# Patient Record
Sex: MALE | Race: WHITE | NOT HISPANIC OR LATINO | Employment: OTHER | URBAN - METROPOLITAN AREA
[De-identification: names, ages, dates, MRNs, and addresses within clinical notes are randomized per-mention and may not be internally consistent; named-entity substitution may affect disease eponyms.]

---

## 2023-09-09 ENCOUNTER — APPOINTMENT (OUTPATIENT)
Dept: GENERAL RADIOLOGY | Facility: HOSPITAL | Age: 88
DRG: 199 | End: 2023-09-09
Payer: MEDICARE

## 2023-09-09 ENCOUNTER — HOSPITAL ENCOUNTER (INPATIENT)
Facility: HOSPITAL | Age: 88
LOS: 20 days | Discharge: NURSING, REHAB OR LONG-TERM CARE | DRG: 199 | End: 2023-09-29
Attending: INTERNAL MEDICINE | Admitting: INTERNAL MEDICINE
Payer: MEDICARE

## 2023-09-09 PROBLEM — J44.1 COPD WITH ACUTE EXACERBATION: Status: ACTIVE | Noted: 2023-09-09

## 2023-09-09 PROBLEM — J96.01 ACUTE RESPIRATORY FAILURE WITH HYPOXIA: Status: ACTIVE | Noted: 2023-09-09

## 2023-09-09 PROBLEM — I46.9 CARDIAC ARREST: Status: ACTIVE | Noted: 2023-09-09

## 2023-09-09 LAB
ALBUMIN SERPL-MCNC: 3.8 G/DL (ref 3.5–5.2)
ALBUMIN/GLOB SERPL: 1.5 G/DL
ALP SERPL-CCNC: 109 U/L (ref 39–117)
ALT SERPL W P-5'-P-CCNC: 30 U/L (ref 1–41)
ANION GAP SERPL CALCULATED.3IONS-SCNC: 18 MMOL/L (ref 5–15)
AST SERPL-CCNC: 19 U/L (ref 1–40)
BILIRUB SERPL-MCNC: 0.3 MG/DL (ref 0–1.2)
BUN SERPL-MCNC: 25 MG/DL (ref 8–23)
BUN/CREAT SERPL: 21.2 (ref 7–25)
CALCIUM SPEC-SCNC: 9.3 MG/DL (ref 8.6–10.5)
CHLORIDE SERPL-SCNC: 106 MMOL/L (ref 98–107)
CO2 SERPL-SCNC: 18 MMOL/L (ref 22–29)
CREAT SERPL-MCNC: 1.18 MG/DL (ref 0.76–1.27)
D-LACTATE SERPL-SCNC: 8.1 MMOL/L (ref 0.5–2)
DEPRECATED RDW RBC AUTO: 47 FL (ref 37–54)
EGFRCR SERPLBLD CKD-EPI 2021: 60.1 ML/MIN/1.73
ERYTHROCYTE [DISTWIDTH] IN BLOOD BY AUTOMATED COUNT: 12.2 % (ref 12.3–15.4)
GLOBULIN UR ELPH-MCNC: 2.6 GM/DL
GLUCOSE SERPL-MCNC: 215 MG/DL (ref 65–99)
HCT VFR BLD AUTO: 36.3 % (ref 37.5–51)
HGB BLD-MCNC: 12.4 G/DL (ref 13–17.7)
INR PPP: 1.04 (ref 0.9–1.1)
LYMPHOCYTES # BLD MANUAL: 0.2 10*3/MM3 (ref 0.7–3.1)
LYMPHOCYTES NFR BLD MANUAL: 1 % (ref 5–12)
MCH RBC QN AUTO: 35.9 PG (ref 26.6–33)
MCHC RBC AUTO-ENTMCNC: 34.2 G/DL (ref 31.5–35.7)
MCV RBC AUTO: 105.2 FL (ref 79–97)
MONOCYTES # BLD: 0.2 10*3/MM3 (ref 0.1–0.9)
NEUTROPHILS # BLD AUTO: 19.61 10*3/MM3 (ref 1.7–7)
NEUTROPHILS NFR BLD MANUAL: 98 % (ref 42.7–76)
NRBC BLD AUTO-RTO: 0 /100 WBC (ref 0–0.2)
NT-PROBNP SERPL-MCNC: 2407 PG/ML (ref 0–1800)
PLAT MORPH BLD: NORMAL
PLATELET # BLD AUTO: 188 10*3/MM3 (ref 140–450)
PMV BLD AUTO: 9.7 FL (ref 6–12)
POTASSIUM SERPL-SCNC: 4.5 MMOL/L (ref 3.5–5.2)
PROCALCITONIN SERPL-MCNC: 0.33 NG/ML (ref 0–0.25)
PROT SERPL-MCNC: 6.4 G/DL (ref 6–8.5)
PROTHROMBIN TIME: 13.7 SECONDS (ref 11.7–14.2)
RBC # BLD AUTO: 3.45 10*6/MM3 (ref 4.14–5.8)
RBC MORPH BLD: NORMAL
SODIUM SERPL-SCNC: 142 MMOL/L (ref 136–145)
TROPONIN T SERPL HS-MCNC: 151 NG/L
VARIANT LYMPHS NFR BLD MANUAL: 1 % (ref 19.6–45.3)
WBC MORPH BLD: NORMAL
WBC NRBC COR # BLD: 20.01 10*3/MM3 (ref 3.4–10.8)

## 2023-09-09 PROCEDURE — 80053 COMPREHEN METABOLIC PANEL: CPT | Performed by: NURSE PRACTITIONER

## 2023-09-09 PROCEDURE — 84145 PROCALCITONIN (PCT): CPT | Performed by: NURSE PRACTITIONER

## 2023-09-09 PROCEDURE — 83605 ASSAY OF LACTIC ACID: CPT | Performed by: NURSE PRACTITIONER

## 2023-09-09 PROCEDURE — 0W9930Z DRAINAGE OF RIGHT PLEURAL CAVITY WITH DRAINAGE DEVICE, PERCUTANEOUS APPROACH: ICD-10-PCS

## 2023-09-09 PROCEDURE — 84484 ASSAY OF TROPONIN QUANT: CPT | Performed by: NURSE PRACTITIONER

## 2023-09-09 PROCEDURE — 93005 ELECTROCARDIOGRAM TRACING: CPT | Performed by: NURSE PRACTITIONER

## 2023-09-09 PROCEDURE — 85025 COMPLETE CBC W/AUTO DIFF WBC: CPT | Performed by: NURSE PRACTITIONER

## 2023-09-09 PROCEDURE — 93010 ELECTROCARDIOGRAM REPORT: CPT | Performed by: STUDENT IN AN ORGANIZED HEALTH CARE EDUCATION/TRAINING PROGRAM

## 2023-09-09 PROCEDURE — 85610 PROTHROMBIN TIME: CPT | Performed by: NURSE PRACTITIONER

## 2023-09-09 PROCEDURE — 71045 X-RAY EXAM CHEST 1 VIEW: CPT

## 2023-09-09 PROCEDURE — 85007 BL SMEAR W/DIFF WBC COUNT: CPT | Performed by: NURSE PRACTITIONER

## 2023-09-09 PROCEDURE — 83880 ASSAY OF NATRIURETIC PEPTIDE: CPT | Performed by: NURSE PRACTITIONER

## 2023-09-09 RX ORDER — ACETAMINOPHEN 160 MG/5ML
650 SOLUTION ORAL EVERY 4 HOURS PRN
Status: DISCONTINUED | OUTPATIENT
Start: 2023-09-09 | End: 2023-09-15

## 2023-09-09 RX ORDER — BISACODYL 5 MG/1
5 TABLET, DELAYED RELEASE ORAL DAILY PRN
Status: DISCONTINUED | OUTPATIENT
Start: 2023-09-09 | End: 2023-09-29 | Stop reason: HOSPADM

## 2023-09-09 RX ORDER — POLYETHYLENE GLYCOL 3350 17 G/17G
17 POWDER, FOR SOLUTION ORAL DAILY PRN
Status: DISCONTINUED | OUTPATIENT
Start: 2023-09-09 | End: 2023-09-29 | Stop reason: HOSPADM

## 2023-09-09 RX ORDER — METHYLPREDNISOLONE SODIUM SUCCINATE 125 MG/2ML
60 INJECTION, POWDER, LYOPHILIZED, FOR SOLUTION INTRAMUSCULAR; INTRAVENOUS EVERY 12 HOURS
Status: DISCONTINUED | OUTPATIENT
Start: 2023-09-09 | End: 2023-09-14

## 2023-09-09 RX ORDER — DEXTROSE MONOHYDRATE 25 G/50ML
25 INJECTION, SOLUTION INTRAVENOUS
Status: DISCONTINUED | OUTPATIENT
Start: 2023-09-09 | End: 2023-09-19

## 2023-09-09 RX ORDER — IPRATROPIUM BROMIDE AND ALBUTEROL SULFATE 2.5; .5 MG/3ML; MG/3ML
3 SOLUTION RESPIRATORY (INHALATION) EVERY 4 HOURS PRN
Status: DISCONTINUED | OUTPATIENT
Start: 2023-09-09 | End: 2023-09-29 | Stop reason: HOSPADM

## 2023-09-09 RX ORDER — MORPHINE SULFATE 2 MG/ML
2 INJECTION, SOLUTION INTRAMUSCULAR; INTRAVENOUS ONCE
Status: COMPLETED | OUTPATIENT
Start: 2023-09-10 | End: 2023-09-10

## 2023-09-09 RX ORDER — NITROGLYCERIN 0.4 MG/1
0.4 TABLET SUBLINGUAL
Status: DISCONTINUED | OUTPATIENT
Start: 2023-09-09 | End: 2023-09-29 | Stop reason: HOSPADM

## 2023-09-09 RX ORDER — BISACODYL 10 MG
10 SUPPOSITORY, RECTAL RECTAL DAILY PRN
Status: DISCONTINUED | OUTPATIENT
Start: 2023-09-09 | End: 2023-09-29 | Stop reason: HOSPADM

## 2023-09-09 RX ORDER — ALUMINA, MAGNESIA, AND SIMETHICONE 2400; 2400; 240 MG/30ML; MG/30ML; MG/30ML
15 SUSPENSION ORAL EVERY 6 HOURS PRN
Status: DISCONTINUED | OUTPATIENT
Start: 2023-09-09 | End: 2023-09-21

## 2023-09-09 RX ORDER — SODIUM CHLORIDE 0.9 % (FLUSH) 0.9 %
10 SYRINGE (ML) INJECTION AS NEEDED
Status: DISCONTINUED | OUTPATIENT
Start: 2023-09-09 | End: 2023-09-19

## 2023-09-09 RX ORDER — UREA 10 %
3 LOTION (ML) TOPICAL NIGHTLY PRN
Status: DISCONTINUED | OUTPATIENT
Start: 2023-09-09 | End: 2023-09-21

## 2023-09-09 RX ORDER — NICOTINE POLACRILEX 4 MG
15 LOZENGE BUCCAL
Status: DISCONTINUED | OUTPATIENT
Start: 2023-09-09 | End: 2023-09-19

## 2023-09-09 RX ORDER — ONDANSETRON 2 MG/ML
4 INJECTION INTRAMUSCULAR; INTRAVENOUS EVERY 6 HOURS PRN
Status: DISCONTINUED | OUTPATIENT
Start: 2023-09-09 | End: 2023-09-29 | Stop reason: HOSPADM

## 2023-09-09 RX ORDER — IBUPROFEN 600 MG/1
1 TABLET ORAL
Status: DISCONTINUED | OUTPATIENT
Start: 2023-09-09 | End: 2023-09-19

## 2023-09-09 RX ORDER — IPRATROPIUM BROMIDE AND ALBUTEROL SULFATE 2.5; .5 MG/3ML; MG/3ML
3 SOLUTION RESPIRATORY (INHALATION)
Status: DISCONTINUED | OUTPATIENT
Start: 2023-09-09 | End: 2023-09-14

## 2023-09-09 RX ORDER — ONDANSETRON 4 MG/1
4 TABLET, FILM COATED ORAL EVERY 6 HOURS PRN
Status: DISCONTINUED | OUTPATIENT
Start: 2023-09-09 | End: 2023-09-29 | Stop reason: HOSPADM

## 2023-09-09 RX ORDER — ACETAMINOPHEN 650 MG/1
650 SUPPOSITORY RECTAL EVERY 4 HOURS PRN
Status: DISCONTINUED | OUTPATIENT
Start: 2023-09-09 | End: 2023-09-29 | Stop reason: HOSPADM

## 2023-09-09 RX ORDER — SODIUM CHLORIDE 9 MG/ML
125 INJECTION, SOLUTION INTRAVENOUS CONTINUOUS
Status: DISCONTINUED | OUTPATIENT
Start: 2023-09-10 | End: 2023-09-11

## 2023-09-09 RX ORDER — INSULIN LISPRO 100 [IU]/ML
2-7 INJECTION, SOLUTION INTRAVENOUS; SUBCUTANEOUS
Status: DISCONTINUED | OUTPATIENT
Start: 2023-09-10 | End: 2023-09-19

## 2023-09-09 RX ORDER — AMOXICILLIN 250 MG
2 CAPSULE ORAL 2 TIMES DAILY
Status: DISCONTINUED | OUTPATIENT
Start: 2023-09-09 | End: 2023-09-29 | Stop reason: HOSPADM

## 2023-09-09 RX ORDER — ACETAMINOPHEN 325 MG/1
650 TABLET ORAL EVERY 4 HOURS PRN
Status: DISCONTINUED | OUTPATIENT
Start: 2023-09-09 | End: 2023-09-15

## 2023-09-10 ENCOUNTER — APPOINTMENT (OUTPATIENT)
Dept: GENERAL RADIOLOGY | Facility: HOSPITAL | Age: 88
DRG: 199 | End: 2023-09-10
Payer: MEDICARE

## 2023-09-10 PROBLEM — J93.9 PNEUMOTHORAX ON RIGHT: Status: ACTIVE | Noted: 2023-09-10

## 2023-09-10 PROBLEM — J96.21 ACUTE ON CHRONIC RESPIRATORY FAILURE WITH HYPOXIA: Status: ACTIVE | Noted: 2023-09-10

## 2023-09-10 LAB
ANION GAP SERPL CALCULATED.3IONS-SCNC: 9.4 MMOL/L (ref 5–15)
B PARAPERT DNA SPEC QL NAA+PROBE: NOT DETECTED
B PERT DNA SPEC QL NAA+PROBE: NOT DETECTED
BUN SERPL-MCNC: 29 MG/DL (ref 8–23)
BUN/CREAT SERPL: 29.3 (ref 7–25)
C PNEUM DNA NPH QL NAA+NON-PROBE: NOT DETECTED
CALCIUM SPEC-SCNC: 9.2 MG/DL (ref 8.6–10.5)
CHLORIDE SERPL-SCNC: 107 MMOL/L (ref 98–107)
CO2 SERPL-SCNC: 22.6 MMOL/L (ref 22–29)
CREAT SERPL-MCNC: 0.99 MG/DL (ref 0.76–1.27)
D-LACTATE SERPL-SCNC: 1 MMOL/L (ref 0.5–2)
D-LACTATE SERPL-SCNC: 2.2 MMOL/L (ref 0.5–2)
D-LACTATE SERPL-SCNC: 2.9 MMOL/L (ref 0.5–2)
D-LACTATE SERPL-SCNC: 4.5 MMOL/L (ref 0.5–2)
DEPRECATED RDW RBC AUTO: 47.4 FL (ref 37–54)
EGFRCR SERPLBLD CKD-EPI 2021: 74.2 ML/MIN/1.73
ERYTHROCYTE [DISTWIDTH] IN BLOOD BY AUTOMATED COUNT: 12.2 % (ref 12.3–15.4)
FLUAV SUBTYP SPEC NAA+PROBE: NOT DETECTED
FLUBV RNA ISLT QL NAA+PROBE: NOT DETECTED
GEN 5 2HR TROPONIN T REFLEX: 116 NG/L
GLUCOSE BLDC GLUCOMTR-MCNC: 136 MG/DL (ref 70–130)
GLUCOSE BLDC GLUCOMTR-MCNC: 138 MG/DL (ref 70–130)
GLUCOSE BLDC GLUCOMTR-MCNC: 143 MG/DL (ref 70–130)
GLUCOSE BLDC GLUCOMTR-MCNC: 156 MG/DL (ref 70–130)
GLUCOSE BLDC GLUCOMTR-MCNC: 163 MG/DL (ref 70–130)
GLUCOSE SERPL-MCNC: 173 MG/DL (ref 65–99)
HADV DNA SPEC NAA+PROBE: NOT DETECTED
HCOV 229E RNA SPEC QL NAA+PROBE: NOT DETECTED
HCOV HKU1 RNA SPEC QL NAA+PROBE: NOT DETECTED
HCOV NL63 RNA SPEC QL NAA+PROBE: NOT DETECTED
HCOV OC43 RNA SPEC QL NAA+PROBE: NOT DETECTED
HCT VFR BLD AUTO: 33.4 % (ref 37.5–51)
HGB BLD-MCNC: 11.4 G/DL (ref 13–17.7)
HMPV RNA NPH QL NAA+NON-PROBE: NOT DETECTED
HPIV1 RNA ISLT QL NAA+PROBE: NOT DETECTED
HPIV2 RNA SPEC QL NAA+PROBE: NOT DETECTED
HPIV3 RNA NPH QL NAA+PROBE: NOT DETECTED
HPIV4 P GENE NPH QL NAA+PROBE: NOT DETECTED
L PNEUMO1 AG UR QL IA: NEGATIVE
M PNEUMO IGG SER IA-ACNC: NOT DETECTED
MCH RBC QN AUTO: 35.7 PG (ref 26.6–33)
MCHC RBC AUTO-ENTMCNC: 34.1 G/DL (ref 31.5–35.7)
MCV RBC AUTO: 104.7 FL (ref 79–97)
PLATELET # BLD AUTO: 180 10*3/MM3 (ref 140–450)
PMV BLD AUTO: 9.9 FL (ref 6–12)
POTASSIUM SERPL-SCNC: 4.8 MMOL/L (ref 3.5–5.2)
QT INTERVAL: 339 MS
QTC INTERVAL: 467 MS
RBC # BLD AUTO: 3.19 10*6/MM3 (ref 4.14–5.8)
RHINOVIRUS RNA SPEC NAA+PROBE: DETECTED
RSV RNA NPH QL NAA+NON-PROBE: NOT DETECTED
S PNEUM AG SPEC QL LA: NEGATIVE
SARS-COV-2 RNA NPH QL NAA+NON-PROBE: NOT DETECTED
SODIUM SERPL-SCNC: 139 MMOL/L (ref 136–145)
TROPONIN T DELTA: -35 NG/L
WBC NRBC COR # BLD: 21.54 10*3/MM3 (ref 3.4–10.8)

## 2023-09-10 PROCEDURE — 25010000002 MORPHINE PER 10 MG: Performed by: INTERNAL MEDICINE

## 2023-09-10 PROCEDURE — 80048 BASIC METABOLIC PNL TOTAL CA: CPT | Performed by: INTERNAL MEDICINE

## 2023-09-10 PROCEDURE — 87449 NOS EACH ORGANISM AG IA: CPT | Performed by: INTERNAL MEDICINE

## 2023-09-10 PROCEDURE — 94640 AIRWAY INHALATION TREATMENT: CPT

## 2023-09-10 PROCEDURE — 94664 DEMO&/EVAL PT USE INHALER: CPT

## 2023-09-10 PROCEDURE — 83605 ASSAY OF LACTIC ACID: CPT | Performed by: NURSE PRACTITIONER

## 2023-09-10 PROCEDURE — 0202U NFCT DS 22 TRGT SARS-COV-2: CPT | Performed by: NURSE PRACTITIONER

## 2023-09-10 PROCEDURE — 71045 X-RAY EXAM CHEST 1 VIEW: CPT

## 2023-09-10 PROCEDURE — 25010000002 METHYLPREDNISOLONE PER 125 MG: Performed by: INTERNAL MEDICINE

## 2023-09-10 PROCEDURE — 94799 UNLISTED PULMONARY SVC/PX: CPT

## 2023-09-10 PROCEDURE — 85027 COMPLETE CBC AUTOMATED: CPT | Performed by: INTERNAL MEDICINE

## 2023-09-10 PROCEDURE — 94761 N-INVAS EAR/PLS OXIMETRY MLT: CPT

## 2023-09-10 PROCEDURE — 94760 N-INVAS EAR/PLS OXIMETRY 1: CPT

## 2023-09-10 PROCEDURE — 25010000002 MORPHINE PER 10 MG

## 2023-09-10 PROCEDURE — 84484 ASSAY OF TROPONIN QUANT: CPT | Performed by: NURSE PRACTITIONER

## 2023-09-10 PROCEDURE — 82948 REAGENT STRIP/BLOOD GLUCOSE: CPT

## 2023-09-10 PROCEDURE — 0W9930Z DRAINAGE OF RIGHT PLEURAL CAVITY WITH DRAINAGE DEVICE, PERCUTANEOUS APPROACH: ICD-10-PCS

## 2023-09-10 PROCEDURE — 99221 1ST HOSP IP/OBS SF/LOW 40: CPT | Performed by: STUDENT IN AN ORGANIZED HEALTH CARE EDUCATION/TRAINING PROGRAM

## 2023-09-10 PROCEDURE — 87040 BLOOD CULTURE FOR BACTERIA: CPT | Performed by: INTERNAL MEDICINE

## 2023-09-10 RX ORDER — PREDNISONE 1 MG/1
2 TABLET ORAL DAILY
COMMUNITY
End: 2023-09-29 | Stop reason: HOSPADM

## 2023-09-10 RX ORDER — ASPIRIN 81 MG/1
81 TABLET ORAL DAILY
Status: DISCONTINUED | OUTPATIENT
Start: 2023-09-10 | End: 2023-09-15

## 2023-09-10 RX ORDER — MIRTAZAPINE 15 MG/1
15 TABLET, ORALLY DISINTEGRATING ORAL NIGHTLY
Status: DISCONTINUED | OUTPATIENT
Start: 2023-09-10 | End: 2023-09-25

## 2023-09-10 RX ORDER — TAMSULOSIN HYDROCHLORIDE 0.4 MG/1
2 CAPSULE ORAL DAILY
COMMUNITY

## 2023-09-10 RX ORDER — TAMSULOSIN HYDROCHLORIDE 0.4 MG/1
0.8 CAPSULE ORAL DAILY
Status: DISCONTINUED | OUTPATIENT
Start: 2023-09-10 | End: 2023-09-29 | Stop reason: HOSPADM

## 2023-09-10 RX ORDER — ATORVASTATIN CALCIUM 40 MG/1
40 TABLET, FILM COATED ORAL DAILY
COMMUNITY

## 2023-09-10 RX ORDER — MORPHINE SULFATE 2 MG/ML
1 INJECTION, SOLUTION INTRAMUSCULAR; INTRAVENOUS
Status: DISPENSED | OUTPATIENT
Start: 2023-09-10 | End: 2023-09-17

## 2023-09-10 RX ORDER — MORPHINE SULFATE 2 MG/ML
2 INJECTION, SOLUTION INTRAMUSCULAR; INTRAVENOUS ONCE
Status: COMPLETED | OUTPATIENT
Start: 2023-09-10 | End: 2023-09-10

## 2023-09-10 RX ORDER — ALLOPURINOL 300 MG/1
300 TABLET ORAL DAILY
COMMUNITY

## 2023-09-10 RX ORDER — ASPIRIN 81 MG/1
81 TABLET, CHEWABLE ORAL DAILY
COMMUNITY

## 2023-09-10 RX ORDER — ATORVASTATIN CALCIUM 20 MG/1
40 TABLET, FILM COATED ORAL DAILY
Status: DISCONTINUED | OUTPATIENT
Start: 2023-09-10 | End: 2023-09-29 | Stop reason: HOSPADM

## 2023-09-10 RX ORDER — MORPHINE SULFATE 2 MG/ML
INJECTION, SOLUTION INTRAMUSCULAR; INTRAVENOUS
Status: COMPLETED
Start: 2023-09-10 | End: 2023-09-10

## 2023-09-10 RX ORDER — CYPROHEPTADINE HYDROCHLORIDE 4 MG/1
4 TABLET ORAL DAILY PRN
COMMUNITY
End: 2023-09-29 | Stop reason: HOSPADM

## 2023-09-10 RX ORDER — MIRTAZAPINE 15 MG/1
15 TABLET, ORALLY DISINTEGRATING ORAL NIGHTLY
COMMUNITY
End: 2023-09-29 | Stop reason: HOSPADM

## 2023-09-10 RX ORDER — ASCORBIC ACID 500 MG
500 TABLET ORAL DAILY
COMMUNITY

## 2023-09-10 RX ADMIN — SENNOSIDES AND DOCUSATE SODIUM 2 TABLET: 50; 8.6 TABLET ORAL at 21:30

## 2023-09-10 RX ADMIN — IPRATROPIUM BROMIDE AND ALBUTEROL SULFATE 3 ML: 2.5; .5 SOLUTION RESPIRATORY (INHALATION) at 15:25

## 2023-09-10 RX ADMIN — MORPHINE SULFATE 2 MG: 2 INJECTION, SOLUTION INTRAMUSCULAR; INTRAVENOUS at 03:43

## 2023-09-10 RX ADMIN — MIRTAZAPINE 15 MG: 15 TABLET, ORALLY DISINTEGRATING ORAL at 21:30

## 2023-09-10 RX ADMIN — IPRATROPIUM BROMIDE AND ALBUTEROL SULFATE 3 ML: 2.5; .5 SOLUTION RESPIRATORY (INHALATION) at 19:21

## 2023-09-10 RX ADMIN — MORPHINE SULFATE 1 MG: 2 INJECTION, SOLUTION INTRAMUSCULAR; INTRAVENOUS at 18:36

## 2023-09-10 RX ADMIN — IPRATROPIUM BROMIDE AND ALBUTEROL SULFATE 3 ML: 2.5; .5 SOLUTION RESPIRATORY (INHALATION) at 07:29

## 2023-09-10 RX ADMIN — IPRATROPIUM BROMIDE AND ALBUTEROL SULFATE 3 ML: 2.5; .5 SOLUTION RESPIRATORY (INHALATION) at 02:59

## 2023-09-10 RX ADMIN — IPRATROPIUM BROMIDE AND ALBUTEROL SULFATE 3 ML: 2.5; .5 SOLUTION RESPIRATORY (INHALATION) at 11:03

## 2023-09-10 RX ADMIN — METHYLPREDNISOLONE SODIUM SUCCINATE 60 MG: 125 INJECTION, POWDER, FOR SOLUTION INTRAMUSCULAR; INTRAVENOUS at 00:48

## 2023-09-10 RX ADMIN — SODIUM CHLORIDE 1000 ML: 9 INJECTION, SOLUTION INTRAVENOUS at 00:48

## 2023-09-10 RX ADMIN — ASPIRIN 81 MG: 81 TABLET, CHEWABLE ORAL at 12:36

## 2023-09-10 RX ADMIN — METHYLPREDNISOLONE SODIUM SUCCINATE 60 MG: 125 INJECTION, POWDER, FOR SOLUTION INTRAMUSCULAR; INTRAVENOUS at 12:36

## 2023-09-10 RX ADMIN — MORPHINE SULFATE 2 MG: 2 INJECTION, SOLUTION INTRAMUSCULAR; INTRAVENOUS at 00:10

## 2023-09-10 RX ADMIN — SODIUM CHLORIDE 125 ML/HR: 9 INJECTION, SOLUTION INTRAVENOUS at 02:03

## 2023-09-10 RX ADMIN — MORPHINE SULFATE 1 MG: 2 INJECTION, SOLUTION INTRAMUSCULAR; INTRAVENOUS at 06:39

## 2023-09-10 RX ADMIN — ACETAMINOPHEN 650 MG: 325 TABLET, FILM COATED ORAL at 21:30

## 2023-09-10 RX ADMIN — TAMSULOSIN HYDROCHLORIDE 0.8 MG: 0.4 CAPSULE ORAL at 12:36

## 2023-09-10 RX ADMIN — ATORVASTATIN CALCIUM 40 MG: 20 TABLET, FILM COATED ORAL at 12:36

## 2023-09-10 NOTE — PROGRESS NOTES
DAILY PROGRESS NOTE  [facility]    Patient Identification:  Name: Gabriel Lan  Age: 87 y.o.  Sex: male  :  1935  MRN: [id number]         Primary Care Physician: Provider, No Known    Subjective:  Interval History: Rib pains his main complaint.  He feels he is breathing a bit easier.  He is able to sit up and have some simple conversations with you.  He is son at bedside/supportive.  No issues with emesis or fever or chills    Objective: Elderly and quite frail but seems to be making a rally    Scheduled Meds:insulin lispro, 2-7 Units, Subcutaneous, 4x Daily AC & at Bedtime  ipratropium-albuterol, 3 mL, Nebulization, 4x Daily - RT  methylPREDNISolone sodium succinate, 60 mg, Intravenous, Q12H  senna-docusate sodium, 2 tablet, Oral, BID      Continuous Infusions:sodium chloride, 125 mL/hr, Last Rate: 125 mL/hr (09/10/23 0203)        Vital signs in last 24 hours:  Temp:  [97.5 °F (36.4 °C)-97.9 °F (36.6 °C)] 97.9 °F (36.6 °C)  Heart Rate:  [] 73  Resp:  [16-28] 16  BP: (149-183)/(65-99) 162/65    Intake/Output:  No intake or output data in the 24 hours ending 09/10/23 1032    Exam:  /65 (BP Location: Left arm, Patient Position: Lying)   Pulse 73   Temp 97.9 °F (36.6 °C) (Oral)   Resp 16   SpO2 98%     General Appearance:    Alert, cooperative, nontoxic, hard of hearing                         Throat:   Oral mucosa pink and moist                           Neck:   No JVD                         Lungs:    Diffuse wheeze and rhonchi and crepitus throughout to auscultation bilaterally, respirations unlabored with simple conversation                 Chest Wall:  Chest tube to right side with tenderness to palpation over fractures                          Heart:    Regular rate and rhythm, S1 and S2 normal                  Abdomen:     Soft, nontender, bowel sounds active                 Extremities:   Nno cyanosis or edema                        Pulses:   Pulses palpable in all  extremities                  Neurologic:   CNII-XII intact     Data Review:  Labs in chart were reviewed.    Assessment:  Active Hospital Problems    Diagnosis  POA    **COPD with acute exacerbation [J44.1]  Yes    Pneumothorax on right [J93.9]  Unknown    Cardiac arrest [I46.9]  Unknown    Acute respiratory failure with hypoxia [J96.01]  Unknown      Resolved Hospital Problems   No resolved problems to display.       Plan:    Rhinovirus with COPD acute exacerbation with large right pneumothorax status post chest tube per pulmonary.  Pulmonary considering nerve block for better pain control   -Serial chest x-ray demonstrating improvement   -Solu-Medrol nebs    Status postcardiac arrest with subsequent multiple rib fractures    Acute hypoxic respiratory failure on supplemental O2 but will wean accordingly    PAF-RC with recent cardiac arrest -cardiology consult placed    Lactic acidosis with no source of infection    Home MAR reviewed -multiple meds resumed    Keo Houston MD  9/10/2023  10:32 EDT

## 2023-09-10 NOTE — PROGRESS NOTES
LAZARO Vizcarra    [address]    9/10/2023    Patient ID:  Name:  Gabriel Lan  MRN:  [id number]  1935  87 y.o.  male            CC/Reason for visit: Large right-sided pneumothorax, COPD exacerbation, acute hypoxic respiratory failure, status post cardiac arrest    HPI:  Patient admitted last night after cardiac arrest.  Chest x-ray revealed large right-sided pneumothorax.  Chest tube placement attempted with significant pain-attempted to redirect without success.  Chest tube removed with plans for repeat imaging.  Post chest tube placement chest x-ray showed reinflation of right lung.    Called urgently to bedside due to patient impending doom, wheezing, complaining of shortness of breath.  Chest x-ray ordered stat showing collapse of right lung.  Assessment revealed subcutaneous emphysema around old chest tube placement site.    ROS:  Positive for shortness of breath, chest pain.    Vitals:  Vitals:    09/10/23 0200 09/10/23 0300 09/10/23 0312 09/10/23 0323   BP:   170/99 174/91   BP Location:       Patient Position:       Pulse: 79 106 113    Resp:  28 28    Temp:       TempSrc:       SpO2: 97% 93% 93%            There is no height or weight on file to calculate BMI.  No intake or output data in the 24 hours ending 09/10/23 0347    Exam:  Constitutional: Elderly male pt in bed, + acute respiratory distress, + accessory muscle use  Head: - NCAT  Eyes: No pallor, Anicteric conjunctiva, EOMI.  ENMT:  Mallampati 3, no oral thrush. Dry MM.   NECK: Trachea midline, No thyromegaly, no palpable cervical lymphadenopathy  Heart: Tachycardic rate, regular rhythm, no murmur. No pedal edema   Lungs: MARCELA +, + wheezing, diminished lung sounds over right side, subcutaneous air around lateral right side.  Abdomen: Soft. No tenderness, guarding or rigidity. No palpable masses  Extremities: Extremities warm and well perfused. No cyanosis/ clubbing  Neuro: Conscious, answers appropriately, no gross  focal neuro deficits  Psych: Mood and affect appropriate, anxious    PPE recommended per [facility] infectious disease Isolation protocol for the current clinical scenario(as mentioned below) was followed.      Scheduled meds:  insulin lispro, 2-7 Units, Subcutaneous, 4x Daily AC & at Bedtime  ipratropium-albuterol, 3 mL, Nebulization, 4x Daily - RT  methylPREDNISolone sodium succinate, 60 mg, Intravenous, Q12H  senna-docusate sodium, 2 tablet, Oral, BID      IV meds:                      sodium chloride, 125 mL/hr, Last Rate: 125 mL/hr (09/10/23 0203)        Data Review:   I reviewed the patient's medications and new clinical results.  Lab Results   Component Value Date    CALCIUM 9.3 09/09/2023     Results from last 7 days   Lab Units 09/09/23  2218   SODIUM mmol/L 142   POTASSIUM mmol/L 4.5   CHLORIDE mmol/L 106   CO2 mmol/L 18.0*   BUN mg/dL 25*   CREATININE mg/dL 1.18   CALCIUM mg/dL 9.3   BILIRUBIN mg/dL 0.3   ALK PHOS U/L 109   ALT (SGPT) U/L 30   AST (SGOT) U/L 19   GLUCOSE mg/dL 215*   WBC 10*3/mm3 20.01*   HEMOGLOBIN g/dL 12.4*   PLATELETS 10*3/mm3 188   INR  1.04   PROBNP pg/mL 2,407.0*   PROCALCITONIN ng/mL 0.33*         Microbiology Results (last 10 days)       Procedure Component Value - Date/Time    Respiratory Panel PCR w/COVID-19(SARS-CoV-2) ALLA/RIMA/CHIDI/PAD/COR/MAD/LAURA In-House, NP Swab in UTM/[facility], 3-4 HR TAT - Swab, Nasopharynx [[id number]]  (Abnormal) Collected: 09/10/23 0102    Lab Status: Final result Specimen: Swab from Nasopharynx Updated: 09/10/23 0224     ADENOVIRUS, PCR Not Detected     Coronavirus 229E Not Detected     Coronavirus HKU1 Not Detected     Coronavirus NL63 Not Detected     Coronavirus OC43 Not Detected     COVID19 Not Detected     Human Metapneumovirus Not Detected     Human Rhinovirus/Enterovirus Detected     Influenza A PCR Not Detected     Influenza B PCR Not Detected     Parainfluenza Virus 1 Not Detected     Parainfluenza Virus 2 Not Detected     Parainfluenza Virus  3 Not Detected     Parainfluenza Virus 4 Not Detected     RSV, PCR Not Detected     Bordetella pertussis pcr Not Detected     Bordetella parapertussis PCR Not Detected     Chlamydophila pneumoniae PCR Not Detected     Mycoplasma pneumo by PCR Not Detected    Narrative:      In the setting of a positive respiratory panel with a viral infection PLUS a negative procalcitonin without other underlying concern for bacterial infection, consider observing off antibiotics or discontinuation of antibiotics and continue supportive care. If the respiratory panel is positive for atypical bacterial infection (Bordetella pertussis, Chlamydophila pneumoniae, or Mycoplasma pneumoniae), consider antibiotic de-escalation to target atypical bacterial infection.            Results from last 7 days   Lab Units 09/09/23  2218   HSTROP T ng/L 151*           CrCl cannot be calculated (Unknown ideal weight.).      ASSESSMENT:   COPD, acute exacerbation  Rhinovirus infection  Acute respiratory failure with hypoxia  Large right sided pneumothorax  Right sided rib fractures, acute versus subacute  Hypertension     PLAN:  Chest x-ray performed stat showing collapse of the right lung.  Patient just received his as needed albuterol treatment for wheezing.  Oxygen saturation remained above 94% on 4 L nasal cannula.  Discussed plan of care with patient and son at bedside-they would like to proceed with replacing chest tube for right-sided pneumothorax.  Risks and indication for procedure discussed.    See separate procedure note.    Transfer patient to vascular thoracic floor for chest tube management.  Morphine 1 mg every 2 hours ordered for severe pain.  Keep chest tube at -20 of suction for now.    Total critical care time: 25 minutes    While I was in the room and during my examination of the patient I were gown, gloves, mask, eye protection and washed my hands before and after the encounter.  Proper enhanced droplet precautions precautions and  isolation precautions were taken.    Edith Shearer, APRN  9/10/2023

## 2023-09-10 NOTE — PROCEDURES
Chest Tube Insertion Procedure Note    Indications:  Clinically significant Pneumothorax    Pre-op Diagnosis:   Large right pneumothorax    Post-Op Diagnosis Codes:   Same as above.    Anesthesia: Local    Procedure Details:   Informed consent was obtained for the procedure, including sedation.  Risks of lung perforation, hemorrhage, arrhythmia, and adverse drug reaction were discussed.     After sterile skin prep, using standard technique, a 14 Lithuanian tube was placed in the right lateral 5th rib space.    Findings:  None    Estimated Blood Loss:  Minimal           Specimens: None                Complications:  Patient did not tolerate procedure secondary to significant pain after insertion. CXR confirmed placement and reexpansion of right lung. Air leak present in chest tube drainage. Plan to redirect chest tube- however unable to due to patient agitation and significant pain. CT removed.           Disposition: Remain on floor.           Condition: stable

## 2023-09-10 NOTE — PLAN OF CARE
Goal Outcome Evaluation:  Plan of Care Reviewed With: patient        Progress: no change  Outcome Evaluation: Pt admitted from [facility] hospital with SOB and s/p cardiac arrest due to reaction to rocephin. Patient arrived on Bipap, alert and oriented. O2 titrated down to 4L. Patient alert and oriented, son at bedside. Chest xray showed large right pneumo. Chest tube attempted to be place 2 times but patient unable to tolerate due to pain. Lung reinflated after chest tube attempt. Told to monitor for distress. Patient showed signs of increased work of breathing and increase in wheezing. Stat chest xray showed another large right pneumno. Pulmonary to bedside and chest tube was place successfully. Patient tolerated well. Transfer to cardiac floor.

## 2023-09-10 NOTE — CONSULTS
CONSULT NOTE    Patient Identification:  Gabriel Lan  87 y.o.  male  1935  [id number]            Requesting physician: Dr. Rueda    Reason for Consultation: COPD exacerbation, hypoxia    CC: Shortness of breath, cough    History of Present Illness:  Gabriel Lan is an 87-year-old male with a past medical history of asthma and hypertension.  He denies history of stroke, coronary artery disease, diabetes.  He is a lifelong non-smoker.  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    He presented to an outside emergency room on 9/9/2023 with complaints of 2 days of shortness of breath and productive cough.  Patient reports he has not slept in 2 days because he has been coughing continuously.  He reports some wheezing, not improved by his albuterol nebulizer.  At the outpatient hospital, he was diagnosed with COPD exacerbation and presumed pneumonia.  He was started on Rocephin and subsequently thereafter developed cardiac arrest.  He received 1 round of CPR with ROSC.  Upon ROSC, EKG showed rapid atrial fibrillation.  He was started on BiPAP therapy with significant improvement in clinical condition and patient was transferred to [facility] as outside hospital not have cardiology coverage.  He is currently on 4 L nasal cannula (not on oxygen at home), sounds hoarse, reports chest soreness from CPR, endorses productive cough with white phlegm.    ED evaluation revealed high-sensitivity troponin of 151, proBNP of 2407, serum bicarbonate 18.0, lactate 8.1, procalcitonin 0.33, WBC 20.01.  Chest x-ray upon arrival to [facility] showed a large right-sided pneumothorax with left-sided mediastinal shift.    Review of Systems:  Constitutional: Negative for fever, chills, unexpected weight change. Positive for fatigue, denies weakness.  HEENT: Negative for congestion, sinus pain or pressure.  Respiratory: Positive for productive cough, shortness of breath,  wheezing.  Cardiac: Positive for chest wall discomfort.  Negative for palpitations or leg swelling.  GI: Negative for abdominal distention, constipation, diarrhea, nausea or vomiting.  : Negative for dysuria, frequency, urgency or flank pain.  Musculoskeletal: Negative for arthralgias or myalgias.  Neuro: Negative for dizziness, lightheadedness, weakness or headache.    Past Medical History:  No past medical history on file.    Past Surgical History:  No past surgical history on file.     Home Meds:  No medications prior to admission.       Allergies:  Allergies   Allergen Reactions    Rocephin [Ceftriaxone] Anaphylaxis    Iodinated Contrast Media Arrhythmia and Unknown - High Severity       Social History:   Social History     Socioeconomic History    Marital status:        Family History:  No family history on file.    Physical Exam:  /85 (BP Location: Right arm, Patient Position: Lying)   Pulse 110   Resp 24   SpO2 90%  There is no height or weight on file to calculate BMI. 90%      Physical Exam:  Constitutional: Elderly male pt in bed, No acute respiratory distress, no accessory muscle use  Head: - NCAT  Eyes: No pallor, Anicteric conjunctiva, EOMI.  ENMT:  Mallampati 3, no oral thrush. Dry MM.   NECK: Trachea midline, No thyromegaly, no palpable cervical lymphadenopathy  Heart: RRR, no murmur. No pedal edema   Lungs: MARCELA +, Clear left lung, diminished/ absent breath sounds over right lung  Abdomen: Soft. No tenderness, guarding or rigidity. No palpable masses  Extremities: Extremities warm and well perfused. No cyanosis/ clubbing  Neuro: Conscious, answers appropriately, no gross focal neuro deficits  Psych: Mood and affect appropriate    PPE recommended per [facility] infectious disease Isolation protocol for the current clinical scenario(as mentioned below) was followed.      LABS:  No results found for: COVID19    Lab Results   Component Value Date    CALCIUM 9.3 09/09/2023      Results from last 7 days   Lab Units 09/09/23  2218   SODIUM mmol/L 142   POTASSIUM mmol/L 4.5   CHLORIDE mmol/L 106   CO2 mmol/L 18.0*   BUN mg/dL 25*   CREATININE mg/dL 1.18   GLUCOSE mg/dL 215*   CALCIUM mg/dL 9.3   WBC 10*3/mm3 20.01*   HEMOGLOBIN g/dL 12.4*   PLATELETS 10*3/mm3 188   ALT (SGPT) U/L 30   AST (SGOT) U/L 19   PROBNP pg/mL 2,407.0*   PROCALCITONIN ng/mL 0.33*     No results found for: CKTOTAL, CKMB, CKMBINDEX, TROPONINI, TROPONINT          Results from last 7 days   Lab Units 09/09/23  2218   PROCALCITONIN ng/mL 0.33*             Results from last 7 days   Lab Units 09/09/23  2218   INR  1.04         No results found for: TSH  CrCl cannot be calculated (Unknown ideal weight.).         Imaging: I personally visualized the images of scans/x-rays performed within last 3 days.      Assessment:  COPD, acute exacerbation  Rhinovirus infection  Acute respiratory failure with hypoxia  Large right sided pneumothorax  Right sided rib fractures, acute versus subacute  Hypertension    Recommendations:  Scheduled DuoNebs.  Continue IV Solu-Medrol.  Continue supplemental oxygen to maintain SPO2 greater than 88%.  Chest x-ray reviewed showing a large right-sided pneumothorax, recommend urgent chest tube placement at bedside.    Addendum:  Chest tube placed on right side, patient had significant pain.  Confirmed position with chest x-ray and air leak in atrium.  Attempted to redirect tube without success, chest tube removed and site sealed with occlusive dressing.  Plan for repeat chest x-ray in 4 hours.  Call rapid response/intensivist if patient develops worsening hypoxia, tachycardia, hypertension.    Patient was placed in face mask upon entering room and kept mask on throughout our encounter. I wore full protective equipment throughout this patient encounter including a face mask, gown and gloves. Hand hygiene was performed before donning protective equipment and after removal when leaving the  room.    Edith Shearer, APRN  9/9/2023  23:03 EDT      Much of this encounter note is an electronic transcription/translation of spoken language to printed text using Dragon Software.

## 2023-09-10 NOTE — PROCEDURES
Chest Tube Insertion Procedure Note    Indications:  Clinically significant Pneumothorax    Pre-op Diagnosis:   Large right-sided pneumothorax    Post-Op Diagnosis Codes:  Same as above    Anesthesia: Local    Procedure Details:   Informed consent was obtained for the procedure, including sedation.  Risks of lung perforation, hemorrhage, arrhythmia, and adverse drug reaction were discussed.     After sterile skin prep, using standard technique, a 14 Romansh tube was placed in the right lateral fourth rib space.    Findings:  None    Estimated Blood Loss:  Minimal           Specimens: None                Complications:  None; patient tolerated the procedure well.           Disposition: Vascular thoracic floor           Condition: stable

## 2023-09-10 NOTE — PROGRESS NOTES
LOS: 1 day   Patient Care Team:  Provider, No Known as PCP - General    Subjective     Picking up pulmonary coverage from Dr. Poole have reviewed his nurse practitioner's notes and other records.  Patient presented to outside hospital with respiratory distress COPD exacerbation he separately been found to have an RSV infection he developed a cardiac arrest at the outside facility and post resuscitation some paroxysmal A-fib.  X-rays here revealed a right pneumothorax he had a chest tube placed had severe pain had the tube removed lung very collapsed and had to replace the chest tube.  he is having some pain in his right anterior chest does hurt to move or breathe  Review of Systems:          Objective     Vital Signs  Vital Sign Min/Max for last 24 hours  Temp  Min: 97.5 °F (36.4 °C)  Max: 97.9 °F (36.6 °C)   BP  Min: 149/85  Max: 183/80   Pulse  Min: 73  Max: 113   Resp  Min: 16  Max: 28   SpO2  Min: 90 %  Max: 98 %   Flow (L/min)  Min: 4  Max: 7   No data recorded        Ventilator/Non-Invasive Ventilation Settings (From admission, onward)      None                         There is no height or weight on file to calculate BMI.  No intake/output data recorded.  No intake/output data recorded.        Physical Exam:  General Appearance: Well-developed elderly male resting in bed he was sleeping on my arrival and not in acute distress  Eyes: Conjunctiva are clear and anicteric  ENT: Mucous membranes are little dry no erythema no exudates  Neck: No jugular venous tension, trachea midline  Lungs: Right chest tube with small air leak he has some crepitance across the right chest the left is clear  Cardiac: Regular rate rhythm no murmur  Abdomen: Soft no palpable hepatosplenomegaly or masses  : Not examined  Musculoskeletal: Grossly normal  Skin: Warm and dry no jaundice no petechiae  Neuro: He is alert he is very hard of hearing but he is cooperative  Extremities/P Vascular: No clubbing no cyanosis no edema  palpable radial and dorsalis pedis pulses bilaterally  MSE: Appropriate       Labs:  Results from last 7 days   Lab Units 09/09/23 2218   GLUCOSE mg/dL 215*   SODIUM mmol/L 142   POTASSIUM mmol/L 4.5   CO2 mmol/L 18.0*   CHLORIDE mmol/L 106   ANION GAP mmol/L 18.0*   CREATININE mg/dL 1.18   BUN mg/dL 25*   BUN / CREAT RATIO  21.2   CALCIUM mg/dL 9.3   ALK PHOS U/L 109   TOTAL PROTEIN g/dL 6.4   ALT (SGPT) U/L 30   AST (SGOT) U/L 19   BILIRUBIN mg/dL 0.3   ALBUMIN g/dL 3.8   GLOBULIN gm/dL 2.6     CrCl cannot be calculated (Unknown ideal weight.).      Results from last 7 days   Lab Units 09/09/23 2218   WBC 10*3/mm3 20.01*   RBC 10*6/mm3 3.45*   HEMOGLOBIN g/dL 12.4*   HEMATOCRIT % 36.3*   MCV fL 105.2*   MCH pg 35.9*   MCHC g/dL 34.2   RDW % 12.2*   RDW-SD fl 47.0   MPV fL 9.7   PLATELETS 10*3/mm3 188   NEUTROS ABS 10*3/mm3 19.61*   NRBC /100 WBC 0.0         Results from last 7 days   Lab Units 09/09/23 2218   HSTROP T ng/L 151*     Results from last 7 days   Lab Units 09/09/23 2218   PROBNP pg/mL 2,407.0*         Results from last 7 days   Lab Units 09/09/23 2218   LACTATE mmol/L 8.1*   PROCALCITONIN ng/mL 0.33*     Results from last 7 days   Lab Units 09/09/23 2218   INR  1.04     Microbiology Results (last 10 days)       Procedure Component Value - Date/Time    Respiratory Panel PCR w/COVID-19(SARS-CoV-2) [facility]A/CH[facility]I/PAD/[facility]AD/LAURA In-House, NP Swab in UTM/VTM, 3-4 HR TAT - Swab, Nasopharynx [[id number]]  (Abnormal) Collected: 09/10/23 0102    Lab Status: Final result Specimen: Swab from Nasopharynx Updated: 09/10/23 0224     ADENOVIRUS, PCR Not Detected     Coronavirus 229E Not Detected     Coronavirus HKU1 Not Detected     Coronavirus NL63 Not Detected     Coronavirus OC43 Not Detected     COVID19 Not Detected     Human Metapneumovirus Not Detected     Human Rhinovirus/Enterovirus Detected     Influenza A PCR Not Detected     Influenza B PCR Not Detected     Parainfluenza Virus 1 Not Detected      Parainfluenza Virus 2 Not Detected     Parainfluenza Virus 3 Not Detected     Parainfluenza Virus 4 Not Detected     RSV, PCR Not Detected     Bordetella pertussis pcr Not Detected     Bordetella parapertussis PCR Not Detected     Chlamydophila pneumoniae PCR Not Detected     Mycoplasma pneumo by PCR Not Detected    Narrative:      In the setting of a positive respiratory panel with a viral infection PLUS a negative procalcitonin without other underlying concern for bacterial infection, consider observing off antibiotics or discontinuation of antibiotics and continue supportive care. If the respiratory panel is positive for atypical bacterial infection (Bordetella pertussis, Chlamydophila pneumoniae, or Mycoplasma pneumoniae), consider antibiotic de-escalation to target atypical bacterial infection.                insulin lispro, 2-7 Units, Subcutaneous, 4x Daily AC & at Bedtime  ipratropium-albuterol, 3 mL, Nebulization, 4x Daily - RT  methylPREDNISolone sodium succinate, 60 mg, Intravenous, Q12H  senna-docusate sodium, 2 tablet, Oral, BID      sodium chloride, 125 mL/hr, Last Rate: 125 mL/hr (09/10/23 0203)        Diagnostics:  XR Chest 1 View    Addendum Date: 9/10/2023    ADDENDUM: 09 10 23 03:49 Call Doctor Regarding Pneumothorax, called Charge Nurse Selvin  on 09 10 03:49 (-04:00)     Result Date: 9/10/2023  CR Patient: JUANCARLOS DUMONT  Time Out: 03:43 Exam(s): XR CXR 1 VIEW EXAM:   XR Chest, 1 View CLINICAL HISTORY:    Reason for exam: Follow-up pneumothorax. TECHNIQUE:   Frontal view of the chest. COMPARISON:   September 10, 2023 at 0016 hours FINDINGS:   Lungs:  Unremarkable.  No consolidation.   Pleural space:  See below.    Heart:  The cardiac silhouette is mildly enlarged.   Mediastinum:  Unremarkable.   Bones joints:  Unremarkable.   Soft tissues:  There is gas in the soft tissues over the right chest.   Vasculature:  The aortic arch is mildly calcified but nondilated.   Tubes, lines and devices:  The  right-sided chest tube has been removed.  There is a new, large, 60% right-sided pneumothorax. IMPRESSION:       The right-sided chest tube has been removed.  There is a new, large, 60% right-sided pneumothorax.     Communications:  Call Doctor Pneumothorax Electronically signed by Jenaro Almanza MD on 09-10-23 at 0343    XR Chest 1 View    Result Date: 9/10/2023  Patient: JUANCARLOS DUMONT  Time Out: 03:21 Exam(s): XR CXR 1 VIEW EXAM:   XR Chest, 1 View CLINICAL HISTORY:    Reason for exam: chest tube placement. TECHNIQUE:   Frontal view of the chest. COMPARISON:   No relevant prior studies available. FINDINGS:   Lungs:  Areas of consolidation in the right lower lobe consistent with atelectasis or pneumonia.   Pleural space:  See below.    Heart:  Unremarkable.  No cardiomegaly.   Mediastinum:  Unremarkable.   Bones joints:  Unremarkable.   Vasculature:  The aortic arch is mildly calcified but nondilated.   Tubes, lines and devices:  There is a locking loop drain in the right lower hemithorax.  No pneumothorax is identified. IMPRESSION:     1.  Areas of consolidation in the right lower lobe consistent with atelectasis or pneumonia. 2.  There is a locking loop drain in the right lower hemithorax.  No pneumothorax is identified.     Electronically signed by Jenaro Almanza MD on 09-10-23 at 0321    XR Chest 1 View    Result Date: 9/9/2023  Clinical: Post cardiopulmonary arrest, hypoxia  COMPARISON: None  FINDINGS: Subtle right rib angulation deformities, acute versus old fractures.  Large right-sided pneumothorax. Suggestion of subtle mediastinal shift towards the left.  The left lung is clear. Cardiac size within normal limits. There is atherosclerotic calcification of the aorta. Mild prominence of the pulmonary vasculature, question mild congestion.  FINDINGS called to the patient's nurse at 11:25 p.m. Attending to be immediately notified.  This report was finalized on 9/9/2023 11:29 PM by Dr. Benjamín Mackay M.D.           Reviewed series of chest x-rays    Active Hospital Problems    Diagnosis  POA    **COPD with acute exacerbation [J44.1]  Yes    Pneumothorax on right [J93.9]  Unknown    Cardiac arrest [I46.9]  Unknown    Acute respiratory failure with hypoxia [J96.01]  Unknown      Resolved Hospital Problems   No resolved problems to display.         Assessment & Plan     Status postcardiac arrest  Pneumothorax question could this have been the cause of his arrest or caused by CPR rib fractures.  Continue chest tube to suction for now.  Follow-up x-ray in the a.m.  Rhinovirus infection treatment is purely symptomatic  Acute exacerbation COPD probably secondary to rhinovirus infection bronchodilators and steroids.  Acute hypoxic respiratory failure secondary to above wean O2 as tolerated  Paroxysmal A-fib looks to be in sinus rhythm now  Right-sided rib fractures no fall probably related to CPR if pain is not adequately controlled may need block  CHF suspected based on elevated proBNP and normal creatinine  Lactic acidosis probably secondary to all above no definite acute pneumonia seen on x-rays    Plan for disposition:    Dami Villafuerte Jr, MD  09/10/23  08:48 EDT    Time:

## 2023-09-10 NOTE — H&P
[facility] Admission H&P    Patient Care Team:  Provider, No Known as PCP - General    Chief complaint: Shortness of breath, cough    History of Present Illness    This is an 87-year-old male with history of COPD who presented to an outside emergency room with a 2-day history of shortness of breath and cough productive of white phlegm.  He was diagnosed with acute exacerbation of COPD.  Evidently there was some concern for pneumonia as well.  [facility] upon administration of Rocephin and a clonidine tablet he developed cardiac arrest and required 1 round of CPR with return of pulse thereafter.  Repeat EKG showed rapid atrial fibrillation.  He subsequently required BiPAP but did show fairly significant improvement in his clinical condition.  He was felt unsuitable to be admitted to the outside emergency room due to lack of cardiology coverage and we were asked to admit.  The case was initially discussed with the intensivist at this facility who did not feel that he needed ICU level care.  He was excepted earlier in the day by Dr. Granado and I am seeing him as the provider on-call now that he has arrived.  Presently the patient complains of soreness in his chest after receiving the CPR.  He still has some shortness of breath.  His son at the bedside notes that he does not wear oxygen at home.  Currently requiring 4 L.  Patient is asking if he can eat and when he can go home.    No past medical history on file.  No past surgical history on file.  No family history on file.     No medications prior to admission.     Allergies:  Rocephin [ceftriaxone] and Iodinated contrast media    Review of Systems   Constitutional:  Negative for chills and fever.   HENT:  Negative for congestion and sore throat.    Eyes:  Negative for visual disturbance.   Respiratory:  Positive for cough and shortness of breath. Negative for chest tightness and wheezing.    Cardiovascular:  Negative  for chest pain, palpitations and leg swelling.   Gastrointestinal:  Negative for abdominal distention, abdominal pain, diarrhea, nausea and vomiting.   Endocrine: Negative for polydipsia and polyuria.   Genitourinary:  Negative for difficulty urinating, dysuria, frequency and urgency.   Musculoskeletal:  Negative for arthralgias and myalgias.   Skin:  Negative for color change and rash.   Neurological:  Negative for dizziness and light-headedness.      PHYSICAL EXAM    Vital Signs  tMax 24 hrs:  No data recorded.    Vitals Ranges:  Heart Rate:  [110] 110  Resp:  [24] 24  BP: (149)/(85) 149/85    Physical Exam  Vitals and nursing note reviewed.   Constitutional:       General: He is not in acute distress.     Appearance: He is well-developed. He is not ill-appearing.      Comments: Looks his stated age and chronically ill-appearing   HENT:      Head: Normocephalic and atraumatic.      Nose: Nose normal.      Mouth/Throat:      Mouth: Mucous membranes are not dry.   Eyes:      Conjunctiva/sclera: Conjunctivae normal.      Pupils: Pupils are equal, round, and reactive to light.   Neck:      Vascular: No JVD.   Cardiovascular:      Rate and Rhythm: Regular rhythm. Tachycardia present.      Heart sounds: No murmur heard.    No gallop.   Pulmonary:      Effort: Pulmonary effort is normal. No accessory muscle usage or respiratory distress.      Breath sounds: Wheezing present. No decreased breath sounds.      Comments: Diminished breath sounds throughout.  No respiratory distress  Abdominal:      General: Bowel sounds are normal. There is no distension.      Palpations: Abdomen is soft.      Tenderness: There is no abdominal tenderness. There is no guarding or rebound.   Musculoskeletal:         General: No deformity. Normal range of motion.      Cervical back: Normal range of motion and neck supple.   Lymphadenopathy:      Cervical: No cervical adenopathy.   Skin:     General: Skin is warm and dry.      Findings: No  erythema or rash.   Neurological:      Mental Status: He is alert and oriented to person, place, and time.      Cranial Nerves: No cranial nerve deficit.       Results Review:  Results from last 7 days   Lab Units 09/09/23  2218   WBC 10*3/mm3 20.01*   HEMOGLOBIN g/dL 12.4*   HEMATOCRIT % 36.3*   PLATELETS 10*3/mm3 188           Invalid input(s): LABALBU, PROT     I reviewed the patient's new clinical results.  I reviewed the patient's new imaging results and agree with the interpretation.  I personally viewed and interpreted the patient's EKG/Telemetry data        Active Hospital Problems    Diagnosis  POA    **COPD with acute exacerbation [J44.1]  Yes    Cardiac arrest [I46.9]  Unknown    Acute respiratory failure with hypoxia [J96.01]  Unknown      Resolved Hospital Problems   No resolved problems to display.       Assessment & Plan    The patient has been directly admitted to this facility for further evaluation/management of his acute exacerbation of COPD as well as the witnessed cardiac arrest that responded well to 1 round of CPR.  He currently is requiring 4 L and has wheezing on exam.  Will initiate IV steroids and bronchodilators along with pulmonary hygiene measures.  Consult pulmonology.  We will also consult cardiology.  Check echocardiogram.  Awaiting repeat lab work to result.  Chest x-ray from the outside emergency room did not reveal any evidence of pneumonia.  I will repeat one tonight but hold off on any further antibiotics for the time being.  Repeat EKG here shows sinus tachycardia without evidence of atrial fibrillation at this time.  Monitor on telemetry.  Additional plans based on his clinical course.    I discussed the patients findings and my recommendations with patient, family, and nursing staff      Raul Rueda MD  09/09/23  22:47 EDT    Addendum: Further work-up has now resulted showing elevated lactic acid of 8.1, troponin of 151, white count of 20, and chest x-ray has  revealed a large right-sided pneumothorax with possibly some mediastinal shift.  Stat consult has been placed for pulmonology/intensivist service for chest tube and potential transfer to ICU.

## 2023-09-10 NOTE — NURSING NOTE
Patient arrived from [facility] with respiratory distress and SOB. Patient arrived on Bipap and was weaned down to 4L NC. CXR was completed and showed large right pneumo. Stat Pulm was consulted and arrived to unit for chest tube placement. Patient unable to tolerate insertion. RN informed by pulmonology to monitor patient and to call rapid and place stat CXR if any signs of further respiratory stress occur. Patient stable at this time.

## 2023-09-10 NOTE — CONSULTS
Date of Hospital Visit: 09/10/23  Encounter Provider: Rashid Johnson MD  Place of Service: [facility] CARDIOLOGY  Patient Name: Gabriel Lan  :1935  Referral Provider: Marisa Granado, *    Chief complaint  PEA    History of Present Illness  87-year-old man with COPD who presented to an outside emergency room with a 2-day history of shortness of breath and cough.  He was diagnosed with COPD exacerbation and he was started on ceftriaxone.  After this he had cardiac arrest requiring 1 round of CPR with ROSC.  Repeat EKG apparently revealed A-fib with RVR.  He was placed on BiPAP and transferred here for further management.    On arrival, he was afebrile, tachycardic to 110, respiratory 24, O2 saturations 90% on 6 L, blood pressure 149/85.  Blood work with high-sensitivity troponin of 151, proBNP of 2400, lactate of 8.1, procalcitonin 0.33, cytosis to 20, hemoglobin 12.4.Chest x-ray revealed large right-sided pneumothorax with possible mediastinal shift.  He underwent chest tube placement however was unable to tolerate the procedure due to significant pain in the chest tube was removed.  Later on in the day, he had acutely worsening shortness of breath and was found to have right lung collapse so chest tube was again placed, this time successfully.    EKG here with sinus tachycardia, nonspecific ST-T changes.  Currently, he complains of chest pain after CPR.    Past medical history:  COPD      No past surgical history on file.    No medications prior to admission.       Current Meds  Scheduled Meds:insulin lispro, 2-7 Units, Subcutaneous, 4x Daily AC & at Bedtime  ipratropium-albuterol, 3 mL, Nebulization, 4x Daily - RT  methylPREDNISolone sodium succinate, 60 mg, Intravenous, Q12H  senna-docusate sodium, 2 tablet, Oral, BID      Continuous Infusions:sodium chloride, 125 mL/hr, Last Rate: 125 mL/hr (09/10/23 0203)      PRN Meds:.  acetaminophen **OR** acetaminophen **OR**  acetaminophen    aluminum-magnesium hydroxide-simethicone    senna-docusate sodium **AND** polyethylene glycol **AND** bisacodyl **AND** bisacodyl    dextrose    dextrose    glucagon (human recombinant)    ipratropium-albuterol    melatonin    Morphine    nitroglycerin    ondansetron **OR** ondansetron    sodium chloride    Allergies as of 09/09/2023 - never reviewed   Allergen Reaction Noted    Rocephin [ceftriaxone] Anaphylaxis 09/09/2023    Iodinated contrast media Arrhythmia and Unknown - High Severity 09/09/2023       Social History     Socioeconomic History    Marital status:        No family history on file.    REVIEW OF SYSTEMS:   12 point ROS was performed and is negative except as outlined in HPI          Objective:   Temp:  [97.5 °F (36.4 °C)-97.8 °F (36.6 °C)] 97.8 °F (36.6 °C)  Heart Rate:  [] 73  Resp:  [16-28] 16  BP: (149-183)/(75-99) 183/80  There is no height or weight on file to calculate BMI.    Vitals:    09/10/23 0729   BP:    Pulse:    Resp: 16   Temp:    SpO2:        GEN: no distress, alert and oriented  HEENT: NACT, EOMI, moist mucous membranes  Lungs: CTAB, no wheezes, rales or rhonchi, right-sided chest tube in place  CV: normal rate, regular rhythm, normal S1, S2, no murmurs, +2 radial pulses b/l, no carotid bruit  Abdomen: soft, nontender, nondistended, NABS  Extremities: no edema  Skin: no rash, warm, dry  Heme/Lymph: no bruising  Psych: organized thought, normal behavior and affect      Lab Review:   Results from last 7 days   Lab Units 09/09/23  2218   SODIUM mmol/L 142   POTASSIUM mmol/L 4.5   CHLORIDE mmol/L 106   CO2 mmol/L 18.0*   BUN mg/dL 25*   CREATININE mg/dL 1.18   CALCIUM mg/dL 9.3   BILIRUBIN mg/dL 0.3   ALK PHOS U/L 109   ALT (SGPT) U/L 30   AST (SGOT) U/L 19   GLUCOSE mg/dL 215*     Results from last 7 days   Lab Units 09/09/23  2218   HSTROP T ng/L 151*     Results from last 7 days   Lab Units 09/09/23  9789   WBC 10*3/mm3 20.01*   HEMOGLOBIN g/dL 12.4*    HEMATOCRIT % 36.3*   PLATELETS 10*3/mm3 188     Results from last 7 days   Lab Units 09/09/23  2218   INR  1.04             I personally viewed and interpreted the patient's EKG/Telemetry data)      COPD with acute exacerbation    Cardiac arrest    Acute respiratory failure with hypoxia    Pneumothorax on right    Assessment and Plan:  #PEA arrest likely secondary to acute allergic reaction to ceftriaxone  #COPD exacerbation  #Right-sided pneumothorax secondary to CPR  #Paroxysmal A-fib    87-year-old man with COPD who presented to an outside emergency room with a 2-day history of shortness of breath and cough started on treatment for COPD exacerbation with ceftriaxone but apparently had allergic reaction and had PEA arrest requiring CPR.  After ROSC he was noted to be in A-fib with RVR.  CPR complicated by right-sided pneumothorax status post chest tube.    Arrest likely related to allergic reaction to ceftriaxone.  Low concern for any other cardiac etiology of arrest.    Echocardiogram for baseline assessment    Rashid Johnson MD  09/10/23  08:24 EDT.

## 2023-09-10 NOTE — PLAN OF CARE
Problem: Infection Progression (Sepsis/Septic Shock)  Goal: Absence of Infection Signs and Symptoms  Outcome: Ongoing, Progressing  Intervention: Initiate Sepsis Management  Description: Provide fluid therapy, such as crystalloid or albumin, to increase intravascular volume, organ perfusion and oxygen delivery.  Provide respiratory support, such as oxygen therapy, noninvasive or invasive positive pressure ventilation, to achieve oxygenation and ventilation goal; avoid hyperoxemia.  Obtain cultures prior to initiating antimicrobial therapy when possible. Do not delay for laboratory results in the presence of high suspicion or clinical indicators.  Administer intravenous broad-spectrum antimicrobial therapy promptly.  Implement hemodynamic monitoring to guide intravascular support based on individual targeted parameters.  Determine and address underlying source of infection aggressively; implement transmission-based precautions and isolation, as indicated.  Recent Flowsheet Documentation  Taken 9/10/2023 1400 by Jany Agarwal RN  Infection Prevention:   hand hygiene promoted   personal protective equipment utilized   single patient room provided   visitors restricted/screened  Isolation Precautions:   precautions maintained   droplet  Taken 9/10/2023 1200 by Jany Agarwal RN  Infection Prevention:   hand hygiene promoted   personal protective equipment utilized   single patient room provided   visitors restricted/screened  Isolation Precautions:   precautions maintained   droplet  Taken 9/10/2023 1000 by Jany Agarwal RN  Infection Prevention:   hand hygiene promoted   personal protective equipment utilized   single patient room provided   visitors restricted/screened  Isolation Precautions:   precautions maintained   droplet  Taken 9/10/2023 0800 by Jany Agarwal RN  Infection Prevention:   hand hygiene promoted   personal protective equipment utilized   single patient room provided    visitors restricted/screened  Isolation Precautions:   precautions maintained   droplet  Intervention: Promote Recovery  Description: Encourage pulmonary hygiene, such as cough-enhancement and airway-clearance techniques, that may include use of incentive spirometry, deep breathing and cough.  Encourage early rehabilitation and physical activity to optimize functional ability and activity tolerance, as well as minimize delirium.  Promote energy conservation; minimize oxygen demand and consumption by adjusting environment, decreasing stimulation, maintaining normothermia and treating pain.  Optimize fluid balance, nutrition intake, sleep and glycemic control to maintain tissue perfusion and enhance immune response.  Recent Flowsheet Documentation  Taken 9/10/2023 1433 by Jany Agarwal, RN  Activity Management: activity encouraged  Taken 9/10/2023 0900 by Jany Agarwal, RN  Activity Management: activity encouraged   Goal Outcome Evaluation:  Plan of Care Reviewed With: patient, family        Progress: improving

## 2023-09-11 ENCOUNTER — APPOINTMENT (OUTPATIENT)
Dept: GENERAL RADIOLOGY | Facility: HOSPITAL | Age: 88
DRG: 199 | End: 2023-09-11
Payer: MEDICARE

## 2023-09-11 ENCOUNTER — APPOINTMENT (OUTPATIENT)
Dept: CARDIOLOGY | Facility: HOSPITAL | Age: 88
DRG: 199 | End: 2023-09-11
Payer: MEDICARE

## 2023-09-11 LAB
ASCENDING AORTA: 2.9 CM
BH CV ECHO MEAS - ACS: 1.96 CM
BH CV ECHO MEAS - AO MAX PG: 7.7 MMHG
BH CV ECHO MEAS - AO MEAN PG: 3.8 MMHG
BH CV ECHO MEAS - AO ROOT DIAM: 3.6 CM
BH CV ECHO MEAS - AO V2 MAX: 138.7 CM/SEC
BH CV ECHO MEAS - AO V2 VTI: 33.5 CM
BH CV ECHO MEAS - AVA(I,D): 2.45 CM2
BH CV ECHO MEAS - EDV(CUBED): 68 ML
BH CV ECHO MEAS - EDV(MOD-SP2): 43 ML
BH CV ECHO MEAS - EDV(MOD-SP4): 50 ML
BH CV ECHO MEAS - EF(MOD-BP): 60.3 %
BH CV ECHO MEAS - EF(MOD-SP2): 65.1 %
BH CV ECHO MEAS - EF(MOD-SP4): 58 %
BH CV ECHO MEAS - ESV(CUBED): 22.4 ML
BH CV ECHO MEAS - ESV(MOD-SP2): 15 ML
BH CV ECHO MEAS - ESV(MOD-SP4): 21 ML
BH CV ECHO MEAS - FS: 31 %
BH CV ECHO MEAS - IVS/LVPW: 0.88 CM
BH CV ECHO MEAS - IVSD: 0.78 CM
BH CV ECHO MEAS - LAT PEAK E' VEL: 8.3 CM/SEC
BH CV ECHO MEAS - LV MASS(C)D: 102.9 GRAMS
BH CV ECHO MEAS - LV MAX PG: 5 MMHG
BH CV ECHO MEAS - LV MEAN PG: 2.07 MMHG
BH CV ECHO MEAS - LV V1 MAX: 111.9 CM/SEC
BH CV ECHO MEAS - LV V1 VTI: 25.9 CM
BH CV ECHO MEAS - LVIDD: 4.1 CM
BH CV ECHO MEAS - LVIDS: 2.8 CM
BH CV ECHO MEAS - LVOT AREA: 3.2 CM2
BH CV ECHO MEAS - LVOT DIAM: 2.01 CM
BH CV ECHO MEAS - LVPWD: 0.89 CM
BH CV ECHO MEAS - MED PEAK E' VEL: 8.7 CM/SEC
BH CV ECHO MEAS - MV A DUR: 0.11 SEC
BH CV ECHO MEAS - MV A MAX VEL: 155 CM/SEC
BH CV ECHO MEAS - MV DEC SLOPE: 360.4 CM/SEC2
BH CV ECHO MEAS - MV DEC TIME: 0.3 MSEC
BH CV ECHO MEAS - MV E MAX VEL: 102 CM/SEC
BH CV ECHO MEAS - MV E/A: 0.66
BH CV ECHO MEAS - MV MAX PG: 7.6 MMHG
BH CV ECHO MEAS - MV MEAN PG: 2.6 MMHG
BH CV ECHO MEAS - MV P1/2T: 82.7 MSEC
BH CV ECHO MEAS - MV V2 VTI: 26.8 CM
BH CV ECHO MEAS - MVA(P1/2T): 2.7 CM2
BH CV ECHO MEAS - MVA(VTI): 3.1 CM2
BH CV ECHO MEAS - PA ACC TIME: 0.11 SEC
BH CV ECHO MEAS - PA V2 MAX: 96.9 CM/SEC
BH CV ECHO MEAS - PULM A REVS DUR: 0.11 SEC
BH CV ECHO MEAS - PULM A REVS VEL: 30.6 CM/SEC
BH CV ECHO MEAS - PULM DIAS VEL: 23.9 CM/SEC
BH CV ECHO MEAS - PULM S/D: 1.57
BH CV ECHO MEAS - PULM SYS VEL: 37.7 CM/SEC
BH CV ECHO MEAS - RAP SYSTOLE: 3 MMHG
BH CV ECHO MEAS - RV MAX PG: 1.48 MMHG
BH CV ECHO MEAS - RV V1 MAX: 60.8 CM/SEC
BH CV ECHO MEAS - RV V1 VTI: 14.1 CM
BH CV ECHO MEAS - RVSP: 45 MMHG
BH CV ECHO MEAS - SV(LVOT): 82.3 ML
BH CV ECHO MEAS - SV(MOD-SP2): 28 ML
BH CV ECHO MEAS - SV(MOD-SP4): 29 ML
BH CV ECHO MEAS - TAPSE (>1.6): 1.65 CM
BH CV ECHO MEAS - TR MAX PG: 42 MMHG
BH CV ECHO MEAS - TR MAX VEL: 324.1 CM/SEC
BH CV ECHO MEASUREMENTS AVERAGE E/E' RATIO: 12
BH CV XLRA - RV BASE: 3.1 CM
BH CV XLRA - RV LENGTH: 7 CM
BH CV XLRA - RV MID: 2.7 CM
BH CV XLRA - TDI S': 11.2 CM/SEC
GLUCOSE BLDC GLUCOMTR-MCNC: 114 MG/DL (ref 70–130)
GLUCOSE BLDC GLUCOMTR-MCNC: 134 MG/DL (ref 70–130)
GLUCOSE BLDC GLUCOMTR-MCNC: 139 MG/DL (ref 70–130)
GLUCOSE BLDC GLUCOMTR-MCNC: 219 MG/DL (ref 70–130)
LEFT ATRIUM VOLUME INDEX: 18.2 ML/M2
SINUS: 3.3 CM
STJ: 2.6 CM

## 2023-09-11 PROCEDURE — 94799 UNLISTED PULMONARY SVC/PX: CPT

## 2023-09-11 PROCEDURE — 99232 SBSQ HOSP IP/OBS MODERATE 35: CPT | Performed by: STUDENT IN AN ORGANIZED HEALTH CARE EDUCATION/TRAINING PROGRAM

## 2023-09-11 PROCEDURE — 25010000002 METHYLPREDNISOLONE PER 125 MG: Performed by: INTERNAL MEDICINE

## 2023-09-11 PROCEDURE — 82948 REAGENT STRIP/BLOOD GLUCOSE: CPT

## 2023-09-11 PROCEDURE — 94761 N-INVAS EAR/PLS OXIMETRY MLT: CPT

## 2023-09-11 PROCEDURE — 25010000002 MORPHINE PER 10 MG: Performed by: INTERNAL MEDICINE

## 2023-09-11 PROCEDURE — 93306 TTE W/DOPPLER COMPLETE: CPT

## 2023-09-11 PROCEDURE — 94664 DEMO&/EVAL PT USE INHALER: CPT

## 2023-09-11 PROCEDURE — 94760 N-INVAS EAR/PLS OXIMETRY 1: CPT

## 2023-09-11 PROCEDURE — 63710000001 INSULIN LISPRO (HUMAN) PER 5 UNITS: Performed by: INTERNAL MEDICINE

## 2023-09-11 PROCEDURE — 93306 TTE W/DOPPLER COMPLETE: CPT | Performed by: INTERNAL MEDICINE

## 2023-09-11 PROCEDURE — 71045 X-RAY EXAM CHEST 1 VIEW: CPT

## 2023-09-11 RX ADMIN — METHYLPREDNISOLONE SODIUM SUCCINATE 60 MG: 125 INJECTION, POWDER, FOR SOLUTION INTRAMUSCULAR; INTRAVENOUS at 01:52

## 2023-09-11 RX ADMIN — ATORVASTATIN CALCIUM 40 MG: 20 TABLET, FILM COATED ORAL at 08:56

## 2023-09-11 RX ADMIN — INSULIN LISPRO 3 UNITS: 100 INJECTION, SOLUTION INTRAVENOUS; SUBCUTANEOUS at 17:38

## 2023-09-11 RX ADMIN — MIRTAZAPINE 15 MG: 15 TABLET, ORALLY DISINTEGRATING ORAL at 21:33

## 2023-09-11 RX ADMIN — IPRATROPIUM BROMIDE AND ALBUTEROL SULFATE 3 ML: 2.5; .5 SOLUTION RESPIRATORY (INHALATION) at 20:26

## 2023-09-11 RX ADMIN — METHYLPREDNISOLONE SODIUM SUCCINATE 60 MG: 125 INJECTION, POWDER, FOR SOLUTION INTRAMUSCULAR; INTRAVENOUS at 12:18

## 2023-09-11 RX ADMIN — SENNOSIDES AND DOCUSATE SODIUM 2 TABLET: 50; 8.6 TABLET ORAL at 21:33

## 2023-09-11 RX ADMIN — IPRATROPIUM BROMIDE AND ALBUTEROL SULFATE 3 ML: 2.5; .5 SOLUTION RESPIRATORY (INHALATION) at 14:21

## 2023-09-11 RX ADMIN — MORPHINE SULFATE 1 MG: 2 INJECTION, SOLUTION INTRAMUSCULAR; INTRAVENOUS at 08:56

## 2023-09-11 RX ADMIN — TAMSULOSIN HYDROCHLORIDE 0.8 MG: 0.4 CAPSULE ORAL at 08:55

## 2023-09-11 RX ADMIN — METHYLPREDNISOLONE SODIUM SUCCINATE 60 MG: 125 INJECTION, POWDER, FOR SOLUTION INTRAMUSCULAR; INTRAVENOUS at 22:44

## 2023-09-11 RX ADMIN — IPRATROPIUM BROMIDE AND ALBUTEROL SULFATE 3 ML: 2.5; .5 SOLUTION RESPIRATORY (INHALATION) at 05:31

## 2023-09-11 RX ADMIN — IPRATROPIUM BROMIDE AND ALBUTEROL SULFATE 3 ML: 2.5; .5 SOLUTION RESPIRATORY (INHALATION) at 11:05

## 2023-09-11 RX ADMIN — MORPHINE SULFATE 1 MG: 2 INJECTION, SOLUTION INTRAMUSCULAR; INTRAVENOUS at 17:38

## 2023-09-11 RX ADMIN — SENNOSIDES AND DOCUSATE SODIUM 2 TABLET: 50; 8.6 TABLET ORAL at 08:56

## 2023-09-11 RX ADMIN — ASPIRIN 81 MG: 81 TABLET, CHEWABLE ORAL at 08:56

## 2023-09-11 NOTE — PROGRESS NOTES
"  PROGRESS NOTE  Patient Name: Gabriel Lan  Age/Sex: 87 y.o. male  : 1935  MRN: [id number]    Date of Admission: 2023  Date of Encounter Visit: 23   LOS: 2 days   Patient Care Team:  Provider, No Known as PCP - General    Chief Complaint: Pneumothorax    Hospital course: Patient had a right-sided small chest tube placed in yesterday and has been doing much better since, he still have minimal air leak mainly noticeable upon coughing otherwise there is minimal discomfort from the chest tube.  He is on 1 L nasal cannula oxygen and his sats were doing okay even without the oxygen in his nose.  He has no significant complaint, he does have some cough with some secretion and he is complaining of the pain mainly upon coughing    Interval History: Chest tube placement on 9/10/2023    REVIEW OF SYSTEMS:   CONSTITUTIONAL: no fever or chills  CARDIOVASCULAR: Positive chest pain upon coughing. No palpitations or edema.   RESPIRATORY: Minimal shortness of breath, positive cough.   GASTROINTESTINAL: No anorexia, nausea, vomiting or diarrhea. No abdominal pain or blood.   HEMATOLOGIC: No bleeding or bruising.     Ventilator/Non-Invasive Ventilation Settings (From admission, onward)      1 L/min              Vital Signs  Temp:  [97.2 °F (36.2 °C)-98.2 °F (36.8 °C)] 97.2 °F (36.2 °C)  Heart Rate:  [72-89] 78  Resp:  [16-20] 20  BP: (149-191)/(71-91) 149/71  SpO2:  [90 %-98 %] 94 %  on  Flow (L/min):  [1-2] 1 Device (Oxygen Therapy): nasal cannula    Intake/Output Summary (Last 24 hours) at 2023 1537  Last data filed at 2023 1100  Gross per 24 hour   Intake 360 ml   Output 1357 ml   Net -997 ml     Flowsheet Rows      Flowsheet Row First Filed Value   Admission Height 172.7 cm (68\") Documented at 09/10/2023 1728   Admission Weight 60.8 kg (134 lb) Documented at 09/10/2023 1728          Body mass index is 20.37 kg/m².      09/10/23  1728 23  0959   Weight: 60.8 kg (134 lb) 60.8 kg (134 lb) "       Physical Exam:  GEN:  No acute distress, alert, cooperative, well developed, elderly but doing better overall  EYES:   Sclerae clear. No icterus. PERRL. Normal EOM  ENT:   External ears/nose normal, no oral lesions, no thrush, mucous membranes moist  NECK:  Supple, midline trachea, no JVD  LUNGS: Normal chest on inspection, minimal wheezes upon coughing, positive rhonchi bilaterally, no subcutaneous air respirations regular, even and unlabored.   CV:  Regular rhythm and rate. Normal S1/S2. No murmurs, gallops, or rubs noted.  ABD:  Soft, nontender and nondistended. Normal bowel sounds. No guarding  EXT:  Moves all extremities well. No cyanosis. No redness. No edema.   Skin: Dry, intact, no bleeding    Results Review:    Results From Last 14 Days   Lab Units 09/10/23  2308 09/10/23  1707 09/10/23  1420   LACTATE mmol/L 1.0 4.5* 2.9*     Results from last 7 days   Lab Units 09/10/23  1024 09/09/23  2218   SODIUM mmol/L 139 142   POTASSIUM mmol/L 4.8 4.5   CHLORIDE mmol/L 107 106   CO2 mmol/L 22.6 18.0*   BUN mg/dL 29* 25*   CREATININE mg/dL 0.99 1.18   CALCIUM mg/dL 9.2 9.3   AST (SGOT) U/L  --  19   ALT (SGPT) U/L  --  30   ANION GAP mmol/L 9.4 18.0*   ALBUMIN g/dL  --  3.8     Results from last 7 days   Lab Units 09/10/23  1024 09/09/23 2218   HSTROP T ng/L 116* 151*         Results from last 7 days   Lab Units 09/09/23  2218   PROBNP pg/mL 2,407.0*     Results from last 7 days   Lab Units 09/10/23  1024 09/09/23 2218   WBC 10*3/mm3 21.54* 20.01*   HEMOGLOBIN g/dL 11.4* 12.4*   HEMATOCRIT % 33.4* 36.3*   PLATELETS 10*3/mm3 180 188   MCV fL 104.7* 105.2*     Results from last 7 days   Lab Units 09/09/23 2218   INR  1.04               Invalid input(s): LDLCALC          Glucose   Date/Time Value Ref Range Status   09/11/2023 1108 114 70 - 130 mg/dL Final   09/11/2023 0611 134 (H) 70 - 130 mg/dL Final   09/10/2023 2039 136 (H) 70 - 130 mg/dL Final   09/10/2023 1635 138 (H) 70 - 130 mg/dL Final   09/10/2023 1124  156 (H) 70 - 130 mg/dL Final   09/10/2023 0657 163 (H) 70 - 130 mg/dL Final   09/10/2023 0315 143 (H) 70 - 130 mg/dL Final     Results from last 7 days   Lab Units 09/10/23  2308 09/10/23  1707 09/10/23  1420 09/10/23  1024 09/09/23  2218   PROCALCITONIN ng/mL  --   --   --   --  0.33*   LACTATE mmol/L 1.0 4.5* 2.9* 2.2* 8.1*     Results from last 7 days   Lab Units 09/10/23  1656 09/10/23  1028 09/10/23  1024   BLOODCX   --  No growth at 24 hours No growth at 24 hours   STREP PNEUMO AG  Negative  --   --    L. PNEUMOPHILA SEROGP 1 UR AG  Negative  --   --          Results from last 7 days   Lab Units 09/10/23  0102   COVID19  Not Detected   ADENOVIRUS DETECTION BY PCR  Not Detected   CORONAVIRUS 229E  Not Detected   CORONAVIRUS HKU1  Not Detected   CORONAVIRUS NL63  Not Detected   CORONAVIRUS OC43  Not Detected   HUMAN METAPNEUMOVIRUS  Not Detected   HUMAN RHINOVIRUS/ENTEROVIRUS  Detected*   INFLUENZA B PCR  Not Detected   PARAINFLUENZA 1  Not Detected   PARAINFLUENZA VIRUS 2  Not Detected   PARAINFLUENZA VIRUS 3  Not Detected   PARAINFLUENZA VIRUS 4  Not Detected   BORDETELLA PERTUSSIS PCR  Not Detected   BORDETELLA PARAPERTUSSIS PCR  Not Detected   CHLAMYDOPHILA PNEUMONIAE PCR  Not Detected   MYCOPLAMA PNEUMO PCR  Not Detected   RSV, PCR  Not Detected               Imaging:   Imaging Results (All)       Procedure Component Value Units Date/Time    XR Chest 1 View [[id number]] Collected: 09/11/23 0624     Updated: 09/11/23 0629    Narrative:      Clinical: Chest tube, pneumothorax     COMPARISON examination 19 2023     FINDINGS: Right-sided chest tube in position as before. Subcutaneous  emphysema right chest wall diminished. No pneumothorax. The  cardiomediastinal silhouette is stable. Patchy atelectasis has developed  at the left lung base. No vascular congestion. No other interval change  has occurred.     This report was finalized on 9/11/2023 6:25 AM by Dr. Benjamín Mackay M.D.       XR Chest 1 View  [[id number]] Collected: 09/10/23 0910     Updated: 09/10/23 0910    Narrative:        Patient: JUANCARLOS DUMONT  Time Out: 09:09  Exam(s): XR CXR 1 VIEW     EXAM:    XR Chest, 1 View    CLINICAL HISTORY:     Reason for exam: chest tube placement.    TECHNIQUE:    Frontal view of the chest.    COMPARISON:    No relevant prior studies available.    FINDINGS: Moderate right subcutaneous emphysema.    Lungs: Mild to moderate peribronchial thickening of the central and   lower lobe bronchi.  No consolidation.    Pleural space: There is a tiny pneumothorax at the right apex.  There   is a right-sided chest tube in place.  No pneumothorax.    Heart:  Unremarkable.  No cardiomegaly.    Mediastinum:  Unremarkable.    Bones joints:  Unremarkable.    IMPRESSION:       Tiny right atypical pneumothorax with chest tube in place.      Impression:          Electronically signed by Gabby Whitlock MD on 09-10-23 at 0909    XR Chest 1 View [[id number]] Collected: 09/10/23 0344     Updated: 09/10/23 0350    Addenda:        ADDENDUM:  09 10 23 03:49 Call Doctor Regarding Pneumothorax, called Charge Nurse   Selvin  on 09 10 03:49 (-04:00)      Signed: 09/10/23 0343 by Jenaro Almanza MD    Narrative:      CR  Patient: JUANCARLOS DUMONT  Time Out: 03:43  Exam(s): XR CXR 1 VIEW     EXAM:    XR Chest, 1 View    CLINICAL HISTORY:     Reason for exam: Follow-up pneumothorax.    TECHNIQUE:    Frontal view of the chest.    COMPARISON:    September 10, 2023 at 0016 hours    FINDINGS:    Lungs:  Unremarkable.  No consolidation.    Pleural space:  See below.      Heart:  The cardiac silhouette is mildly enlarged.    Mediastinum:  Unremarkable.    Bones joints:  Unremarkable.    Soft tissues:  There is gas in the soft tissues over the right chest.    Vasculature:  The aortic arch is mildly calcified but nondilated.    Tubes, lines and devices:  The right-sided chest tube has been removed.    There is a new, large, 60% right-sided  pneumothorax.    IMPRESSION:         The right-sided chest tube has been removed.  There is a new, large,   60% right-sided pneumothorax.      Impression:            Communications:     Call Doctor Pneumothorax    Electronically signed by Jenaro Almanza MD on 09-10-23 at 0343    XR Chest 1 View [[id number]] Collected: 09/10/23 0321     Updated: 09/10/23 0321    Narrative:        Patient: JUANCARLOS DUMONT  Time Out: 03:21  Exam(s): XR CXR 1 VIEW     EXAM:    XR Chest, 1 View    CLINICAL HISTORY:     Reason for exam: chest tube placement.    TECHNIQUE:    Frontal view of the chest.    COMPARISON:    No relevant prior studies available.    FINDINGS:    Lungs:  Areas of consolidation in the right lower lobe consistent with   atelectasis or pneumonia.    Pleural space:  See below.      Heart:  Unremarkable.  No cardiomegaly.    Mediastinum:  Unremarkable.    Bones joints:  Unremarkable.    Vasculature:  The aortic arch is mildly calcified but nondilated.    Tubes, lines and devices:  There is a locking loop drain in the right   lower hemithorax.  No pneumothorax is identified.    IMPRESSION:       1.  Areas of consolidation in the right lower lobe consistent with   atelectasis or pneumonia.  2.  There is a locking loop drain in the right lower hemithorax.  No   pneumothorax is identified.      Impression:          Electronically signed by Jenaro Almanza MD on 09-10-23 at 0321    XR Chest 1 View [[id number]] Collected: 09/09/23 2326     Updated: 09/09/23 2332    Narrative:      Clinical: Post cardiopulmonary arrest, hypoxia     COMPARISON: None     FINDINGS: Subtle right rib angulation deformities, acute versus old  fractures.     Large right-sided pneumothorax. Suggestion of subtle mediastinal shift  towards the left.     The left lung is clear. Cardiac size within normal limits. There is  atherosclerotic calcification of the aorta. Mild prominence of the  pulmonary vasculature, question mild congestion.     FINDINGS called  to the patient's nurse at 11:25 p.m. Attending to be  immediately notified.     This report was finalized on 9/9/2023 11:29 PM by Dr. Benjamín Mackay M.D.               I reviewed the patient's new clinical results.  I personally viewed and interpreted the patient's imaging results: Expansion of the right lung with chest tube in position        Medication Review:   aspirin, 81 mg, Oral, Daily  atorvastatin, 40 mg, Oral, Daily  insulin lispro, 2-7 Units, Subcutaneous, 4x Daily AC & at Bedtime  ipratropium-albuterol, 3 mL, Nebulization, 4x Daily - RT  methylPREDNISolone sodium succinate, 60 mg, Intravenous, Q12H  mirtazapine, 15 mg, Oral, Nightly  senna-docusate sodium, 2 tablet, Oral, BID  tamsulosin, 0.8 mg, Oral, Daily             ASSESSMENT:   Status post resuscitated cardiac arrest  Right-sided pneumothorax, possibly from rib fracture and CPR, chest tube in position  Rhinovirus infection, on symptomatic treatment  Acute exacerbation of COPD secondary to rhinoviral infection  Acute hypoxemic respiratory failure secondary to above  Right-sided rib fracture, likely from CPR  Congestive heart failure with elevated proBNP, currently on no diuretics  Lactic acidosis, improved    PLAN:  Patient is currently on 1 L/min nasal cannula oxygen, lactic acid is down to normal, chemistry is normal, high sensitive troponin is slightly elevated likely type II  proBNP was 2400.  White count is elevated at 21, glycemic control is satisfactory, cultures are negative to date except for the rhinovirus on the PCR  The air leak is very minimal at this point, we will continue with the chest tube at the wall suction and we will discontinue the suction and switch to waterseal at 6 in the morning, repeat the chest x-ray 2 hours later and decide if the chest tube can be removed  Discussed with patient and family  Labs/Notes/films were independently reviewed and pertinent results are summarized above  The copied texts in this note were  reviewed and they are accurate as of 09/11/23    Disposition: Per primary team    Carlos House MD  09/11/23  15:37 EDT         Dictated utilizing Dragon dictation

## 2023-09-11 NOTE — PLAN OF CARE
Goal Outcome Evaluation:           Progress: improving  Outcome Evaluation: Pt A&Ox4, VSS on 1L, and only 2 doses of morphine given throughout the day. CT still at -20 suction to be switched to water seal at 0600 tomorrow and then repeat CXR at 1000. Pt still wheezing - scheduled and PRN breathing tx's given. Will CTM.         Problem: Adult Inpatient Plan of Care  Goal: Plan of Care Review  Flowsheets  Taken 9/11/2023 1939 by Teri Guerra, RN  Progress: improving  Outcome Evaluation: Pt A&Ox4, VSS on 1L, and only 2 doses of morphine given throughout the day. CT still at -20 suction to be switched to water seal at 0600 tomorrow and then repeat CXR at 1000. Pt still wheezing - scheduled and PRN breathing tx's given. Will CTM.  Taken 9/10/2023 1548 by Jany Agarwal, RN  Plan of Care Reviewed With:   patient   family  Goal: Absence of Hospital-Acquired Illness or Injury  Intervention: Identify and Manage Fall Risk  Recent Flowsheet Documentation  Taken 9/11/2023 1750 by Teri Guerra, RN  Safety Promotion/Fall Prevention:   activity supervised   assistive device/personal items within reach   clutter free environment maintained   fall prevention program maintained   lighting adjusted   nonskid shoes/slippers when out of bed   room organization consistent   safety round/check completed  Taken 9/11/2023 1613 by Teri Guerra, RN  Safety Promotion/Fall Prevention:   activity supervised   assistive device/personal items within reach   clutter free environment maintained   fall prevention program maintained   lighting adjusted   nonskid shoes/slippers when out of bed   room organization consistent   safety round/check completed  Taken 9/11/2023 1419 by Teri Guerra, RN  Safety Promotion/Fall Prevention:   activity supervised   assistive device/personal items within reach   clutter free environment maintained   fall prevention program maintained   lighting adjusted   nonskid shoes/slippers when out of bed    room organization consistent   safety round/check completed  Taken 9/11/2023 1216 by Teri Guerra RN  Safety Promotion/Fall Prevention:   activity supervised   assistive device/personal items within reach   clutter free environment maintained   fall prevention program maintained   lighting adjusted   nonskid shoes/slippers when out of bed   room organization consistent   safety round/check completed  Taken 9/11/2023 1009 by Teri Guerra RN  Safety Promotion/Fall Prevention:   activity supervised   assistive device/personal items within reach   clutter free environment maintained   fall prevention program maintained   lighting adjusted   nonskid shoes/slippers when out of bed   room organization consistent   safety round/check completed  Taken 9/11/2023 0856 by Teri Guerra RN  Safety Promotion/Fall Prevention:   activity supervised   assistive device/personal items within reach   clutter free environment maintained   fall prevention program maintained   lighting adjusted   nonskid shoes/slippers when out of bed   room organization consistent   safety round/check completed  Intervention: Prevent Skin Injury  Recent Flowsheet Documentation  Taken 9/11/2023 1750 by Teri Guerra RN  Body Position: position changed independently  Taken 9/11/2023 1613 by Teri Guerra, RN  Body Position: position changed independently  Taken 9/11/2023 1419 by Teri Guerra RN  Body Position: position changed independently  Skin Protection:   adhesive use limited   incontinence pads utilized   skin sealant/moisture barrier applied   transparent dressing maintained   tubing/devices free from skin contact  Taken 9/11/2023 1216 by Teri Guerra RN  Body Position: position changed independently  Taken 9/11/2023 1009 by Teri Guerra RN  Body Position: position changed independently  Taken 9/11/2023 0856 by Teri Guerra RN  Body Position: position changed independently  Skin Protection:   adhesive use limited    incontinence pads utilized   skin sealant/moisture barrier applied   transparent dressing maintained   tubing/devices free from skin contact  Intervention: Prevent and Manage VTE (Venous Thromboembolism) Risk  Recent Flowsheet Documentation  Taken 9/11/2023 1750 by Teri Guerra RN  Activity Management: bedrest  Taken 9/11/2023 1613 by Teri Guerra RN  Activity Management: bedrest  Taken 9/11/2023 1419 by Teri Guerra RN  Activity Management: activity encouraged  Taken 9/11/2023 1216 by Teri Guerra RN  Activity Management: activity minimized  Taken 9/11/2023 1009 by Teri Guerra RN  Activity Management: bedrest  Taken 9/11/2023 0856 by Teri Guerra RN  Activity Management: activity minimized  Intervention: Prevent Infection  Recent Flowsheet Documentation  Taken 9/11/2023 1750 by Teri Guerra RN  Infection Prevention:   hand hygiene promoted   personal protective equipment utilized   rest/sleep promoted   single patient room provided   visitors restricted/screened  Taken 9/11/2023 1613 by Teri Guerra RN  Infection Prevention:   hand hygiene promoted   personal protective equipment utilized   rest/sleep promoted   single patient room provided   visitors restricted/screened  Taken 9/11/2023 1419 by Teri Guerra RN  Infection Prevention:   hand hygiene promoted   personal protective equipment utilized   single patient room provided   rest/sleep promoted   visitors restricted/screened  Taken 9/11/2023 1216 by Teri Guerra RN  Infection Prevention:   hand hygiene promoted   personal protective equipment utilized   rest/sleep promoted   single patient room provided   visitors restricted/screened  Taken 9/11/2023 1009 by Teri Guerra RN  Infection Prevention:   rest/sleep promoted   hand hygiene promoted   personal protective equipment utilized   single patient room provided   visitors restricted/screened  Taken 9/11/2023 0856 by Teri Guerra RN  Infection Prevention:   hand  hygiene promoted   personal protective equipment utilized   rest/sleep promoted   single patient room provided   visitors restricted/screened  Goal: Optimal Comfort and Wellbeing  Intervention: Monitor Pain and Promote Comfort  Recent Flowsheet Documentation  Taken 9/11/2023 1738 by Teri Guerra, RN  Pain Management Interventions:   pillow support provided   position adjusted   quiet environment facilitated   see MAR   relaxation techniques promoted  Taken 9/11/2023 0856 by Teri Guerra, RN  Pain Management Interventions:   pillow support provided   position adjusted   quiet environment facilitated   see MAR   relaxation techniques promoted  Intervention: Provide Person-Centered Care  Recent Flowsheet Documentation  Taken 9/11/2023 1419 by Teri Guerra, RN  Trust Relationship/Rapport:   care explained   emotional support provided   choices provided   empathic listening provided   questions answered   questions encouraged   reassurance provided   thoughts/feelings acknowledged  Taken 9/11/2023 0856 by Teri Guerra, RN  Trust Relationship/Rapport:   care explained   choices provided   emotional support provided   empathic listening provided   questions answered   questions encouraged   reassurance provided   thoughts/feelings acknowledged

## 2023-09-11 NOTE — PROGRESS NOTES
"    DAILY PROGRESS NOTE  [facility]    Patient Identification:  Name: Gabriel Lan  Age: 87 y.o.  Sex: male  :  1935  MRN: [id number]         Primary Care Physician: Provider, No Known    Subjective:  Interval History: Continues to feel better.  Oral intake starting to improve.  Conversational and pleasant with improving mentation.  No new fevers and no new complaints of chest pain or worsening respirations with continued improvement with O2 requirements    Objective: Elderly and quite frail but holding his own.  Case discussed with son at bedside    Scheduled Meds:aspirin, 81 mg, Oral, Daily  atorvastatin, 40 mg, Oral, Daily  insulin lispro, 2-7 Units, Subcutaneous, 4x Daily AC & at Bedtime  ipratropium-albuterol, 3 mL, Nebulization, 4x Daily - RT  methylPREDNISolone sodium succinate, 60 mg, Intravenous, Q12H  mirtazapine, 15 mg, Oral, Nightly  senna-docusate sodium, 2 tablet, Oral, BID  tamsulosin, 0.8 mg, Oral, Daily      Continuous Infusions:     Vital signs in last 24 hours:  Temp:  [97.2 °F (36.2 °C)-98.2 °F (36.8 °C)] 97.7 °F (36.5 °C)  Heart Rate:  [72-84] 84  Resp:  [14-18] 18  BP: (144-191)/(69-91) 191/91    Intake/Output:    Intake/Output Summary (Last 24 hours) at 2023 1053  Last data filed at 2023 0651  Gross per 24 hour   Intake 120 ml   Output 1357 ml   Net -1237 ml       Exam:  BP (!) 191/91   Pulse 84   Temp 97.7 °F (36.5 °C) (Oral)   Resp 18   Ht 172.7 cm (68\")   Wt 60.8 kg (134 lb)   SpO2 93%   BMI 20.37 kg/m²     General Appearance:    Alert, cooperative, nontoxic, AAOx3                         Throat:   Oral mucosa pink and moist                           Neck:   No JVD                         Lungs:    Apical wheeze otherwise improved air entry to bases/clear to auscultation bilaterally, respirations unlabored                 Chest Wall:  TTP                          Heart:    Regular rate and rhythm, S1 and S2 normal                  Abdomen:     Soft, " nontender, bowel sounds active                  Extremities: Moving all, no cyanosis or edema                        Pulses:   Pulses palpable in all extremities                  Neurologic:   CNII-XII intact     Data Review:  Labs in chart were reviewed.    Assessment:  Active Hospital Problems    Diagnosis  POA    **COPD with acute exacerbation [J44.1]  Yes    Pneumothorax on right [J93.9]  Unknown    Acute on chronic respiratory failure with hypoxia [J96.21]  Yes    Cardiac arrest [I46.9]  Unknown    Acute respiratory failure with hypoxia [J96.01]  Unknown      Resolved Hospital Problems   No resolved problems to display.       Plan:    Rhinovirus with COPD acute exacerbation with large right pneumothorax status post chest tube per pulmonary.  Pulmonary considering nerve block for better pain control              -Serial chest x-ray demonstrating improvement/resolution of PTX              -Solu-Medrol nebs     Status postcardiac arrest with subsequent multiple rib fractures    Elevated lactate -not all that clinically significant from my perspective given recent cardiac arrest.  When contacted by RN I asked her to make pulmonology aware and today lactate is normal without antibiotic     Acute hypoxic respiratory failure on supplemental O2 but will wean accordingly     PAF-RC with recent cardiac arrest -cardiology consult placed   -echo pending     Elevated pressures noted.  Given recent cardiac arrest, I do not plan on initiating anything right now as elevated pressures are not all that bad in this clinical case/recent arrest.  We will go ahead and saline lock fluids       Keo Houston MD  9/11/2023  10:53 EDT

## 2023-09-11 NOTE — PROGRESS NOTES
"    Patient Name: Gabriel Lan  :1935  87 y.o.      Patient Care Team:  Provider, No Known as PCP - General    Chief Complaint:   PEA arrest    Interval History:   NAEO.  Feels well, no complaints.    Objective   Vital Signs  Temp:  [97.2 °F (36.2 °C)-98.2 °F (36.8 °C)] 97.7 °F (36.5 °C)  Heart Rate:  [72-81] 76  Resp:  [14-18] 18  BP: (144-184)/(69-84) 184/84    Intake/Output Summary (Last 24 hours) at 2023 0802  Last data filed at 2023 0651  Gross per 24 hour   Intake 120 ml   Output 1357 ml   Net -1237 ml     Flowsheet Rows      Flowsheet Row First Filed Value   Admission Height 172.7 cm (68\") Documented at 09/10/2023 1728   Admission Weight 60.8 kg (134 lb) Documented at 09/10/2023 1728            GEN: no distress, alert and oriented  HEENT: NACT, EOMI, moist mucous membranes  Lungs: CTAB, no wheezes, rales or rhonchi  CV: normal rate, regular rhythm, normal S1, S2, no murmurs, +2 radial pulses b/l, no carotid bruit  Abdomen: soft, nontender, nondistended, NABS  Extremities: no edema  Skin: no rash, warm, dry  Heme/Lymph: no bruising  Psych: organized thought, normal behavior and affect    Results Review:    Results from last 7 days   Lab Units 09/10/23  1024   SODIUM mmol/L 139   POTASSIUM mmol/L 4.8   CHLORIDE mmol/L 107   CO2 mmol/L 22.6   BUN mg/dL 29*   CREATININE mg/dL 0.99   GLUCOSE mg/dL 173*   CALCIUM mg/dL 9.2     Results from last 7 days   Lab Units 09/10/23  1024 23  2218   HSTROP T ng/L 116* 151*     Results from last 7 days   Lab Units 09/10/23  1024   WBC 10*3/mm3 21.54*   HEMOGLOBIN g/dL 11.4*   HEMATOCRIT % 33.4*   PLATELETS 10*3/mm3 180     Results from last 7 days   Lab Units 23  2218   INR  1.04                       Medication Review:   aspirin, 81 mg, Oral, Daily  atorvastatin, 40 mg, Oral, Daily  insulin lispro, 2-7 Units, Subcutaneous, 4x Daily AC & at Bedtime  ipratropium-albuterol, 3 mL, Nebulization, 4x Daily - RT  methylPREDNISolone sodium succinate, 60 " mg, Intravenous, Q12H  mirtazapine, 15 mg, Oral, Nightly  senna-docusate sodium, 2 tablet, Oral, BID  tamsulosin, 0.8 mg, Oral, Daily         sodium chloride, 125 mL/hr, Last Rate: 125 mL/hr (09/10/23 0203)        Assessment & Plan   #PEA arrest likely secondary to acute allergic reaction to ceftriaxone  #COPD exacerbation  #Right-sided pneumothorax secondary to CPR  #Paroxysmal A-fib     87-year-old man with COPD who presented to an outside emergency room with a 2-day history of shortness of breath and cough started on treatment for COPD exacerbation with ceftriaxone but apparently had allergic reaction and had PEA arrest requiring CPR.  After ROSC he was noted to be in A-fib with RVR.  CPR complicated by right-sided pneumothorax status post chest tube.     Arrest likely related to allergic reaction to ceftriaxone vs pneumothorax?  Low concern for any other cardiac etiology of arrest.     Echocardiogram with normal EF, no significant valvular disease.    Cardiology will sign off at this time, please call back with any questions or concerns.    Rashid Johnson MD  [facility]  09/11/23  08:02 EDT

## 2023-09-11 NOTE — PLAN OF CARE
Goal Outcome Evaluation:         VSS. 1L O2 NC. Ax1. New IV placed. Son at bedside through night. PRN tylenol x1. R CT to -20 sxn. Airleak present. No Fluctuation, no subQ air. Cream on bottom.

## 2023-09-11 NOTE — CASE MANAGEMENT/SOCIAL WORK
Discharge Planning Assessment  [facility]     Patient Name: Gabriel Lan  MRN: [id number]  Today's Date: 9/11/2023    Admit Date: 9/9/2023    Plan: Home with HH vs SNF   Discharge Needs Assessment       Row Name 09/11/23 1439       Living Environment    People in Home spouse    Current Living Arrangements home    Potentially Unsafe Housing Conditions none    Primary Care Provided by self    Provides Primary Care For no one    Quality of Family Relationships supportive       Resource/Environmental Concerns    Resource/Environmental Concerns none       Transition Planning    Patient/Family Anticipates Transition to home with family    Patient/Family Anticipated Services at Transition none    Transportation Anticipated family or friend will provide       Discharge Needs Assessment    Equipment Currently Used at Home rollator    Equipment Needed After Discharge none                   Discharge Plan       Row Name 09/11/23 1440       Plan    Plan Home with HH vs SNF    Patient/Family in Agreement with Plan yes    Plan Comments Met with pt and granddaughter at bedside. Introduced self, explained CCP role, facesheet verified. Pt states he lives with wife in one level home with ramp present.  Pt doesn't use any DME.  No history of HH.  Has been [facility] in past.  If rehab needed after this hospitalization, would want [facility].  Await PT eval, CCP will follow for needs.  IVAN Rios RN                  Continued Care and Services - Admitted Since 9/9/2023    Coordination has not been started for this encounter.       Expected Discharge Date and Time       Expected Discharge Date Expected Discharge Time    Sep 13, 2023            Demographic Summary       Row Name 09/11/23 1437       General Information    Admission Type inpatient    Arrived From home    Referral Source admission list    Reason for Consult discharge planning    Preferred Language English       Contact Information     Permission Granted to Share Info With family/designee  Viviana Rios (daughter)                   Functional Status    No documentation.                  Psychosocial    No documentation.                  Abuse/Neglect    No documentation.                  Legal    No documentation.                  Substance Abuse    No documentation.                  Patient Forms    No documentation.                     Modesta Rios RN

## 2023-09-12 ENCOUNTER — APPOINTMENT (OUTPATIENT)
Dept: GENERAL RADIOLOGY | Facility: HOSPITAL | Age: 88
DRG: 199 | End: 2023-09-12
Payer: MEDICARE

## 2023-09-12 LAB
GLUCOSE BLDC GLUCOMTR-MCNC: 127 MG/DL (ref 70–130)
GLUCOSE BLDC GLUCOMTR-MCNC: 152 MG/DL (ref 70–130)
GLUCOSE BLDC GLUCOMTR-MCNC: 221 MG/DL (ref 70–130)
GLUCOSE BLDC GLUCOMTR-MCNC: 92 MG/DL (ref 70–130)

## 2023-09-12 PROCEDURE — 94761 N-INVAS EAR/PLS OXIMETRY MLT: CPT

## 2023-09-12 PROCEDURE — 25010000002 METHYLPREDNISOLONE PER 125 MG: Performed by: INTERNAL MEDICINE

## 2023-09-12 PROCEDURE — 94664 DEMO&/EVAL PT USE INHALER: CPT

## 2023-09-12 PROCEDURE — 94799 UNLISTED PULMONARY SVC/PX: CPT

## 2023-09-12 PROCEDURE — 97162 PT EVAL MOD COMPLEX 30 MIN: CPT

## 2023-09-12 PROCEDURE — 25010000002 MORPHINE PER 10 MG: Performed by: INTERNAL MEDICINE

## 2023-09-12 PROCEDURE — 71045 X-RAY EXAM CHEST 1 VIEW: CPT

## 2023-09-12 PROCEDURE — 63710000001 INSULIN LISPRO (HUMAN) PER 5 UNITS: Performed by: INTERNAL MEDICINE

## 2023-09-12 PROCEDURE — 94760 N-INVAS EAR/PLS OXIMETRY 1: CPT

## 2023-09-12 PROCEDURE — 82948 REAGENT STRIP/BLOOD GLUCOSE: CPT

## 2023-09-12 PROCEDURE — 97530 THERAPEUTIC ACTIVITIES: CPT

## 2023-09-12 RX ADMIN — METHYLPREDNISOLONE SODIUM SUCCINATE 60 MG: 125 INJECTION, POWDER, FOR SOLUTION INTRAMUSCULAR; INTRAVENOUS at 12:46

## 2023-09-12 RX ADMIN — SENNOSIDES AND DOCUSATE SODIUM 2 TABLET: 50; 8.6 TABLET ORAL at 20:55

## 2023-09-12 RX ADMIN — ATORVASTATIN CALCIUM 40 MG: 20 TABLET, FILM COATED ORAL at 09:45

## 2023-09-12 RX ADMIN — SENNOSIDES AND DOCUSATE SODIUM 2 TABLET: 50; 8.6 TABLET ORAL at 09:45

## 2023-09-12 RX ADMIN — IPRATROPIUM BROMIDE AND ALBUTEROL SULFATE 3 ML: 2.5; .5 SOLUTION RESPIRATORY (INHALATION) at 16:37

## 2023-09-12 RX ADMIN — INSULIN LISPRO 2 UNITS: 100 INJECTION, SOLUTION INTRAVENOUS; SUBCUTANEOUS at 20:54

## 2023-09-12 RX ADMIN — IPRATROPIUM BROMIDE AND ALBUTEROL SULFATE 3 ML: 2.5; .5 SOLUTION RESPIRATORY (INHALATION) at 07:55

## 2023-09-12 RX ADMIN — MIRTAZAPINE 15 MG: 15 TABLET, ORALLY DISINTEGRATING ORAL at 20:55

## 2023-09-12 RX ADMIN — TAMSULOSIN HYDROCHLORIDE 0.8 MG: 0.4 CAPSULE ORAL at 09:45

## 2023-09-12 RX ADMIN — METHYLPREDNISOLONE SODIUM SUCCINATE 60 MG: 125 INJECTION, POWDER, FOR SOLUTION INTRAMUSCULAR; INTRAVENOUS at 22:30

## 2023-09-12 RX ADMIN — IPRATROPIUM BROMIDE AND ALBUTEROL SULFATE 3 ML: 2.5; .5 SOLUTION RESPIRATORY (INHALATION) at 11:13

## 2023-09-12 RX ADMIN — INSULIN LISPRO 3 UNITS: 100 INJECTION, SOLUTION INTRAVENOUS; SUBCUTANEOUS at 12:00

## 2023-09-12 RX ADMIN — MORPHINE SULFATE 1 MG: 2 INJECTION, SOLUTION INTRAMUSCULAR; INTRAVENOUS at 20:55

## 2023-09-12 RX ADMIN — IPRATROPIUM BROMIDE AND ALBUTEROL SULFATE 3 ML: 2.5; .5 SOLUTION RESPIRATORY (INHALATION) at 19:16

## 2023-09-12 RX ADMIN — ASPIRIN 81 MG: 81 TABLET, CHEWABLE ORAL at 09:45

## 2023-09-12 NOTE — PROGRESS NOTES
"  PROGRESS NOTE  Patient Name: Gabriel Lan  Age/Sex: 87 y.o. male  : 1935  MRN: [id number]    Date of Admission: 2023  Date of Encounter Visit: 23   LOS: 3 days   Patient Care Team:  Provider, No Known as PCP - General    Chief Complaint: Pneumothorax    Hospital course: Patient had a right-sided small chest tube placed on 9/10/2023  with significant improvement, he had his chest tube connected to waterseal this morning with a chest x-ray 2 hours later showing a small apical pneumothorax.  Later this afternoon he was having some evidence of air bubbling in the waterseal chamber.  The was feeling better himself  No fever or chills  Denies any significant wheezing or chest tightness at this point    Interval History: Chest tube placement on 9/10/2023    REVIEW OF SYSTEMS:   CONSTITUTIONAL: no fever or chills  CARDIOVASCULAR: Positive chest pain upon coughing. No palpitations or edema.   RESPIRATORY: Breath with minimal residual cough  GASTROINTESTINAL: No anorexia, nausea, vomiting or diarrhea. No abdominal pain or blood.   HEMATOLOGIC: No bleeding or bruising.     Ventilator/Non-Invasive Ventilation Settings (From admission, onward)      1 L/min              Vital Signs  Temp:  [97.2 °F (36.2 °C)-98.8 °F (37.1 °C)] 97.8 °F (36.6 °C)  Heart Rate:  [78-90] 83  Resp:  [18-20] 18  BP: (137-167)/(69-94) 166/84  SpO2:  [91 %-96 %] 95 %  on  Flow (L/min):  [1-2] 1 Device (Oxygen Therapy): nasal cannula    Intake/Output Summary (Last 24 hours) at 2023 1243  Last data filed at 2023 0800  Gross per 24 hour   Intake 480 ml   Output 950 ml   Net -470 ml       Flowsheet Rows      Flowsheet Row First Filed Value   Admission Height 172.7 cm (68\") Documented at 09/10/2023 1728   Admission Weight 60.8 kg (134 lb) Documented at 09/10/2023 1728          Body mass index is 20.37 kg/m².      09/10/23  1728 23  0959   Weight: 60.8 kg (134 lb) 60.8 kg (134 lb)       Physical Exam:  GEN:  No acute " distress, alert, cooperative, well developed, elderly but doing better overall  EYES:   Sclerae clear. No icterus. PERRL. Normal EOM  ENT:   External ears/nose normal, no oral lesions, no thrush, mucous membranes moist  NECK:  Supple, midline trachea, no JVD  LUNGS: Normal chest on inspection, minimal and expiratory wheeze, no rhonchi today respirations regular, even and unlabored.   CV:  Regular rhythm and rate. Normal S1/S2. No murmurs, gallops, or rubs noted.  ABD:  Soft, nontender and nondistended. Normal bowel sounds. No guarding  EXT:  Moves all extremities well. No cyanosis. No redness. No edema.   Skin: Dry, intact, no bleeding    Results Review:    Results From Last 14 Days   Lab Units 09/10/23  2308 09/10/23  1707 09/10/23  1420   LACTATE mmol/L 1.0 4.5* 2.9*       Results from last 7 days   Lab Units 09/10/23  1024 09/09/23  2218   SODIUM mmol/L 139 142   POTASSIUM mmol/L 4.8 4.5   CHLORIDE mmol/L 107 106   CO2 mmol/L 22.6 18.0*   BUN mg/dL 29* 25*   CREATININE mg/dL 0.99 1.18   CALCIUM mg/dL 9.2 9.3   AST (SGOT) U/L  --  19   ALT (SGPT) U/L  --  30   ANION GAP mmol/L 9.4 18.0*   ALBUMIN g/dL  --  3.8       Results from last 7 days   Lab Units 09/10/23  1024 09/09/23  2218   HSTROP T ng/L 116* 151*           Results from last 7 days   Lab Units 09/09/23  2218   PROBNP pg/mL 2,407.0*       Results from last 7 days   Lab Units 09/10/23  1024 09/09/23  2218   WBC 10*3/mm3 21.54* 20.01*   HEMOGLOBIN g/dL 11.4* 12.4*   HEMATOCRIT % 33.4* 36.3*   PLATELETS 10*3/mm3 180 188   MCV fL 104.7* 105.2*       Results from last 7 days   Lab Units 09/09/23  2218   INR  1.04                 Invalid input(s): LDLCALC          Glucose   Date/Time Value Ref Range Status   09/12/2023 1109 221 (H) 70 - 130 mg/dL Final   09/12/2023 0552 127 70 - 130 mg/dL Final   09/11/2023 2045 139 (H) 70 - 130 mg/dL Final   09/11/2023 1637 219 (H) 70 - 130 mg/dL Final   09/11/2023 1108 114 70 - 130 mg/dL Final   09/11/2023 0611 134 (H) 70 -  130 mg/dL Final   09/10/2023 2039 136 (H) 70 - 130 mg/dL Final   09/10/2023 1635 138 (H) 70 - 130 mg/dL Final     Results from last 7 days   Lab Units 09/10/23  2308 09/10/23  1707 09/10/23  1420 09/10/23  1024 09/09/23  2218   PROCALCITONIN ng/mL  --   --   --   --  0.33*   LACTATE mmol/L 1.0 4.5* 2.9* 2.2* 8.1*       Results from last 7 days   Lab Units 09/10/23  1656 09/10/23  1028 09/10/23  1024   BLOODCX   --  No growth at 2 days No growth at 2 days   STREP PNEUMO AG  Negative  --   --    L. PNEUMOPHILA SEROGP 1 UR AG  Negative  --   --            Results from last 7 days   Lab Units 09/10/23  0102   COVID19  Not Detected   ADENOVIRUS DETECTION BY PCR  Not Detected   CORONAVIRUS 229E  Not Detected   CORONAVIRUS HKU1  Not Detected   CORONAVIRUS NL63  Not Detected   CORONAVIRUS OC43  Not Detected   HUMAN METAPNEUMOVIRUS  Not Detected   HUMAN RHINOVIRUS/ENTEROVIRUS  Detected*   INFLUENZA B PCR  Not Detected   PARAINFLUENZA 1  Not Detected   PARAINFLUENZA VIRUS 2  Not Detected   PARAINFLUENZA VIRUS 3  Not Detected   PARAINFLUENZA VIRUS 4  Not Detected   BORDETELLA PERTUSSIS PCR  Not Detected   BORDETELLA PARAPERTUSSIS PCR  Not Detected   CHLAMYDOPHILA PNEUMONIAE PCR  Not Detected   MYCOPLAMA PNEUMO PCR  Not Detected   RSV, PCR  Not Detected                 Imaging:   Imaging Results (All)                     Medication Review:   aspirin, 81 mg, Oral, Daily  atorvastatin, 40 mg, Oral, Daily  insulin lispro, 2-7 Units, Subcutaneous, 4x Daily AC & at Bedtime  ipratropium-albuterol, 3 mL, Nebulization, 4x Daily - RT  methylPREDNISolone sodium succinate, 60 mg, Intravenous, Q12H  mirtazapine, 15 mg, Oral, Nightly  senna-docusate sodium, 2 tablet, Oral, BID  tamsulosin, 0.8 mg, Oral, Daily             ASSESSMENT:   Status post resuscitated cardiac arrest  Right-sided pneumothorax, possibly from rib fracture and CPR, chest tube in position  Rhinovirus infection, on symptomatic treatment  Acute exacerbation of COPD  secondary to rhinoviral infection  Acute hypoxemic respiratory failure secondary to above  Right-sided rib fracture, likely from CPR  Congestive heart failure with elevated proBNP, currently on no diuretics  Lactic acidosis, improved    PLAN:  Patient is currently on 1 L/min nasal cannula oxygen, lactic acid is down to normal, chemistry is normal, high sensitive troponin is slightly elevated likely type II  The chest tube showed some air leak after he was connected to waterseal for a few hours and the chest x-ray showed small apical pneumothorax  Patient will be reconnected back to the negative pressure suction at -20 cm  We will hold the negative pressure at midnight again and we will try to repeat the chest x-ray in a.m. and decide if the chest tube can be removed  Patient seems to be improving from the viral infection standpoint with less wheezing and congestion on today's exam   discussed with patient   Labs/Notes/films were independently reviewed and pertinent results are summarized above  The copied texts in this note were reviewed and they are accurate as of 09/12/23    Disposition: Per primary team    Carlos House MD  09/12/23  12:43 EDT         Dictated utilizing Dragon dictation

## 2023-09-12 NOTE — THERAPY EVALUATION
Patient Name: Gabriel Lan  : 1935    MRN: [id number]                              Today's Date: 2023       Admit Date: 2023    Visit Dx: No diagnosis found.  Patient Active Problem List   Diagnosis    COPD with acute exacerbation    Cardiac arrest    Acute respiratory failure with hypoxia    Pneumothorax on right    Acute on chronic respiratory failure with hypoxia     History reviewed. No pertinent past medical history.  History reviewed. No pertinent surgical history.   General Information       Row Name 23          Physical Therapy Time and Intention    Document Type evaluation  -     Mode of Treatment individual therapy;physical therapy  -       Row Name 23          General Information    Prior Level of Function independent:;gait;transfer;bed mobility  walks with a walker  -     Existing Precautions/Restrictions fall;oxygen therapy device and L/min  -     Barriers to Rehab medically complex  -       Row Name 23          Living Environment    People in Home spouse  -       Row Name 23          Cognition    Orientation Status (Cognition) oriented x 3  -       Row Name 23          Safety Issues, Functional Mobility    Impairments Affecting Function (Mobility) balance;endurance/activity tolerance;pain;shortness of breath;strength  -               User Key  (r) = Recorded By, (t) = Taken By, (c) = Cosigned By      Initials Name Provider Type     Meg Fitzgerald, PT Physical Therapist                   Mobility       Row Name 23          Bed Mobility    Bed Mobility supine-sit;sit-supine  -     Supine-Sit [place] (Bed Mobility) verbal cues;nonverbal cues (demo/gesture);contact guard  -     Sit-Supine [place] (Bed Mobility) verbal cues;nonverbal cues (demo/gesture);contact guard  -     Assistive Device (Bed Mobility) bed rails;head of bed elevated  -       Row Name 23          Sit-Stand  Transfer    Sit-Stand [place] (Transfers) verbal cues;nonverbal cues (demo/gesture);minimum assist (75% patient effort)  -     Assistive Device (Sit-Stand Transfers) walker, front-wheeled  -       Row Name 09/12/23 0947          Gait/Stairs (Locomotion)    [place] Level (Gait) verbal cues;nonverbal cues (demo/gesture);contact guard  -     Assistive Device (Gait) walker, front-wheeled  -     Distance in Feet (Gait) 75  -     Deviations/Abnormal Patterns (Gait) sumeet decreased;gait speed decreased;stride length decreased  -     Bilateral Gait Deviations forward flexed posture  -     Comment, (Gait/Stairs) some assist to navigate walker on the turns  -               User Key  (r) = Recorded By, (t) = Taken By, (c) = Cosigned By      Initials Name Provider Type     Meg Fitzgerald, PT Physical Therapist                   Obj/Interventions       Row Name 09/12/23 0949          Range of Motion Comprehensive    General Range of Motion no range of motion deficits identified  -       Row Name 09/12/23 0949          Strength Comprehensive (MMT)    Comment, General Manual Muscle Testing (MMT) Assessment generalized weakness noted with functional mobility, B LE grossly >/=3+/5  -       Row Name 09/12/23 0949          Balance    Balance Assessment standing static balance;standing dynamic balance  -     Static Standing Balance verbal cues;non-verbal cues (demo/gesture);contact guard  -     Dynamic Standing Balance verbal cues;non-verbal cues (demo/gesture);contact guard  -     Position/Device Used, Standing Balance walker, rolling  -               User Key  (r) = Recorded By, (t) = Taken By, (c) = Cosigned By      Initials Name Provider Type     Meg Fitzgerald, PT Physical Therapist                   Goals/Plan       Row Name 09/12/23 0954          Bed Mobility Goal 1 (PT)    Activity/Assistive Device (Bed Mobility Goal 1, PT) bed mobility activities, all  -     [place]  Level/Cues Needed (Bed Mobility Goal 1, PT) supervision required  -CH     Time Frame (Bed Mobility Goal 1, PT) 1 week  -CH       Row Name 09/12/23 0998          Transfer Goal 1 (PT)    Activity/Assistive Device (Transfer Goal 1, PT) transfers, all;walker, rolling  -CH     [place] Level/Cues Needed (Transfer Goal 1, PT) supervision required  -CH     Time Frame (Transfer Goal 1, PT) 1 week  -CH       Row Name 09/12/23 0923          Gait Training Goal 1 (PT)    Activity/Assistive Device (Gait Training Goal 1, PT) gait (walking locomotion);walker, rolling  -CH     [place] Level (Gait Training Goal 1, PT) supervision required  -CH     Distance (Gait Training Goal 1, PT) 150  -CH     Time Frame (Gait Training Goal 1, PT) 1 week  -       Row Name 09/12/23 0976          Therapy Assessment/Plan (PT)    Planned Therapy Interventions (PT) balance training;bed mobility training;gait training;home exercise program;patient/family education;strengthening;transfer training  -               User Key  (r) = Recorded By, (t) = Taken By, (c) = Cosigned By      Initials Name Provider Type    CH Meg Fitzgerald, PT Physical Therapist                   Clinical Impression       Row Name 09/12/23 0912          Pain    Pretreatment Pain Rating 5/10  -CH     Posttreatment Pain Rating 5/10  -CH     Pain Location - Side/Orientation Right  -     Pain Location - chest  -CH     Pre/Posttreatment Pain Comment CT site  -     Pain Intervention(s) Repositioned  -       Row Name 09/12/23 0925          Plan of Care Review    Plan of Care Reviewed With patient  -     Outcome Evaluation Pt is a 88 yo M who was admitted with SOA, cough. Pt reports he went into cardiac arrest and respiratory after given a medication. Pt received CPR and sustained a pneumothorax. Pt had a chest tube inserted which is now set to water seal. Pt presents to PT with some impaired functional mobility and gait secondary to generalized weakness, impaired  balance, and decreased activity tolerance. Pt may benefit from skilled PT to address strength, mobility, and gait.  -       Row Name 09/12/23 0950          Therapy Assessment/Plan (PT)    Patient/Family Therapy Goals Statement (PT) to return to PLOF  -     Rehab Potential (PT) good, to achieve stated therapy goals  -     Criteria for Skilled Interventions Met (PT) skilled treatment is necessary  -     Therapy Frequency (PT) 5 times/wk  -       Row Name 09/12/23 0950          Positioning and Restraints    Pre-Treatment Position in bed  -     Post Treatment Position bed  -     In Bed supine;call light within reach;encouraged to call for assist;exit alarm on;with family/caregiver  -               User Key  (r) = Recorded By, (t) = Taken By, (c) = Cosigned By      Initials Name Provider Type    Meg Barriga PT Physical Therapist                   Outcome Measures       Row Name 09/12/23 0955          How much help from another person do you currently need...    Turning from your back to your side while in flat bed without using bedrails? 3  -CH     Moving from lying on back to sitting on the side of a flat bed without bedrails? 3  -CH     Moving to and from a bed to a chair (including a wheelchair)? 3  -CH     Standing up from a chair using your arms (e.g., wheelchair, bedside chair)? 3  -CH     Climbing 3-5 steps with a railing? 1  -CH     To walk in hospital room? 3  -CH     AM-PAC 6 Clicks Score (PT) 16  -     Highest level of mobility 5 --> Static standing  -       Row Name 09/12/23 0955          Functional Assessment    Outcome Measure Options AM-PAC 6 Clicks Basic Mobility (PT)  -               User Key  (r) = Recorded By, (t) = Taken By, (c) = Cosigned By      Initials Name Provider Type    Meg Barriga PT Physical Therapist                                 Physical Therapy Education       Title: PT OT SLP Therapies (In Progress)       Topic: Physical Therapy (In Progress)        Point: Mobility training (Done)       Learning Progress Summary             Patient Acceptance, E,TB,D, VU,NR by  at 9/12/2023 0955                         Point: Home exercise program (Not Started)       Learner Progress:  Not documented in this visit.              Point: Body mechanics (Done)       Learning Progress Summary             Patient Acceptance, E,TB,D, VU,NR by  at 9/12/2023 0955                         Point: Precautions (Done)       Learning Progress Summary             Patient Acceptance, E,TB,D, VU,NR by  at 9/12/2023 0955                                         User Key       Initials Effective Dates Name Provider Type Discipline     06/16/21 -  Meg Fitzgerald, PT Physical Therapist PT                  PT Recommendation and Plan  Planned Therapy Interventions (PT): balance training, bed mobility training, gait training, home exercise program, patient/family education, strengthening, transfer training  Plan of Care Reviewed With: patient  Outcome Evaluation: Pt is a 86 yo M who was admitted with SOA, cough. Pt reports he went into cardiac arrest and respiratory after given a medication. Pt received CPR and sustained a pneumothorax. Pt had a chest tube inserted which is now set to water seal. Pt presents to PT with some impaired functional mobility and gait secondary to generalized weakness, impaired balance, and decreased activity tolerance. Pt may benefit from skilled PT to address strength, mobility, and gait.     Time Calculation:         PT Charges       Row Name 09/12/23 0956             Time Calculation    Start Time 0848  -      Stop Time 0905  -      Time Calculation (min) 17 min  -      PT Received On 09/12/23  -      PT - Next Appointment 09/13/23  -      PT Goal Re-Cert Due Date 09/19/23  -         Time Calculation- PT    Total Timed Code Minutes- PT 12 minute(s)  -         Timed Charges    36347 - PT Therapeutic Activity Minutes 12  -         Total  Minutes    Timed Charges Total Minutes 12  -CH       Total Minutes 12  -CH                User Key  (r) = Recorded By, (t) = Taken By, (c) = Cosigned By      Initials Name Provider Type     Meg Fitzgerald, PT Physical Therapist                  Therapy Charges for Today       Code Description Service Date Service Provider Modifiers Qty    63732830737 HC PT THERAPEUTIC ACT EA 15 MIN 9/12/2023 Meg Fitzgerald, PT GP 1    99966308056 HC PT EVAL MOD COMPLEXITY 2 9/12/2023 Meg Fitzgerald, PT GP 1            PT G-Codes  Outcome Measure Options: AM-PAC 6 Clicks Basic Mobility (PT)  AM-PAC 6 Clicks Score (PT): 16  PT Discharge Summary  Anticipated Discharge Disposition (PT): home with assist, home with home health    Meg Fitzgerald, PT  9/12/2023

## 2023-09-12 NOTE — PROGRESS NOTES
"    DAILY PROGRESS NOTE  [facility]    Patient Identification:  Name: Gabriel Lan  Age: 87 y.o.  Sex: male  :  1935  MRN: [id number]         Primary Care Physician: Provider, No Known    Subjective:  Interval History:     Still with chest tube  On oxygen  On steroids  Sleeping this morning      Case discussed with son at bedside    Scheduled Meds:aspirin, 81 mg, Oral, Daily  atorvastatin, 40 mg, Oral, Daily  insulin lispro, 2-7 Units, Subcutaneous, 4x Daily AC & at Bedtime  ipratropium-albuterol, 3 mL, Nebulization, 4x Daily - RT  methylPREDNISolone sodium succinate, 60 mg, Intravenous, Q12H  mirtazapine, 15 mg, Oral, Nightly  senna-docusate sodium, 2 tablet, Oral, BID  tamsulosin, 0.8 mg, Oral, Daily      Continuous Infusions:     Vital signs in last 24 hours:  Temp:  [97.2 °F (36.2 °C)-98.8 °F (37.1 °C)] 97.8 °F (36.6 °C)  Heart Rate:  [78-90] 83  Resp:  [18-20] 18  BP: (137-167)/(69-94) 166/84    Intake/Output:    Intake/Output Summary (Last 24 hours) at 2023 1311  Last data filed at 2023 0800  Gross per 24 hour   Intake 480 ml   Output 950 ml   Net -470 ml       Exam:  /84 (BP Location: Left arm, Patient Position: Lying)   Pulse 83   Temp 97.8 °F (36.6 °C) (Oral)   Resp 18   Ht 172.7 cm (68\")   Wt 60.8 kg (134 lb)   SpO2 95%   BMI 20.37 kg/m²     General Appearance:    Sleeping, acutely and chronically ill                                          Neck:   No JVD                         Lungs:   bases/clear to auscultation bilaterally, respirations unlabored                                  Heart:    Regular rate and rhythm, S1 and S2 normal                  Abdomen:     Soft, nontender, bowel sounds active                  Extremities: Moving all, no cyanosis or edema                        Pulses:   Pulses palpable in all extremities                  Neurologic:   CNII-XII intact     Data Review:  Labs in chart were reviewed.  09/10/2023 4282 09/10/2023 9003 STAT " Lactic Acid, Reflex [[id number]]   Blood    Final result Component Value Units   Lactate 1.0 mmol/L          09/10/2023 1707 09/10/2023 1749 STAT Lactic Acid, Reflex [[id number]]   (Abnormal)   Blood    Final result Component Value Units   Lactate 4.5 High Critical  mmol/L          09/10/2023 1656 09/10/2023 1819 S. Pneumo Ag Urine or CSF - Urine, Urine, Clean Catch [[id number]]   Urine, Clean Catch    Final result Component Value   Strep Pneumo Ag Negative             09/10/2023 1656 09/10/2023 1819 Legionella Antigen, Urine - Urine, Urine, Clean Catch [[id number]]   Urine, Clean Catch    Final result Component Value   LEGIONELLA ANTIGEN, URINE Negative             09/10/2023 1420 09/10/2023 1459 STAT Lactic Acid, Reflex [[id number]]   (Abnormal)   Blood from Arm, Right    Final result Component Value Units   Lactate 2.9 High Critical  mmol/L          09/10/2023 1028 09/12/2023 1045 Blood Culture - Blood, Arm, Left [[id number]]   Blood from Arm, Left    Preliminary result Component Value   Blood Culture No growth at 2 days P             09/10/2023 1024 09/10/2023 1051 CBC (No Diff) [[id number]]   (Abnormal)   Blood    Final result Component Value Units   WBC 21.54 High  10*3/mm3   RBC 3.19 Low  10*6/mm3   Hemoglobin 11.4 Low  g/dL   Hematocrit 33.4 Low  %   .7 High  fL   MCH 35.7 High  pg   MCHC 34.1 g/dL   RDW 12.2 Low  %   RDW-SD 47.4 fl   MPV 9.9 fL   Platelets 180 10*3/mm3          09/10/2023 1024 09/10/2023 1116 Basic Metabolic Panel [[id number]]    (Abnormal)   Blood    Final result Component Value Units   Glucose 173 High  mg/dL   BUN 29 High  mg/dL   Creatinine 0.99 mg/dL   Sodium 139 mmol/L   Potassium 4.8 mmol/L   Chloride 107 mmol/L   CO2 22.6 mmol/L   Calcium 9.2 mg/dL   BUN/Creatinine Ratio 29.3 High     Anion Gap 9.4 mmol/L   eGFR 74.2 mL/min/1.73        Assessment:  Active Hospital Problems    Diagnosis  POA    **COPD with acute exacerbation [J44.1]  Yes    Pneumothorax on right [J93.9]  Unknown     Acute on chronic respiratory failure with hypoxia [J96.21]  Yes    Cardiac arrest [I46.9]  Unknown    Acute respiratory failure with hypoxia [J96.01]  Unknown      Resolved Hospital Problems   No resolved problems to display.       Plan:    Rhinovirus with COPD acute exacerbation with large right pneumothorax status post chest tube per pulmonary.                -Serial chest x-ray               -Solu-Medrol   -nebs  -Leukocytosis likely related to steroids, monitor. No fever.      Status postcardiac arrest with subsequent multiple rib fractures    Elevated lactate -may not be clinically significant from my perspective given recent cardiac arrest.  Repeat lactate is normal      Acute hypoxic respiratory failure on supplemental O2 but will wean accordingly     PAF-RC with recent cardiac arrest -cardiology seen. Arrest may be allergic rx to ceftriaxone vs PTX per Cardiology. Low concern for cardiac cause per Cardiology and ECHO unremarkable.            William Song MD  9/12/2023  13:11 EDT

## 2023-09-12 NOTE — PLAN OF CARE
Goal Outcome Evaluation:   VSS. A&OX4. Speech is garbled, per family this is baseline. O2 @2lpm. No c/o pain or discomfort overnight. Right chest tube remains to -20 suction, will switch to water seal at 0600. Up x1 assist to bsdc. Call light in reach. Son at bedside.

## 2023-09-12 NOTE — PLAN OF CARE
Goal Outcome Evaluation:  Plan of Care Reviewed With: patient           Outcome Evaluation: Pt is a 88 yo M who was admitted with SOA, cough. Pt reports he went into cardiac arrest and respiratory after given a medication. Pt received CPR and sustained a pneumothorax. Pt had a chest tube inserted which is now set to water seal. Pt presents to PT with some impaired functional mobility and gait secondary to generalized weakness, impaired balance, and decreased activity tolerance. Pt may benefit from skilled PT to address strength, mobility, and gait.      Anticipated Discharge Disposition (PT): home with assist, home with home health

## 2023-09-12 NOTE — PLAN OF CARE
Goal Outcome Evaluation:  Plan of Care Reviewed With: patient        Progress: improving     Vital signs stable. Alert and oriented x 3. On 1 liter oxygen per nasal cannula. Normal sinus cardiac rhythm. Right chest tube patent to - 20 cm suction. No air leak or fluctuation noted. Occlusive dressing dry / intact to chest tube insertion site. Dark serosanguinous drainage noted in atrium. Up with assistance of one out of bed. Falls risk noted, bed alarm on. Family visited throughout the day. Tolerating consistent carbohydrate diet fairly well. Blood sugar monitored AC/HS. Voiding per urinal. Resting quietly in no distress. Call light within patient's reach.

## 2023-09-13 ENCOUNTER — APPOINTMENT (OUTPATIENT)
Dept: CT IMAGING | Facility: HOSPITAL | Age: 88
DRG: 199 | End: 2023-09-13
Payer: MEDICARE

## 2023-09-13 ENCOUNTER — APPOINTMENT (OUTPATIENT)
Dept: GENERAL RADIOLOGY | Facility: HOSPITAL | Age: 88
DRG: 199 | End: 2023-09-13
Payer: MEDICARE

## 2023-09-13 ENCOUNTER — APPOINTMENT (OUTPATIENT)
Dept: MRI IMAGING | Facility: HOSPITAL | Age: 88
DRG: 199 | End: 2023-09-13
Payer: MEDICARE

## 2023-09-13 LAB
ALBUMIN SERPL-MCNC: 3 G/DL (ref 3.5–5.2)
ALBUMIN/GLOB SERPL: 1.2 G/DL
ALP SERPL-CCNC: 77 U/L (ref 39–117)
ALT SERPL W P-5'-P-CCNC: 21 U/L (ref 1–41)
ANION GAP SERPL CALCULATED.3IONS-SCNC: 8.9 MMOL/L (ref 5–15)
AST SERPL-CCNC: 19 U/L (ref 1–40)
BASOPHILS # BLD AUTO: 0.02 10*3/MM3 (ref 0–0.2)
BASOPHILS NFR BLD AUTO: 0.1 % (ref 0–1.5)
BILIRUB SERPL-MCNC: 0.3 MG/DL (ref 0–1.2)
BUN SERPL-MCNC: 34 MG/DL (ref 8–23)
BUN/CREAT SERPL: 38.6 (ref 7–25)
CALCIUM SPEC-SCNC: 9 MG/DL (ref 8.6–10.5)
CHLORIDE SERPL-SCNC: 112 MMOL/L (ref 98–107)
CO2 SERPL-SCNC: 22.1 MMOL/L (ref 22–29)
CREAT SERPL-MCNC: 0.88 MG/DL (ref 0.76–1.27)
DEPRECATED RDW RBC AUTO: 45.8 FL (ref 37–54)
EGFRCR SERPLBLD CKD-EPI 2021: 83.7 ML/MIN/1.73
EOSINOPHIL # BLD AUTO: 0 10*3/MM3 (ref 0–0.4)
EOSINOPHIL NFR BLD AUTO: 0 % (ref 0.3–6.2)
ERYTHROCYTE [DISTWIDTH] IN BLOOD BY AUTOMATED COUNT: 12 % (ref 12.3–15.4)
GLOBULIN UR ELPH-MCNC: 2.6 GM/DL
GLUCOSE BLDC GLUCOMTR-MCNC: 106 MG/DL (ref 70–130)
GLUCOSE BLDC GLUCOMTR-MCNC: 122 MG/DL (ref 70–130)
GLUCOSE BLDC GLUCOMTR-MCNC: 124 MG/DL (ref 70–130)
GLUCOSE BLDC GLUCOMTR-MCNC: 176 MG/DL (ref 70–130)
GLUCOSE SERPL-MCNC: 117 MG/DL (ref 65–99)
HCT VFR BLD AUTO: 34.6 % (ref 37.5–51)
HGB BLD-MCNC: 12.1 G/DL (ref 13–17.7)
IMM GRANULOCYTES # BLD AUTO: 0.33 10*3/MM3 (ref 0–0.05)
IMM GRANULOCYTES NFR BLD AUTO: 1.9 % (ref 0–0.5)
LYMPHOCYTES # BLD AUTO: 0.65 10*3/MM3 (ref 0.7–3.1)
LYMPHOCYTES NFR BLD AUTO: 3.8 % (ref 19.6–45.3)
MCH RBC QN AUTO: 36 PG (ref 26.6–33)
MCHC RBC AUTO-ENTMCNC: 35 G/DL (ref 31.5–35.7)
MCV RBC AUTO: 103 FL (ref 79–97)
MONOCYTES # BLD AUTO: 0.61 10*3/MM3 (ref 0.1–0.9)
MONOCYTES NFR BLD AUTO: 3.6 % (ref 5–12)
NEUTROPHILS NFR BLD AUTO: 15.46 10*3/MM3 (ref 1.7–7)
NEUTROPHILS NFR BLD AUTO: 90.6 % (ref 42.7–76)
NRBC BLD AUTO-RTO: 0 /100 WBC (ref 0–0.2)
PLATELET # BLD AUTO: 179 10*3/MM3 (ref 140–450)
PMV BLD AUTO: 10.3 FL (ref 6–12)
POTASSIUM SERPL-SCNC: 4.1 MMOL/L (ref 3.5–5.2)
PROT SERPL-MCNC: 5.6 G/DL (ref 6–8.5)
RBC # BLD AUTO: 3.36 10*6/MM3 (ref 4.14–5.8)
SODIUM SERPL-SCNC: 143 MMOL/L (ref 136–145)
WBC NRBC COR # BLD: 17.07 10*3/MM3 (ref 3.4–10.8)

## 2023-09-13 PROCEDURE — 0 GADOBENATE DIMEGLUMINE 529 MG/ML SOLUTION: Performed by: INTERNAL MEDICINE

## 2023-09-13 PROCEDURE — 99223 1ST HOSP IP/OBS HIGH 75: CPT | Performed by: NURSE PRACTITIONER

## 2023-09-13 PROCEDURE — 71045 X-RAY EXAM CHEST 1 VIEW: CPT

## 2023-09-13 PROCEDURE — 71250 CT THORAX DX C-: CPT

## 2023-09-13 PROCEDURE — 25010000002 MORPHINE PER 10 MG: Performed by: INTERNAL MEDICINE

## 2023-09-13 PROCEDURE — 94761 N-INVAS EAR/PLS OXIMETRY MLT: CPT

## 2023-09-13 PROCEDURE — 94799 UNLISTED PULMONARY SVC/PX: CPT

## 2023-09-13 PROCEDURE — 80053 COMPREHEN METABOLIC PANEL: CPT | Performed by: INTERNAL MEDICINE

## 2023-09-13 PROCEDURE — 85025 COMPLETE CBC W/AUTO DIFF WBC: CPT | Performed by: INTERNAL MEDICINE

## 2023-09-13 PROCEDURE — 70553 MRI BRAIN STEM W/O & W/DYE: CPT

## 2023-09-13 PROCEDURE — 63710000001 INSULIN LISPRO (HUMAN) PER 5 UNITS: Performed by: INTERNAL MEDICINE

## 2023-09-13 PROCEDURE — 82948 REAGENT STRIP/BLOOD GLUCOSE: CPT

## 2023-09-13 PROCEDURE — 94760 N-INVAS EAR/PLS OXIMETRY 1: CPT

## 2023-09-13 PROCEDURE — 94664 DEMO&/EVAL PT USE INHALER: CPT

## 2023-09-13 PROCEDURE — 25010000002 METHYLPREDNISOLONE PER 125 MG: Performed by: INTERNAL MEDICINE

## 2023-09-13 PROCEDURE — A9577 INJ MULTIHANCE: HCPCS | Performed by: INTERNAL MEDICINE

## 2023-09-13 PROCEDURE — 97530 THERAPEUTIC ACTIVITIES: CPT

## 2023-09-13 PROCEDURE — 70450 CT HEAD/BRAIN W/O DYE: CPT

## 2023-09-13 RX ORDER — HYDRALAZINE HYDROCHLORIDE 25 MG/1
25 TABLET, FILM COATED ORAL ONCE
Status: COMPLETED | OUTPATIENT
Start: 2023-09-13 | End: 2023-09-13

## 2023-09-13 RX ORDER — SODIUM CHLORIDE 9 MG/ML
40 INJECTION, SOLUTION INTRAVENOUS AS NEEDED
Status: DISCONTINUED | OUTPATIENT
Start: 2023-09-13 | End: 2023-09-19

## 2023-09-13 RX ORDER — AMLODIPINE BESYLATE 5 MG/1
5 TABLET ORAL
Status: DISCONTINUED | OUTPATIENT
Start: 2023-09-13 | End: 2023-09-15

## 2023-09-13 RX ORDER — SODIUM CHLORIDE 0.9 % (FLUSH) 0.9 %
10 SYRINGE (ML) INJECTION EVERY 12 HOURS SCHEDULED
Status: DISCONTINUED | OUTPATIENT
Start: 2023-09-13 | End: 2023-09-19

## 2023-09-13 RX ORDER — SODIUM CHLORIDE 0.9 % (FLUSH) 0.9 %
10 SYRINGE (ML) INJECTION AS NEEDED
Status: DISCONTINUED | OUTPATIENT
Start: 2023-09-13 | End: 2023-09-19

## 2023-09-13 RX ADMIN — TAMSULOSIN HYDROCHLORIDE 0.8 MG: 0.4 CAPSULE ORAL at 09:33

## 2023-09-13 RX ADMIN — MORPHINE SULFATE 1 MG: 2 INJECTION, SOLUTION INTRAMUSCULAR; INTRAVENOUS at 09:45

## 2023-09-13 RX ADMIN — HYDRALAZINE HYDROCHLORIDE 25 MG: 25 TABLET, FILM COATED ORAL at 12:30

## 2023-09-13 RX ADMIN — MIRTAZAPINE 15 MG: 15 TABLET, ORALLY DISINTEGRATING ORAL at 23:14

## 2023-09-13 RX ADMIN — INSULIN LISPRO 2 UNITS: 100 INJECTION, SOLUTION INTRAVENOUS; SUBCUTANEOUS at 12:30

## 2023-09-13 RX ADMIN — ATORVASTATIN CALCIUM 40 MG: 20 TABLET, FILM COATED ORAL at 09:32

## 2023-09-13 RX ADMIN — METHYLPREDNISOLONE SODIUM SUCCINATE 60 MG: 125 INJECTION, POWDER, FOR SOLUTION INTRAMUSCULAR; INTRAVENOUS at 12:30

## 2023-09-13 RX ADMIN — Medication 10 ML: at 09:39

## 2023-09-13 RX ADMIN — ASPIRIN 81 MG: 81 TABLET, CHEWABLE ORAL at 09:33

## 2023-09-13 RX ADMIN — GADOBENATE DIMEGLUMINE 12 ML: 529 INJECTION, SOLUTION INTRAVENOUS at 19:57

## 2023-09-13 RX ADMIN — METHYLPREDNISOLONE SODIUM SUCCINATE 60 MG: 125 INJECTION, POWDER, FOR SOLUTION INTRAMUSCULAR; INTRAVENOUS at 23:14

## 2023-09-13 RX ADMIN — IPRATROPIUM BROMIDE AND ALBUTEROL SULFATE 3 ML: 2.5; .5 SOLUTION RESPIRATORY (INHALATION) at 14:56

## 2023-09-13 RX ADMIN — IPRATROPIUM BROMIDE AND ALBUTEROL SULFATE 3 ML: 2.5; .5 SOLUTION RESPIRATORY (INHALATION) at 10:58

## 2023-09-13 RX ADMIN — AMLODIPINE BESYLATE 5 MG: 5 TABLET ORAL at 12:56

## 2023-09-13 RX ADMIN — IPRATROPIUM BROMIDE AND ALBUTEROL SULFATE 3 ML: 2.5; .5 SOLUTION RESPIRATORY (INHALATION) at 07:26

## 2023-09-13 RX ADMIN — Medication 10 ML: at 12:30

## 2023-09-13 NOTE — PROGRESS NOTES
"    DAILY PROGRESS NOTE  [facility]    Patient Identification:  Name: Riky Rios  Age: 87 y.o.  Sex: male  :  1935  MRN: [id number]         Primary Care Physician: Provider, No Known    Subjective:  Interval History:     Still with chest tube  On oxygen  On steroids  Awake today   Some upper airway congestion and dry mouth     Case discussed with granddaughter at bedside    Scheduled Meds:amLODIPine, 5 mg, Oral, Q24H  aspirin, 81 mg, Oral, Daily  atorvastatin, 40 mg, Oral, Daily  insulin lispro, 2-7 Units, Subcutaneous, 4x Daily AC & at Bedtime  ipratropium-albuterol, 3 mL, Nebulization, 4x Daily - RT  methylPREDNISolone sodium succinate, 60 mg, Intravenous, Q12H  mirtazapine, 15 mg, Oral, Nightly  senna-docusate sodium, 2 tablet, Oral, BID  tamsulosin, 0.8 mg, Oral, Daily      Continuous Infusions:     Vital signs in last 24 hours:  Temp:  [97.3 °F (36.3 °C)-98.7 °F (37.1 °C)] 97.8 °F (36.6 °C)  Heart Rate:  [71-91] 81  Resp:  [18-20] 20  BP: (156-192)/(85-99) 162/88    Intake/Output:    Intake/Output Summary (Last 24 hours) at 2023 1306  Last data filed at 2023 0900  Gross per 24 hour   Intake 600 ml   Output 1010 ml   Net -410 ml       Exam:  /88 (BP Location: Left arm, Patient Position: Lying)   Pulse 81   Temp 97.8 °F (36.6 °C) (Oral)   Resp 20   Ht 172.7 cm (68\")   Wt 60.8 kg (134 lb)   SpO2 93%   BMI 20.37 kg/m²     General Appearance:    awake, acutely and chronically ill                                          Neck:   No JVD                         Lungs:   +rhonchi, slight resp distress                                  Heart:    Regular rate and rhythm, S1 and S2 normal                  Abdomen:     Soft, nontender, bowel sounds active                  Extremities: Moving all, no cyanosis or edema                     Some upper airway congestion and dry mouth         Data Review:  Labs in chart were reviewed.  09/10/2023 2614 09/10/2023 2333 STAT Lactic " Acid, Reflex [[id number]]   Blood    Final result Component Value Units   Lactate 1.0 mmol/L          09/10/2023 1707 09/10/2023 1749 STAT Lactic Acid, Reflex [[id number]]   (Abnormal)   Blood    Final result Component Value Units   Lactate 4.5 High Critical  mmol/L          09/10/2023 1656 09/10/2023 1819 S. Pneumo Ag Urine or CSF - Urine, Urine, Clean Catch [[id number]]   Urine, Clean Catch    Final result Component Value   Strep Pneumo Ag Negative             09/10/2023 1656 09/10/2023 1819 Legionella Antigen, Urine - Urine, Urine, Clean Catch [[id number]]   Urine, Clean Catch    Final result Component Value   LEGIONELLA ANTIGEN, URINE Negative             09/10/2023 1420 09/10/2023 1459 STAT Lactic Acid, Reflex [[id number]]   (Abnormal)   Blood from Arm, Right    Final result Component Value Units   Lactate 2.9 High Critical  mmol/L          09/10/2023 1028 09/12/2023 1045 Blood Culture - Blood, Arm, Left [[id number]]   Blood from Arm, Left    Preliminary result Component Value   Blood Culture No growth at 2 days P             09/10/2023 1024 09/10/2023 1051 CBC (No Diff) [[id number]]   (Abnormal)   Blood    Final result Component Value Units   WBC 21.54 High  10*3/mm3   RBC 3.19 Low  10*6/mm3   Hemoglobin 11.4 Low  g/dL   Hematocrit 33.4 Low  %   .7 High  fL   MCH 35.7 High  pg   MCHC 34.1 g/dL   RDW 12.2 Low  %   RDW-SD 47.4 fl   MPV 9.9 fL   Platelets 180 10*3/mm3          09/10/2023 1024 09/10/2023 1116 Basic Metabolic Panel [[id number]]    (Abnormal)   Blood    Final result Component Value Units   Glucose 173 High  mg/dL   BUN 29 High  mg/dL   Creatinine 0.99 mg/dL   Sodium 139 mmol/L   Potassium 4.8 mmol/L   Chloride 107 mmol/L   CO2 22.6 mmol/L   Calcium 9.2 mg/dL   BUN/Creatinine Ratio 29.3 High     Anion Gap 9.4 mmol/L   eGFR 74.2 mL/min/1.73        Assessment:  Active Hospital Problems    Diagnosis  POA    **COPD with acute exacerbation [J44.1]  Yes    Pneumothorax on right [J93.9]  Unknown    Acute on  chronic respiratory failure with hypoxia [J96.21]  Yes    Cardiac arrest [I46.9]  Unknown    Acute respiratory failure with hypoxia [J96.01]  Unknown      Resolved Hospital Problems   No resolved problems to display.       Plan:    Rhinovirus with COPD acute exacerbation with large right pneumothorax status post chest tube per pulmonary.                -Serial chest x-ray               -Solu-Medrol   -nebs  -Leukocytosis likely related to steroids, monitor. No fever.   -Pulmonary following      Status postcardiac arrest with subsequent multiple rib fractures    Elevated lactate -may not be clinically significant from my perspective given recent cardiac arrest.  Repeat lactate is normal      Acute hypoxic respiratory failure on supplemental O2 but will wean accordingly     PAF-RC with recent cardiac arrest -cardiology seen. Arrest may be allergic rx to ceftriaxone vs PTX per Cardiology. Low concern for cardiac cause per Cardiology and ECHO unremarkable.     Slurred speech today most likely related to congestion from rhinovirus and dry mouth. BP high though will obtain CT head to r/o any bleed and monitor. Have added agents for HTN.     DW Family  DW Dr. Harsh Song MD  9/13/2023  13:06 EDT    Addendum  DW Dr. [facility]. No bleed on CT but abnormalities best obtained by MRI, will obtain. On asa and statin already. Neurochecks and NIH ordered.    Electronically signed by William Song MD, 09/13/23, 4:48 PM EDT.

## 2023-09-13 NOTE — PROGRESS NOTES
"  PROGRESS NOTE  Patient Name: Riky Rios  Age/Sex: 87 y.o. male  : 1935  MRN: [id number]    Date of Admission: 2023  Date of Encounter Visit: 23   LOS: 4 days   Patient Care Team:  Provider, No Known as PCP - General    Chief Complaint: Pneumothorax    Hospital course: Patient had a right-sided small chest tube placed on 9/10/2023  with significant improvement, however unable to maintain on waterseal with separation of the lung from the chest wall and he does have some elements of subcutaneous air on today's exam.  Today his speech was slurred but it looks more related to the dry mouth, this was also noted by the internal medicine team who will be evaluating with a CT of the head    Interval History: Chest tube placement on 9/10/2023    REVIEW OF SYSTEMS:   CONSTITUTIONAL: no fever or chills  CARDIOVASCULAR: Positive chest pain upon coughing. No palpitations or edema.   RESPIRATORY: Breath with minimal residual cough  GASTROINTESTINAL: No anorexia, nausea, vomiting or diarrhea. No abdominal pain or blood.   HEMATOLOGIC: No bleeding or bruising.     Ventilator/Non-Invasive Ventilation Settings (From admission, onward)      1 L/min              Vital Signs  Temp:  [97.3 °F (36.3 °C)-98.7 °F (37.1 °C)] 97.5 °F (36.4 °C)  Heart Rate:  [71-91] 71  Resp:  [18] 18  BP: (156-192)/(84-99) 192/98  SpO2:  [93 %-97 %] 94 %  on  Flow (L/min):  [1-2] 2 Device (Oxygen Therapy): nasal cannula    Intake/Output Summary (Last 24 hours) at 2023 0838  Last data filed at 2023 0218  Gross per 24 hour   Intake 360 ml   Output 860 ml   Net -500 ml       Flowsheet Rows      Flowsheet Row First Filed Value   Admission Height 172.7 cm (68\") Documented at 09/10/2023 1728   Admission Weight 60.8 kg (134 lb) Documented at 09/10/2023 1728          Body mass index is 20.37 kg/m².      09/10/23  1728 23  0959   Weight: 60.8 kg (134 lb) 60.8 kg (134 lb)       Physical Exam:  GEN:  No acute distress, alert, " cooperative, well developed, elderly but doing better overall, slurred speech, likely from the dry mouth but not completely certain  EYES:   Sclerae clear. No icterus. PERRL. Normal EOM  ENT:   External ears/nose normal, no oral lesions, no thrush, mucous membranes moist  NECK:  Supple, midline trachea, no JVD  LUNGS: Normal chest on inspection, minimal and expiratory wheeze, no rhonchi today respirations regular, even and unlabored..  Minimal subcutaneous air  CV:  Regular rhythm and rate. Normal S1/S2. No murmurs, gallops, or rubs noted.  ABD:  Soft, nontender and nondistended. Normal bowel sounds. No guarding  EXT:  Moves all extremities well. No cyanosis. No redness. No edema.   Skin: Dry, intact, no bleeding    Results Review:    Results From Last 14 Days   Lab Units 09/10/23  2308 09/10/23  1707 09/10/23  1420   LACTATE mmol/L 1.0 4.5* 2.9*       Results from last 7 days   Lab Units 09/13/23  0530 09/10/23  1024 09/09/23  2218   SODIUM mmol/L 143 139 142   POTASSIUM mmol/L 4.1 4.8 4.5   CHLORIDE mmol/L 112* 107 106   CO2 mmol/L 22.1 22.6 18.0*   BUN mg/dL 34* 29* 25*   CREATININE mg/dL 0.88 0.99 1.18   CALCIUM mg/dL 9.0 9.2 9.3   AST (SGOT) U/L 19  --  19   ALT (SGPT) U/L 21  --  30   ANION GAP mmol/L 8.9 9.4 18.0*   ALBUMIN g/dL 3.0*  --  3.8       Results from last 7 days   Lab Units 09/10/23  1024 09/09/23 2218   HSTROP T ng/L 116* 151*           Results from last 7 days   Lab Units 09/09/23  2218   PROBNP pg/mL 2,407.0*       Results from last 7 days   Lab Units 09/13/23  0530 09/10/23  1024 09/09/23 2218   WBC 10*3/mm3 17.07* 21.54* 20.01*   HEMOGLOBIN g/dL 12.1* 11.4* 12.4*   HEMATOCRIT % 34.6* 33.4* 36.3*   PLATELETS 10*3/mm3 179 180 188   MCV fL 103.0* 104.7* 105.2*   NEUTROPHIL % % 90.6*  --   --    LYMPHOCYTE % % 3.8*  --   --    MONOCYTES % % 3.6*  --   --    EOSINOPHIL % % 0.0*  --   --    BASOPHIL % % 0.1  --   --    IMM GRAN % % 1.9*  --   --        Results from last 7 days   Lab Units  09/09/23  2218   INR  1.04                 Invalid input(s): LDLCALC          Glucose   Date/Time Value Ref Range Status   09/13/2023 0549 122 70 - 130 mg/dL Final   09/12/2023 2034 152 (H) 70 - 130 mg/dL Final   09/12/2023 1630 92 70 - 130 mg/dL Final   09/12/2023 1109 221 (H) 70 - 130 mg/dL Final   09/12/2023 0552 127 70 - 130 mg/dL Final   09/11/2023 2045 139 (H) 70 - 130 mg/dL Final   09/11/2023 1637 219 (H) 70 - 130 mg/dL Final   09/11/2023 1108 114 70 - 130 mg/dL Final     Results from last 7 days   Lab Units 09/10/23  2308 09/10/23  1707 09/10/23  1420 09/10/23  1024 09/09/23  2218   PROCALCITONIN ng/mL  --   --   --   --  0.33*   LACTATE mmol/L 1.0 4.5* 2.9* 2.2* 8.1*       Results from last 7 days   Lab Units 09/10/23  1656 09/10/23  1028 09/10/23  1024   BLOODCX   --  No growth at 2 days No growth at 2 days   STREP PNEUMO AG  Negative  --   --    L. PNEUMOPHILA SEROGP 1 UR AG  Negative  --   --            Results from last 7 days   Lab Units 09/10/23  0102   COVID19  Not Detected   ADENOVIRUS DETECTION BY PCR  Not Detected   CORONAVIRUS 229E  Not Detected   CORONAVIRUS HKU1  Not Detected   CORONAVIRUS NL63  Not Detected   CORONAVIRUS OC43  Not Detected   HUMAN METAPNEUMOVIRUS  Not Detected   HUMAN RHINOVIRUS/ENTEROVIRUS  Detected*   INFLUENZA B PCR  Not Detected   PARAINFLUENZA 1  Not Detected   PARAINFLUENZA VIRUS 2  Not Detected   PARAINFLUENZA VIRUS 3  Not Detected   PARAINFLUENZA VIRUS 4  Not Detected   BORDETELLA PERTUSSIS PCR  Not Detected   BORDETELLA PARAPERTUSSIS PCR  Not Detected   CHLAMYDOPHILA PNEUMONIAE PCR  Not Detected   MYCOPLAMA PNEUMO PCR  Not Detected   RSV, PCR  Not Detected                 Imaging:   Imaging Results (All)                 Medication Review:   aspirin, 81 mg, Oral, Daily  atorvastatin, 40 mg, Oral, Daily  insulin lispro, 2-7 Units, Subcutaneous, 4x Daily AC & at Bedtime  ipratropium-albuterol, 3 mL, Nebulization, 4x Daily - RT  methylPREDNISolone sodium succinate, 60 mg,  Intravenous, Q12H  mirtazapine, 15 mg, Oral, Nightly  senna-docusate sodium, 2 tablet, Oral, BID  tamsulosin, 0.8 mg, Oral, Daily             ASSESSMENT:   Status post resuscitated cardiac arrest  Right-sided pneumothorax, possibly from rib fracture and CPR, chest tube in position  Rhinovirus infection, on symptomatic treatment  Acute exacerbation of COPD secondary to rhinoviral infection  Acute hypoxemic respiratory failure secondary to above  Right-sided rib fracture, likely from CPR  Congestive heart failure with elevated proBNP, currently on no diuretics  Lactic acidosis, improved    PLAN:  Patient is still having air leak and the follow-up chest x-ray this morning after being on waterseal overnight shows recurrent separation of the pleura membrane  We will consult thoracic surgery to evaluate  As far as the slurred speech, this was also noted by the primary team who is ordering CT of the head for more reassurance  We will continue to follow    discussed with patient   Labs/Notes/films were independently reviewed and pertinent results are summarized above  The copied texts in this note were reviewed and they are accurate as of 09/13/23    Disposition: Per primary team    Carlos House MD  09/13/23  08:38 EDT         Dictated utilizing Dragon dictation

## 2023-09-13 NOTE — PLAN OF CARE
Goal Outcome Evaluation:  Plan of Care Reviewed With: patient        Progress: no change  Outcome Evaluation: Pt seen by PT this AM for tx. Pt performed bed mobility w/ SBA. Pt then stood and amb 80' req CGA and use of fww. Pt unsteady w/ no overt LOB. Extra time taken after cues to turn. Pt SOA on RA during amb w/ sats at 77% although wave poor. Incr to mid 90's in less than 15 sec. Pt performed ther ex for strengthening then returned to bed after declining chair. PT will prog as pt enrrique.      Anticipated Discharge Disposition (PT): skilled nursing facility, home with home health, home with assist

## 2023-09-13 NOTE — PLAN OF CARE
Alert, vss. 1L NC. Droplet isolation. Voids per urinal. Chest tube water seal 0000. Repeat chest xray in AM. IV saline locked. Pain meds given, see mar. Assist x 1 to ambulate.       Problem: Adult Inpatient Plan of Care  Goal: Absence of Hospital-Acquired Illness or Injury  Intervention: Prevent Skin Injury  Recent Flowsheet Documentation  Taken 9/13/2023 0005 by Lis Brito RN  Body Position: sitting up in bed  Skin Protection: adhesive use limited  Taken 9/12/2023 2215 by Lis Brito, RN  Body Position: sitting up in bed  Taken 9/12/2023 1942 by Lis Brito, RN  Body Position: sitting up in bed  Skin Protection: adhesive use limited   Goal Outcome Evaluation:

## 2023-09-13 NOTE — CONSULTS
Inpatient Thoracic Surgery Consult  Consult performed by: Tamela Hernandez, MARILYNN, APRN  Consult ordered by: Carlos House MD        Patient Care Team:  Provider, No Known as PCP - General    No chief complaint on file.      Subjective     History of Present Illness    Mr. Rios is a pleasant 87-year-old gentleman with a past medical history of asthma and hypertension.  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  He presented [facility] ER on 9/9/2023 with shortness of breath, wheezing and cough x2 days.  He went to outpatient hospital and was diagnosed with COPD exacerbation and pneumonia.  He was initiated on Rocephin and had a subsequent cardiac arrest.  Patient was resuscitated and ROSC was achieved.  EKG showed atrial fibrillation and patient was transferred to [facility] for cardiology evaluation.  Chest x-ray performed upon admission demonstrated a large right-sided pneumothorax and chest tube was placed at the bedside.  Patient has had persistent pneumothorax since then for which thoracic surgery has been asked to see him.    On exam today, he is resting in bed on 2 L nasal cannula.  Shortness of breath is much improved since admission.  There is report of some slurred speech for which a head CT has been ordered, but I do not appreciate any deficit on my exam today.    Review of Systems   Constitutional: Negative.    Eyes: Negative.    Respiratory:  Positive for shortness of breath.    Cardiovascular: Negative.    Gastrointestinal: Negative.    Endocrine: Negative.    Genitourinary: Negative.    Musculoskeletal: Negative.    Skin: Negative.    Allergic/Immunologic: Negative.    Neurological: Negative.    Hematological: Negative.    Psychiatric/Behavioral: Negative.        History reviewed. No pertinent past medical history.  History reviewed. No pertinent surgical history.  History reviewed. No pertinent family history.  Social History      Socioeconomic History    Marital status:    Tobacco Use    Smoking status: Never    Smokeless tobacco: Never   Vaping Use    Vaping Use: Never used   Substance and Sexual Activity    Alcohol use: Yes     Alcohol/week: 14.0 standard drinks     Types: 14 Cans of beer per week    Drug use: Never    Sexual activity: Defer     Medications Prior to Admission   Medication Sig Dispense Refill Last Dose    allopurinol (ZYLOPRIM) 300 MG tablet Take 1 tablet by mouth Daily.   Past Week    ascorbic acid (VITAMIN C) 500 MG tablet Take 1 tablet by mouth Daily.   Past Week    aspirin 81 MG chewable tablet Chew 1 tablet Daily.   Past Week    atorvastatin (LIPITOR) 40 MG tablet Take 1 tablet by mouth Daily.   Past Week    cyproheptadine (PERIACTIN) 4 MG tablet Take 1 tablet by mouth Daily As Needed for Allergies.   Past Week    mirtazapine (REMERON SOL-TAB) 15 MG disintegrating tablet Place 1 tablet on the tongue Every Night.   Past Week    predniSONE (DELTASONE) 1 MG tablet Take 2 tablets by mouth Daily.   Past Week    tamsulosin (FLOMAX) 0.4 MG capsule 24 hr capsule Take 2 capsules by mouth Daily.   Past Week     Allergies   Allergen Reactions    Rocephin [Ceftriaxone] Anaphylaxis    Iodinated Contrast Media Arrhythmia and Unknown - High Severity       Objective      Vital Signs  Temp:  [97.5 °F (36.4 °C)-98.7 °F (37.1 °C)] 97.8 °F (36.6 °C)  Heart Rate:  [71-85] 83  Resp:  [18-24] 24  BP: (156-192)/(85-99) 162/88    Intake & Output (last day)         09/12 0701  09/13 0700 09/13 0701  09/14 0700    P.O. 600 240    Total Intake(mL/kg) 600 (9.9) 240 (3.9)    Urine (mL/kg/hr) 1250 (0.9) 450 (0.8)    Stool  0    Chest Tube 10     Total Output 1260 450    Net -660 -210          Urine Unmeasured Occurrence 1 x 1 x    Stool Unmeasured Occurrence  1 x            Physical Exam  Constitutional:       General: He is not in acute distress.     Appearance: Normal appearance. He is ill-appearing.   HENT:      Head: Normocephalic and  atraumatic.      Comments: Nasal cannula  Cardiovascular:      Rate and Rhythm: Normal rate.   Pulmonary:      Effort: No respiratory distress.   Chest:      Chest wall: Tenderness present.   Musculoskeletal:         General: Normal range of motion.      Cervical back: Normal range of motion and neck supple.   Skin:     General: Skin is warm.   Neurological:      General: No focal deficit present.      Mental Status: He is alert.      Motor: Weakness present.   Psychiatric:         Mood and Affect: Mood normal.         Thought Content: Thought content normal.       Results Review:    I reviewed the patient's new clinical results.  I reviewed the patient's new imaging results and agree with the interpretation.  I reviewed the patient's other test results and agree with the interpretation  Discussed with patient, RN and Dr. Kitchen    Imaging Results (Last 24 Hours)       Procedure Component Value Units Date/Time    CT Head Without Contrast [[id number]] Resulted: 09/13/23 1545     Updated: 09/13/23 1547    CT Chest Without Contrast Diagnostic [[id number]] Resulted: 09/13/23 1545     Updated: 09/13/23 1547    XR Chest 1 View [[id number]] Collected: 09/13/23 0803     Updated: 09/13/23 0810    Narrative:      Clinical: Follow-up right apical pneumothorax     COMPARISON 9/12/2023     FINDINGS: Right-sided chest tube in position as before. There is  subcutaneous emphysema right chest wall.     Right apical pneumothorax appears slightly larger compared to the  previous examination. Distance between the pleural reflection and apex  30 mm currently compared to 15 mm previously. The current projection  however is apical lordotic. This could be contributing to the apparent  enlargement.     The cardiomediastinal silhouette is stable. Bibasilar airspace disease  near completely resolved within the interim. No vascular congestion  seen. The remainder is unremarkable.     This report was finalized on 9/13/2023 8:07 AM by Dr. Butts  MARIA ANTONIA Mackay               Lab Results:  Lab Results (last 24 hours)       Procedure Component Value Units Date/Time    POC Glucose Once [[id number]]  (Abnormal) Collected: 09/13/23 1128    Specimen: Blood Updated: 09/13/23 1129     Glucose 176 mg/dL     Blood Culture - Blood, Arm, Left [[id number]]  (Normal) Collected: 09/10/23 1028    Specimen: Blood from Arm, Left Updated: 09/13/23 1045     Blood Culture No growth at 3 days    Blood Culture - Blood, Arm, Right [[id number]]  (Normal) Collected: 09/10/23 1024    Specimen: Blood from Arm, Right Updated: 09/13/23 1045     Blood Culture No growth at 3 days    Narrative:      Less than seven (7) mL's of blood was collected.  Insufficient quantity may yield false negative results.    Comprehensive Metabolic Panel [[id number]]  (Abnormal) Collected: 09/13/23 0530    Specimen: Blood Updated: 09/13/23 0709     Glucose 117 mg/dL      BUN 34 mg/dL      Creatinine 0.88 mg/dL      Sodium 143 mmol/L      Potassium 4.1 mmol/L      Chloride 112 mmol/L      CO2 22.1 mmol/L      Calcium 9.0 mg/dL      Total Protein 5.6 g/dL      Albumin 3.0 g/dL      ALT (SGPT) 21 U/L      AST (SGOT) 19 U/L      Alkaline Phosphatase 77 U/L      Total Bilirubin 0.3 mg/dL      Globulin 2.6 gm/dL      A/G Ratio 1.2 g/dL      BUN/Creatinine Ratio 38.6     Anion Gap 8.9 mmol/L      eGFR 83.7 mL/min/1.73     Narrative:      GFR Normal >60  Chronic Kidney Disease <60  Kidney Failure <15    The GFR formula is only valid for adults with stable renal function between ages 18 and 70.    CBC & Differential [[id number]]  (Abnormal) Collected: 09/13/23 0530    Specimen: Blood Updated: 09/13/23 0612    Narrative:      The following orders were created for panel order CBC & Differential.  Procedure                               Abnormality         Status                     ---------                               -----------         ------                     CBC Auto Differential[[id number]]        Abnormal             Final result                 Please view results for these tests on the individual orders.    CBC Auto Differential [[id number]]  (Abnormal) Collected: 09/13/23 0530    Specimen: Blood Updated: 09/13/23 0612     WBC 17.07 10*3/mm3      RBC 3.36 10*6/mm3      Hemoglobin 12.1 g/dL      Hematocrit 34.6 %      .0 fL      MCH 36.0 pg      MCHC 35.0 g/dL      RDW 12.0 %      RDW-SD 45.8 fl      MPV 10.3 fL      Platelets 179 10*3/mm3      Neutrophil % 90.6 %      Lymphocyte % 3.8 %      Monocyte % 3.6 %      Eosinophil % 0.0 %      Basophil % 0.1 %      Immature Grans % 1.9 %      Neutrophils, Absolute 15.46 10*3/mm3      Lymphocytes, Absolute 0.65 10*3/mm3      Monocytes, Absolute 0.61 10*3/mm3      Eosinophils, Absolute 0.00 10*3/mm3      Basophils, Absolute 0.02 10*3/mm3      Immature Grans, Absolute 0.33 10*3/mm3      nRBC 0.0 /100 WBC     POC Glucose Once [[id number]]  (Normal) Collected: 09/13/23 0549    Specimen: Blood Updated: 09/13/23 0551     Glucose 122 mg/dL     POC Glucose Once [[id number]]  (Abnormal) Collected: 09/12/23 2034    Specimen: Blood Updated: 09/12/23 2039     Glucose 152 mg/dL     POC Glucose Once [[id number]]  (Normal) Collected: 09/12/23 1630    Specimen: Blood Updated: 09/12/23 1631     Glucose 92 mg/dL                 Assessment & Plan       COPD with acute exacerbation    Cardiac arrest    Acute respiratory failure with hypoxia    Pneumothorax on right    Acute on chronic respiratory failure with hypoxia      Assessment & Plan    I independently reviewed the patient's chest x-rays including this morning's film which demonstrates trace pleural separation with small bore chest tube in adequate position.    Right pneumothorax: Likely secondary to chest compressions during CPR on 9/9/2023.  CT chest has been ordered for further evaluation.  Pleural separation appears improved with chest tube in place although minimal pneumothorax remains.  Intermittent airleak on exam today.  Patient has a  known history of emphysema and is currently on high-dose steroids, which is likely contributing to his persistent airleak.  Would recommend to wean.  Increase suction to -40 cm and recheck a chest x-ray in the morning.    COPD exacerbation: Recommend to wean steroids as this is likely contributing to persistent airleak.  Management per pulmonary medicine    Rhinovirus: Continue supportive care per primary service      I discussed the patients findings and our recommendations with patient, nursing staff, and Dr. Kitchen    Thank you for this consult and allowing us to participate in the care of your patient.  We will follow along with you during this hospitalization.       Tamela Hernandez DNP, APRN  [facility]  09/13/23  15:50 EDT    I spent >75 minutes reviewing the patient's chart including medical history, notes, radiographic imaging, labs and performing an assessment and development of a plan and discussion with the patient/family at bedside.

## 2023-09-13 NOTE — THERAPY TREATMENT NOTE
Patient Name: Riky Rios  : 1935    MRN: [id number]                              Today's Date: 2023       Admit Date: 2023    Visit Dx: No diagnosis found.  Patient Active Problem List   Diagnosis    COPD with acute exacerbation    Cardiac arrest    Acute respiratory failure with hypoxia    Pneumothorax on right    Acute on chronic respiratory failure with hypoxia     History reviewed. No pertinent past medical history.  History reviewed. No pertinent surgical history.   General Information       Row Name 23 1130          Physical Therapy Time and Intention    Document Type therapy note (daily note)  -     Mode of Treatment physical therapy  -       Row Name 23 1130          General Information    Existing Precautions/Restrictions fall  -       Row Name 23 1130          Safety Issues, Functional Mobility    Impairments Affecting Function (Mobility) balance;endurance/activity tolerance;strength  -     Comment, Safety Issues/Impairments (Mobility) gt belt and non skid socks donned; 1 chest tube w/ suction  -               User Key  (r) = Recorded By, (t) = Taken By, (c) = Cosigned By      Initials Name Provider Type     Sherri Whitten PTA Physical Therapist Assistant                   Mobility       Row Name 23 1131          Bed Mobility    Bed Mobility bed mobility (all) activities  -     All Activities, [place] (Bed Mobility) standby assist  -     Comment, (Bed Mobility) pt SBA for sit bal at EOB  -       Row Name 23 1131          Transfers    Comment, (Transfers) sats at 95% on NC at beg of session; Removed during amb  -       Row Name 23 1131          Sit-Stand Transfer    Sit-Stand [place] (Transfers) contact guard;verbal cues  -     Assistive Device (Sit-Stand Transfers) walker, front-wheeled  -     Comment, (Sit-Stand Transfer) 2x from EOB; cues for postural correction  -       Row Name 23 1131           Gait/Stairs (Locomotion)    [place] Level (Gait) contact guard;verbal cues  -PH     Assistive Device (Gait) walker, front-wheeled  -PH     Distance in Feet (Gait) 80'  -PH     Deviations/Abnormal Patterns (Gait) sumeet decreased;gait speed decreased;stride length decreased  -PH     Bilateral Gait Deviations forward flexed posture  -PH     [place] Level (Stairs) not tested  -PH     Comment, (Gait/Stairs) slow and unsteady w/ extra time to respond to cues for turns. SOA noted w/ sats at 77% after seated EOB although quickly arose to mid 90's w/ wave poor  -PH               User Key  (r) = Recorded By, (t) = Taken By, (c) = Cosigned By      Initials Name Provider Type     Sherri Whitten PTA Physical Therapist Assistant                   Obj/Interventions       Row Name 09/13/23 1133          Motor Skills    Therapeutic Exercise other (see comments)  BAP, LAQ, seated march; x 10 reps all  -PH       Row Name 09/13/23 1133          Balance    Balance Assessment sitting static balance;standing static balance  -PH     Static Sitting Balance standby assist  -PH     Static Standing Balance contact guard  -PH     Position/Device Used, Standing Balance walker, front-wheeled  -PH     Comment, Balance unsteady w/ no overt LOB during amb.  -PH               User Key  (r) = Recorded By, (t) = Taken By, (c) = Cosigned By      Initials Name Provider Type     Sherri Whitten PTA Physical Therapist Assistant                   Goals/Plan    No documentation.                  Clinical Impression       Row Name 09/13/23 1134          Pain    Pretreatment Pain Rating 5/10  -PH     Posttreatment Pain Rating 5/10  -PH     Pain Location - Side/Orientation Right  -PH     Pain Location incisional  -PH     Pain Location - chest  -PH     Pain Intervention(s) Repositioned;Rest  -PH     Additional Documentation Pain Scale: Numbers Pre/Post-Treatment (Group)  -PH       Row Name 09/13/23 1133          Plan of Care  Review    Plan of Care Reviewed With patient  -PH     Progress no change  -PH     Outcome Evaluation Pt seen by PT this AM for tx. Pt performed bed mobility w/ SBA. Pt then stood and amb 80' req CGA and use of fww. Pt unsteady w/ no overt LOB. Extra time taken after cues to turn. Pt SOA on RA during amb w/ sats at 77% although wave poor. Incr to mid 90's in less than 15 sec. Pt performed ther ex for strengthening then returned to bed after declining chair. PT will prog as pt enrrique.  -PH       Row Name 09/13/23 1134          Vital Signs    Pre SpO2 (%) 95  -PH     O2 Delivery Pre Treatment supplemental O2  -PH     Intra SpO2 (%) 77  -PH     O2 Delivery Intra Treatment room air  -PH     Post SpO2 (%) 96  -PH     O2 Delivery Post Treatment supplemental O2  -PH       Row Name 09/13/23 1134          Positioning and Restraints    Pre-Treatment Position in bed  -PH     Post Treatment Position bed  -PH     In Bed fowlers;call light within reach;encouraged to call for assist;exit alarm on;notified nsg  -PH               User Key  (r) = Recorded By, (t) = Taken By, (c) = Cosigned By      Initials Name Provider Type    PH Sherri Whitten PTA Physical Therapist Assistant                   Outcome Measures       Row Name 09/13/23 1137 09/13/23 0930       How much help from another person do you currently need...    Turning from your back to your side while in flat bed without using bedrails? 4  -PH 3  -CG    Moving from lying on back to sitting on the side of a flat bed without bedrails? 3  -PH 3  -CG    Moving to and from a bed to a chair (including a wheelchair)? 3  -PH 2  -CG    Standing up from a chair using your arms (e.g., wheelchair, bedside chair)? 3  -PH 2  -CG    Climbing 3-5 steps with a railing? 2  -PH 2  -CG    To walk in hospital room? 3  -PH 2  -CG    AM-PAC 6 Clicks Score (PT) 18  -PH 14  -CG    Highest level of mobility 6 --> Walked 10 steps or more  -PH 4 --> Transferred to chair/commode  -CG      Row  Name 09/13/23 1137          Functional Assessment    Outcome Measure Options AM-PAC 6 Clicks Basic Mobility (PT)  -               User Key  (r) = Recorded By, (t) = Taken By, (c) = Cosigned By      Initials Name Provider Type     Sherri Whitten PTA Physical Therapist Assistant    Sudhir Guerrero, RN Registered Nurse                                 Physical Therapy Education       Title: PT OT SLP Therapies (Done)       Topic: Physical Therapy (Done)       Point: Mobility training (Done)       Learning Progress Summary             Patient Acceptance, E,TB,D, VU,NR by  at 9/13/2023 1138    Acceptance, E, VU,NR by  at 9/12/2023 1156    Acceptance, E,TB,D, VU,NR by  at 9/12/2023 0955                         Point: Home exercise program (Done)       Learning Progress Summary             Patient Acceptance, E,TB,D, VU,NR by  at 9/13/2023 1138    Acceptance, E, VU,NR by  at 9/12/2023 1156                         Point: Body mechanics (Done)       Learning Progress Summary             Patient Acceptance, E,TB,D, VU,NR by  at 9/13/2023 1138    Acceptance, E, VU,NR by  at 9/12/2023 1156    Acceptance, E,TB,D, VU,NR by  at 9/12/2023 0955                         Point: Precautions (Done)       Learning Progress Summary             Patient Acceptance, E,TB,D, VU,NR by  at 9/13/2023 1138    Acceptance, E, VU,NR by  at 9/12/2023 1156    Acceptance, E,TB,D, VU,NR by  at 9/12/2023 0955                                         User Key       Initials Effective Dates Name Provider Type Discipline     06/16/21 -  Meg Fitzgerald, PT Physical Therapist PT    KT 06/16/21 -  Chel De Anda, RN Registered Nurse Nurse     06/16/21 -  Sherri Whitten PTA Physical Therapist Assistant PT                  PT Recommendation and Plan     Plan of Care Reviewed With: patient  Progress: no change  Outcome Evaluation: Pt seen by PT this AM for tx. Pt performed bed mobility w/ SBA. Pt then  stood and amb 80' req CGA and use of fww. Pt unsteady w/ no overt LOB. Extra time taken after cues to turn. Pt SOA on RA during amb w/ sats at 77% although wave poor. Incr to mid 90's in less than 15 sec. Pt performed ther ex for strengthening then returned to bed after declining chair. PT will prog as pt enrrique.     Time Calculation:         PT Charges       Row Name 09/13/23 1138             Time Calculation    Start Time 1030  -PH      Stop Time 1047  -PH      Time Calculation (min) 17 min  -PH      PT Received On 09/13/23  -PH      PT - Next Appointment 09/14/23  -PH         Timed Charges    55376 - PT Therapeutic Exercise Minutes 5  -PH      32194 - PT Therapeutic Activity Minutes 12  -PH         Total Minutes    Timed Charges Total Minutes 17  -PH       Total Minutes 17  -PH                User Key  (r) = Recorded By, (t) = Taken By, (c) = Cosigned By      Initials Name Provider Type    PH Sherri Whitten PTA Physical Therapist Assistant                  Therapy Charges for Today       Code Description Service Date Service Provider Modifiers Qty    [id number]  PT THERAPEUTIC ACT EA 15 MIN 9/13/2023 Sherri Whitten PTA GP 1            PT G-Codes  Outcome Measure Options: AM-PAC 6 Clicks Basic Mobility (PT)  AM-PAC 6 Clicks Score (PT): 18  PT Discharge Summary  Anticipated Discharge Disposition (PT): skilled nursing facility, home with home health, home with assist    Sherri Whitten PTA  9/13/2023

## 2023-09-14 ENCOUNTER — APPOINTMENT (OUTPATIENT)
Dept: CARDIOLOGY | Facility: HOSPITAL | Age: 88
DRG: 199 | End: 2023-09-14
Payer: MEDICARE

## 2023-09-14 ENCOUNTER — APPOINTMENT (OUTPATIENT)
Dept: GENERAL RADIOLOGY | Facility: HOSPITAL | Age: 88
DRG: 199 | End: 2023-09-14
Payer: MEDICARE

## 2023-09-14 PROBLEM — I48.0 PAROXYSMAL ATRIAL FIBRILLATION: Status: ACTIVE | Noted: 2023-09-14

## 2023-09-14 PROBLEM — I63.9 ACUTE ISCHEMIC STROKE: Status: ACTIVE | Noted: 2023-09-14

## 2023-09-14 LAB
ANION GAP SERPL CALCULATED.3IONS-SCNC: 9 MMOL/L (ref 5–15)
BH CV XLRA MEAS LEFT DIST CCA EDV: -8.8 CM/SEC
BH CV XLRA MEAS LEFT DIST CCA PSV: -94.4 CM/SEC
BH CV XLRA MEAS LEFT DIST ICA EDV: -14.7 CM/SEC
BH CV XLRA MEAS LEFT DIST ICA PSV: -73.7 CM/SEC
BH CV XLRA MEAS LEFT ICA/CCA RATIO: 1.17
BH CV XLRA MEAS LEFT MID ICA EDV: -8.7 CM/SEC
BH CV XLRA MEAS LEFT MID ICA PSV: -52.3 CM/SEC
BH CV XLRA MEAS LEFT PROX CCA PSV: 67.7 CM/SEC
BH CV XLRA MEAS LEFT PROX ECA EDV: -8.1 CM/SEC
BH CV XLRA MEAS LEFT PROX ECA PSV: -95.1 CM/SEC
BH CV XLRA MEAS LEFT PROX ICA EDV: 9.9 CM/SEC
BH CV XLRA MEAS LEFT PROX ICA PSV: 110 CM/SEC
BH CV XLRA MEAS LEFT PROX SCLA PSV: 116 CM/SEC
BH CV XLRA MEAS LEFT VERTEBRAL A PSV: -42.3 CM/SEC
BH CV XLRA MEAS RIGHT DIST CCA PSV: -76.3 CM/SEC
BH CV XLRA MEAS RIGHT DIST ICA EDV: -11.1 CM/SEC
BH CV XLRA MEAS RIGHT DIST ICA PSV: -58.2 CM/SEC
BH CV XLRA MEAS RIGHT ICA/CCA RATIO: 1.01
BH CV XLRA MEAS RIGHT MID ICA EDV: 13.7 CM/SEC
BH CV XLRA MEAS RIGHT MID ICA PSV: 76.3 CM/SEC
BH CV XLRA MEAS RIGHT PROX CCA PSV: 76.4 CM/SEC
BH CV XLRA MEAS RIGHT PROX ECA PSV: 71.9 CM/SEC
BH CV XLRA MEAS RIGHT PROX ICA EDV: -9.3 CM/SEC
BH CV XLRA MEAS RIGHT PROX ICA PSV: -77.4 CM/SEC
BH CV XLRA MEAS RIGHT PROX SCLA PSV: 85.2 CM/SEC
BH CV XLRA MEAS RIGHT VERTEBRAL A EDV: -13.8 CM/SEC
BH CV XLRA MEAS RIGHT VERTEBRAL A PSV: -64.9 CM/SEC
BUN SERPL-MCNC: 32 MG/DL (ref 8–23)
BUN/CREAT SERPL: 40 (ref 7–25)
CALCIUM SPEC-SCNC: 8.7 MG/DL (ref 8.6–10.5)
CHLORIDE SERPL-SCNC: 112 MMOL/L (ref 98–107)
CHOLEST SERPL-MCNC: 144 MG/DL (ref 0–200)
CO2 SERPL-SCNC: 24 MMOL/L (ref 22–29)
CREAT SERPL-MCNC: 0.8 MG/DL (ref 0.76–1.27)
DEPRECATED RDW RBC AUTO: 46.3 FL (ref 37–54)
EGFRCR SERPLBLD CKD-EPI 2021: 86.2 ML/MIN/1.73
ERYTHROCYTE [DISTWIDTH] IN BLOOD BY AUTOMATED COUNT: 12.5 % (ref 12.3–15.4)
GLUCOSE BLDC GLUCOMTR-MCNC: 111 MG/DL (ref 70–130)
GLUCOSE BLDC GLUCOMTR-MCNC: 174 MG/DL (ref 70–130)
GLUCOSE BLDC GLUCOMTR-MCNC: 95 MG/DL (ref 70–130)
GLUCOSE SERPL-MCNC: 146 MG/DL (ref 65–99)
HBA1C MFR BLD: 5.4 % (ref 4.8–5.6)
HCT VFR BLD AUTO: 35.8 % (ref 37.5–51)
HDLC SERPL-MCNC: 73 MG/DL (ref 40–60)
HGB BLD-MCNC: 12.6 G/DL (ref 13–17.7)
LDLC SERPL CALC-MCNC: 55 MG/DL (ref 0–100)
LDLC/HDLC SERPL: 0.75 {RATIO}
MCH RBC QN AUTO: 35.9 PG (ref 26.6–33)
MCHC RBC AUTO-ENTMCNC: 35.2 G/DL (ref 31.5–35.7)
MCV RBC AUTO: 102 FL (ref 79–97)
PLATELET # BLD AUTO: 175 10*3/MM3 (ref 140–450)
PMV BLD AUTO: 10.5 FL (ref 6–12)
POTASSIUM SERPL-SCNC: 4.4 MMOL/L (ref 3.5–5.2)
RBC # BLD AUTO: 3.51 10*6/MM3 (ref 4.14–5.8)
SODIUM SERPL-SCNC: 145 MMOL/L (ref 136–145)
TRIGL SERPL-MCNC: 82 MG/DL (ref 0–150)
VLDLC SERPL-MCNC: 16 MG/DL (ref 5–40)
WBC NRBC COR # BLD: 14.43 10*3/MM3 (ref 3.4–10.8)

## 2023-09-14 PROCEDURE — 63710000001 INSULIN LISPRO (HUMAN) PER 5 UNITS: Performed by: INTERNAL MEDICINE

## 2023-09-14 PROCEDURE — 92610 EVALUATE SWALLOWING FUNCTION: CPT

## 2023-09-14 PROCEDURE — 80048 BASIC METABOLIC PNL TOTAL CA: CPT | Performed by: INTERNAL MEDICINE

## 2023-09-14 PROCEDURE — 71045 X-RAY EXAM CHEST 1 VIEW: CPT

## 2023-09-14 PROCEDURE — 94799 UNLISTED PULMONARY SVC/PX: CPT

## 2023-09-14 PROCEDURE — 83036 HEMOGLOBIN GLYCOSYLATED A1C: CPT | Performed by: INTERNAL MEDICINE

## 2023-09-14 PROCEDURE — 82948 REAGENT STRIP/BLOOD GLUCOSE: CPT

## 2023-09-14 PROCEDURE — 93880 EXTRACRANIAL BILAT STUDY: CPT

## 2023-09-14 PROCEDURE — 80061 LIPID PANEL: CPT | Performed by: INTERNAL MEDICINE

## 2023-09-14 PROCEDURE — 99222 1ST HOSP IP/OBS MODERATE 55: CPT | Performed by: NURSE PRACTITIONER

## 2023-09-14 PROCEDURE — 94664 DEMO&/EVAL PT USE INHALER: CPT

## 2023-09-14 PROCEDURE — 94761 N-INVAS EAR/PLS OXIMETRY MLT: CPT

## 2023-09-14 PROCEDURE — 99232 SBSQ HOSP IP/OBS MODERATE 35: CPT

## 2023-09-14 PROCEDURE — 85027 COMPLETE CBC AUTOMATED: CPT | Performed by: INTERNAL MEDICINE

## 2023-09-14 RX ORDER — BUDESONIDE 0.5 MG/2ML
0.5 INHALANT ORAL
Status: DISCONTINUED | OUTPATIENT
Start: 2023-09-14 | End: 2023-09-29 | Stop reason: HOSPADM

## 2023-09-14 RX ORDER — PREDNISONE 10 MG/1
10 TABLET ORAL
Status: DISCONTINUED | OUTPATIENT
Start: 2023-09-15 | End: 2023-09-15

## 2023-09-14 RX ORDER — ALBUTEROL SULFATE 2.5 MG/3ML
2.5 SOLUTION RESPIRATORY (INHALATION) EVERY 6 HOURS PRN
Status: DISCONTINUED | OUTPATIENT
Start: 2023-09-14 | End: 2023-09-21

## 2023-09-14 RX ORDER — ARFORMOTEROL TARTRATE 15 UG/2ML
15 SOLUTION RESPIRATORY (INHALATION)
Status: DISCONTINUED | OUTPATIENT
Start: 2023-09-14 | End: 2023-09-29 | Stop reason: HOSPADM

## 2023-09-14 RX ORDER — METHYLPREDNISOLONE SODIUM SUCCINATE 40 MG/ML
40 INJECTION, POWDER, LYOPHILIZED, FOR SOLUTION INTRAMUSCULAR; INTRAVENOUS EVERY 12 HOURS
Status: DISCONTINUED | OUTPATIENT
Start: 2023-09-14 | End: 2023-09-14

## 2023-09-14 RX ADMIN — INSULIN LISPRO 2 UNITS: 100 INJECTION, SOLUTION INTRAVENOUS; SUBCUTANEOUS at 12:13

## 2023-09-14 RX ADMIN — ATORVASTATIN CALCIUM 40 MG: 20 TABLET, FILM COATED ORAL at 09:56

## 2023-09-14 RX ADMIN — AMLODIPINE BESYLATE 5 MG: 5 TABLET ORAL at 09:56

## 2023-09-14 RX ADMIN — ARFORMOTEROL TARTRATE 15 MCG: 15 SOLUTION RESPIRATORY (INHALATION) at 20:12

## 2023-09-14 RX ADMIN — IPRATROPIUM BROMIDE AND ALBUTEROL SULFATE 3 ML: 2.5; .5 SOLUTION RESPIRATORY (INHALATION) at 11:30

## 2023-09-14 RX ADMIN — ASPIRIN 81 MG: 81 TABLET, CHEWABLE ORAL at 09:56

## 2023-09-14 RX ADMIN — Medication 10 ML: at 20:15

## 2023-09-14 RX ADMIN — IPRATROPIUM BROMIDE AND ALBUTEROL SULFATE 3 ML: 2.5; .5 SOLUTION RESPIRATORY (INHALATION) at 08:02

## 2023-09-14 RX ADMIN — SENNOSIDES AND DOCUSATE SODIUM 2 TABLET: 50; 8.6 TABLET ORAL at 09:57

## 2023-09-14 RX ADMIN — MIRTAZAPINE 15 MG: 15 TABLET, ORALLY DISINTEGRATING ORAL at 20:14

## 2023-09-14 RX ADMIN — TAMSULOSIN HYDROCHLORIDE 0.8 MG: 0.4 CAPSULE ORAL at 09:56

## 2023-09-14 RX ADMIN — Medication 10 ML: at 09:58

## 2023-09-14 NOTE — PROGRESS NOTES
PROGRESS NOTE  Patient Name: Riky Rios  Age/Sex: 87 y.o. male  : 1935  MRN: [id number]    Date of Admission: 2023  Date of Encounter Visit: 23   LOS: 5 days   Patient Care Team:  Provider, No Known as PCP - General    Chief Complaint: Pneumothorax    Hospital course: Patient had a right-sided small chest tube placed on 9/10/2023  with significant improvement, however unable to maintain on waterseal with separation of the lung from the chest wall and he does have some elements of subcutaneous air on   exam.  Requested a consultation from thoracic surgery and they ordered a CT of the chest which was reviewed and showed no significant blebs or bullous emphysema or any significant emphysema to start with.  They recommended to hold on the steroid and I will go ahead and discontinue Solu-Medrol and we will follow-up along with them on further recommendations  Patient developed some slurred speech yesterday that was felt to be due to the dry mouth, a MRI of the head was done and it showed a an acute infarct in the cerebellar area that is unrelated to the speech.  Patient will be seen in consultation later by neurology for further recommendations  On nasal cannula oxygen at 2 L/min  Afebrile  Minimal cough  Interval History: Chest tube placement on 9/10/2023    REVIEW OF SYSTEMS:   CONSTITUTIONAL: no fever or chills  CARDIOVASCULAR: Positive chest pain upon coughing. No palpitations or edema.   RESPIRATORY: Breath with minimal residual cough  GASTROINTESTINAL: No anorexia, nausea, vomiting or diarrhea. No abdominal pain or blood.   HEMATOLOGIC: No bleeding or bruising.     Ventilator/Non-Invasive Ventilation Settings (From admission, onward)      2 L/min              Vital Signs  Temp:  [97.7 °F (36.5 °C)-98 °F (36.7 °C)] 97.7 °F (36.5 °C)  Heart Rate:  [65-86] 65  Resp:  [18-24] 18  BP: (162-189)/(88-98) 183/98  SpO2:  [93 %-100 %] 100 %  on  Flow (L/min):  [2] 2 Device (Oxygen Therapy): nasal  "cannula    Intake/Output Summary (Last 24 hours) at 9/14/2023 0929  Last data filed at 9/13/2023 2025  Gross per 24 hour   Intake 480 ml   Output 500 ml   Net -20 ml       Flowsheet Rows      Flowsheet Row First Filed Value   Admission Height 172.7 cm (68\") Documented at 09/10/2023 1728   Admission Weight 60.8 kg (134 lb) Documented at 09/10/2023 1728          Body mass index is 20.37 kg/m².      09/10/23  1728 09/11/23  0959   Weight: 60.8 kg (134 lb) 60.8 kg (134 lb)       Physical Exam:  GEN:  No acute distress, alert, cooperative, well developed, elderly but doing better overall, slurred speech, likely from the dry mouth but not completely certain.  Neuro exam otherwise was nonfocal  EYES:   Sclerae clear. No icterus. PERRL. Normal EOM  ENT:   External ears/nose normal, no oral lesions, no thrush, dry mucous membranes   NECK:  Supple, midline trachea, no JVD  LUNGS: Normal chest on inspection, minimal and expiratory wheeze, minimal scattered rhonchi, respirations regular, even and unlabored..  Minimal subcutaneous air  CV:  Regular rhythm and rate. Normal S1/S2. No murmurs, gallops, or rubs noted.  ABD:  Soft, nontender and nondistended. Normal bowel sounds. No guarding  EXT:  Moves all extremities well. No cyanosis. No redness. No edema.   Skin: Dry, intact, no bleeding    Results Review:    Results From Last 14 Days   Lab Units 09/14/23  0541 09/10/23  2308 09/10/23  1707 09/10/23  1420   LACTATE mmol/L  --  1.0 4.5* 2.9*   TRIGLYCERIDES mg/dL 82  --   --   --        Results from last 7 days   Lab Units 09/14/23  0541 09/13/23  0530 09/10/23  1024 09/09/23  2218   SODIUM mmol/L 145 143 139 142   POTASSIUM mmol/L 4.4 4.1 4.8 4.5   CHLORIDE mmol/L 112* 112* 107 106   CO2 mmol/L 24.0 22.1 22.6 18.0*   BUN mg/dL 32* 34* 29* 25*   CREATININE mg/dL 0.80 0.88 0.99 1.18   CALCIUM mg/dL 8.7 9.0 9.2 9.3   AST (SGOT) U/L  --  19  --  19   ALT (SGPT) U/L  --  21  --  30   ANION GAP mmol/L 9.0 8.9 9.4 18.0*   ALBUMIN g/dL  " --  3.0*  --  3.8       Results from last 7 days   Lab Units 09/10/23  1024 09/09/23  2218   HSTROP T ng/L 116* 151*           Results from last 7 days   Lab Units 09/09/23  2218   PROBNP pg/mL 2,407.0*       Results from last 7 days   Lab Units 09/14/23  0541 09/13/23  0530 09/10/23  1024 09/09/23  2218   WBC 10*3/mm3 14.43* 17.07* 21.54* 20.01*   HEMOGLOBIN g/dL 12.6* 12.1* 11.4* 12.4*   HEMATOCRIT % 35.8* 34.6* 33.4* 36.3*   PLATELETS 10*3/mm3 175 179 180 188   MCV fL 102.0* 103.0* 104.7* 105.2*   NEUTROPHIL % %  --  90.6*  --   --    LYMPHOCYTE % %  --  3.8*  --   --    MONOCYTES % %  --  3.6*  --   --    EOSINOPHIL % %  --  0.0*  --   --    BASOPHIL % %  --  0.1  --   --    IMM GRAN % %  --  1.9*  --   --        Results from last 7 days   Lab Units 09/09/23 2218   INR  1.04           Results from last 7 days   Lab Units 09/14/23  0541   CHOLESTEROL mg/dL 144   TRIGLYCERIDES mg/dL 82   HDL CHOL mg/dL 73*         Results from last 7 days   Lab Units 09/14/23  0541   HEMOGLOBIN A1C % 5.40     Glucose   Date/Time Value Ref Range Status   09/13/2023 2020 124 70 - 130 mg/dL Final   09/13/2023 1625 106 70 - 130 mg/dL Final   09/13/2023 1128 176 (H) 70 - 130 mg/dL Final   09/13/2023 0549 122 70 - 130 mg/dL Final   09/12/2023 2034 152 (H) 70 - 130 mg/dL Final   09/12/2023 1630 92 70 - 130 mg/dL Final   09/12/2023 1109 221 (H) 70 - 130 mg/dL Final   09/12/2023 0552 127 70 - 130 mg/dL Final     Results from last 7 days   Lab Units 09/10/23  2308 09/10/23  1707 09/10/23  1420 09/10/23  1024 09/09/23  2218   PROCALCITONIN ng/mL  --   --   --   --  0.33*   LACTATE mmol/L 1.0 4.5* 2.9* 2.2* 8.1*       Results from last 7 days   Lab Units 09/10/23  1656 09/10/23  1028 09/10/23  1024   BLOODCX   --  No growth at 3 days No growth at 3 days   STREP PNEUMO AG  Negative  --   --    L. PNEUMOPHILA SEROGP 1 UR AG  Negative  --   --            Results from last 7 days   Lab Units 09/10/23  0102   COVID19  Not Detected   ADENOVIRUS  DETECTION BY PCR  Not Detected   CORONAVIRUS 229E  Not Detected   CORONAVIRUS HKU1  Not Detected   CORONAVIRUS NL63  Not Detected   CORONAVIRUS OC43  Not Detected   HUMAN METAPNEUMOVIRUS  Not Detected   HUMAN RHINOVIRUS/ENTEROVIRUS  Detected*   INFLUENZA B PCR  Not Detected   PARAINFLUENZA 1  Not Detected   PARAINFLUENZA VIRUS 2  Not Detected   PARAINFLUENZA VIRUS 3  Not Detected   PARAINFLUENZA VIRUS 4  Not Detected   BORDETELLA PERTUSSIS PCR  Not Detected   BORDETELLA PARAPERTUSSIS PCR  Not Detected   CHLAMYDOPHILA PNEUMONIAE PCR  Not Detected   MYCOPLAMA PNEUMO PCR  Not Detected   RSV, PCR  Not Detected                 Imaging:   Imaging Results (All)                   Medication Review:   amLODIPine, 5 mg, Oral, Q24H  aspirin, 81 mg, Oral, Daily  atorvastatin, 40 mg, Oral, Daily  insulin lispro, 2-7 Units, Subcutaneous, 4x Daily AC & at Bedtime  ipratropium-albuterol, 3 mL, Nebulization, 4x Daily - RT  methylPREDNISolone sodium succinate, 60 mg, Intravenous, Q12H  mirtazapine, 15 mg, Oral, Nightly  senna-docusate sodium, 2 tablet, Oral, BID  sodium chloride, 10 mL, Intravenous, Q12H  tamsulosin, 0.8 mg, Oral, Daily             ASSESSMENT:   Status post resuscitated cardiac arrest  Right-sided pneumothorax, possibly from rib fracture and CPR, chest tube in position  Rhinovirus infection, on symptomatic treatment  Acute exacerbation of COPD secondary to rhinoviral infection  Acute hypoxemic respiratory failure secondary to above  Right-sided rib fracture, likely from CPR  Congestive heart failure with elevated proBNP, currently on no diuretics  Lactic acidosis, improved  Acute CVA    PLAN:  Patient is still having minimal air leak  We will go ahead and discontinue the steroid per thoracic surgery recommendation and the CT scan was very reassuring  Awaiting input from the neurology team, the area of the acute stroke is not related to the speech center  We will continue with the bronchodilators however and with  oxygen titration.  Note reviewed, will continue to follow       discussed with patient   Labs/Notes/films were independently reviewed and pertinent results are summarized above  The copied texts in this note were reviewed and they are accurate as of 09/14/23    Disposition: Per primary team    Carlos House MD  09/14/23  09:29 EDT         Dictated utilizing Dragon dictation

## 2023-09-14 NOTE — CONSULTS
DOS: 2023  NAME: Riky Rios   : 1935  PCP: Provider, No Known  CC: Stroke  Referring MD: Marisa Granado, *          Neurological Problem and Interval History:  87 y.o.  male with a known history of COPD, EtOH use (14 beers per week), tobacco abuse, known iodine and IV contrast allergy who presented to outside hospital due to complaint of shortness of air and productive cough-found to have right-sided pneumothorax-chest tube since placed.  IV Rocephin and clonidine administered and patient arrested there after-recovered after 1 cycle of CPR-since found to have A-fib with RVR.  Did not require intubation-BiPAP for short time-at hospital but did not have cardiology services ([facility]) therefore transferred here for further work-up and evaluation-stable since admission never required higher level of care/ICU admission.  Other work-up completed since admission TTE shows EF 60.3%.  LV/LA normal.  Left atrial volume index 18.2.  Ideally would like to complete vessel imaging/CTA head and neck the patient has anaphylaxis to contrast dye therefore will discontinue CTA and transition order to MRA of the head and carotid duplex.  Patient on aspirin 81 mg daily and atorvastatin 40 mg daily prior to arrival-remains on both these medications currently.    Our service was consulted due to acute left cerebellar infarct noted on imaging.  Patient denies any unilateral weakness although does report generalized weakness.  Exam local.  Chest tube noted to the right chest        Past Medical/Surgical Hx:  History reviewed. No pertinent past medical history.  History reviewed. No pertinent surgical history.    Review of Systems:        A complete review of all systems is negative except as described above.     Medications On Admission  Medications Prior to Admission   Medication Sig Dispense Refill Last Dose    allopurinol (ZYLOPRIM) 300 MG tablet Take 1 tablet by mouth Daily.   Past Week    ascorbic acid  (VITAMIN C) 500 MG tablet Take 1 tablet by mouth Daily.   Past Week    aspirin 81 MG chewable tablet Chew 1 tablet Daily.   Past Week    atorvastatin (LIPITOR) 40 MG tablet Take 1 tablet by mouth Daily.   Past Week    cyproheptadine (PERIACTIN) 4 MG tablet Take 1 tablet by mouth Daily As Needed for Allergies.   Past Week    mirtazapine (REMERON SOL-TAB) 15 MG disintegrating tablet Place 1 tablet on the tongue Every Night.   Past Week    predniSONE (DELTASONE) 1 MG tablet Take 2 tablets by mouth Daily.   Past Week    tamsulosin (FLOMAX) 0.4 MG capsule 24 hr capsule Take 2 capsules by mouth Daily.   Past Week       Allergies:  Allergies   Allergen Reactions    Rocephin [Ceftriaxone] Anaphylaxis    Iodinated Contrast Media Arrhythmia and Unknown - High Severity       Social Hx:  Social History     Socioeconomic History    Marital status:    Tobacco Use    Smoking status: Never    Smokeless tobacco: Never   Vaping Use    Vaping Use: Never used   Substance and Sexual Activity    Alcohol use: Yes     Alcohol/week: 14.0 standard drinks     Types: 14 Cans of beer per week    Drug use: Never    Sexual activity: Defer       Family Hx:  History reviewed. No pertinent family history.    Review of Imaging (Interpretation of images not reports):  Imaging discussed above reviewed    Laboratory Results:   Lab Results   Component Value Date    GLUCOSE 146 (H) 09/14/2023    CALCIUM 8.7 09/14/2023     09/14/2023    K 4.4 09/14/2023    CO2 24.0 09/14/2023     (H) 09/14/2023    BUN 32 (H) 09/14/2023    CREATININE 0.80 09/14/2023    BCR 40.0 (H) 09/14/2023    ANIONGAP 9.0 09/14/2023     Lab Results   Component Value Date    WBC 14.43 (H) 09/14/2023    HGB 12.6 (L) 09/14/2023    HCT 35.8 (L) 09/14/2023    .0 (H) 09/14/2023     09/14/2023     Lab Results   Component Value Date    CHOL 144 09/14/2023     Lab Results   Component Value Date    HDL 73 (H) 09/14/2023     Lab Results   Component Value Date    LDL  "55 09/14/2023     Lab Results   Component Value Date    TRIG 82 09/14/2023     Lab Results   Component Value Date    HGBA1C 5.40 09/14/2023     Lab Results   Component Value Date    INR 1.04 09/09/2023    PROTIME 13.7 09/09/2023         Physical Examination:  /87 (BP Location: Left arm, Patient Position: Lying)   Pulse 77   Temp 97.6 °F (36.4 °C) (Oral)   Resp 18   Ht 172.7 cm (68\")   Wt 60.8 kg (134 lb)   SpO2 98%   BMI 20.37 kg/m²   General Appearance:   Well developed, well nourished, well groomed, alert, and cooperative.  HEENT:   Normocephalic.   Neck and Spine:  Normal range of motion.  Normal alignment. No mass or tenderness. No bruits.  Cardiac: Regular rate and rhythm. No murmurs.  Peripheral Vasculature: Radial and pedal pulses are equal and symmetric.   Extremities:    No edema or deformities. Normal joint ROM.   Skin:    No rashes or birth marks.    Neurological examination:  Higher Integrative  Function:   Oriented to time, place and person. Normal registration, recall, attention span and concentration. Normal language including comprehension, spontaneous speech, repetition, reading, writing, naming and vocabulary. No neglect with normal visual-spatial function and construction. Normal fund of knowledge and higher integrative function.  CN II:    Pupils are equal, round, and reactive to light. Normal visual acuity and visual fields.    CN III IV VI:   Extraocular movements are full without nystagmus.   CN V:    Normal facial sensation and strength of muscles of mastication.  CN VII:   Facial movements are symmetric. No weakness.  CN VIII:   Auditory acuity is normal.  CN IX & X:   Symmetric palatal movement.  CN XI:    Sternocleidomastoid and trapezius are normal.  No weakness.  CN XII:   The tongue is midline.    Motor:    Normal muscle strength, bulk and tone in upper and lower extremities.  No fasciculations, rigidity, spasticity, or abnormal movements.  Sensation:   Normal to light " touch in arms and legs.   Station and Gait:  Normal gait and station.    Coordination:  Finger-to-nose test shows no dysmetria.    Impression:  1.  Acute left cerebellar infarct status post cardiac arrest likely secondary to this/cardioembolic source since found to have AF w/ RVR-patient on statin prior to arrival-no antiplt. Will consult cardiology to weigh in on long-term need for AC  2.  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  Abnormal MRI of the brain noting nonspecific nodular focus within the nasal cavities-suspicious for polyps-outpatient follow-up for direct visualization advised per radiologist  4.  COPD  5.  Tobacco abuse  6.  EtOH use  7.  Cardiac/PEA arrest with respiratory failure-successful ROSC-BiPAP used-intubation not required  8.  Pneumothorax; right-chest tube in place- felt to be d/t CPR   9. Rhinovirus         Plan:  Reconsult Cardiology; New onset AF/ s/p cardiac arrest - acute stroke   Carotid duplex  MRA of the head  Anaphylaxis noted with IV contrast therefore CTA head and neck discontinued  Continue aspirin and statin as written-on aspirin 81 mg and statin 40 mg daily prior to arrival; if ac warranted- AC not indicated from neurology perspective   EKG Tele  PT/OT/ST  Stroke Education  Blood pressure control to <130/80  Goal LDL <70-recommend high dose statins-   Serum glucose < 140  Outpatient ENT follow due to abnormalities noted on MRI above for direct visualization of suspected polyp          Case reviewed with and patient seen in conjunction with attending neurologist  and he agrees with treatment plan above.  Spoke with Dr. Jonathan Song regarding cardiology reconsult-he is in agreement given new finding of acute stroke    LAZARO Bravo       MDM  Reviewed: previous chart, nursing note and vitals  Reviewed previous: labs, MRI, CT scan and x-ray  Interpretation: MRI, CT scan, labs and x-ray

## 2023-09-14 NOTE — PROGRESS NOTES
"    DAILY PROGRESS NOTE  [facility]    Patient Identification:  Name: Riky Rios  Age: 87 y.o.  Sex: male  :  1935  MRN: [id number]         Primary Care Physician: Provider, No Known    Subjective:  Interval History:     Still with chest tube  On oxygen  On steroids  Had CT and MRI yesterday  +slurred speech     Scheduled Meds:amLODIPine, 5 mg, Oral, Q24H  aspirin, 81 mg, Oral, Daily  atorvastatin, 40 mg, Oral, Daily  insulin lispro, 2-7 Units, Subcutaneous, 4x Daily AC & at Bedtime  ipratropium-albuterol, 3 mL, Nebulization, 4x Daily - RT  methylPREDNISolone sodium succinate, 60 mg, Intravenous, Q12H  mirtazapine, 15 mg, Oral, Nightly  senna-docusate sodium, 2 tablet, Oral, BID  sodium chloride, 10 mL, Intravenous, Q12H  tamsulosin, 0.8 mg, Oral, Daily      Continuous Infusions:     Vital signs in last 24 hours:  Temp:  [97.7 °F (36.5 °C)-98 °F (36.7 °C)] 97.7 °F (36.5 °C)  Heart Rate:  [65-86] 65  Resp:  [18-24] 18  BP: (162-189)/(88-98) 183/98    Intake/Output:    Intake/Output Summary (Last 24 hours) at 2023 1048  Last data filed at 2023 0947  Gross per 24 hour   Intake 1080 ml   Output 500 ml   Net 580 ml       Exam:  BP (!) 183/98 (BP Location: Left arm, Patient Position: Lying)   Pulse 65   Temp 97.7 °F (36.5 °C) (Oral)   Resp 18   Ht 172.7 cm (68\")   Wt 60.8 kg (134 lb)   SpO2 100%   BMI 20.37 kg/m²     General Appearance:    awake, acutely and chronically ill                                          Neck:   No JVD                         Lungs:   +rhonchi, slight resp distress                                  Heart:    Regular rate and rhythm, S1 and S2 normal                  Abdomen:     Soft, nontender, bowel sounds active                  Extremities: Moving all, no cyanosis or edema                     Some upper airway congestion and dry mouth   +slurred speech. Gen weakness.       Data Review:       XR Chest 1 View [[id number]] Endy as Reviewed   Order " Status: Completed Collected: 09/14/23 0738    Updated: 09/14/23 0744   Narrative:     XR CHEST 1 VW-9/14/2023     HISTORY: Chest tube management.     Heart size is within normal limits. There is some mild probable  atelectasis of the right lower lung. Right-sided pigtail catheter  terminates over the lateral aspect of the right mid hemithorax. There is  relative lucency of the right apex compared to the left which is  probably related to small right apical pneumothorax with approximately  10 mm pleural separation in the upper lateral right hemithorax. Soft  tissue air is seen on the right.      Impression:     1. Small probable right apical pneumothorax.     This report was finalized on 9/14/2023 7:41 AM by Dr. Michael Antunez M.D.       MRI Brain With & Without Contrast [[id number]] Endy as Reviewed   Order Status: Completed Collected: 09/13/23 2101    Updated: 09/13/23 2152   Narrative:     MRI OF THE BRAIN WITH AND WITHOUT CONTRAST     CLINICAL HISTORY: Speech difficulty.     TECHNIQUE: MRI of the brain was obtained with sagittal T1, axial  pre-gadolinium and postgadolinium,  coronal postgadolinium T1,  axial  FLAIR, axial T2, axial gradient echo, and axial diffusion-weighted  images.     COMPARISON: CT head dated 09/13/2023.     FINDINGS:     There is a focus of acute infarction within the posterior aspect of the  left cerebellar hemisphere measuring up to 2.1 x 1.1 cm in greatest  axial dimensions that is within the left PICA distribution.  Additionally, there are chronic infarcts within the left cerebellar  hemisphere within the left PICA distribution. The largest of these  measures up to 1.7 x 1.0 cm in greatest axial dimensions. There are  small foci of encephalomalacia within both occipital lobes compatible  with chronic infarcts within both PCA distributions. The largest of  these is seen within the right occipital lobe measuring up to 12 mm in  diameter.     There are moderate to severe changes of  chronic small vessel ischemic  phenomenon. A chronic infarct is seen within the right corona radiata  measuring up to 1.1 cm in diameter. There is a focus of encephalomalacia  of uncertain etiology within the anterior and inferior portion of the  left temporal lobe measuring up to 2.9 x 6.3 cm in greatest axial  dimensions.     There are punctate foci of susceptibility artifact seen within the  corticomedullary interfaces of the left frontal and parietal lobes as  well as the right temporal lobe that are compatible with chronic  microhemorrhages likely secondary to underlying amyloid.     The ventricles, sulci, and cisterns are age-appropriate. The midline  intracranial anatomy is within normal limits. The major intracranial  flow related signal voids are within normal limits. There are no  abnormal foci of contrast enhancement within the brain parenchyma.     The extracranial structures are remarkable for mucosal thickening within  the maxillary sinuses, the ethmoid air cells, the sphenoid sinus.  Additionally, there are nonspecific nodular appearing foci noted within  the nasal cavities that are suspicious for polyps. Please correlate with  direct visual inspection.      Impression:        There is a focus of acute infarction within the posterior aspect of the  left cerebellar hemisphere measuring up to 2.1 x 1.1 cm in greatest  axial dimensions that is within the left PICA distribution. This finding  was discussed with the nurse caring for this patient, Jocelynn Melendrez RN, on 09/13/2023 at approximately 8:45 PM.  She was instructed to  notify the physician caring for this patient with this result.     Additionally, there are findings compatible with chronic infarcts within  the left cerebellar hemisphere within the left PICA distribution and  within the posterior aspects of both occipital lobes within the PCA  distributions.     There are moderate to severe changes of chronic small vessel ischemic  phenomenon  and there is a subcentimeter chronic infarct within the right  corona radiata.     There is a focus of encephalomalacia within the anterior and inferior  portion of the right temporal lobe that is of uncertain etiology.     Punctate foci of susceptibility artifact are seen within the  corticomedullary interfaces of the left frontal and parietal lobes as  well as the right temporal lobe that are compatible with chronic  microhemorrhages likely secondary to underlying amyloid.     There are moderate changes of inflammatory paranasal sinus disease.  Additionally, there are nonspecific nodular foci within the nasal  cavities that are suspicious for polyps. Correlation with direct visual  inspection is suggested.          Labs in chart were reviewed.  09/10/2023 2308 09/10/2023 2333 STAT Lactic Acid, Reflex [[id number]]   Blood    Final result Component Value Units   Lactate 1.0 mmol/L          09/10/2023 1707 09/10/2023 1749 STAT Lactic Acid, Reflex [[id number]]   (Abnormal)   Blood    Final result Component Value Units   Lactate 4.5 High Critical  mmol/L          09/10/2023 1656 09/10/2023 1819 S. Pneumo Ag Urine or CSF - Urine, Urine, Clean Catch [[id number]]   Urine, Clean Catch    Final result Component Value   Strep Pneumo Ag Negative             09/10/2023 1656 09/10/2023 1819 Legionella Antigen, Urine - Urine, Urine, Clean Catch [[id number]]   Urine, Clean Catch    Final result Component Value   LEGIONELLA ANTIGEN, URINE Negative             09/10/2023 1420 09/10/2023 1459 STAT Lactic Acid, Reflex [[id number]]   (Abnormal)   Blood from Arm, Right    Final result Component Value Units   Lactate 2.9 High Critical  mmol/L          09/10/2023 1028 09/12/2023 1045 Blood Culture - Blood, Arm, Left [[id number]]   Blood from Arm, Left    Preliminary result Component Value   Blood Culture No growth at 2 days P             09/10/2023 1024 09/10/2023 1051 CBC (No Diff) [[id number]]   (Abnormal)   Blood    Final result Component  Value Units   WBC 21.54 High  10*3/mm3   RBC 3.19 Low  10*6/mm3   Hemoglobin 11.4 Low  g/dL   Hematocrit 33.4 Low  %   .7 High  fL   MCH 35.7 High  pg   MCHC 34.1 g/dL   RDW 12.2 Low  %   RDW-SD 47.4 fl   MPV 9.9 fL   Platelets 180 10*3/mm3          09/10/2023 1024 09/10/2023 1116 Basic Metabolic Panel [[id number]]    (Abnormal)   Blood    Final result Component Value Units   Glucose 173 High  mg/dL   BUN 29 High  mg/dL   Creatinine 0.99 mg/dL   Sodium 139 mmol/L   Potassium 4.8 mmol/L   Chloride 107 mmol/L   CO2 22.6 mmol/L   Calcium 9.2 mg/dL   BUN/Creatinine Ratio 29.3 High     Anion Gap 9.4 mmol/L   eGFR 74.2 mL/min/1.73        Assessment:  Active Hospital Problems    Diagnosis  POA    **COPD with acute exacerbation [J44.1]  Yes    Acute ischemic stroke [I63.9]  Yes    Pneumothorax on right [J93.9]  Yes    Acute on chronic respiratory failure with hypoxia [J96.21]  Yes    Cardiac arrest [I46.9]  Yes    Acute respiratory failure with hypoxia [J96.01]  Yes      Resolved Hospital Problems   No resolved problems to display.       Plan:    Rhinovirus with COPD acute exacerbation with large right pneumothorax status post chest tube per pulmonary.                -Serial chest x-ray               -Solu-Medrol - may be weaned by Pulmonary  -nebs  -Leukocytosis likely related to steroids, monitor. No fever.   -Pulmonary and Thoracic Surgery following      Status postcardiac arrest with subsequent multiple rib fractures    Elevated lactate -may not be clinically significant from my perspective given recent cardiac arrest.  Repeat lactate is normal      Acute hypoxic respiratory failure on supplemental O2 but will wean accordingly     PAF-RC with recent cardiac arrest -cardiology seen. Arrest may be allergic rx to ceftriaxone vs contrast vs PTX per Cardiology. Low concern for cardiac cause per Cardiology and ECHO unremarkable. Cardiology to re-eval now with stroke and p.afib likely will need a/c.     Obtained CT  followed by MRI yesterday for slurred speech. +for acute stroke. On ASA and statin. Neurology consulted. PT/OT/ST. Neurochecks and NIH ordered. Added HTN agents yesterday to slowly bring down BP.      staff       William Song MD  9/14/2023  10:48 EDT

## 2023-09-14 NOTE — PROGRESS NOTES
"    Chief Complaint: Pneumothorax  S/P: Chest tube placement    Subjective:  Symptoms:  Stable.  He reports weakness.  No shortness of breath or chest pain.    Diet:  No nausea or vomiting.    Pain:  He reports no pain.      Vital Signs:  Temp:  [97.6 °F (36.4 °C)-97.8 °F (36.6 °C)] 97.8 °F (36.6 °C)  Heart Rate:  [65-86] 80  Resp:  [17-18] 18  BP: (162-189)/(83-98) 162/83    Intake & Output (last day)         09/13 0701  09/14 0700 09/14 0701  09/15 0700    P.O. 720 1080    Total Intake(mL/kg) 720 (11.8) 1080 (17.8)    Urine (mL/kg/hr) 950 (0.7)     Stool 0     Chest Tube 0     Total Output 950     Net -230 +1080          Urine Unmeasured Occurrence 1 x 1 x    Stool Unmeasured Occurrence 1 x 1 x            Objective:  General Appearance:  Comfortable, well-appearing and in no acute distress.    Vital signs: (most recent): Blood pressure 162/83, pulse 80, temperature 97.8 °F (36.6 °C), temperature source Oral, resp. rate 18, height 172.7 cm (68\"), weight 60.8 kg (134 lb), SpO2 97 %.    Lungs:  Normal effort and normal respiratory rate.  He is not in respiratory distress.  No decreased breath sounds.    Heart: Normal rate.    Chest: Symmetric chest wall expansion. Chest wall tenderness present.    Abdomen: Abdomen is soft and non-distended.    Neurological: Patient is alert and oriented to person, place and time.    Pupils:  Pupils are equal, round, and reactive to light.    Skin:  Warm and dry.              Chest tube:   Site: Right, Clean, Dry, and Securement device intact  Suction: -40 cm  Air Leak: positive  24 Hour Total: 0ml     Results Review:     I reviewed the patient's new clinical results.  I reviewed the patient's new imaging results and agree with the interpretation.  Discussed with patient, nurse, Dr. Benedict.    Imaging Results (Last 24 Hours)       Procedure Component Value Units Date/Time    XR Chest 1 View [[id number]] Collected: 09/14/23 0738     Updated: 09/14/23 0744    Narrative:      XR CHEST 1 " VW-9/14/2023     HISTORY: Chest tube management.     Heart size is within normal limits. There is some mild probable  atelectasis of the right lower lung. Right-sided pigtail catheter  terminates over the lateral aspect of the right mid hemithorax. There is  relative lucency of the right apex compared to the left which is  probably related to small right apical pneumothorax with approximately  10 mm pleural separation in the upper lateral right hemithorax. Soft  tissue air is seen on the right.       Impression:      1. Small probable right apical pneumothorax.     This report was finalized on 9/14/2023 7:41 AM by Dr. Michael Antunez M.D.       CT Head Without Contrast [[id number]] Collected: 09/13/23 1737     Updated: 09/14/23 0718    Narrative:      CT HEAD WITHOUT CONTRAST     HISTORY: Slurred speech.     COMPARISON: None.     FINDINGS: There is moderate diffuse atrophy. Severe vascular  calcification is noted. Areas of decreased attenuation are noted  involving the left cerebellar hemisphere posterolaterally consistent  with areas of infarction, age-indeterminate. The infarction may be  remote although an acute to subacute infarct cannot be excluded. The  largest measures approximately 2.4 x 1.0 cm in size. Moderate small  vessel ischemic disease is noted. A lacunar infarct involving the  subcortical white matter of frontal lobe inferolaterally is noted.  Polypoidal masses are appreciated involving the nasal cavity anteriorly  larger on the left. Direct visualization is suggested.     Decreased attenuation is appreciated involving the central white matter  of the right temporal lobe with relative sparing of the cortex. There is  no evidence of volume loss. This may represent an area of gliosis  however an underlying lesion cannot be excluded. Further evaluation with  a MRI examination of the brain with and without contrast is recommended  in order to better assess the right temporal lobe and to exclude  acute  infarction. Likely nasal polyps are appreciated bilaterally more  prominent on the left. Direct visualization is suggested.     The above information was called to and discussed with Dr. Song.     Radiation dose reduction techniques were utilized, including automated  exposure control and exposure modulation based on body size.        This report was finalized on 9/14/2023 7:14 AM by Dr. Ector Palma M.D.       MRI Brain With & Without Contrast [[id number]] Collected: 09/13/23 2101     Updated: 09/13/23 2152    Narrative:      MRI OF THE BRAIN WITH AND WITHOUT CONTRAST     CLINICAL HISTORY: Speech difficulty.     TECHNIQUE: MRI of the brain was obtained with sagittal T1, axial  pre-gadolinium and postgadolinium,  coronal postgadolinium T1,  axial  FLAIR, axial T2, axial gradient echo, and axial diffusion-weighted  images.     COMPARISON: CT head dated 09/13/2023.     FINDINGS:     There is a focus of acute infarction within the posterior aspect of the  left cerebellar hemisphere measuring up to 2.1 x 1.1 cm in greatest  axial dimensions that is within the left PICA distribution.  Additionally, there are chronic infarcts within the left cerebellar  hemisphere within the left PICA distribution. The largest of these  measures up to 1.7 x 1.0 cm in greatest axial dimensions. There are  small foci of encephalomalacia within both occipital lobes compatible  with chronic infarcts within both PCA distributions. The largest of  these is seen within the right occipital lobe measuring up to 12 mm in  diameter.     There are moderate to severe changes of chronic small vessel ischemic  phenomenon. A chronic infarct is seen within the right corona radiata  measuring up to 1.1 cm in diameter. There is a focus of encephalomalacia  of uncertain etiology within the anterior and inferior portion of the  left temporal lobe measuring up to 2.9 x 6.3 cm in greatest axial  dimensions.     There are punctate foci of  susceptibility artifact seen within the  corticomedullary interfaces of the left frontal and parietal lobes as  well as the right temporal lobe that are compatible with chronic  microhemorrhages likely secondary to underlying amyloid.     The ventricles, sulci, and cisterns are age-appropriate. The midline  intracranial anatomy is within normal limits. The major intracranial  flow related signal voids are within normal limits. There are no  abnormal foci of contrast enhancement within the brain parenchyma.     The extracranial structures are remarkable for mucosal thickening within  the maxillary sinuses, the ethmoid air cells, the sphenoid sinus.  Additionally, there are nonspecific nodular appearing foci noted within  the nasal cavities that are suspicious for polyps. Please correlate with  direct visual inspection.       Impression:         There is a focus of acute infarction within the posterior aspect of the  left cerebellar hemisphere measuring up to 2.1 x 1.1 cm in greatest  axial dimensions that is within the left PICA distribution. This finding  was discussed with the nurse caring for this patient, Jocelynn Melendrez RN, on 09/13/2023 at approximately 8:45 PM.  She was instructed to  notify the physician caring for this patient with this result.     Additionally, there are findings compatible with chronic infarcts within  the left cerebellar hemisphere within the left PICA distribution and  within the posterior aspects of both occipital lobes within the PCA  distributions.     There are moderate to severe changes of chronic small vessel ischemic  phenomenon and there is a subcentimeter chronic infarct within the right  corona radiata.     There is a focus of encephalomalacia within the anterior and inferior  portion of the right temporal lobe that is of uncertain etiology.     Punctate foci of susceptibility artifact are seen within the  corticomedullary interfaces of the left frontal and parietal lobes  as  well as the right temporal lobe that are compatible with chronic  microhemorrhages likely secondary to underlying amyloid.     There are moderate changes of inflammatory paranasal sinus disease.  Additionally, there are nonspecific nodular foci within the nasal  cavities that are suspicious for polyps. Correlation with direct visual  inspection is suggested.     This report was finalized on 9/13/2023 9:49 PM by Dr. Tao Gusman M.D.               Lab Results:     Lab Results (last 24 hours)       Procedure Component Value Units Date/Time    POC Glucose Once [[id number]]  (Normal) Collected: 09/14/23 1639    Specimen: Blood Updated: 09/14/23 1640     Glucose 111 mg/dL     POC Glucose Once [[id number]]  (Abnormal) Collected: 09/14/23 1135    Specimen: Blood Updated: 09/14/23 1136     Glucose 174 mg/dL     Blood Culture - Blood, Arm, Left [[id number]]  (Normal) Collected: 09/10/23 1028    Specimen: Blood from Arm, Left Updated: 09/14/23 1045     Blood Culture No growth at 4 days    Blood Culture - Blood, Arm, Right [[id number]]  (Normal) Collected: 09/10/23 1024    Specimen: Blood from Arm, Right Updated: 09/14/23 1045     Blood Culture No growth at 4 days    Narrative:      Less than seven (7) mL's of blood was collected.  Insufficient quantity may yield false negative results.    Basic Metabolic Panel [[id number]]  (Abnormal) Collected: 09/14/23 0541    Specimen: Blood Updated: 09/14/23 0649     Glucose 146 mg/dL      BUN 32 mg/dL      Creatinine 0.80 mg/dL      Sodium 145 mmol/L      Potassium 4.4 mmol/L      Comment: Slight hemolysis detected by analyzer. Results may be affected.        Chloride 112 mmol/L      CO2 24.0 mmol/L      Calcium 8.7 mg/dL      BUN/Creatinine Ratio 40.0     Anion Gap 9.0 mmol/L      eGFR 86.2 mL/min/1.73     Narrative:      GFR Normal >60  Chronic Kidney Disease <60  Kidney Failure <15    The GFR formula is only valid for adults with stable renal function between ages 18 and 70.    Lipid  Panel [[id number]]  (Abnormal) Collected: 09/14/23 0541    Specimen: Blood Updated: 09/14/23 0644     Total Cholesterol 144 mg/dL      Triglycerides 82 mg/dL      HDL Cholesterol 73 mg/dL      LDL Cholesterol  55 mg/dL      VLDL Cholesterol 16 mg/dL      LDL/HDL Ratio 0.75    Narrative:      Cholesterol Reference Ranges  (U.S. Department of Health and Human Services ATP III Classifications)    Desirable          <200 mg/dL  Borderline High    200-239 mg/dL  High Risk          >240 mg/dL      Triglyceride Reference Ranges  (U.S. Department of Health and Human Services ATP III Classifications)    Normal           <150 mg/dL  Borderline High  150-199 mg/dL  High             200-499 mg/dL  Very High        >500 mg/dL    HDL Reference Ranges  (U.S. Department of Health and Human Services ATP III Classifications)    Low     <40 mg/dl (major risk factor for CHD)  High    >60 mg/dl ('negative' risk factor for CHD)        LDL Reference Ranges  (U.S. Department of Health and Human Services ATP III Classifications)    Optimal          <100 mg/dL  Near Optimal     100-129 mg/dL  Borderline High  130-159 mg/dL  High             160-189 mg/dL  Very High        >189 mg/dL    Hemoglobin A1c [[id number]]  (Normal) Collected: 09/14/23 0541    Specimen: Blood Updated: 09/14/23 0639     Hemoglobin A1C 5.40 %     Narrative:      Hemoglobin A1C Ranges:    Increased Risk for Diabetes  5.7% to 6.4%  Diabetes                     >= 6.5%  Diabetic Goal                < 7.0%    CBC (No Diff) [[id number]]  (Abnormal) Collected: 09/14/23 0541    Specimen: Blood Updated: 09/14/23 0626     WBC 14.43 10*3/mm3      RBC 3.51 10*6/mm3      Hemoglobin 12.6 g/dL      Hematocrit 35.8 %      .0 fL      MCH 35.9 pg      MCHC 35.2 g/dL      RDW 12.5 %      RDW-SD 46.3 fl      MPV 10.5 fL      Platelets 175 10*3/mm3     POC Glucose Once [[id number]]  (Normal) Collected: 09/13/23 2020    Specimen: Blood Updated: 09/13/23 2021     Glucose 124 mg/dL               Assessment & Plan       COPD with acute exacerbation    Cardiac arrest    Acute respiratory failure with hypoxia    Pneumothorax on right    Acute on chronic respiratory failure with hypoxia    Acute ischemic stroke    Paroxysmal atrial fibrillation       Assessment & Plan  He is in bed without complaints.  Denies shortness of air or pain.  CVA management per neurology. I have independently reviewed the a.m. chest x-ray today.  Persistent right apical pleural separation noted.  Air leak on exam.  Continue chest tube to -40 cm of suction.  Patient has a known history of emphysema is currently on high-dose steroids, which is likely contributing to his persistent air leak.  Repeat a.m. chest x-ray.      LAZARO Juan  [facility]  09/14/23  16:48 EDT

## 2023-09-14 NOTE — THERAPY EVALUATION
Acute Care - Speech Language Pathology   Swallow Initial Evaluation [facility]     Patient Name: Riky Rios  : 1935  MRN: [id number]  Today's Date: 2023               Admit Date: 2023    Visit Dx:   No diagnosis found.  Patient Active Problem List   Diagnosis    COPD with acute exacerbation    Cardiac arrest    Acute respiratory failure with hypoxia    Pneumothorax on right    Acute on chronic respiratory failure with hypoxia    Acute ischemic stroke     History reviewed. No pertinent past medical history.  History reviewed. No pertinent surgical history.    SLP Recommendation and Plan  SLP Swallowing Diagnosis: oral dysphagia, R/O pharyngeal dysphagia (23)  SLP Diet Recommendation: soft to chew textures, chopped, thin liquids (23)  Recommended Precautions and Strategies: upright posture during/after eating, small bites of food and sips of liquid, general aspiration precautions (23)  SLP Rec. for Method of Medication Administration: meds whole, with thin liquids, with puree, as tolerated (23)     Monitor for Signs of Aspiration: yes, notify SLP if any concerns (23)  Recommended Diagnostics: reassess via clinical swallow evaluation (23)  Swallow Criteria for Skilled Therapeutic Interventions Met: demonstrates skilled criteria (23)     Rehab Potential/Prognosis, Swallowing: good, to achieve stated therapy goals (23)  Therapy Frequency (Swallow): PRN (23)  Predicted Duration Therapy Intervention (Days): until discharge (23)  Oral Care Recommendations: Oral Care BID/PRN (23)                                      Oral Care Recommendations: Oral Care BID/PRN (23)    Outcome Evaluation: Orders received. Clinical swallow evaluation completed. Voice difficult to assess however remained unchanged throughout assessment. No overt s/s of aspiration with ice chip, thins by  "spoon/cup/straw, puree, soft/chopped solid, or mixed consistency trials. Mild oral residue post regular solid trial with x1 thin liquid wash. Additional thin liquid wash cleared oral cavity. Suspect dry mouth/reduced saliva production creates difficulties to tolerate regular (dry/crunchy) solids. Recommend patient initiate soft/chopped solid diet with thin liquids. Meds whole with thins or puree, as tolerated. Sitting upright, slow rate, small bites/sips, alternate solids/liquids. Speech to follow for diet tolerance.      SWALLOW EVALUATION (last 72 hours)       SLP Adult Swallow Evaluation       Row Name 09/14/23 1300                   Rehab Evaluation    Document Type evaluation  -CR        Subjective Information no complaints  -CR        Patient Observations alert;cooperative  -CR        Patient Effort good  -CR        Symptoms Noted During/After Treatment none  -CR           General Information    Patient Profile Reviewed yes  -CR        Pertinent History Of Current Problem 86 y/o male admitted with rhinovirus and L CVA. Rib fx likely due to CPR. CXR with \"mild probable atelectasis of the right lower lung\".  -CR        Current Method of Nutrition regular textures;thin liquids  -CR        Precautions/Limitations, Vision WFL;for purposes of eval  -CR        Precautions/Limitations, Hearing WFL;for purposes of eval  -CR        Prior Level of Function-Swallowing no diet consistency restrictions  -CR        Plans/Goals Discussed with patient;agreed upon  -CR        Barriers to Rehab none identified  -CR           Pain Scale: Numbers Pre/Post-Treatment    Pretreatment Pain Rating 0/10 - no pain  -CR           Oral Motor Structure and Function    Dentition Assessment natural, present and adequate  -CR        Secretion Management WNL/WFL  -CR        Mucosal Quality moist, healthy  -CR           Oral Musculature and Cranial Nerve Assessment    Oral Motor General Assessment generalized oral motor weakness  -CR        " Vocal Impairment, Detail. Cranial Nerve X (Vagus) other (see comments)  hoarse  -CR           Clinical Swallow Eval    Clinical Swallow Evaluation Summary Clinical swallow evaluation completed. Voice hoarse and difficult to assess, however, remained unchanged throughout assessment. Productive cough noted prior to PO trials. No overt s/s of aspiration with ice chip, thins by spoon/cup/straw, puree, soft/chopped solid, or mixed consistency trials. Mild oral residue post regular solid trial with x1 thin liquid wash. Additional thin liquid wash cleared oral cavity. Suspect dry mouth/reduced saliva production creates difficulties to tolerate regular (dry/crunchy) solids. Following PO trials, nursing aide laid patient down for hygiene; of note, patient with delayed coughing. Recommend patient initiate soft/chopped solid diet with thin liquids. Meds whole with thins or puree, as tolerated. Sitting upright, slow rate, small bites/sips, alternate solids/liquids. Speech to follow for diet tolerance.  -CR           SLP Evaluation Clinical Impression    SLP Swallowing Diagnosis oral dysphagia;R/O pharyngeal dysphagia  -CR        Functional Impact risk of aspiration/pneumonia  -CR        Rehab Potential/Prognosis, Swallowing good, to achieve stated therapy goals  -CR        Swallow Criteria for Skilled Therapeutic Interventions Met demonstrates skilled criteria  -CR           Recommendations    Therapy Frequency (Swallow) PRN  -CR        Predicted Duration Therapy Intervention (Days) until discharge  -CR        SLP Diet Recommendation soft to chew textures;chopped;thin liquids  -CR        Recommended Diagnostics reassess via clinical swallow evaluation  -CR        Recommended Precautions and Strategies upright posture during/after eating;small bites of food and sips of liquid;general aspiration precautions  -CR        Oral Care Recommendations Oral Care BID/PRN  -CR        SLP Rec. for Method of Medication Administration meds  whole;with thin liquids;with puree;as tolerated  -CR        Monitor for Signs of Aspiration yes;notify SLP if any concerns  -CR           Swallow Goals (SLP)    Swallow LTGs Patient will demonstrate functional swallow for  -CR           (LTG) Patient will demonstrate functional swallow for    Diet Texture (Demonstrate functional swallow) soft to chew (chopped) textures  -CR        Liquid viscosity (Demonstrate functional swallow) thin liquids  -CR        [place] (Demonstrate functional swallow) independently (over 90% accuracy)  -CR        Time Frame (Demonstrate functional swallow) by discharge  -CR        Progress/Outcomes (Demonstrate functional swallow) new goal  -CR                  User Key  (r) = Recorded By, (t) = Taken By, (c) = Cosigned By      Initials Name Effective Dates    Delmy Guidry SLP 08/28/23 -                     EDUCATION  The patient has been educated in the following areas:   Dysphagia (Swallowing Impairment).        SLP GOALS       Row Name 09/14/23 1300             (LTG) Patient will demonstrate functional swallow for    Diet Texture (Demonstrate functional swallow) soft to chew (chopped) textures  -CR      Liquid viscosity (Demonstrate functional swallow) thin liquids  -CR      [place] (Demonstrate functional swallow) independently (over 90% accuracy)  -CR      Time Frame (Demonstrate functional swallow) by discharge  -CR      Progress/Outcomes (Demonstrate functional swallow) new goal  -CR                User Key  (r) = Recorded By, (t) = Taken By, (c) = Cosigned By      Initials Name Provider Type    Delmy Guidry SLP Speech and Language Pathologist                       Time Calculation:    Time Calculation- SLP       Row Name 09/14/23 1327             Time Calculation- SLP    SLP Start Time 1200  -CR      SLP Received On 09/14/23  -CR         Untimed Charges    40990-TA Eval Oral Pharyng Swallow Minutes 45  -CR         Total Minutes    Untimed Charges Total  Minutes 45  -CR       Total Minutes 45  -CR                User Key  (r) = Recorded By, (t) = Taken By, (c) = Cosigned By      Initials Name Provider Type    Delmy Guidry SLP Speech and Language Pathologist                    Therapy Charges for Today       Code Description Service Date Service Provider Modifiers Qty    [id number]  ST EVAL ORAL PHARYNG SWALLOW 3 9/14/2023 Delmy Esparza SLP GN 1                 CALEB Shah  9/14/2023

## 2023-09-14 NOTE — SIGNIFICANT NOTE
09/14/23 0833   OTHER   Discipline physical therapy assistant   Rehab Time/Intention   Session Not Performed unable to treat, medical status change  (Per RN request, PT will hold today as pt undergoes CVA workup. PT will follow up tomorrow.)   Recommendation   PT - Next Appointment 09/15/23

## 2023-09-14 NOTE — PLAN OF CARE
Goal Outcome Evaluation:              Outcome Evaluation: Orders received. Clinical swallow evaluation completed. Voice difficult to assess however remained unchanged throughout assessment. No overt s/s of aspiration with ice chip, thins by spoon/cup/straw, puree, soft/chopped solid, or mixed consistency trials. Mild oral residue post regular solid trial with x1 thin liquid wash. Additional thin liquid wash cleared oral cavity. Suspect dry mouth/reduced saliva production creates difficulties to tolerate regular (dry/crunchy) solids. Recommend patient initiate soft/chopped solid diet with thin liquids. Meds whole with thins or puree, as tolerated. Sitting upright, slow rate, small bites/sips, alternate solids/liquids. Speech to follow for diet tolerance.

## 2023-09-15 ENCOUNTER — APPOINTMENT (OUTPATIENT)
Dept: MRI IMAGING | Facility: HOSPITAL | Age: 88
DRG: 199 | End: 2023-09-15
Payer: MEDICARE

## 2023-09-15 ENCOUNTER — APPOINTMENT (OUTPATIENT)
Dept: GENERAL RADIOLOGY | Facility: HOSPITAL | Age: 88
DRG: 199 | End: 2023-09-15
Payer: MEDICARE

## 2023-09-15 LAB
ANION GAP SERPL CALCULATED.3IONS-SCNC: 8 MMOL/L (ref 5–15)
BACTERIA SPEC AEROBE CULT: NORMAL
BACTERIA SPEC AEROBE CULT: NORMAL
BUN SERPL-MCNC: 29 MG/DL (ref 8–23)
BUN/CREAT SERPL: 34.1 (ref 7–25)
CALCIUM SPEC-SCNC: 9 MG/DL (ref 8.6–10.5)
CHLORIDE SERPL-SCNC: 112 MMOL/L (ref 98–107)
CO2 SERPL-SCNC: 26 MMOL/L (ref 22–29)
CREAT SERPL-MCNC: 0.85 MG/DL (ref 0.76–1.27)
DEPRECATED RDW RBC AUTO: 43.4 FL (ref 37–54)
EGFRCR SERPLBLD CKD-EPI 2021: 84.6 ML/MIN/1.73
ERYTHROCYTE [DISTWIDTH] IN BLOOD BY AUTOMATED COUNT: 11.8 % (ref 12.3–15.4)
GLUCOSE BLDC GLUCOMTR-MCNC: 183 MG/DL (ref 70–130)
GLUCOSE BLDC GLUCOMTR-MCNC: 183 MG/DL (ref 70–130)
GLUCOSE BLDC GLUCOMTR-MCNC: 77 MG/DL (ref 70–130)
GLUCOSE BLDC GLUCOMTR-MCNC: 85 MG/DL (ref 70–130)
GLUCOSE SERPL-MCNC: 77 MG/DL (ref 65–99)
HCT VFR BLD AUTO: 36.1 % (ref 37.5–51)
HGB BLD-MCNC: 12.6 G/DL (ref 13–17.7)
MCH RBC QN AUTO: 35 PG (ref 26.6–33)
MCHC RBC AUTO-ENTMCNC: 34.9 G/DL (ref 31.5–35.7)
MCV RBC AUTO: 100.3 FL (ref 79–97)
PLATELET # BLD AUTO: 171 10*3/MM3 (ref 140–450)
PMV BLD AUTO: 10 FL (ref 6–12)
POTASSIUM SERPL-SCNC: 3.6 MMOL/L (ref 3.5–5.2)
RBC # BLD AUTO: 3.6 10*6/MM3 (ref 4.14–5.8)
SODIUM SERPL-SCNC: 146 MMOL/L (ref 136–145)
WBC NRBC COR # BLD: 13.4 10*3/MM3 (ref 3.4–10.8)

## 2023-09-15 PROCEDURE — 80048 BASIC METABOLIC PNL TOTAL CA: CPT | Performed by: INTERNAL MEDICINE

## 2023-09-15 PROCEDURE — 85027 COMPLETE CBC AUTOMATED: CPT | Performed by: INTERNAL MEDICINE

## 2023-09-15 PROCEDURE — 94799 UNLISTED PULMONARY SVC/PX: CPT

## 2023-09-15 PROCEDURE — 71045 X-RAY EXAM CHEST 1 VIEW: CPT

## 2023-09-15 PROCEDURE — 94664 DEMO&/EVAL PT USE INHALER: CPT

## 2023-09-15 PROCEDURE — 99232 SBSQ HOSP IP/OBS MODERATE 35: CPT | Performed by: NURSE PRACTITIONER

## 2023-09-15 PROCEDURE — 99232 SBSQ HOSP IP/OBS MODERATE 35: CPT | Performed by: STUDENT IN AN ORGANIZED HEALTH CARE EDUCATION/TRAINING PROGRAM

## 2023-09-15 PROCEDURE — 63710000001 INSULIN LISPRO (HUMAN) PER 5 UNITS: Performed by: INTERNAL MEDICINE

## 2023-09-15 PROCEDURE — 63710000001 PREDNISONE PER 5 MG: Performed by: INTERNAL MEDICINE

## 2023-09-15 PROCEDURE — 82948 REAGENT STRIP/BLOOD GLUCOSE: CPT

## 2023-09-15 PROCEDURE — 97535 SELF CARE MNGMENT TRAINING: CPT

## 2023-09-15 PROCEDURE — 70544 MR ANGIOGRAPHY HEAD W/O DYE: CPT

## 2023-09-15 PROCEDURE — 97166 OT EVAL MOD COMPLEX 45 MIN: CPT

## 2023-09-15 PROCEDURE — 94761 N-INVAS EAR/PLS OXIMETRY MLT: CPT

## 2023-09-15 PROCEDURE — 99233 SBSQ HOSP IP/OBS HIGH 50: CPT | Performed by: NURSE PRACTITIONER

## 2023-09-15 PROCEDURE — 25010000002 ENOXAPARIN PER 10 MG: Performed by: INTERNAL MEDICINE

## 2023-09-15 RX ORDER — PREDNISONE 1 MG/1
2 TABLET ORAL
Status: DISCONTINUED | OUTPATIENT
Start: 2023-09-16 | End: 2023-09-18

## 2023-09-15 RX ORDER — SODIUM CHLORIDE FOR INHALATION 7 %
4 VIAL, NEBULIZER (ML) INHALATION
Status: DISCONTINUED | OUTPATIENT
Start: 2023-09-15 | End: 2023-09-19

## 2023-09-15 RX ORDER — GUAIFENESIN 600 MG/1
600 TABLET, EXTENDED RELEASE ORAL 2 TIMES DAILY
Status: DISCONTINUED | OUTPATIENT
Start: 2023-09-15 | End: 2023-09-29 | Stop reason: HOSPADM

## 2023-09-15 RX ORDER — LIDOCAINE 50 MG/G
1 PATCH TOPICAL
Status: DISCONTINUED | OUTPATIENT
Start: 2023-09-15 | End: 2023-09-29 | Stop reason: HOSPADM

## 2023-09-15 RX ORDER — AMLODIPINE BESYLATE 10 MG/1
10 TABLET ORAL
Status: DISCONTINUED | OUTPATIENT
Start: 2023-09-16 | End: 2023-09-29 | Stop reason: HOSPADM

## 2023-09-15 RX ORDER — ACETAMINOPHEN 500 MG
1000 TABLET ORAL 3 TIMES DAILY
Status: DISCONTINUED | OUTPATIENT
Start: 2023-09-15 | End: 2023-09-24

## 2023-09-15 RX ORDER — ENOXAPARIN SODIUM 100 MG/ML
1 INJECTION SUBCUTANEOUS EVERY 12 HOURS
Status: DISCONTINUED | OUTPATIENT
Start: 2023-09-15 | End: 2023-09-19

## 2023-09-15 RX ORDER — ASPIRIN 81 MG/1
81 TABLET ORAL DAILY
Status: DISCONTINUED | OUTPATIENT
Start: 2023-09-16 | End: 2023-09-21

## 2023-09-15 RX ADMIN — ACETAMINOPHEN 1000 MG: 500 TABLET ORAL at 17:18

## 2023-09-15 RX ADMIN — AMLODIPINE BESYLATE 5 MG: 5 TABLET ORAL at 09:39

## 2023-09-15 RX ADMIN — INSULIN LISPRO 2 UNITS: 100 INJECTION, SOLUTION INTRAVENOUS; SUBCUTANEOUS at 21:05

## 2023-09-15 RX ADMIN — ARFORMOTEROL TARTRATE 15 MCG: 15 SOLUTION RESPIRATORY (INHALATION) at 07:11

## 2023-09-15 RX ADMIN — PREDNISONE 10 MG: 10 TABLET ORAL at 09:39

## 2023-09-15 RX ADMIN — IPRATROPIUM BROMIDE AND ALBUTEROL SULFATE 3 ML: 2.5; .5 SOLUTION RESPIRATORY (INHALATION) at 03:24

## 2023-09-15 RX ADMIN — ENOXAPARIN SODIUM 60 MG: 100 INJECTION SUBCUTANEOUS at 17:18

## 2023-09-15 RX ADMIN — Medication 10 ML: at 09:56

## 2023-09-15 RX ADMIN — MIRTAZAPINE 15 MG: 15 TABLET, ORALLY DISINTEGRATING ORAL at 21:05

## 2023-09-15 RX ADMIN — INSULIN LISPRO 2 UNITS: 100 INJECTION, SOLUTION INTRAVENOUS; SUBCUTANEOUS at 17:18

## 2023-09-15 RX ADMIN — ASPIRIN 81 MG: 81 TABLET, CHEWABLE ORAL at 09:39

## 2023-09-15 RX ADMIN — GUAIFENESIN 600 MG: 600 TABLET, EXTENDED RELEASE ORAL at 21:05

## 2023-09-15 RX ADMIN — TAMSULOSIN HYDROCHLORIDE 0.8 MG: 0.4 CAPSULE ORAL at 09:39

## 2023-09-15 RX ADMIN — LIDOCAINE 1 PATCH: 50 PATCH CUTANEOUS at 13:42

## 2023-09-15 RX ADMIN — ACETAMINOPHEN 1000 MG: 500 TABLET ORAL at 13:42

## 2023-09-15 RX ADMIN — SENNOSIDES AND DOCUSATE SODIUM 2 TABLET: 50; 8.6 TABLET ORAL at 09:39

## 2023-09-15 RX ADMIN — BUDESONIDE 0.5 MG: 0.5 INHALANT ORAL at 07:10

## 2023-09-15 RX ADMIN — ACETAMINOPHEN 1000 MG: 500 TABLET ORAL at 21:05

## 2023-09-15 RX ADMIN — TIOTROPIUM BROMIDE INHALATION SPRAY 2 PUFF: 3.12 SPRAY, METERED RESPIRATORY (INHALATION) at 07:12

## 2023-09-15 RX ADMIN — ARFORMOTEROL TARTRATE 15 MCG: 15 SOLUTION RESPIRATORY (INHALATION) at 20:34

## 2023-09-15 RX ADMIN — Medication 4 ML: at 20:34

## 2023-09-15 RX ADMIN — Medication 10 ML: at 22:04

## 2023-09-15 RX ADMIN — ATORVASTATIN CALCIUM 40 MG: 20 TABLET, FILM COATED ORAL at 09:39

## 2023-09-15 RX ADMIN — GUAIFENESIN 600 MG: 600 TABLET, EXTENDED RELEASE ORAL at 13:42

## 2023-09-15 NOTE — PROGRESS NOTES
"    Chief Complaint: Traumatic pneumothorax, right  S/P: Chest tube placement    Subjective:  Symptoms:  Worsening.  He reports weakness.  No shortness of breath or chest pain.    Diet:  Adequate intake.  No nausea or vomiting.    Activity level: Impaired due to weakness.    Pain:  He reports no pain.    More congested today.      Vital Signs:  Temp:  [97.4 °F (36.3 °C)-98.2 °F (36.8 °C)] 97.4 °F (36.3 °C)  Heart Rate:  [76-92] 92  Resp:  [16-20] 16  BP: (141-182)/(78-94) 141/79    Intake & Output (last day)         09/14 0701  09/15 0700 09/15 0701  09/16 0700    P.O. 1080 240    Total Intake(mL/kg) 1080 (17.8) 240 (3.9)    Urine (mL/kg/hr) 500 (0.3)     Stool 0     Chest Tube 35     Total Output 535     Net +545 +240          Urine Unmeasured Occurrence 1 x     Stool Unmeasured Occurrence 1 x             Objective:  General Appearance:  Comfortable, well-appearing and in no acute distress.    Vital signs: (most recent): Blood pressure 141/79, pulse 92, temperature 97.4 °F (36.3 °C), temperature source Oral, resp. rate 16, height 172.7 cm (68\"), weight 60.8 kg (134 lb), SpO2 94 %.    Lungs:  Normal effort and normal respiratory rate.  He is not in respiratory distress.  There are decreased breath sounds and rhonchi.  (Productive cough)  Heart: Normal rate.    Chest: Symmetric chest wall expansion. Chest wall tenderness present.    Abdomen: Abdomen is soft and non-distended.    Extremities: Decreased range of motion.    Neurological: Patient is alert.  (Slurred speech, decreased strength).    Pupils:  Pupils are equal, round, and reactive to light.    Skin:  Warm and dry.              Chest tube:   Site: Right, Clean, Dry, and Securement device intact  Suction: -40 cm  Air Leak: negative  24 Hour Total: 35ml     Results Review:     I reviewed the patient's new clinical results.  I reviewed the patient's new imaging results and agree with the interpretation.  Discussed with patient, nurse, Dr. Benedict.    Imaging " Results (Last 24 Hours)       Procedure Component Value Units Date/Time    XR Chest 1 View [[id number]] Collected: 09/15/23 0741     Updated: 09/15/23 0745    Narrative:      PORTABLE CHEST X-RAY     HISTORY: Chest tube management.     TECHNIQUE: Portable frontal chest x-ray is correlated with chest x-ray  from yesterday morning and other recent prior exams.     FINDINGS: Small-caliber right pigtail drain projects over the lateral  right hemithorax. Gas in the chest wall is again observed and there is a  10 mm right apical pneumothorax. Atelectasis at the medial right lung  base. Vascular volume is normal. The lungs are otherwise clear.       Impression:      Right chest tube remains in place and there is a small  residual right apical pneumothorax.     This report was finalized on 9/15/2023 7:41 AM by Dr. Devin Brandt M.D.               Lab Results:     Lab Results (last 24 hours)       Procedure Component Value Units Date/Time    POC Glucose Once [[id number]]  (Normal) Collected: 09/15/23 1102    Specimen: Blood Updated: 09/15/23 1104     Glucose 85 mg/dL     Blood Culture - Blood, Arm, Right [[id number]]  (Normal) Collected: 09/10/23 1024    Specimen: Blood from Arm, Right Updated: 09/15/23 1045     Blood Culture No growth at 5 days    Narrative:      Less than seven (7) mL's of blood was collected.  Insufficient quantity may yield false negative results.    Blood Culture - Blood, Arm, Left [[id number]]  (Normal) Collected: 09/10/23 1028    Specimen: Blood from Arm, Left Updated: 09/15/23 1045     Blood Culture No growth at 5 days    POC Glucose Once [[id number]]  (Normal) Collected: 09/15/23 0646    Specimen: Blood Updated: 09/15/23 0648     Glucose 77 mg/dL     Basic Metabolic Panel [[id number]]  (Abnormal) Collected: 09/15/23 0443    Specimen: Blood Updated: 09/15/23 0607     Glucose 77 mg/dL      BUN 29 mg/dL      Creatinine 0.85 mg/dL      Sodium 146 mmol/L      Potassium 3.6 mmol/L      Chloride 112 mmol/L       CO2 26.0 mmol/L      Calcium 9.0 mg/dL      BUN/Creatinine Ratio 34.1     Anion Gap 8.0 mmol/L      eGFR 84.6 mL/min/1.73     Narrative:      GFR Normal >60  Chronic Kidney Disease <60  Kidney Failure <15    The GFR formula is only valid for adults with stable renal function between ages 18 and 70.    CBC (No Diff) [[id number]]  (Abnormal) Collected: 09/15/23 0443    Specimen: Blood Updated: 09/15/23 0539     WBC 13.40 10*3/mm3      RBC 3.60 10*6/mm3      Hemoglobin 12.6 g/dL      Hematocrit 36.1 %      .3 fL      MCH 35.0 pg      MCHC 34.9 g/dL      RDW 11.8 %      RDW-SD 43.4 fl      MPV 10.0 fL      Platelets 171 10*3/mm3     POC Glucose Once [[id number]]  (Normal) Collected: 09/14/23 2034    Specimen: Blood Updated: 09/14/23 2036     Glucose 95 mg/dL     POC Glucose Once [[id number]]  (Normal) Collected: 09/14/23 1639    Specimen: Blood Updated: 09/14/23 1640     Glucose 111 mg/dL     POC Glucose Once [[id number]]  (Abnormal) Collected: 09/14/23 1135    Specimen: Blood Updated: 09/14/23 1136     Glucose 174 mg/dL              Assessment & Plan       COPD with acute exacerbation    Cardiac arrest    Acute respiratory failure with hypoxia    Pneumothorax on right    Acute on chronic respiratory failure with hypoxia    Acute ischemic stroke    Paroxysmal atrial fibrillation       Assessment & Plan    This morning's chest x-ray was independently reviewed.  Improved pleural separation with small bore chest tube in place.    Right pneumothorax: Airleak is improved today with chest tube to -40 cm of suction.  Decrease suction to -20 cm and recheck a chest x-ray in the morning.  Would recommend to hold oral anticoagulation with chest tube in place.    Emphysema: Bronchodilators per pulmonary medicine.  Steroids are being weaned.  He appears more congested today and I have added Mucinex and flutter valve today to assist with expectoration.    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

## 2023-09-15 NOTE — PROGRESS NOTES
"  PROGRESS NOTE  Patient Name: Riky Rios  Age/Sex: 87 y.o. male  : 1935  MRN: [id number]    Date of Admission: 2023  Date of Encounter Visit: 09/15/23   LOS: 6 days   Patient Care Team:  Provider, No Known as PCP - General    Chief Complaint: Pneumothorax    Hospital course: Patient had a right-sided small chest tube placed on 9/10/2023  with significant improvement, however unable to maintain on waterseal with separation of the lung from the chest wall and he does have some elements of subcutaneous air on   exam.  Patient also had an acute cerebellar stroke and is being followed by neurology  He is having difficulty handling secretion with some slurred speech even though the stroke did not include the speech area.  He did pass his swallow study but still sounding congested in the neck area.    Interval History: Chest tube placement on 9/10/2023    REVIEW OF SYSTEMS:   CONSTITUTIONAL: no fever or chills  CARDIOVASCULAR: Positive chest pain upon coughing. No palpitations or edema.   RESPIRATORY: Breath with minimal residual cough  GASTROINTESTINAL: No anorexia, nausea, vomiting or diarrhea. No abdominal pain or blood.   HEMATOLOGIC: No bleeding or bruising.     Ventilator/Non-Invasive Ventilation Settings (From admission, onward)      2 L/min              Vital Signs  Temp:  [97.6 °F (36.4 °C)-98.2 °F (36.8 °C)] 98.2 °F (36.8 °C)  Heart Rate:  [73-85] 81  Resp:  [16-20] 16  BP: (146-182)/(78-94) 182/94  SpO2:  [91 %-99 %] 97 %  on  Flow (L/min):  [2] 2 Device (Oxygen Therapy): nasal cannula    Intake/Output Summary (Last 24 hours) at 9/15/2023 0910  Last data filed at 9/15/2023 0600  Gross per 24 hour   Intake 720 ml   Output 535 ml   Net 185 ml       Flowsheet Rows      Flowsheet Row First Filed Value   Admission Height 172.7 cm (68\") Documented at 09/10/2023 1728   Admission Weight 60.8 kg (134 lb) Documented at 09/10/2023 1728          Body mass index is 20.37 kg/m².      09/10/23  1728 " 09/11/23  0959   Weight: 60.8 kg (134 lb) 60.8 kg (134 lb)       Physical Exam:  GEN:  No acute distress, alert, cooperative, well developed, elderly but doing better overall, slurred speech, likely from the dry mouth but not completely certain.  Neuro exam otherwise was nonfocal  EYES:   Sclerae clear. No icterus. PERRL. Normal EOM  ENT:   External ears/nose normal, no oral lesions, no thrush, dry mucous membranes   NECK:  Supple, midline trachea, no JVD  LUNGS: Normal chest on inspection, minimal and expiratory wheeze, minimal scattered rhonchi, patient has coarse sounds coming from the posterior oropharynx probably from retained secretions respirations regular, even and unlabored..  Minimal subcutaneous air  CV:  Regular rhythm and rate. Normal S1/S2. No murmurs, gallops, or rubs noted.  ABD:  Soft, nontender and nondistended. Normal bowel sounds. No guarding  EXT:  Moves all extremities well. No cyanosis. No redness. No edema.   Skin: Dry, intact, no bleeding    Results Review:    Results From Last 14 Days   Lab Units 09/14/23  0541 09/10/23  2308 09/10/23  1707 09/10/23  1420   LACTATE mmol/L  --  1.0 4.5* 2.9*   TRIGLYCERIDES mg/dL 82  --   --   --        Results from last 7 days   Lab Units 09/15/23  0443 09/14/23  0541 09/13/23  0530 09/10/23  1024 09/09/23  2218   SODIUM mmol/L 146* 145 143 139 142   POTASSIUM mmol/L 3.6 4.4 4.1 4.8 4.5   CHLORIDE mmol/L 112* 112* 112* 107 106   CO2 mmol/L 26.0 24.0 22.1 22.6 18.0*   BUN mg/dL 29* 32* 34* 29* 25*   CREATININE mg/dL 0.85 0.80 0.88 0.99 1.18   CALCIUM mg/dL 9.0 8.7 9.0 9.2 9.3   AST (SGOT) U/L  --   --  19  --  19   ALT (SGPT) U/L  --   --  21  --  30   ANION GAP mmol/L 8.0 9.0 8.9 9.4 18.0*   ALBUMIN g/dL  --   --  3.0*  --  3.8       Results from last 7 days   Lab Units 09/10/23  1024 09/09/23 2218   HSTROP T ng/L 116* 151*           Results from last 7 days   Lab Units 09/09/23  2218   PROBNP pg/mL 2,407.0*       Results from last 7 days   Lab Units  09/15/23  0443 09/14/23  0541 09/13/23  0530 09/10/23  1024 09/09/23  2218   WBC 10*3/mm3 13.40* 14.43* 17.07* 21.54* 20.01*   HEMOGLOBIN g/dL 12.6* 12.6* 12.1* 11.4* 12.4*   HEMATOCRIT % 36.1* 35.8* 34.6* 33.4* 36.3*   PLATELETS 10*3/mm3 171 175 179 180 188   MCV fL 100.3* 102.0* 103.0* 104.7* 105.2*   NEUTROPHIL % %  --   --  90.6*  --   --    LYMPHOCYTE % %  --   --  3.8*  --   --    MONOCYTES % %  --   --  3.6*  --   --    EOSINOPHIL % %  --   --  0.0*  --   --    BASOPHIL % %  --   --  0.1  --   --    IMM GRAN % %  --   --  1.9*  --   --        Results from last 7 days   Lab Units 09/09/23  2218   INR  1.04           Results from last 7 days   Lab Units 09/14/23  0541   CHOLESTEROL mg/dL 144   TRIGLYCERIDES mg/dL 82   HDL CHOL mg/dL 73*           Results from last 7 days   Lab Units 09/14/23  0541   HEMOGLOBIN A1C % 5.40       Glucose   Date/Time Value Ref Range Status   09/15/2023 0646 77 70 - 130 mg/dL Final   09/14/2023 2034 95 70 - 130 mg/dL Final   09/14/2023 1639 111 70 - 130 mg/dL Final   09/14/2023 1135 174 (H) 70 - 130 mg/dL Final   09/13/2023 2020 124 70 - 130 mg/dL Final   09/13/2023 1625 106 70 - 130 mg/dL Final   09/13/2023 1128 176 (H) 70 - 130 mg/dL Final   09/13/2023 0549 122 70 - 130 mg/dL Final     Results from last 7 days   Lab Units 09/10/23  2308 09/10/23  1707 09/10/23  1420 09/10/23  1024 09/09/23 2218   PROCALCITONIN ng/mL  --   --   --   --  0.33*   LACTATE mmol/L 1.0 4.5* 2.9* 2.2* 8.1*       Results from last 7 days   Lab Units 09/10/23  1656 09/10/23  1028 09/10/23  1024   BLOODCX   --  No growth at 4 days No growth at 4 days   STREP PNEUMO AG  Negative  --   --    L. PNEUMOPHILA SEROGP 1 UR AG  Negative  --   --            Results from last 7 days   Lab Units 09/10/23  0102   COVID19  Not Detected   ADENOVIRUS DETECTION BY PCR  Not Detected   CORONAVIRUS 229E  Not Detected   CORONAVIRUS HKU1  Not Detected   CORONAVIRUS NL63  Not Detected   CORONAVIRUS OC43  Not Detected   HUMAN  METAPNEUMOVIRUS  Not Detected   HUMAN RHINOVIRUS/ENTEROVIRUS  Detected*   INFLUENZA B PCR  Not Detected   PARAINFLUENZA 1  Not Detected   PARAINFLUENZA VIRUS 2  Not Detected   PARAINFLUENZA VIRUS 3  Not Detected   PARAINFLUENZA VIRUS 4  Not Detected   BORDETELLA PERTUSSIS PCR  Not Detected   BORDETELLA PARAPERTUSSIS PCR  Not Detected   CHLAMYDOPHILA PNEUMONIAE PCR  Not Detected   MYCOPLAMA PNEUMO PCR  Not Detected   RSV, PCR  Not Detected                 Imaging:   Imaging Results (All)                     Medication Review:   amLODIPine, 5 mg, Oral, Q24H  arformoterol, 15 mcg, Nebulization, BID - RT  aspirin, 81 mg, Oral, Daily  atorvastatin, 40 mg, Oral, Daily  budesonide, 0.5 mg, Nebulization, Daily - RT  insulin lispro, 2-7 Units, Subcutaneous, 4x Daily AC & at Bedtime  mirtazapine, 15 mg, Oral, Nightly  predniSONE, 10 mg, Oral, Daily With Breakfast  senna-docusate sodium, 2 tablet, Oral, BID  sodium chloride, 10 mL, Intravenous, Q12H  tamsulosin, 0.8 mg, Oral, Daily  tiotropium bromide monohydrate, 2 puff, Inhalation, Daily - RT             ASSESSMENT:   Status post resuscitated cardiac arrest  Right-sided pneumothorax, possibly from rib fracture and CPR, chest tube in position  Rhinovirus infection, on symptomatic treatment  Acute exacerbation of COPD secondary to rhinoviral infection  Acute hypoxemic respiratory failure secondary to above  Right-sided rib fracture, likely from CPR  Congestive heart failure with elevated proBNP, currently on no diuretics  Lactic acidosis, improved  Acute CVA    PLAN:  Patient is still dependent on the chest tube, followed by cardiothoracic surgery, on -40 cm chest tube suction  Patient has been off the IV steroids since yesterday to help with the pneumothorax resolution  Acute CVA management per neurology, the area of the acute stroke does not explain the slurred speech  Patient is on chronic prednisone at 2 mg dose which will be reviewed to minimize any adrenal  insufficiency  We will provide a suction at the bedside to help the patient retrieve any secretion from the back of the throat, will allow NT suction as needed, and we will add mucolytic therapy to help the patient with expectoration    discussed with patient and with the nursing staff  Labs/Notes/films were independently reviewed and pertinent results are summarized above  The copied texts in this note were reviewed and they are accurate as of 09/15/23    Disposition: Per primary team    Carlos House MD  09/15/23  09:10 EDT         Dictated utilizing Dragon dictation

## 2023-09-15 NOTE — PROGRESS NOTES
"    Patient Name: Riky Rios  :1935  87 y.o.      Patient Care Team:  Provider, No Known as PCP - General    Chief Complaint:   PEA arrest    Interval History:   Slurred speech, difficult to understand.    Objective   Vital Signs  Temp:  [97.6 °F (36.4 °C)-98.2 °F (36.8 °C)] 98.2 °F (36.8 °C)  Heart Rate:  [73-85] 81  Resp:  [16-20] 16  BP: (146-182)/(78-94) 182/94    Intake/Output Summary (Last 24 hours) at 9/15/2023 0817  Last data filed at 9/15/2023 0600  Gross per 24 hour   Intake 1080 ml   Output 535 ml   Net 545 ml     Flowsheet Rows      Flowsheet Row First Filed Value   Admission Height 172.7 cm (68\") Documented at 09/10/2023 1728   Admission Weight 60.8 kg (134 lb) Documented at 09/10/2023 1728            GEN: no distress, alert and oriented  HEENT: NACT, EOMI, moist mucous membranes  Lungs: CTAB, no wheezes, rales or rhonchi  CV: normal rate, regular rhythm, normal S1, S2, no murmurs, +2 radial pulses b/l, no carotid bruit  Abdomen: soft, nontender, nondistended, NABS  Extremities: no edema  Skin: no rash, warm, dry  Heme/Lymph: no bruising  Psych: organized thought, normal behavior and affect    Results Review:    Results from last 7 days   Lab Units 09/15/23  0443   SODIUM mmol/L 146*   POTASSIUM mmol/L 3.6   CHLORIDE mmol/L 112*   CO2 mmol/L 26.0   BUN mg/dL 29*   CREATININE mg/dL 0.85   GLUCOSE mg/dL 77   CALCIUM mg/dL 9.0     Results from last 7 days   Lab Units 09/10/23  1024 23  2218   HSTROP T ng/L 116* 151*     Results from last 7 days   Lab Units 09/15/23  0443   WBC 10*3/mm3 13.40*   HEMOGLOBIN g/dL 12.6*   HEMATOCRIT % 36.1*   PLATELETS 10*3/mm3 171     Results from last 7 days   Lab Units 23  2218   INR  1.04         Results from last 7 days   Lab Units 23  0541   CHOLESTEROL mg/dL 144   TRIGLYCERIDES mg/dL 82   HDL CHOL mg/dL 73*   LDL CHOL mg/dL 55               Medication Review:   amLODIPine, 5 mg, Oral, Q24H  arformoterol, 15 mcg, Nebulization, BID - " RT  aspirin, 81 mg, Oral, Daily  atorvastatin, 40 mg, Oral, Daily  budesonide, 0.5 mg, Nebulization, Daily - RT  insulin lispro, 2-7 Units, Subcutaneous, 4x Daily AC & at Bedtime  mirtazapine, 15 mg, Oral, Nightly  predniSONE, 10 mg, Oral, Daily With Breakfast  senna-docusate sodium, 2 tablet, Oral, BID  sodium chloride, 10 mL, Intravenous, Q12H  tamsulosin, 0.8 mg, Oral, Daily  tiotropium bromide monohydrate, 2 puff, Inhalation, Daily - RT                Assessment & Plan   #PEA arrest likely secondary to acute allergic reaction to ceftriaxone  #COPD exacerbation  #Right-sided pneumothorax secondary to CPR  #Paroxysmal A-fib  #Acute stroke     87-year-old man with COPD who presented to an outside emergency room with a 2-day history of shortness of breath and cough started on treatment for COPD exacerbation with ceftriaxone but apparently had allergic reaction and had PEA arrest requiring CPR.  After ROSC he was noted to be in A-fib with RVR.  CPR complicated by right-sided pneumothorax status post chest tube.     Arrest likely related to allergic reaction to ceftriaxone vs pneumothorax?  Low concern for any other cardiac etiology of arrest.     Echocardiogram with normal EF, no significant valvular disease.    MRI brain with acute infarct.  Telemetry with sinus with APCs, no AF.    - given acute stroke as well as imaging findings consistent with prior strokes it is possible that he has had underlying AF. He reportedly had AF after his PEA arrest but this may have been secondary to the epinephrine received during code. Regardless I think AC is warranted and reasonable. He is right at 60 kg, would start eliquis 2.5mg BID once able  - increase amlodipine to 10mg daily      Rashid Johnson MD  [facility]  09/15/23  08:17 EDT

## 2023-09-15 NOTE — PLAN OF CARE
Goal Outcome Evaluation:  Plan of Care Reviewed With: patient, family        Progress: no change  Outcome Evaluation: Pt admitted to [facility] acute COPD exacerbation. While inpatient, MRI (+) for CVA. Pt is (I) w/ ADLs at baseline and uses rollator for functional mobility. Agreeable to OT eval this date. Performing bed mobility w/ SBA, OOBTC w/ CGA using RW. S/u for LBD at EOB. Pt w/ mild strength/endurance deficits throughout session. Anticipate home w/ HHOT and increased assistance at d/c. OT will f/u to address deficits.      Anticipated Discharge Disposition (OT): home with home health

## 2023-09-15 NOTE — PROGRESS NOTES
"    DAILY PROGRESS NOTE  [facility]    Patient Identification:  Name: Riky Rios  Age: 87 y.o.  Sex: male  :  1935  MRN: [id number]         Primary Care Physician: Provider, No Known    Subjective:  Interval History:     Still with chest tube  On oxygen  On steroids  Had CT and MRI yesterday  +slurred speech     Scheduled Meds:acetaminophen, 1,000 mg, Oral, TID  [START ON 2023] amLODIPine, 10 mg, Oral, Q24H  arformoterol, 15 mcg, Nebulization, BID - RT  atorvastatin, 40 mg, Oral, Daily  budesonide, 0.5 mg, Nebulization, Daily - RT  enoxaparin, 1 mg/kg, Subcutaneous, Q12H  guaiFENesin, 600 mg, Oral, BID  insulin lispro, 2-7 Units, Subcutaneous, 4x Daily AC & at Bedtime  lidocaine, 1 patch, Transdermal, Q24H  mirtazapine, 15 mg, Oral, Nightly  [START ON 2023] predniSONE, 2 mg, Oral, Daily With Breakfast  senna-docusate sodium, 2 tablet, Oral, BID  sodium chloride, 10 mL, Intravenous, Q12H  sodium chloride, 4 mL, Nebulization, TID - RT  tamsulosin, 0.8 mg, Oral, Daily  tiotropium bromide monohydrate, 2 puff, Inhalation, Daily - RT      Continuous Infusions:     Vital signs in last 24 hours:  Temp:  [97.4 °F (36.3 °C)-98.2 °F (36.8 °C)] 97.4 °F (36.3 °C)  Heart Rate:  [76-92] 92  Resp:  [16-20] 16  BP: (141-182)/(78-94) 141/79    Intake/Output:    Intake/Output Summary (Last 24 hours) at 9/15/2023 1251  Last data filed at 9/15/2023 0934  Gross per 24 hour   Intake 720 ml   Output 535 ml   Net 185 ml       Exam:  /79 (BP Location: Right arm, Patient Position: Sitting)   Pulse 92   Temp 97.4 °F (36.3 °C) (Oral)   Resp 16   Ht 172.7 cm (68\")   Wt 60.8 kg (134 lb)   SpO2 94%   BMI 20.37 kg/m²     General Appearance:    awake, acutely and chronically ill                                          Neck:   No JVD                         Lungs:   +rhonchi, slight resp distress                                  Heart:    Regular rate and rhythm, S1 and S2 normal                  " Abdomen:     Soft, nontender, bowel sounds active                  Extremities: Moving all, no cyanosis or edema                     Some upper airway congestion and dry mouth   +slurred speech. Gen weakness.       Data Review:       XR Chest 1 View [[id number]] Endy as Reviewed   Order Status: Completed Collected: 09/14/23 0738    Updated: 09/14/23 0744   Narrative:     XR CHEST 1 VW-9/14/2023     HISTORY: Chest tube management.     Heart size is within normal limits. There is some mild probable  atelectasis of the right lower lung. Right-sided pigtail catheter  terminates over the lateral aspect of the right mid hemithorax. There is  relative lucency of the right apex compared to the left which is  probably related to small right apical pneumothorax with approximately  10 mm pleural separation in the upper lateral right hemithorax. Soft  tissue air is seen on the right.      Impression:     1. Small probable right apical pneumothorax.     This report was finalized on 9/14/2023 7:41 AM by Dr. Michael Antunez M.D.       MRI Brain With & Without Contrast [[id number]] Endy as Reviewed   Order Status: Completed Collected: 09/13/23 2101    Updated: 09/13/23 2152   Narrative:     MRI OF THE BRAIN WITH AND WITHOUT CONTRAST     CLINICAL HISTORY: Speech difficulty.     TECHNIQUE: MRI of the brain was obtained with sagittal T1, axial  pre-gadolinium and postgadolinium,  coronal postgadolinium T1,  axial  FLAIR, axial T2, axial gradient echo, and axial diffusion-weighted  images.     COMPARISON: CT head dated 09/13/2023.     FINDINGS:     There is a focus of acute infarction within the posterior aspect of the  left cerebellar hemisphere measuring up to 2.1 x 1.1 cm in greatest  axial dimensions that is within the left PICA distribution.  Additionally, there are chronic infarcts within the left cerebellar  hemisphere within the left PICA distribution. The largest of these  measures up to 1.7 x 1.0 cm in greatest axial  dimensions. There are  small foci of encephalomalacia within both occipital lobes compatible  with chronic infarcts within both PCA distributions. The largest of  these is seen within the right occipital lobe measuring up to 12 mm in  diameter.     There are moderate to severe changes of chronic small vessel ischemic  phenomenon. A chronic infarct is seen within the right corona radiata  measuring up to 1.1 cm in diameter. There is a focus of encephalomalacia  of uncertain etiology within the anterior and inferior portion of the  left temporal lobe measuring up to 2.9 x 6.3 cm in greatest axial  dimensions.     There are punctate foci of susceptibility artifact seen within the  corticomedullary interfaces of the left frontal and parietal lobes as  well as the right temporal lobe that are compatible with chronic  microhemorrhages likely secondary to underlying amyloid.     The ventricles, sulci, and cisterns are age-appropriate. The midline  intracranial anatomy is within normal limits. The major intracranial  flow related signal voids are within normal limits. There are no  abnormal foci of contrast enhancement within the brain parenchyma.     The extracranial structures are remarkable for mucosal thickening within  the maxillary sinuses, the ethmoid air cells, the sphenoid sinus.  Additionally, there are nonspecific nodular appearing foci noted within  the nasal cavities that are suspicious for polyps. Please correlate with  direct visual inspection.      Impression:        There is a focus of acute infarction within the posterior aspect of the  left cerebellar hemisphere measuring up to 2.1 x 1.1 cm in greatest  axial dimensions that is within the left PICA distribution. This finding  was discussed with the nurse caring for this patient, Jocelynn Melendrez RN, on 09/13/2023 at approximately 8:45 PM.  She was instructed to  notify the physician caring for this patient with this result.     Additionally, there are  findings compatible with chronic infarcts within  the left cerebellar hemisphere within the left PICA distribution and  within the posterior aspects of both occipital lobes within the PCA  distributions.     There are moderate to severe changes of chronic small vessel ischemic  phenomenon and there is a subcentimeter chronic infarct within the right  corona radiata.     There is a focus of encephalomalacia within the anterior and inferior  portion of the right temporal lobe that is of uncertain etiology.     Punctate foci of susceptibility artifact are seen within the  corticomedullary interfaces of the left frontal and parietal lobes as  well as the right temporal lobe that are compatible with chronic  microhemorrhages likely secondary to underlying amyloid.     There are moderate changes of inflammatory paranasal sinus disease.  Additionally, there are nonspecific nodular foci within the nasal  cavities that are suspicious for polyps. Correlation with direct visual  inspection is suggested.          Labs in chart were reviewed.  09/10/2023 2308 09/10/2023 2333 STAT Lactic Acid, Reflex [[id number]]   Blood    Final result Component Value Units   Lactate 1.0 mmol/L          09/10/2023 1707 09/10/2023 1749 STAT Lactic Acid, Reflex [[id number]]   (Abnormal)   Blood    Final result Component Value Units   Lactate 4.5 High Critical  mmol/L          09/10/2023 1656 09/10/2023 1819 S. Pneumo Ag Urine or CSF - Urine, Urine, Clean Catch [[id number]]   Urine, Clean Catch    Final result Component Value   Strep Pneumo Ag Negative             09/10/2023 1656 09/10/2023 1819 Legionella Antigen, Urine - Urine, Urine, Clean Catch [[id number]]   Urine, Clean Catch    Final result Component Value   LEGIONELLA ANTIGEN, URINE Negative             09/10/2023 1420 09/10/2023 1459 STAT Lactic Acid, Reflex [[id number]]   (Abnormal)   Blood from Arm, Right    Final result Component Value Units   Lactate 2.9 High Critical  mmol/L           09/10/2023 1028 09/12/2023 1045 Blood Culture - Blood, Arm, Left [[id number]]   Blood from Arm, Left    Preliminary result Component Value   Blood Culture No growth at 2 days P             09/10/2023 1024 09/10/2023 1051 CBC (No Diff) [[id number]]   (Abnormal)   Blood    Final result Component Value Units   WBC 21.54 High  10*3/mm3   RBC 3.19 Low  10*6/mm3   Hemoglobin 11.4 Low  g/dL   Hematocrit 33.4 Low  %   .7 High  fL   MCH 35.7 High  pg   MCHC 34.1 g/dL   RDW 12.2 Low  %   RDW-SD 47.4 fl   MPV 9.9 fL   Platelets 180 10*3/mm3          09/10/2023 1024 09/10/2023 1116 Basic Metabolic Panel [[id number]]    (Abnormal)   Blood    Final result Component Value Units   Glucose 173 High  mg/dL   BUN 29 High  mg/dL   Creatinine 0.99 mg/dL   Sodium 139 mmol/L   Potassium 4.8 mmol/L   Chloride 107 mmol/L   CO2 22.6 mmol/L   Calcium 9.2 mg/dL   BUN/Creatinine Ratio 29.3 High     Anion Gap 9.4 mmol/L   eGFR 74.2 mL/min/1.73        Assessment:  Active Hospital Problems    Diagnosis  POA    **COPD with acute exacerbation [J44.1]  Yes    Acute ischemic stroke [I63.9]  Yes    Paroxysmal atrial fibrillation [I48.0]  Clinically Undetermined    Pneumothorax on right [J93.9]  Yes    Acute on chronic respiratory failure with hypoxia [J96.21]  Yes    Cardiac arrest [I46.9]  Yes    Acute respiratory failure with hypoxia [J96.01]  Yes      Resolved Hospital Problems   No resolved problems to display.       Plan:    Rhinovirus with COPD acute exacerbation with large right pneumothorax status post chest tube per pulmonary.                -Serial chest x-ray               -Steroids weaned from IV to PO. He is on chronic steroids so monitor with taper for adrenal insufficiency.   -nebs  -Leukocytosis likely related to steroids, monitor. No fever.   -Pulmonary and Thoracic Surgery following      Status postcardiac arrest with subsequent multiple rib fractures       Acute hypoxic respiratory failure on supplemental O2 but will wean  accordingly     PAF-RC with recent cardiac arrest -cardiology seen. Arrest may be allergic rx to ceftriaxone vs contrast vs PTX per Cardiology. Low concern for cardiac cause per Cardiology and ECHO unremarkable. Cardiology has seen again now with stroke and p.afib will need a/c.     Obtained CT followed by MRI yesterday for slurred speech. +for acute stroke. Neurology has seen. PT/OT/ST. Neurochecks and NIH ordered. Added HTN agents and increasing. Now with recent P.Afib (in sinus currently) and stroke Neurology and Cardiology recommending a/c. Due to Chest tube Thoracic Surgery wants to wait on oral a/c so will start therapeutic lovenox for the time being.     FAUSTINA Hernandez DNP, APRN and Jocelynn Song MD  9/15/2023  12:51 EDT

## 2023-09-15 NOTE — PLAN OF CARE
Problem: Adult Inpatient Plan of Care  Goal: Plan of Care Review  Outcome: Ongoing, Progressing  Flowsheets (Taken 9/15/2023 0306)  Outcome Evaluation: VSS. AO x 4 but very garbled per family that is baseline. NIH 1. CT to -40 sx intermittent air leak present. NO c/o of pain. NSR. On 2 L NC.  Goal: Patient-Specific Goal (Individualized)  Outcome: Ongoing, Progressing  Goal: Absence of Hospital-Acquired Illness or Injury  Outcome: Ongoing, Progressing  Intervention: Identify and Manage Fall Risk  Recent Flowsheet Documentation  Taken 9/15/2023 0212 by Gissel Parry, RN  Safety Promotion/Fall Prevention:   activity supervised   assistive device/personal items within reach   clutter free environment maintained   lighting adjusted   mobility aid in reach   nonskid shoes/slippers when out of bed   safety round/check completed  Taken 9/15/2023 0002 by Gissel Parry, RN  Safety Promotion/Fall Prevention:   activity supervised   assistive device/personal items within reach   clutter free environment maintained   lighting adjusted   mobility aid in reach   nonskid shoes/slippers when out of bed   safety round/check completed   toileting scheduled  Taken 9/14/2023 2221 by Gissel Parry, RN  Safety Promotion/Fall Prevention:   activity supervised   assistive device/personal items within reach   clutter free environment maintained   lighting adjusted   mobility aid in reach   nonskid shoes/slippers when out of bed   safety round/check completed   toileting scheduled  Taken 9/14/2023 2000 by Gissel Parry, RN  Safety Promotion/Fall Prevention:   activity supervised   assistive device/personal items within reach   clutter free environment maintained   lighting adjusted   mobility aid in reach   nonskid shoes/slippers when out of bed   safety round/check completed   toileting scheduled  Intervention: Prevent Skin Injury  Recent Flowsheet Documentation  Taken 9/14/2023 2000 by Gissel Parry  RN  Skin Protection:   adhesive use limited   tubing/devices free from skin contact  Intervention: Prevent and Manage VTE (Venous Thromboembolism) Risk  Recent Flowsheet Documentation  Taken 9/15/2023 0212 by Gissel Parry, RN  Activity Management: activity encouraged  Taken 9/15/2023 0002 by Gissel Parry, RN  Activity Management: activity encouraged  Taken 9/14/2023 2221 by Gissel Parry, RN  Activity Management: activity encouraged  Taken 9/14/2023 2000 by Gissel Parry, RN  Activity Management: activity encouraged  VTE Prevention/Management:   bilateral   sequential compression devices on  Goal: Optimal Comfort and Wellbeing  Outcome: Ongoing, Progressing  Intervention: Provide Person-Centered Care  Recent Flowsheet Documentation  Taken 9/15/2023 0212 by Gissel Parry, RN  Trust Relationship/Rapport:   choices provided   care explained   emotional support provided   questions answered   reassurance provided   questions encouraged   thoughts/feelings acknowledged  Taken 9/14/2023 2000 by Gissel Parry, RN  Trust Relationship/Rapport:   care explained   emotional support provided   choices provided   questions answered   questions encouraged   reassurance provided   thoughts/feelings acknowledged  Goal: Readiness for Transition of Care  Outcome: Ongoing, Progressing   Goal Outcome Evaluation:              Outcome Evaluation: VSS. AO x 4 but very garbled per family that is baseline. NIH 1. CT to -40 sx intermittent air leak present. NO c/o of pain. NSR. On 2 L NC.

## 2023-09-15 NOTE — CASE MANAGEMENT/SOCIAL WORK
Continued Stay Note  [facility]     Patient Name: Riky Rios  MRN: [id number]  Today's Date: 9/15/2023    Admit Date: 9/9/2023    Plan: Home with HH   Discharge Plan       Row Name 09/15/23 1411       Plan    Plan Home with HH    Patient/Family in Agreement with Plan yes    Plan Comments Met with pt and son at [facility] who both state plan for pt will be to return home with HH at discharge.  Pt states he is has a lot family support and does not feel he will need SNF.  Pt requests referral to [facility] as he has used them in past.  Referral placed in Epic.  No further needs identified.  IVAN Rios RN                   Discharge Codes    No documentation.                 Expected Discharge Date and Time       Expected Discharge Date Expected Discharge Time    Sep 13, 2023               Modesta Rios, RN

## 2023-09-15 NOTE — SIGNIFICANT NOTE
09/15/23 1317   OTHER   Discipline physical therapist   Rehab Time/Intention   Session Not Performed other (see comments)  (Patient of the floor for MRI this PM. PT will f/u this PM if time allows. If unable will follow up next service date.)   Recommendation   PT - Next Appointment 09/16/23

## 2023-09-15 NOTE — DISCHARGE PLACEMENT REQUEST
"Riky Bauer (87 y.o. Male)       Date of Birth   1935    Social Security Number       Address   [address] [place] [address]  [facility] [address]    Home Phone   [phone]    MRN   [id number]       Christianity   None    Marital Status                               Admission Date   9/9/23    Admission Type   Emergency    Admitting Provider   Raul Rueda MD    Attending Provider   William Song MD    Department, Room/Bed   [address], [address]/1       Discharge Date       Discharge Disposition       Discharge Destination                                 Attending Provider: William Song MD    Allergies: Rocephin [Ceftriaxone], Iodinated Contrast Media    Isolation: Droplet   Infection: Rhinovirus  (09/10/23)   Code Status: CPR    Ht: 172.7 cm (68\")   Wt: 60.8 kg (134 lb)    Admission Cmt: None   Principal Problem: COPD with acute exacerbation [J44.1]                   Active Insurance as of 9/9/2023       Primary Coverage       Payor Plan Insurance Group Employer/Plan Group    HUMANA MEDICARE REPLACEMENT HUMANA MEDICARE REPLACEMENT [id number]       Payor Plan Address Payor Plan Phone Number Payor Plan Fax Number Effective Dates    [address] [address] [phone]  1/1/2018 - None Entered    [facility] [place] [address]         Subscriber Name Subscriber Birth Date Member ID       RIKY BAUER 1935 [id number]                     Emergency Contacts        (Rel.) Home Phone Work Phone Mobile Phone    Viviana Bauer (Daughter) -- -- [phone]    BAUERTEQUILA CAMEJO (Son) [phone] -- --              Emergency Contact Information       Name Relation Home Work Mobile    Viviana Bauer Daughter   [phone]    TEQUILA BAUER Son [phone]            "

## 2023-09-15 NOTE — PROGRESS NOTES
"DOS: 9/15/2023  NAME: Riky Rios   : 1935  PCP: Provider, No Known      Stroke    Subjective: No acute events overnight.  She denies any new complaints or concerns on my exam.  Patient sitting up in chair at time of my exam.    Objective:  Vital signs: /79 (BP Location: Right arm, Patient Position: Sitting)   Pulse 92   Temp 97.4 °F (36.3 °C) (Oral)   Resp 16   Ht 172.7 cm (68\")   Wt 60.8 kg (134 lb)   SpO2 94%   BMI 20.37 kg/m²       HEENT: Normocephalic, atraumatic   COR: RRR  Resp: Even and unlabored.  Chest tube to right chest  Extremities: Equal pulses, nondistal embolization    Neurological:   MS: AO. Language normal. No neglect. Higher integrative function normal  CN: II-XII normal  Motor: 5/5, normal tone  Sensory: Intact  Coordination: Normal    Laboratory results:  Lab Results   Component Value Date    GLUCOSE 77 09/15/2023    CALCIUM 9.0 09/15/2023     (H) 09/15/2023    K 3.6 09/15/2023    CO2 26.0 09/15/2023     (H) 09/15/2023    BUN 29 (H) 09/15/2023    CREATININE 0.85 09/15/2023    BCR 34.1 (H) 09/15/2023    ANIONGAP 8.0 09/15/2023     Lab Results   Component Value Date    WBC 13.40 (H) 09/15/2023    HGB 12.6 (L) 09/15/2023    HCT 36.1 (L) 09/15/2023    .3 (H) 09/15/2023     09/15/2023     Lab Results   Component Value Date    CHOL 144 2023     Lab Results   Component Value Date    HDL 73 (H) 2023     Lab Results   Component Value Date    LDL 55 2023     Lab Results   Component Value Date    TRIG 82 2023         Lab 23  0541   HEMOGLOBIN A1C 5.40      Review and interpretation of imaging:  Imaging discussed below reviewed      This is an 87 y.o.  male with a known history of COPD, EtOH use (14 beers per week), tobacco abuse, known iodine and IV contrast allergy who presented to outside hospital due to complaint of shortness of air and productive cough-found to have right-sided pneumothorax-chest tube since placed.  IV " Rocephin and clonidine administered and patient arrested there after-recovered after 1 cycle of CPR-since found to have A-fib with RVR.  Did not require intubation-BiPAP for short time-at hospital but did not have cardiology services ([facility]) therefore transferred here for further work-up and evaluation-stable since admission never required higher level of care/ICU admission.  Other work-up completed since admission TTE shows EF 60.3%.  LV/LA normal.  Left atrial volume index 18.2.  Ideally would like to complete vessel imaging/CTA head and neck the patient has anaphylaxis to contrast dye therefore will discontinue CTA and transition order to MRA of the head and carotid duplex.  Patient on aspirin 81 mg daily and atorvastatin 40 mg daily prior to arrival-remains on both these medications currently.      Neurologically, stable nonfocal exam.  Carotid duplex showed less than 50% bilateral ICA stenosis.  MRA of the head-shows no evidence of high-grade stenosis/aneurysm and/or dissection-acute redemonstrated. Cardiology team reconsulted due to discovery of acute infarct on telemetry and indication for anticoagulation-further review determines benefit outweighs the risk therefore patient should be started on adjusted dose Eliquis 2.5 mg twice daily-amlodipine also increased per cardiology.  Aspirin not indicated from neurology perspective will defer to team for other indications otherwise okay from our perspective to discontinue once Eliquis initiated.  Other recommendations below. No further neurology workup indicated. We will sign off and see again upon request.      Impression:  1.  Acute left cerebellar infarct status post cardiac arrest likely secondary to this/cardioembolic source since found to have AF w/ RVR-patient on statin prior to arrival-no antiplt. Will consult cardiology to weigh in on long-term need for AC  2.  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  Abnormal MRI of the brain noting nonspecific nodular focus within the nasal cavities-suspicious for polyps-outpatient follow-up for direct visualization advised per radiologist  4.  COPD  5.  Tobacco abuse  6.  EtOH use  7.  Cardiac/PEA arrest with respiratory failure-successful ROSC-BiPAP used-intubation not required  8.  Pneumothorax; right-chest tube in place- felt to be d/t CPR   9. Rhinovirus            Plan:  Aspirin not indicated long-term from neurology perspective will defer to team if needed once Eliquis initiated  Begin Eliquis adjusted dose per criteria 2.5 mg twice daily once chest tube removed and cleared by cardiothoracic team-see note regarding aspirin above  Continue high-dose statin as written-on aspirin 81 mg and statin 40 mg daily prior to arrival  EKG Tele  PT/OT/ST  Stroke Education  Blood pressure control to <130/80  Goal LDL <70-recommend high dose statins-             Serum glucose < 140  Outpatient ENT follow due to abnormalities noted on MRI above for direct visualization of suspected polyp  Follow-up with neurology in 3 months    Case reviewed with attending neurologist  09/15 and he agrees with treatment plan above.    Jocelynn Woodall, APRN

## 2023-09-16 ENCOUNTER — APPOINTMENT (OUTPATIENT)
Dept: GENERAL RADIOLOGY | Facility: HOSPITAL | Age: 88
DRG: 199 | End: 2023-09-16
Payer: MEDICARE

## 2023-09-16 LAB
ANION GAP SERPL CALCULATED.3IONS-SCNC: 8.2 MMOL/L (ref 5–15)
BUN SERPL-MCNC: 29 MG/DL (ref 8–23)
BUN/CREAT SERPL: 34.1 (ref 7–25)
CALCIUM SPEC-SCNC: 9 MG/DL (ref 8.6–10.5)
CHLORIDE SERPL-SCNC: 112 MMOL/L (ref 98–107)
CO2 SERPL-SCNC: 24.8 MMOL/L (ref 22–29)
CREAT SERPL-MCNC: 0.85 MG/DL (ref 0.76–1.27)
DEPRECATED RDW RBC AUTO: 46.4 FL (ref 37–54)
EGFRCR SERPLBLD CKD-EPI 2021: 84.6 ML/MIN/1.73
ERYTHROCYTE [DISTWIDTH] IN BLOOD BY AUTOMATED COUNT: 12.2 % (ref 12.3–15.4)
GLUCOSE BLDC GLUCOMTR-MCNC: 129 MG/DL (ref 70–130)
GLUCOSE BLDC GLUCOMTR-MCNC: 138 MG/DL (ref 70–130)
GLUCOSE BLDC GLUCOMTR-MCNC: 185 MG/DL (ref 70–130)
GLUCOSE BLDC GLUCOMTR-MCNC: 94 MG/DL (ref 70–130)
GLUCOSE SERPL-MCNC: 122 MG/DL (ref 65–99)
HCT VFR BLD AUTO: 36.4 % (ref 37.5–51)
HGB BLD-MCNC: 12.7 G/DL (ref 13–17.7)
MCH RBC QN AUTO: 36.3 PG (ref 26.6–33)
MCHC RBC AUTO-ENTMCNC: 34.9 G/DL (ref 31.5–35.7)
MCV RBC AUTO: 104 FL (ref 79–97)
PLATELET # BLD AUTO: 180 10*3/MM3 (ref 140–450)
PMV BLD AUTO: 10.8 FL (ref 6–12)
POTASSIUM SERPL-SCNC: 3.5 MMOL/L (ref 3.5–5.2)
PROCALCITONIN SERPL-MCNC: 0.15 NG/ML (ref 0–0.25)
RBC # BLD AUTO: 3.5 10*6/MM3 (ref 4.14–5.8)
SODIUM SERPL-SCNC: 145 MMOL/L (ref 136–145)
WBC NRBC COR # BLD: 13.85 10*3/MM3 (ref 3.4–10.8)

## 2023-09-16 PROCEDURE — 63710000001 INSULIN LISPRO (HUMAN) PER 5 UNITS: Performed by: INTERNAL MEDICINE

## 2023-09-16 PROCEDURE — 99232 SBSQ HOSP IP/OBS MODERATE 35: CPT | Performed by: STUDENT IN AN ORGANIZED HEALTH CARE EDUCATION/TRAINING PROGRAM

## 2023-09-16 PROCEDURE — 71045 X-RAY EXAM CHEST 1 VIEW: CPT

## 2023-09-16 PROCEDURE — 80048 BASIC METABOLIC PNL TOTAL CA: CPT | Performed by: INTERNAL MEDICINE

## 2023-09-16 PROCEDURE — 82948 REAGENT STRIP/BLOOD GLUCOSE: CPT

## 2023-09-16 PROCEDURE — 94664 DEMO&/EVAL PT USE INHALER: CPT

## 2023-09-16 PROCEDURE — 94799 UNLISTED PULMONARY SVC/PX: CPT

## 2023-09-16 PROCEDURE — 63710000001 PREDNISONE PER 5 MG: Performed by: INTERNAL MEDICINE

## 2023-09-16 PROCEDURE — 85027 COMPLETE CBC AUTOMATED: CPT | Performed by: INTERNAL MEDICINE

## 2023-09-16 PROCEDURE — 94761 N-INVAS EAR/PLS OXIMETRY MLT: CPT

## 2023-09-16 PROCEDURE — 84145 PROCALCITONIN (PCT): CPT | Performed by: INTERNAL MEDICINE

## 2023-09-16 PROCEDURE — 25010000002 ENOXAPARIN PER 10 MG: Performed by: INTERNAL MEDICINE

## 2023-09-16 RX ADMIN — ARFORMOTEROL TARTRATE 15 MCG: 15 SOLUTION RESPIRATORY (INHALATION) at 07:11

## 2023-09-16 RX ADMIN — ALBUTEROL SULFATE 2.5 MG: 2.5 SOLUTION RESPIRATORY (INHALATION) at 15:17

## 2023-09-16 RX ADMIN — Medication 10 ML: at 08:55

## 2023-09-16 RX ADMIN — ENOXAPARIN SODIUM 60 MG: 100 INJECTION SUBCUTANEOUS at 05:30

## 2023-09-16 RX ADMIN — ATORVASTATIN CALCIUM 40 MG: 20 TABLET, FILM COATED ORAL at 08:55

## 2023-09-16 RX ADMIN — Medication 4 ML: at 07:19

## 2023-09-16 RX ADMIN — Medication 10 ML: at 21:51

## 2023-09-16 RX ADMIN — ACETAMINOPHEN 1000 MG: 500 TABLET ORAL at 21:38

## 2023-09-16 RX ADMIN — BUDESONIDE 0.5 MG: 0.5 INHALANT ORAL at 07:12

## 2023-09-16 RX ADMIN — PREDNISONE 2 MG: 1 TABLET ORAL at 08:54

## 2023-09-16 RX ADMIN — GUAIFENESIN 600 MG: 600 TABLET, EXTENDED RELEASE ORAL at 21:38

## 2023-09-16 RX ADMIN — Medication 4 ML: at 15:18

## 2023-09-16 RX ADMIN — MIRTAZAPINE 15 MG: 15 TABLET, ORALLY DISINTEGRATING ORAL at 21:38

## 2023-09-16 RX ADMIN — ARFORMOTEROL TARTRATE 15 MCG: 15 SOLUTION RESPIRATORY (INHALATION) at 19:02

## 2023-09-16 RX ADMIN — SENNOSIDES AND DOCUSATE SODIUM 2 TABLET: 50; 8.6 TABLET ORAL at 08:54

## 2023-09-16 RX ADMIN — GUAIFENESIN 600 MG: 600 TABLET, EXTENDED RELEASE ORAL at 08:54

## 2023-09-16 RX ADMIN — LIDOCAINE 1 PATCH: 50 PATCH CUTANEOUS at 08:55

## 2023-09-16 RX ADMIN — ACETAMINOPHEN 1000 MG: 500 TABLET ORAL at 08:54

## 2023-09-16 RX ADMIN — ASPIRIN 81 MG: 81 TABLET, COATED ORAL at 08:54

## 2023-09-16 RX ADMIN — ENOXAPARIN SODIUM 60 MG: 100 INJECTION SUBCUTANEOUS at 15:29

## 2023-09-16 RX ADMIN — AMLODIPINE BESYLATE 10 MG: 10 TABLET ORAL at 08:54

## 2023-09-16 RX ADMIN — TAMSULOSIN HYDROCHLORIDE 0.8 MG: 0.4 CAPSULE ORAL at 08:54

## 2023-09-16 RX ADMIN — ACETAMINOPHEN 1000 MG: 500 TABLET ORAL at 15:29

## 2023-09-16 RX ADMIN — TIOTROPIUM BROMIDE INHALATION SPRAY 2 PUFF: 3.12 SPRAY, METERED RESPIRATORY (INHALATION) at 11:16

## 2023-09-16 RX ADMIN — Medication 4 ML: at 19:02

## 2023-09-16 RX ADMIN — INSULIN LISPRO 2 UNITS: 100 INJECTION, SOLUTION INTRAVENOUS; SUBCUTANEOUS at 21:41

## 2023-09-16 NOTE — PLAN OF CARE
Problem: Adult Inpatient Plan of Care  Goal: Plan of Care Review  Outcome: Ongoing, Progressing  Flowsheets (Taken 9/16/2023 0337)  Outcome Evaluation: VSS. AO x 4. Congested cough. CT to -20 sx with intermittent air leak. No c/o of pain. NIH 1 very garbled speech but pt and family state thats baseline. Up with assist.  Goal: Patient-Specific Goal (Individualized)  Outcome: Ongoing, Progressing  Goal: Absence of Hospital-Acquired Illness or Injury  Outcome: Ongoing, Progressing  Intervention: Identify and Manage Fall Risk  Recent Flowsheet Documentation  Taken 9/16/2023 0235 by Gissel Parry, RN  Safety Promotion/Fall Prevention:   activity supervised   assistive device/personal items within reach   clutter free environment maintained   lighting adjusted   mobility aid in reach   nonskid shoes/slippers when out of bed   safety round/check completed   toileting scheduled  Taken 9/16/2023 0046 by Gissel Parry, RN  Safety Promotion/Fall Prevention:   activity supervised   assistive device/personal items within reach   clutter free environment maintained   lighting adjusted   mobility aid in reach   nonskid shoes/slippers when out of bed   safety round/check completed   toileting scheduled  Taken 9/15/2023 2205 by Gissel Parry, RN  Safety Promotion/Fall Prevention:   activity supervised   assistive device/personal items within reach   clutter free environment maintained   lighting adjusted   mobility aid in reach   nonskid shoes/slippers when out of bed   safety round/check completed   toileting scheduled  Taken 9/15/2023 2030 by Gissel Parry, RN  Safety Promotion/Fall Prevention:   activity supervised   assistive device/personal items within reach   clutter free environment maintained   lighting adjusted   mobility aid in reach   nonskid shoes/slippers when out of bed   safety round/check completed   toileting scheduled  Intervention: Prevent Skin Injury  Recent Flowsheet  Documentation  Taken 9/16/2023 0235 by Gissel Parry, THADDEUS  Skin Protection:   adhesive use limited   tubing/devices free from skin contact  Taken 9/16/2023 0046 by Gissel Parry RN  Body Position:   turned   right  Taken 9/15/2023 2030 by Gissel Parry, THADDEUS  Skin Protection:   adhesive use limited   tubing/devices free from skin contact  Intervention: Prevent and Manage VTE (Venous Thromboembolism) Risk  Recent Flowsheet Documentation  Taken 9/16/2023 0235 by Gissel Parry, RN  Activity Management: activity encouraged  Taken 9/15/2023 2205 by Gissel Parry, RN  Activity Management: activity encouraged  Taken 9/15/2023 2030 by Gissel Parry, RN  Activity Management: activity encouraged  Goal: Optimal Comfort and Wellbeing  Outcome: Ongoing, Progressing  Intervention: Provide Person-Centered Care  Recent Flowsheet Documentation  Taken 9/16/2023 0235 by Gissel Parry, THADDEUS  Trust Relationship/Rapport:   care explained   choices provided   emotional support provided   questions encouraged   questions answered   reassurance provided   thoughts/feelings acknowledged  Taken 9/15/2023 2030 by Gissel Parry, THADDEUS  Trust Relationship/Rapport:   care explained   choices provided   emotional support provided   questions answered   questions encouraged   reassurance provided   thoughts/feelings acknowledged  Goal: Readiness for Transition of Care  Outcome: Ongoing, Progressing   Goal Outcome Evaluation:              Outcome Evaluation: VSS. AO x 4. Congested cough. CT to -20 sx with intermittent air leak. No c/o of pain. NIH 1 very garbled speech but pt and family state thats baseline. Up with assist.

## 2023-09-16 NOTE — PLAN OF CARE
Problem: Adult Inpatient Plan of Care  Goal: Plan of Care Review  Outcome: Ongoing, Progressing  Flowsheets (Taken 9/16/2023 1830)  Progress: improving  Plan of Care Reviewed With:   patient   family  Outcome Evaluation: VSS, no c/o pain, CT to waterseal per thoracic , NIH 0, speech is baseline for Pt per family at bedside  Goal: Patient-Specific Goal (Individualized)  Outcome: Ongoing, Progressing  Goal: Absence of Hospital-Acquired Illness or Injury  Outcome: Ongoing, Progressing  Intervention: Identify and Manage Fall Risk  Recent Flowsheet Documentation  Taken 9/16/2023 1529 by Chitra Gamez, RN  Safety Promotion/Fall Prevention: safety round/check completed  Taken 9/16/2023 1245 by Chitra Gamez RN  Safety Promotion/Fall Prevention: safety round/check completed  Taken 9/16/2023 0851 by Chitra Gamez RN  Safety Promotion/Fall Prevention:   safety round/check completed   nonskid shoes/slippers when out of bed   fall prevention program maintained   clutter free environment maintained  Intervention: Prevent Skin Injury  Recent Flowsheet Documentation  Taken 9/16/2023 0851 by Chitra Gamez RN  Body Position: position changed independently  Skin Protection:   adhesive use limited   other (see comments)  Intervention: Prevent and Manage VTE (Venous Thromboembolism) Risk  Recent Flowsheet Documentation  Taken 9/16/2023 1529 by Chitra Gamez RN  Activity Management: bedrest  Taken 9/16/2023 1245 by Chitra Gamez, RN  Activity Management: up in chair  Taken 9/16/2023 0851 by Chitra Gamez, RN  Activity Management:   activity encouraged   up in chair  VTE Prevention/Management:   bilateral   sequential compression devices off  Intervention: Prevent Infection  Recent Flowsheet Documentation  Taken 9/16/2023 0851 by Chitra Gamez, RN  Infection Prevention: rest/sleep promoted  Goal: Optimal Comfort and Wellbeing  Outcome: Ongoing, Progressing  Intervention: Provide Person-Centered  Care  Recent Flowsheet Documentation  Taken 9/16/2023 1529 by Chitra Gamez RN  Trust Relationship/Rapport: care explained  Taken 9/16/2023 0851 by Chitra Gamez RN  Trust Relationship/Rapport:   care explained   questions answered  Goal: Readiness for Transition of Care  Outcome: Ongoing, Progressing     Problem: Fall Injury Risk  Goal: Absence of Fall and Fall-Related Injury  Outcome: Ongoing, Progressing  Intervention: Identify and Manage Contributors  Recent Flowsheet Documentation  Taken 9/16/2023 0851 by Chitra Gamez RN  Medication Review/Management: medications reviewed  Intervention: Promote Injury-Free Environment  Recent Flowsheet Documentation  Taken 9/16/2023 1529 by Chitra Gamez RN  Safety Promotion/Fall Prevention: safety round/check completed  Taken 9/16/2023 1245 by Chitra Gamez RN  Safety Promotion/Fall Prevention: safety round/check completed  Taken 9/16/2023 0851 by Chitra Gamez RN  Safety Promotion/Fall Prevention:   safety round/check completed   nonskid shoes/slippers when out of bed   fall prevention program maintained   clutter free environment maintained     Problem: Adjustment to Illness (Sepsis/Septic Shock)  Goal: Optimal Coping  Outcome: Ongoing, Progressing     Problem: Bleeding (Sepsis/Septic Shock)  Goal: Absence of Bleeding  Outcome: Ongoing, Progressing     Problem: Glycemic Control Impaired (Sepsis/Septic Shock)  Goal: Blood Glucose Level Within Desired Range  Outcome: Ongoing, Progressing  Intervention: Optimize Glycemic Control  Recent Flowsheet Documentation  Taken 9/16/2023 0851 by Chitra Gamez RN  Glycemic Management: blood glucose monitored     Problem: Infection Progression (Sepsis/Septic Shock)  Goal: Absence of Infection Signs and Symptoms  Outcome: Ongoing, Progressing  Intervention: Initiate Sepsis Management  Recent Flowsheet Documentation  Taken 9/16/2023 0851 by Chitra Gamez RN  Infection Prevention: rest/sleep  promoted  Isolation Precautions:   precautions maintained   droplet  Intervention: Promote Recovery  Recent Flowsheet Documentation  Taken 9/16/2023 1529 by Chitra Gamez, RN  Activity Management: bedrest  Taken 9/16/2023 1245 by Chitra Gamez, RN  Activity Management: up in chair  Taken 9/16/2023 0851 by Chitra Gamez, RN  Activity Management:   activity encouraged   up in chair     Problem: Nutrition Impaired (Sepsis/Septic Shock)  Goal: Optimal Nutrition Intake  Outcome: Ongoing, Progressing     Problem: Skin Injury Risk Increased  Goal: Skin Health and Integrity  Outcome: Ongoing, Progressing  Intervention: Optimize Skin Protection  Recent Flowsheet Documentation  Taken 9/16/2023 0851 by Chitra Gamez, RN  Pressure Reduction Techniques:   frequent weight shift encouraged   weight shift assistance provided  Pressure Reduction Devices:   alternating pressure pump (ADD)   positioning supports utilized  Skin Protection:   adhesive use limited   other (see comments)   Goal Outcome Evaluation:  Plan of Care Reviewed With: patient, family        Progress: improving  Outcome Evaluation: VSS, no c/o pain, CT to waterseal per thoracic , NIH 0, speech is baseline for Pt per family at bedside

## 2023-09-16 NOTE — CONSULTS
This came as a new consult.  I reviewed all the information available to me.  He has been in sinus rhythm/sinus tachycardia since being here.   EKG and telemetry show this.  It appears diagnosis of atrial fibrillation was made at outside hospital and by report.  I reviewed the scanned EKG.  I'm not convinced that it shows atrial fibrillation.  I think it is sinus tachycardia with ectopy.  Perhaps there is other information that I did not see?    Summary,  I not sure he has atrial fibrillation.  My recommendation would be a monitor for further information collection prior to recommending anticoagulation.  He is certainly at risk of having atrial fibrillation.  I would discuss with Dr. Johnson as well.  Perhaps he saw something more convincing.

## 2023-09-16 NOTE — PROGRESS NOTES
"    DAILY PROGRESS NOTE  [facility]    Patient Identification:  Name: Riky Rios  Age: 87 y.o.  Sex: male  :  1935  MRN: [id number]         Primary Care Physician: Provider, No Known    Subjective:  Interval History:     Still with chest tube  On oxygen  +slurred speech   Working with therapists      Scheduled Meds:acetaminophen, 1,000 mg, Oral, TID  amLODIPine, 10 mg, Oral, Q24H  arformoterol, 15 mcg, Nebulization, BID - RT  aspirin, 81 mg, Oral, Daily  atorvastatin, 40 mg, Oral, Daily  budesonide, 0.5 mg, Nebulization, Daily - RT  enoxaparin, 1 mg/kg, Subcutaneous, Q12H  guaiFENesin, 600 mg, Oral, BID  insulin lispro, 2-7 Units, Subcutaneous, 4x Daily AC & at Bedtime  lidocaine, 1 patch, Transdermal, Q24H  mirtazapine, 15 mg, Oral, Nightly  predniSONE, 2 mg, Oral, Daily With Breakfast  senna-docusate sodium, 2 tablet, Oral, BID  sodium chloride, 10 mL, Intravenous, Q12H  sodium chloride, 4 mL, Nebulization, TID - RT  tamsulosin, 0.8 mg, Oral, Daily  tiotropium bromide monohydrate, 2 puff, Inhalation, Daily - RT      Continuous Infusions:     Vital signs in last 24 hours:  Temp:  [97.4 °F (36.3 °C)-98.6 °F (37 °C)] 97.4 °F (36.3 °C)  Heart Rate:  [70-92] 71  Resp:  [16-20] 20  BP: (109-165)/(66-88) 165/88    Intake/Output:    Intake/Output Summary (Last 24 hours) at 2023 1008  Last data filed at 2023 0553  Gross per 24 hour   Intake 714 ml   Output 10 ml   Net 704 ml       Exam:  /88 (BP Location: Left arm, Patient Position: Lying)   Pulse 71   Temp 97.4 °F (36.3 °C) (Oral)   Resp 20   Ht 172.7 cm (68\")   Wt 60.8 kg (134 lb)   SpO2 99%   BMI 20.37 kg/m²     General Appearance:    awake, acutely and chronically ill                                          Neck:   No JVD                         Lungs:   +rhonchi, slight resp distress                                  Heart:    Regular rate and rhythm, S1 and S2 normal                  Abdomen:     Soft, nontender, bowel " sounds active                  Extremities: Moving all, no cyanosis or edema                     Some upper airway congestion and dry mouth   +slurred speech. Gen weakness.       Data Review:       XR Chest 1 View [[id number]] Endy as Reviewed   Order Status: Completed Collected: 09/14/23 0738    Updated: 09/14/23 0744   Narrative:     XR CHEST 1 VW-9/14/2023     HISTORY: Chest tube management.     Heart size is within normal limits. There is some mild probable  atelectasis of the right lower lung. Right-sided pigtail catheter  terminates over the lateral aspect of the right mid hemithorax. There is  relative lucency of the right apex compared to the left which is  probably related to small right apical pneumothorax with approximately  10 mm pleural separation in the upper lateral right hemithorax. Soft  tissue air is seen on the right.      Impression:     1. Small probable right apical pneumothorax.     This report was finalized on 9/14/2023 7:41 AM by Dr. Michael Antunez M.D.       MRI Brain With & Without Contrast [[id number]] Endy as Reviewed   Order Status: Completed Collected: 09/13/23 2101    Updated: 09/13/23 2152   Narrative:     MRI OF THE BRAIN WITH AND WITHOUT CONTRAST     CLINICAL HISTORY: Speech difficulty.     TECHNIQUE: MRI of the brain was obtained with sagittal T1, axial  pre-gadolinium and postgadolinium,  coronal postgadolinium T1,  axial  FLAIR, axial T2, axial gradient echo, and axial diffusion-weighted  images.     COMPARISON: CT head dated 09/13/2023.     FINDINGS:     There is a focus of acute infarction within the posterior aspect of the  left cerebellar hemisphere measuring up to 2.1 x 1.1 cm in greatest  axial dimensions that is within the left PICA distribution.  Additionally, there are chronic infarcts within the left cerebellar  hemisphere within the left PICA distribution. The largest of these  measures up to 1.7 x 1.0 cm in greatest axial dimensions. There are  small foci of  encephalomalacia within both occipital lobes compatible  with chronic infarcts within both PCA distributions. The largest of  these is seen within the right occipital lobe measuring up to 12 mm in  diameter.     There are moderate to severe changes of chronic small vessel ischemic  phenomenon. A chronic infarct is seen within the right corona radiata  measuring up to 1.1 cm in diameter. There is a focus of encephalomalacia  of uncertain etiology within the anterior and inferior portion of the  left temporal lobe measuring up to 2.9 x 6.3 cm in greatest axial  dimensions.     There are punctate foci of susceptibility artifact seen within the  corticomedullary interfaces of the left frontal and parietal lobes as  well as the right temporal lobe that are compatible with chronic  microhemorrhages likely secondary to underlying amyloid.     The ventricles, sulci, and cisterns are age-appropriate. The midline  intracranial anatomy is within normal limits. The major intracranial  flow related signal voids are within normal limits. There are no  abnormal foci of contrast enhancement within the brain parenchyma.     The extracranial structures are remarkable for mucosal thickening within  the maxillary sinuses, the ethmoid air cells, the sphenoid sinus.  Additionally, there are nonspecific nodular appearing foci noted within  the nasal cavities that are suspicious for polyps. Please correlate with  direct visual inspection.      Impression:        There is a focus of acute infarction within the posterior aspect of the  left cerebellar hemisphere measuring up to 2.1 x 1.1 cm in greatest  axial dimensions that is within the left PICA distribution. This finding  was discussed with the nurse caring for this patient, Jocelynn Melendrez RN, on 09/13/2023 at approximately 8:45 PM.  She was instructed to  notify the physician caring for this patient with this result.     Additionally, there are findings compatible with chronic  infarcts within  the left cerebellar hemisphere within the left PICA distribution and  within the posterior aspects of both occipital lobes within the PCA  distributions.     There are moderate to severe changes of chronic small vessel ischemic  phenomenon and there is a subcentimeter chronic infarct within the right  corona radiata.     There is a focus of encephalomalacia within the anterior and inferior  portion of the right temporal lobe that is of uncertain etiology.     Punctate foci of susceptibility artifact are seen within the  corticomedullary interfaces of the left frontal and parietal lobes as  well as the right temporal lobe that are compatible with chronic  microhemorrhages likely secondary to underlying amyloid.     There are moderate changes of inflammatory paranasal sinus disease.  Additionally, there are nonspecific nodular foci within the nasal  cavities that are suspicious for polyps. Correlation with direct visual  inspection is suggested.          Labs in chart were reviewed.  09/10/2023 2308 09/10/2023 2333 STAT Lactic Acid, Reflex [[id number]]   Blood    Final result Component Value Units   Lactate 1.0 mmol/L          09/10/2023 1707 09/10/2023 1749 STAT Lactic Acid, Reflex [[id number]]   (Abnormal)   Blood    Final result Component Value Units   Lactate 4.5 High Critical  mmol/L          09/10/2023 1656 09/10/2023 1819 S. Pneumo Ag Urine or CSF - Urine, Urine, Clean Catch [[id number]]   Urine, Clean Catch    Final result Component Value   Strep Pneumo Ag Negative             09/10/2023 1656 09/10/2023 1819 Legionella Antigen, Urine - Urine, Urine, Clean Catch [[id number]]   Urine, Clean Catch    Final result Component Value   LEGIONELLA ANTIGEN, URINE Negative             09/10/2023 1420 09/10/2023 1459 STAT Lactic Acid, Reflex [[id number]]   (Abnormal)   Blood from Arm, Right    Final result Component Value Units   Lactate 2.9 High Critical  mmol/L          09/10/2023 1028 09/12/2023 1045 Blood  Culture - Blood, Arm, Left [[id number]]   Blood from Arm, Left    Preliminary result Component Value   Blood Culture No growth at 2 days P             09/10/2023 1024 09/10/2023 1051 CBC (No Diff) [[id number]]   (Abnormal)   Blood    Final result Component Value Units   WBC 21.54 High  10*3/mm3   RBC 3.19 Low  10*6/mm3   Hemoglobin 11.4 Low  g/dL   Hematocrit 33.4 Low  %   .7 High  fL   MCH 35.7 High  pg   MCHC 34.1 g/dL   RDW 12.2 Low  %   RDW-SD 47.4 fl   MPV 9.9 fL   Platelets 180 10*3/mm3          09/10/2023 1024 09/10/2023 1116 Basic Metabolic Panel [[id number]]    (Abnormal)   Blood    Final result Component Value Units   Glucose 173 High  mg/dL   BUN 29 High  mg/dL   Creatinine 0.99 mg/dL   Sodium 139 mmol/L   Potassium 4.8 mmol/L   Chloride 107 mmol/L   CO2 22.6 mmol/L   Calcium 9.2 mg/dL   BUN/Creatinine Ratio 29.3 High     Anion Gap 9.4 mmol/L   eGFR 74.2 mL/min/1.73        Assessment:  Active Hospital Problems    Diagnosis  POA    **COPD with acute exacerbation [J44.1]  Yes    Acute ischemic stroke [I63.9]  Yes    Paroxysmal atrial fibrillation [I48.0]  Clinically Undetermined    Pneumothorax on right [J93.9]  Yes    Acute on chronic respiratory failure with hypoxia [J96.21]  Yes    Cardiac arrest [I46.9]  Yes    Acute respiratory failure with hypoxia [J96.01]  Yes      Resolved Hospital Problems   No resolved problems to display.       Plan:    Rhinovirus with COPD acute exacerbation with large right pneumothorax status post chest tube per pulmonary.                -Serial chest x-ray               -Steroids weaned from IV to PO. He is on chronic steroids so monitor with taper for adrenal insufficiency.   -nebs  -Leukocytosis likely related to steroids, monitor. No fever.   -Pulmonary and Thoracic Surgery following      Status postcardiac arrest with subsequent multiple rib fractures       Acute hypoxic respiratory failure on supplemental O2 but will wean accordingly     PAF-RC with recent cardiac  arrest -cardiology seen. Arrest may be allergic rx to ceftriaxone vs contrast vs PTX per Cardiology. Low concern for cardiac cause per Cardiology and ECHO unremarkable. Cardiology has seen again now with stroke and p.afib will need a/c.     Obtained CT followed by MRI for slurred speech. +for acute stroke. Neurology has seen. PT/OT/ST. Neurochecks and NIH ordered. Added HTN agents and increasing. Now with recent P.Afib (in sinus currently) and stroke Neurology and Cardiology recommending a/c. Due to Chest tube Thoracic Surgery wants to wait on oral a/c so will now on therapeutic lovenox for the time being.     FAUSTINA Hernandez DNP, APRN and Jocelynn Woodall APRN- on 9/15    Continue close monitoring in the hospital for multiple significant medical issues.        William Song MD  9/16/2023  10:08 EDT

## 2023-09-16 NOTE — PROGRESS NOTES
"    Chief Complaint: Traumatic pneumothorax, right  S/P: Chest tube placement    Subjective:  Symptoms:  Worsening.  He reports weakness.  No shortness of breath or chest pain.    Diet:  Adequate intake.  No nausea or vomiting.    Activity level: Impaired due to weakness.    Pain:  He reports no pain.    Was sleeping in the chair this morning.  Denies any complaints.  Denies shortness of breath.      Vital Signs:  Temp:  [97.4 °F (36.3 °C)-98.6 °F (37 °C)] 97.5 °F (36.4 °C)  Heart Rate:  [70-91] 86  Resp:  [16-20] 20  BP: (109-165)/(66-97) 129/97    Intake & Output (last day)         09/15 0701  09/16 0700 09/16 0701  09/17 0700    P.O. 954 240    Total Intake(mL/kg) 954 (15.7) 240 (3.9)    Urine (mL/kg/hr)  400 (1)    Stool      Chest Tube 10     Total Output 10 400    Net +944 -160          Urine Unmeasured Occurrence 2 x             Objective:  General Appearance:  Comfortable, well-appearing and in no acute distress.    Vital signs: (most recent): Blood pressure 129/97, pulse 86, temperature 97.5 °F (36.4 °C), temperature source Oral, resp. rate 20, height 172.7 cm (68\"), weight 60.8 kg (134 lb), SpO2 98 %.    Lungs:  Normal effort and normal respiratory rate.  He is not in respiratory distress.  There are decreased breath sounds and rhonchi.  (Productive cough)  Heart: Normal rate.    Chest: Symmetric chest wall expansion. Chest wall tenderness present.    Abdomen: Abdomen is soft and non-distended.    Extremities: Decreased range of motion.    Neurological: Patient is alert.  (Slurred speech, decreased strength).    Pupils:  Pupils are equal, round, and reactive to light.    Skin:  Warm and dry.              Chest tube:   Site: Right, Clean, Dry, and Securement device intact  Suction: -20 cm  Air Leak: negative  24 Hour Total: 10ml     Results Review:     I reviewed the patient's new clinical results.  I reviewed the patient's new imaging results and agree with the interpretation.  Discussed with patient, " nurse.    Imaging Results (Last 24 Hours)       Procedure Component Value Units Date/Time    XR Chest 1 View [[id number]] Collected: 09/16/23 0709     Updated: 09/16/23 0748    Narrative:      ONE-VIEW PORTABLE CHEST     HISTORY: Small-gauge chest tube for right-sided pneumothorax.     FINDINGS: A small-gauge chest tube remains in place in the right  midchest laterally and there is some adjacent subcutaneous emphysema  along the right lateral chest wall. There is no evidence of pneumothorax  and this is unchanged from yesterday. There is slight worsening of  atelectasis and pleural effusion at the left base. The heart remains  slightly enlarged.     This report was finalized on 9/16/2023 7:45 AM by Dr. Julisu Reyna M.D.       MRI Angiogram Head Without Contrast [[id number]] Collected: 09/15/23 1400     Updated: 09/15/23 1654    Narrative:      MRA OF THE HEAD WITHOUT CONTRAST     HISTORY: Acute infarction.     COMPARISON: MRI brain 09/15/2013.     FINDINGS: The diffusion sequence demonstrates an area of acute  infarction involving the left cerebellar hemisphere inferiorly,  currently measuring approximately 2.4 cm in maximum dimension. It  appears slightly larger than the MRI examination of 09/13/2022, at which  time it measured 22 mm in maximum dimension. No new infarct is  identified.     There is signal present within the distal aspect of the internal carotid  arteries bilaterally. The proximal aspects of the anterior and middle  cerebral arteries appear unremarkable.     There is signal loss within the left vertebral artery proximal to and  just beyond the dura. This may be artifactual but is suspicious for an  underlying stenosis. The basilar artery and the proximal aspects of the  posterior cerebral arteries appear unremarkable.     Extensive small vessel ischemic disease and to a remote infarct  involving the left cerebellar hemisphere posteriorly is noted.  Encephalomalacia involving the inferior aspect of  "the right temporal  lobe and a small lacunar infarct involving the corona radiata on the  right are appreciated, similar in appearance as compared to the prior  study.       Impression:      1.  The left cerebellar acute infarct has increased in size by  approximately 2 mm since the study of  09/13/2023. No new infarct is  identified.  2.  There is attenuated signal within the left vertebral artery at the  skull base beginning just proximal to the dura and extending  intracranially. Further evaluation with a CT angiogram of the neck and  head is recommended.  3.  Extensive small vessel ischemic disease, remote infarcts involving  the left cerebellar hemisphere posteriorly in the corona radiata on the  right are noted. Encephalomalacia involving the right temporal lobe  inferiorly is noted. These findings are similar to the prior  examination.     This report was finalized on 9/15/2023 4:51 PM by Dr. Ector Palma M.D.               Lab Results:     Lab Results (last 24 hours)       Procedure Component Value Units Date/Time    Procalcitonin [[id number]]  (Normal) Collected: 09/16/23 1026    Specimen: Blood Updated: 09/16/23 1115     Procalcitonin 0.15 ng/mL     Narrative:      As a Marker for Sepsis (Non-Neonates):    1. <0.5 ng/mL represents a low risk of severe sepsis and/or septic shock.  2. >2 ng/mL represents a high risk of severe sepsis and/or septic shock.    As a Marker for Lower Respiratory Tract Infections that require antibiotic therapy:    PCT on Admission    Antibiotic Therapy       6-12 Hrs later    >0.5                Strongly Recommended  >0.25 - <0.5        Recommended   0.1 - 0.25          Discouraged              Remeasure/reassess PCT  <0.1                Strongly Discouraged     Remeasure/reassess PCT    As 28 day mortality risk marker: \"Change in Procalcitonin Result\" (>80% or <=80%) if Day 0 (or Day 1) and Day 4 values are available. Refer to http://www.[employer]-pct-calculator.com    Change in " PCT <=80%  A decrease of PCT levels below or equal to 80% defines a positive change in PCT test result representing a higher risk for 28-day all-cause mortality of patients diagnosed with severe sepsis for septic shock.    Change in PCT >80%  A decrease of PCT levels of more than 80% defines a negative change in PCT result representing a lower risk for 28-day all-cause mortality of patients diagnosed with severe sepsis or septic shock.       Basic Metabolic Panel [[id number]]  (Abnormal) Collected: 09/16/23 1026    Specimen: Blood Updated: 09/16/23 1108     Glucose 122 mg/dL      BUN 29 mg/dL      Creatinine 0.85 mg/dL      Sodium 145 mmol/L      Potassium 3.5 mmol/L      Chloride 112 mmol/L      CO2 24.8 mmol/L      Calcium 9.0 mg/dL      BUN/Creatinine Ratio 34.1     Anion Gap 8.2 mmol/L      eGFR 84.6 mL/min/1.73     Narrative:      GFR Normal >60  Chronic Kidney Disease <60  Kidney Failure <15    The GFR formula is only valid for adults with stable renal function between ages 18 and 70.    POC Glucose Once [[id number]]  (Abnormal) Collected: 09/16/23 1057    Specimen: Blood Updated: 09/16/23 1059     Glucose 138 mg/dL     CBC (No Diff) [[id number]]  (Abnormal) Collected: 09/16/23 0647    Specimen: Blood Updated: 09/16/23 0735     WBC 13.85 10*3/mm3      RBC 3.50 10*6/mm3      Hemoglobin 12.7 g/dL      Hematocrit 36.4 %      .0 fL      MCH 36.3 pg      MCHC 34.9 g/dL      RDW 12.2 %      RDW-SD 46.4 fl      MPV 10.8 fL      Platelets 180 10*3/mm3     POC Glucose Once [[id number]]  (Normal) Collected: 09/16/23 0600    Specimen: Blood Updated: 09/16/23 0602     Glucose 94 mg/dL     POC Glucose Once [[id number]]  (Abnormal) Collected: 09/15/23 2049    Specimen: Blood Updated: 09/15/23 2051     Glucose 183 mg/dL     POC Glucose Once [[id number]]  (Abnormal) Collected: 09/15/23 1600    Specimen: Blood Updated: 09/15/23 1601     Glucose 183 mg/dL              Assessment & Plan       COPD with acute exacerbation     Cardiac arrest    Acute respiratory failure with hypoxia    Pneumothorax on right    Acute on chronic respiratory failure with hypoxia    Acute ischemic stroke    Paroxysmal atrial fibrillation       Assessment & Plan    This morning's chest x-ray was independently reviewed.  Improved pleural separation with small bore chest tube in place.    I did not appreciate a pneumothorax on this morning's chest film.  He did not have a air leak on physical examination  We will place him to waterseal today.  Due to his history of chronic steroid use, he will likely be placed to clamp trial prior to removing his chest tube.    Emphysema: Bronchodilators per pulmonary medicine.  Steroids are being weaned.      Cerebellar stroke: Defer to neurology      Celso Kitchen MD PhD  [facility]  09/16/23  13:32 EDT

## 2023-09-16 NOTE — PROGRESS NOTES
Dr. DESHAWN Dickerson    [address]        Patient ID:  Name:  Riky Rios  MRN:  [id number]  1935  87 y.o.  male            CC/Reason for visit: Traumatic pneumothorax status post CPR, resuscitated cardiac arrest, rhinovirus bronchitis, wheezing, COPD exacerbation, acute hypoxic respiratory failure, rib fractures from CPR    Interval hx: Patient still complains of some expected chest pain from CPR and recent chest tube.  Still coughing and wheezing.  No hemoptysis  Reviewed notes by Dr. Poole and other medical consultants.  Has been weaned off of oxygen    ROS: No fever, no syncope, no abdominal pain    I reviewed old medical records.  Past medical history, social history and family history: Unchanged from admission H&P.      Vitals:  Vitals:    09/16/23 0712 09/16/23 0716 09/16/23 1059 09/16/23 1116   BP:   129/97    BP Location:   Right arm    Patient Position:   Lying    Pulse: 70 71 86 86   Resp:   16 20   Temp:   97.5 °F (36.4 °C)    TempSrc:   Oral    SpO2: 97% 99% 95% 98%   Weight:       Height:               Body mass index is 20.37 kg/m².    Intake/Output Summary (Last 24 hours) at 9/16/2023 1405  Last data filed at 9/16/2023 0900  Gross per 24 hour   Intake 717 ml   Output 410 ml   Net 307 ml       Exam:  GEN:  No distress  Alert, oriented x 3.   LUNGS: Right-sided chest tube in place.  Diminished breath sounds bilaterally with wheezing bilaterally.  No use of accessory muscles  CV:  Normal S1S2, without murmur, no edema  ABD:  Non tender, no enlarged liver or masses      Scheduled meds:  acetaminophen, 1,000 mg, Oral, TID  amLODIPine, 10 mg, Oral, Q24H  arformoterol, 15 mcg, Nebulization, BID - RT  aspirin, 81 mg, Oral, Daily  atorvastatin, 40 mg, Oral, Daily  budesonide, 0.5 mg, Nebulization, Daily - RT  enoxaparin, 1 mg/kg, Subcutaneous, Q12H  guaiFENesin, 600 mg, Oral, BID  insulin lispro, 2-7 Units, Subcutaneous, 4x Daily AC & at Bedtime  lidocaine, 1 patch, Transdermal,  Q24H  mirtazapine, 15 mg, Oral, Nightly  predniSONE, 2 mg, Oral, Daily With Breakfast  senna-docusate sodium, 2 tablet, Oral, BID  sodium chloride, 10 mL, Intravenous, Q12H  sodium chloride, 4 mL, Nebulization, TID - RT  tamsulosin, 0.8 mg, Oral, Daily  tiotropium bromide monohydrate, 2 puff, Inhalation, Daily - RT      IV meds:                           Data Review:   I reviewed the patient's medications and new clinical results.            Results from last 7 days   Lab Units 09/16/23  1026 09/16/23  0647 09/15/23  0443 09/14/23  0541 09/13/23  0530 09/10/23  1024 09/09/23  2218   SODIUM mmol/L 145  --  146* 145 143   < > 142   POTASSIUM mmol/L 3.5  --  3.6 4.4 4.1   < > 4.5   CHLORIDE mmol/L 112*  --  112* 112* 112*   < > 106   CO2 mmol/L 24.8  --  26.0 24.0 22.1   < > 18.0*   BUN mg/dL 29*  --  29* 32* 34*   < > 25*   CREATININE mg/dL 0.85  --  0.85 0.80 0.88   < > 1.18   CALCIUM mg/dL 9.0  --  9.0 8.7 9.0   < > 9.3   BILIRUBIN mg/dL  --   --   --   --  0.3  --  0.3   ALK PHOS U/L  --   --   --   --  77  --  109   ALT (SGPT) U/L  --   --   --   --  21  --  30   AST (SGOT) U/L  --   --   --   --  19  --  19   GLUCOSE mg/dL 122*  --  77 146* 117*   < > 215*   WBC 10*3/mm3  --  13.85* 13.40* 14.43* 17.07*   < > 20.01*   HEMOGLOBIN g/dL  --  12.7* 12.6* 12.6* 12.1*   < > 12.4*   PLATELETS 10*3/mm3  --  180 171 175 179   < > 188   INR   --   --   --   --   --   --  1.04   PROBNP pg/mL  --   --   --   --   --   --  2,407.0*   PROCALCITONIN ng/mL 0.15  --   --   --   --   --  0.33*    < > = values in this interval not displayed.     Results from last 7 days   Lab Units 09/10/23  1028 09/10/23  1024   BLOODCX  No growth at 5 days No growth at 5 days           ASSESSMENT:     COPD with acute exacerbation    Cardiac arrest    Acute respiratory failure with hypoxia    Pneumothorax on right    Acute on chronic respiratory failure with hypoxia    Acute ischemic stroke    Paroxysmal atrial fibrillation  Acute rhinovirus  bronchitis      PLAN:  Patient and all problems new to me.  Chest tube being managed by thoracic surgery.  Had resuscitated cardiac arrest, and CPR caused rib fractures and pneumothorax, presumably lung puncture.  Also has rhinovirus bronchitis.  Continue symptomatic management with nebulized bronchodilators, flutter valve, frequent ambulation and oxygen.  Wean oxygen as tolerated.  Systemic steroids have been discontinued.  Now on nebulized steroids.        Matty Dickerson MD  9/16/2023

## 2023-09-17 ENCOUNTER — APPOINTMENT (OUTPATIENT)
Dept: GENERAL RADIOLOGY | Facility: HOSPITAL | Age: 88
DRG: 199 | End: 2023-09-17
Payer: MEDICARE

## 2023-09-17 LAB
ANION GAP SERPL CALCULATED.3IONS-SCNC: 8.9 MMOL/L (ref 5–15)
BUN SERPL-MCNC: 27 MG/DL (ref 8–23)
BUN/CREAT SERPL: 28.1 (ref 7–25)
CALCIUM SPEC-SCNC: 8.5 MG/DL (ref 8.6–10.5)
CHLORIDE SERPL-SCNC: 111 MMOL/L (ref 98–107)
CO2 SERPL-SCNC: 25.1 MMOL/L (ref 22–29)
CREAT SERPL-MCNC: 0.96 MG/DL (ref 0.76–1.27)
EGFRCR SERPLBLD CKD-EPI 2021: 77 ML/MIN/1.73
GEN 5 2HR TROPONIN T REFLEX: 53 NG/L
GLUCOSE BLDC GLUCOMTR-MCNC: 106 MG/DL (ref 70–130)
GLUCOSE BLDC GLUCOMTR-MCNC: 165 MG/DL (ref 70–130)
GLUCOSE BLDC GLUCOMTR-MCNC: 211 MG/DL (ref 70–130)
GLUCOSE BLDC GLUCOMTR-MCNC: 85 MG/DL (ref 70–130)
GLUCOSE SERPL-MCNC: 117 MG/DL (ref 65–99)
POTASSIUM SERPL-SCNC: 3.9 MMOL/L (ref 3.5–5.2)
QT INTERVAL: 345 MS
QTC INTERVAL: 401 MS
SODIUM SERPL-SCNC: 145 MMOL/L (ref 136–145)
TROPONIN T DELTA: 0 NG/L
TROPONIN T SERPL HS-MCNC: 53 NG/L

## 2023-09-17 PROCEDURE — 99232 SBSQ HOSP IP/OBS MODERATE 35: CPT | Performed by: STUDENT IN AN ORGANIZED HEALTH CARE EDUCATION/TRAINING PROGRAM

## 2023-09-17 PROCEDURE — 71045 X-RAY EXAM CHEST 1 VIEW: CPT

## 2023-09-17 PROCEDURE — 25010000002 MORPHINE PER 10 MG: Performed by: INTERNAL MEDICINE

## 2023-09-17 PROCEDURE — 94799 UNLISTED PULMONARY SVC/PX: CPT

## 2023-09-17 PROCEDURE — 63710000001 PREDNISONE PER 5 MG: Performed by: INTERNAL MEDICINE

## 2023-09-17 PROCEDURE — 93005 ELECTROCARDIOGRAM TRACING: CPT | Performed by: INTERNAL MEDICINE

## 2023-09-17 PROCEDURE — 97530 THERAPEUTIC ACTIVITIES: CPT

## 2023-09-17 PROCEDURE — 82948 REAGENT STRIP/BLOOD GLUCOSE: CPT

## 2023-09-17 PROCEDURE — 80048 BASIC METABOLIC PNL TOTAL CA: CPT | Performed by: INTERNAL MEDICINE

## 2023-09-17 PROCEDURE — 25010000002 ENOXAPARIN PER 10 MG: Performed by: INTERNAL MEDICINE

## 2023-09-17 PROCEDURE — 84484 ASSAY OF TROPONIN QUANT: CPT | Performed by: INTERNAL MEDICINE

## 2023-09-17 PROCEDURE — 94664 DEMO&/EVAL PT USE INHALER: CPT

## 2023-09-17 PROCEDURE — 93010 ELECTROCARDIOGRAM REPORT: CPT | Performed by: INTERNAL MEDICINE

## 2023-09-17 PROCEDURE — 94761 N-INVAS EAR/PLS OXIMETRY MLT: CPT

## 2023-09-17 RX ORDER — MORPHINE SULFATE 2 MG/ML
2 INJECTION, SOLUTION INTRAMUSCULAR; INTRAVENOUS
Status: DISCONTINUED | OUTPATIENT
Start: 2023-09-17 | End: 2023-09-19

## 2023-09-17 RX ORDER — OXYCODONE HYDROCHLORIDE 5 MG/1
5 TABLET ORAL EVERY 4 HOURS PRN
Status: DISCONTINUED | OUTPATIENT
Start: 2023-09-17 | End: 2023-09-21

## 2023-09-17 RX ADMIN — TIOTROPIUM BROMIDE INHALATION SPRAY 2 PUFF: 3.12 SPRAY, METERED RESPIRATORY (INHALATION) at 06:57

## 2023-09-17 RX ADMIN — GUAIFENESIN 600 MG: 600 TABLET, EXTENDED RELEASE ORAL at 08:53

## 2023-09-17 RX ADMIN — ARFORMOTEROL TARTRATE 15 MCG: 15 SOLUTION RESPIRATORY (INHALATION) at 19:39

## 2023-09-17 RX ADMIN — PREDNISONE 2 MG: 1 TABLET ORAL at 08:53

## 2023-09-17 RX ADMIN — TAMSULOSIN HYDROCHLORIDE 0.8 MG: 0.4 CAPSULE ORAL at 08:52

## 2023-09-17 RX ADMIN — OXYCODONE HYDROCHLORIDE 5 MG: 5 TABLET ORAL at 16:35

## 2023-09-17 RX ADMIN — IPRATROPIUM BROMIDE AND ALBUTEROL SULFATE 3 ML: 2.5; .5 SOLUTION RESPIRATORY (INHALATION) at 06:54

## 2023-09-17 RX ADMIN — BUDESONIDE 0.5 MG: 0.5 INHALANT ORAL at 06:57

## 2023-09-17 RX ADMIN — ARFORMOTEROL TARTRATE 15 MCG: 15 SOLUTION RESPIRATORY (INHALATION) at 06:57

## 2023-09-17 RX ADMIN — MORPHINE SULFATE 2 MG: 2 INJECTION, SOLUTION INTRAMUSCULAR; INTRAVENOUS at 06:28

## 2023-09-17 RX ADMIN — IPRATROPIUM BROMIDE AND ALBUTEROL SULFATE 3 ML: 2.5; .5 SOLUTION RESPIRATORY (INHALATION) at 23:08

## 2023-09-17 RX ADMIN — Medication 4 ML: at 19:38

## 2023-09-17 RX ADMIN — Medication 10 ML: at 08:54

## 2023-09-17 RX ADMIN — ACETAMINOPHEN 1000 MG: 500 TABLET ORAL at 08:53

## 2023-09-17 RX ADMIN — ALBUTEROL SULFATE 2.5 MG: 2.5 SOLUTION RESPIRATORY (INHALATION) at 19:38

## 2023-09-17 RX ADMIN — ENOXAPARIN SODIUM 60 MG: 100 INJECTION SUBCUTANEOUS at 14:47

## 2023-09-17 RX ADMIN — LIDOCAINE 1 PATCH: 50 PATCH CUTANEOUS at 08:53

## 2023-09-17 RX ADMIN — MORPHINE SULFATE 2 MG: 2 INJECTION, SOLUTION INTRAMUSCULAR; INTRAVENOUS at 08:55

## 2023-09-17 RX ADMIN — ENOXAPARIN SODIUM 60 MG: 100 INJECTION SUBCUTANEOUS at 05:24

## 2023-09-17 RX ADMIN — Medication 4 ML: at 06:54

## 2023-09-17 RX ADMIN — ASPIRIN 81 MG: 81 TABLET, COATED ORAL at 08:52

## 2023-09-17 RX ADMIN — IPRATROPIUM BROMIDE AND ALBUTEROL SULFATE 3 ML: 2.5; .5 SOLUTION RESPIRATORY (INHALATION) at 14:59

## 2023-09-17 RX ADMIN — MORPHINE SULFATE 2 MG: 2 INJECTION, SOLUTION INTRAMUSCULAR; INTRAVENOUS at 14:47

## 2023-09-17 RX ADMIN — Medication 4 ML: at 14:59

## 2023-09-17 RX ADMIN — AMLODIPINE BESYLATE 10 MG: 10 TABLET ORAL at 08:53

## 2023-09-17 RX ADMIN — ATORVASTATIN CALCIUM 40 MG: 20 TABLET, FILM COATED ORAL at 08:52

## 2023-09-17 RX ADMIN — ACETAMINOPHEN 1000 MG: 500 TABLET ORAL at 16:35

## 2023-09-17 NOTE — THERAPY TREATMENT NOTE
Patient Name: Riky Rios  : 1935    MRN: [id number]                              Today's Date: 2023       Admit Date: 2023    Visit Dx: No diagnosis found.  Patient Active Problem List   Diagnosis    COPD with acute exacerbation    Cardiac arrest    Acute respiratory failure with hypoxia    Pneumothorax on right    Acute on chronic respiratory failure with hypoxia    Acute ischemic stroke    Paroxysmal atrial fibrillation     History reviewed. No pertinent past medical history.  History reviewed. No pertinent surgical history.   General Information       Row Name 23 143          Physical Therapy Time and Intention    Document Type therapy note (daily note)  -CB     Mode of Treatment individual therapy;physical therapy  -CB       Row Name 23 143          General Information    Existing Precautions/Restrictions fall;other (see comments)  chest tube  -CB       Row Name 23 143          Cognition    Orientation Status (Cognition) oriented to;person;place;time  -CB       Row Name 23 143          Safety Issues, Functional Mobility    Impairments Affecting Function (Mobility) endurance/activity tolerance;strength;balance;pain  -CB               User Key  (r) = Recorded By, (t) = Taken By, (c) = Cosigned By      Initials Name Provider Type    CB Mary Reyes PT Physical Therapist                   Mobility       Row Name 23 1433          Bed Mobility    Bed Mobility supine-sit  -CB     Supine-Sit [place] (Bed Mobility) moderate assist (50% patient effort);verbal cues  -CB     Assistive Device (Bed Mobility) bed rails;head of bed elevated  -CB       Row Name 23          Bed-Chair Transfer    Bed-Chair [place] (Transfers) minimum assist (75% patient effort);verbal cues  -CB     Assistive Device (Bed-Chair Transfers) --  HHA  -CB       Row Name 23          Sit-Stand Transfer    Sit-Stand [place] (Transfers) minimum assist (75% patient  effort);verbal cues  -CB     Assistive Device (Sit-Stand Transfers) --  no AD  -CB       Row Name 09/17/23 1433          Gait/Stairs (Locomotion)    Comment, (Gait/Stairs) denies ambulation  -CB               User Key  (r) = Recorded By, (t) = Taken By, (c) = Cosigned By      Initials Name Provider Type    Mary Noel, PT Physical Therapist                   Obj/Interventions       Row Name 09/17/23 1434          Motor Skills    Therapeutic Exercise --  AP and SLR x10 reps  -CB       Row Name 09/17/23 1434          Balance    Balance Assessment standing static balance;standing dynamic balance  -CB     Static Standing Balance minimal assist;verbal cues  -CB     Dynamic Standing Balance minimal assist;verbal cues  -CB     Position/Device Used, Standing Balance --  HHA  -CB     Balance Interventions sitting;standing;sit to stand;supported;dynamic;static;minimal challenge  -CB               User Key  (r) = Recorded By, (t) = Taken By, (c) = Cosigned By      Initials Name Provider Type    Mary Noel, PT Physical Therapist                   Goals/Plan    No documentation.                  Clinical Impression       Row Name 09/17/23 1434          Pain    Pre/Posttreatment Pain Comment pt reports pain today but does not state numerical value  -CB       Row Name 09/17/23 1434          Plan of Care Review    Plan of Care Reviewed With patient;family  -CB     Progress declining  -CB     Outcome Evaluation Patient is agreeable to PT today with maximum encouragement. Pt completed bed mobility requiring modA, STS with Jayna, and tx to chair with HHA requiring Jayna. Pt reports SOA although O2 >90% throughout session. Long discussion about increasing mobility as pt wants to go home at [place]. All lines in tact post session and daughter at bedside.  -CB       Row Name 09/17/23 1434          Vital Signs    O2 Delivery Pre Treatment room air  -CB     O2 Delivery Intra Treatment room air  -CB     O2 Delivery Post Treatment room  air  -CB       Row Name 09/17/23 1434          Positioning and Restraints    Pre-Treatment Position in bed  -CB     Post Treatment Position chair  -CB     In Chair notified nsg;reclined;call light within reach;encouraged to call for assist;exit alarm on;with family/caregiver  -CB               User Key  (r) = Recorded By, (t) = Taken By, (c) = Cosigned By      Initials Name Provider Type    Mary Noel, PT Physical Therapist                   Outcome Measures       Row Name 09/17/23 1438          How much help from another person do you currently need...    Turning from your back to your side while in flat bed without using bedrails? 3  -CB     Moving from lying on back to sitting on the side of a flat bed without bedrails? 2  -CB     Moving to and from a bed to a chair (including a wheelchair)? 3  -CB     Standing up from a chair using your arms (e.g., wheelchair, bedside chair)? 3  -CB     Climbing 3-5 steps with a railing? 2  -CB     To walk in hospital room? 2  -CB     AM-PAC 6 Clicks Score (PT) 15  -CB     Highest level of mobility 4 --> Transferred to chair/commode  -CB       Row Name 09/17/23 1438          Functional Assessment    Outcome Measure Options AM-PAC 6 Clicks Basic Mobility (PT)  -CB               User Key  (r) = Recorded By, (t) = Taken By, (c) = Cosigned By      Initials Name Provider Type    Mary Noel, MATA Physical Therapist                                 Physical Therapy Education       Title: PT OT SLP Therapies (In Progress)       Topic: Physical Therapy (Done)       Point: Mobility training (Done)       Learning Progress Summary             Patient Acceptance, E,TB, VU,NR by CB at 9/17/2023 1438    Acceptance, E,TB,D, VU,NR by PH at 9/13/2023 1138    Acceptance, E, VU,NR by KT at 9/12/2023 1156    Acceptance, E,TB,D, VU,NR by  at 9/12/2023 0955                         Point: Home exercise program (Done)       Learning Progress Summary             Patient Acceptance, E,TB,  VU,NR by CB at 9/17/2023 1438    Acceptance, E,TB,D, VU,NR by PH at 9/13/2023 1138    Acceptance, E, VU,NR by KT at 9/12/2023 1156                         Point: Body mechanics (Done)       Learning Progress Summary             Patient Acceptance, E,TB,D, VU,NR by PH at 9/13/2023 1138    Acceptance, E, VU,NR by KT at 9/12/2023 1156    Acceptance, E,TB,D, VU,NR by  at 9/12/2023 0955                         Point: Precautions (Done)       Learning Progress Summary             Patient Acceptance, E,TB,D, VU,NR by  at 9/13/2023 1138    Acceptance, E, VU,NR by KT at 9/12/2023 1156    Acceptance, E,TB,D, VU,NR by  at 9/12/2023 0955                                         User Key       Initials Effective Dates Name Provider Type Discipline     06/16/21 -  Meg Fitzgerald, PT Physical Therapist PT     06/16/21 -  Chel De Anda RN Registered Nurse Nurse     06/16/21 -  Sherri Whitten PTA Physical Therapist Assistant PT    CB 10/22/21 -  Mary Reyes, PT Physical Therapist PT                  PT Recommendation and Plan     Plan of Care Reviewed With: patient, family  Progress: declining  Outcome Evaluation: Patient is agreeable to PT today with maximum encouragement. Pt completed bed mobility requiring modA, STS with Jayna, and tx to chair with HHA requiring Jayna. Pt reports SOA although O2 >90% throughout session. Long discussion about increasing mobility as pt wants to go home at [place]. All lines in tact post session and daughter at bedside.     Time Calculation:         PT Charges       Row Name 09/17/23 1438             Time Calculation    Start Time 1331  -CB      Stop Time 1347  -CB      Time Calculation (min) 16 min  -CB      PT Received On 09/17/23  -CB      PT - Next Appointment 09/18/23  -CB         Time Calculation- PT    Total Timed Code Minutes- PT 16 minute(s)  -CB         Timed Charges    10290 - PT Therapeutic Activity Minutes 16  -CB         Total Minutes    Timed Charges Total  Minutes 16  -CB       Total Minutes 16  -CB                User Key  (r) = Recorded By, (t) = Taken By, (c) = Cosigned By      Initials Name Provider Type    CB Mary Reyes, PT Physical Therapist                  Therapy Charges for Today       Code Description Service Date Service Provider Modifiers Qty    [id number]  PT THERAPEUTIC ACT EA 15 MIN 9/17/2023 Mary Reyes, PT GP 1            PT G-Codes  Outcome Measure Options: AM-PAC 6 Clicks Basic Mobility (PT)  AM-PAC 6 Clicks Score (PT): 15  AM-PAC 6 Clicks Score (OT): 18  Modified [place] Scale: 3 - Moderate disability.  Requiring some help, but able to walk without assistance.       Mary Reyes, MATA  9/17/2023

## 2023-09-17 NOTE — PROGRESS NOTES
Dr. DESHAWN Dickerson    [address]        Patient ID:  Name:  Riky Rios  MRN:  [id number]  1935  87 y.o.  male            CC/Reason for visit: Traumatic pneumothorax status post CPR, resuscitated cardiac arrest, rhinovirus bronchitis, wheezing, COPD exacerbation, acute hypoxic respiratory failure, rib fractures from CPR     Interval hx: Patient still coughing and wheezing.  Still having some chest soreness.  He has been charted as being on room air for last 24 hours but when I visited him he was on 2 L oxygen.     ROS: No fever, no syncope, no abdominal pain    Vitals:  Vitals:    09/17/23 1100 09/17/23 1438 09/17/23 1500 09/17/23 1504   BP: 158/83 137/74     BP Location: Left arm Right arm     Patient Position: Lying Lying     Pulse: 82 85 86 85   Resp: 16 16 16    Temp: 98 °F (36.7 °C) 97.8 °F (36.6 °C)     TempSrc: Oral Oral     SpO2: 93% 95% 93% 93%   Weight:       Height:               Body mass index is 20.37 kg/m².    Intake/Output Summary (Last 24 hours) at 9/17/2023 1658  Last data filed at 9/17/2023 0537  Gross per 24 hour   Intake 240 ml   Output 115 ml   Net 125 ml       Exam:  GEN:  No distress  Alert, oriented x 3.   LUNGS: Diminished breath sounds on the right side, chest tube on the right side.  Left side sounds clear, no use of accessory muscles  CV:  Normal S1S2, without murmur, no edema  ABD:  Non tender, no enlarged liver or masses      Scheduled meds:  acetaminophen, 1,000 mg, Oral, TID  amLODIPine, 10 mg, Oral, Q24H  arformoterol, 15 mcg, Nebulization, BID - RT  aspirin, 81 mg, Oral, Daily  atorvastatin, 40 mg, Oral, Daily  budesonide, 0.5 mg, Nebulization, Daily - RT  enoxaparin, 1 mg/kg, Subcutaneous, Q12H  guaiFENesin, 600 mg, Oral, BID  insulin lispro, 2-7 Units, Subcutaneous, 4x Daily AC & at Bedtime  lidocaine, 1 patch, Transdermal, Q24H  mirtazapine, 15 mg, Oral, Nightly  predniSONE, 2 mg, Oral, Daily With Breakfast  senna-docusate sodium, 2 tablet, Oral,  BID  sodium chloride, 10 mL, Intravenous, Q12H  sodium chloride, 4 mL, Nebulization, TID - RT  tamsulosin, 0.8 mg, Oral, Daily  tiotropium bromide monohydrate, 2 puff, Inhalation, Daily - RT      IV meds:                           Data Review:   I reviewed the patient's medications and new clinical results.    COVID19   Date Value Ref Range Status   09/10/2023 Not Detected Not Detected - Ref. Range Final         Lab Results   Component Value Date    CALCIUM 8.5 (L) 09/17/2023     Results from last 7 days   Lab Units 09/17/23  1441 09/16/23  1026 09/16/23  0647 09/15/23  0443 09/14/23  0541 09/13/23  0530   SODIUM mmol/L 145 145  --  146* 145 143   POTASSIUM mmol/L 3.9 3.5  --  3.6 4.4 4.1   CHLORIDE mmol/L 111* 112*  --  112* 112* 112*   CO2 mmol/L 25.1 24.8  --  26.0 24.0 22.1   BUN mg/dL 27* 29*  --  29* 32* 34*   CREATININE mg/dL 0.96 0.85  --  0.85 0.80 0.88   CALCIUM mg/dL 8.5* 9.0  --  9.0 8.7 9.0   BILIRUBIN mg/dL  --   --   --   --   --  0.3   ALK PHOS U/L  --   --   --   --   --  77   ALT (SGPT) U/L  --   --   --   --   --  21   AST (SGOT) U/L  --   --   --   --   --  19   GLUCOSE mg/dL 117* 122*  --  77 146* 117*   WBC 10*3/mm3  --   --  13.85* 13.40* 14.43* 17.07*   HEMOGLOBIN g/dL  --   --  12.7* 12.6* 12.6* 12.1*   PLATELETS 10*3/mm3  --   --  180 171 175 179   PROCALCITONIN ng/mL  --  0.15  --   --   --   --              ASSESSMENT:     COPD with acute exacerbation    Cardiac arrest    Acute respiratory failure with hypoxia    Pneumothorax on right    Acute on chronic respiratory failure with hypoxia    Acute ischemic stroke    Paroxysmal atrial fibrillation    Rhinovirus bronchitis    PLAN:  Thoracic surgery managing chest tube.  Patient still having symptoms from viral bronchitis  Continue symptomatic and supportive treatment for rhino bronchitis with nebulized bronchodilators, oxygen and flutter valve.  Patient needs to start ambulating daily with physical therapy to enhance clearance of respiratory  secretions.  Hopefully chest tube will be removed tomorrow if no air leak.  Thoracic surgery has clamped tube today      Matty Dickerson MD  9/17/2023

## 2023-09-17 NOTE — PLAN OF CARE
Goal Outcome Evaluation:  Plan of Care Reviewed With: patient, family        Progress: declining  Outcome Evaluation: Patient is agreeable to PT today with maximum encouragement. Pt completed bed mobility requiring modA, STS with Jayna, and tx to chair with HHA requiring Jayna. Pt reports SOA although O2 >90% throughout session. Long discussion about increasing mobility as pt wants to go home at [place]. All lines in tact post session and daughter at bedside.

## 2023-09-17 NOTE — PROGRESS NOTES
"    DAILY PROGRESS NOTE  [facility]    Patient Identification:  Name: Riky Rios  Age: 87 y.o.  Sex: male  :  1935  MRN: [id number]         Primary Care Physician: Provider, No Known    Subjective:  Interval History:     Still with chest tube  On oxygen  +slurred speech   Working with therapists    Some worse dyspnea and chest discomfort today  On apap scheduled  On prn IV morphine       Scheduled Meds:acetaminophen, 1,000 mg, Oral, TID  amLODIPine, 10 mg, Oral, Q24H  arformoterol, 15 mcg, Nebulization, BID - RT  aspirin, 81 mg, Oral, Daily  atorvastatin, 40 mg, Oral, Daily  budesonide, 0.5 mg, Nebulization, Daily - RT  enoxaparin, 1 mg/kg, Subcutaneous, Q12H  guaiFENesin, 600 mg, Oral, BID  insulin lispro, 2-7 Units, Subcutaneous, 4x Daily AC & at Bedtime  lidocaine, 1 patch, Transdermal, Q24H  mirtazapine, 15 mg, Oral, Nightly  predniSONE, 2 mg, Oral, Daily With Breakfast  senna-docusate sodium, 2 tablet, Oral, BID  sodium chloride, 10 mL, Intravenous, Q12H  sodium chloride, 4 mL, Nebulization, TID - RT  tamsulosin, 0.8 mg, Oral, Daily  tiotropium bromide monohydrate, 2 puff, Inhalation, Daily - RT      Continuous Infusions:     Vital signs in last 24 hours:  Temp:  [97.7 °F (36.5 °C)-98.3 °F (36.8 °C)] 98 °F (36.7 °C)  Heart Rate:  [79-95] 82  Resp:  [16-20] 16  BP: (123-163)/(64-89) 158/83    Intake/Output:    Intake/Output Summary (Last 24 hours) at 2023 1309  Last data filed at 2023 0537  Gross per 24 hour   Intake 240 ml   Output 115 ml   Net 125 ml       Exam:  /83 (BP Location: Left arm, Patient Position: Lying)   Pulse 82   Temp 98 °F (36.7 °C) (Oral)   Resp 16   Ht 172.7 cm (68\")   Wt 60.8 kg (134 lb)   SpO2 93%   BMI 20.37 kg/m²     General Appearance:    awake, acutely and chronically ill                                          Neck:   No JVD                         Lungs:   +rhonchi, slight resp distress                                  Heart:    " Regular rate and rhythm, S1 and S2 normal                  Abdomen:     Soft, nontender, bowel sounds active                  Extremities: Moving all, no cyanosis or edema                     Some upper airway congestion and dry mouth   +slurred speech. Gen weakness.   More uncomfortable today. Pain to chest with palpation     Data Review:       XR Chest 1 View [[id number]] Endy as Reviewed   Order Status: Completed Collected: 09/14/23 0738    Updated: 09/14/23 0744   Narrative:     XR CHEST 1 VW-9/14/2023     HISTORY: Chest tube management.     Heart size is within normal limits. There is some mild probable  atelectasis of the right lower lung. Right-sided pigtail catheter  terminates over the lateral aspect of the right mid hemithorax. There is  relative lucency of the right apex compared to the left which is  probably related to small right apical pneumothorax with approximately  10 mm pleural separation in the upper lateral right hemithorax. Soft  tissue air is seen on the right.      Impression:     1. Small probable right apical pneumothorax.     This report was finalized on 9/14/2023 7:41 AM by Dr. Michael Antunez M.D.       MRI Brain With & Without Contrast [[id number]] Endy as Reviewed   Order Status: Completed Collected: 09/13/23 2101    Updated: 09/13/23 2152   Narrative:     MRI OF THE BRAIN WITH AND WITHOUT CONTRAST     CLINICAL HISTORY: Speech difficulty.     TECHNIQUE: MRI of the brain was obtained with sagittal T1, axial  pre-gadolinium and postgadolinium,  coronal postgadolinium T1,  axial  FLAIR, axial T2, axial gradient echo, and axial diffusion-weighted  images.     COMPARISON: CT head dated 09/13/2023.     FINDINGS:     There is a focus of acute infarction within the posterior aspect of the  left cerebellar hemisphere measuring up to 2.1 x 1.1 cm in greatest  axial dimensions that is within the left PICA distribution.  Additionally, there are chronic infarcts within the left  cerebellar  hemisphere within the left PICA distribution. The largest of these  measures up to 1.7 x 1.0 cm in greatest axial dimensions. There are  small foci of encephalomalacia within both occipital lobes compatible  with chronic infarcts within both PCA distributions. The largest of  these is seen within the right occipital lobe measuring up to 12 mm in  diameter.     There are moderate to severe changes of chronic small vessel ischemic  phenomenon. A chronic infarct is seen within the right corona radiata  measuring up to 1.1 cm in diameter. There is a focus of encephalomalacia  of uncertain etiology within the anterior and inferior portion of the  left temporal lobe measuring up to 2.9 x 6.3 cm in greatest axial  dimensions.     There are punctate foci of susceptibility artifact seen within the  corticomedullary interfaces of the left frontal and parietal lobes as  well as the right temporal lobe that are compatible with chronic  microhemorrhages likely secondary to underlying amyloid.     The ventricles, sulci, and cisterns are age-appropriate. The midline  intracranial anatomy is within normal limits. The major intracranial  flow related signal voids are within normal limits. There are no  abnormal foci of contrast enhancement within the brain parenchyma.     The extracranial structures are remarkable for mucosal thickening within  the maxillary sinuses, the ethmoid air cells, the sphenoid sinus.  Additionally, there are nonspecific nodular appearing foci noted within  the nasal cavities that are suspicious for polyps. Please correlate with  direct visual inspection.      Impression:        There is a focus of acute infarction within the posterior aspect of the  left cerebellar hemisphere measuring up to 2.1 x 1.1 cm in greatest  axial dimensions that is within the left PICA distribution. This finding  was discussed with the nurse caring for this patient, Jocelynn Melendrez RN, on 09/13/2023 at approximately  8:45 PM.  She was instructed to  notify the physician caring for this patient with this result.     Additionally, there are findings compatible with chronic infarcts within  the left cerebellar hemisphere within the left PICA distribution and  within the posterior aspects of both occipital lobes within the PCA  distributions.     There are moderate to severe changes of chronic small vessel ischemic  phenomenon and there is a subcentimeter chronic infarct within the right  corona radiata.     There is a focus of encephalomalacia within the anterior and inferior  portion of the right temporal lobe that is of uncertain etiology.     Punctate foci of susceptibility artifact are seen within the  corticomedullary interfaces of the left frontal and parietal lobes as  well as the right temporal lobe that are compatible with chronic  microhemorrhages likely secondary to underlying amyloid.     There are moderate changes of inflammatory paranasal sinus disease.  Additionally, there are nonspecific nodular foci within the nasal  cavities that are suspicious for polyps. Correlation with direct visual  inspection is suggested.          Labs in chart were reviewed.  09/10/2023 2308 09/10/2023 2333 STAT Lactic Acid, Reflex [[id number]]   Blood    Final result Component Value Units   Lactate 1.0 mmol/L          09/10/2023 1707 09/10/2023 1749 STAT Lactic Acid, Reflex [[id number]]   (Abnormal)   Blood    Final result Component Value Units   Lactate 4.5 High Critical  mmol/L          09/10/2023 1656 09/10/2023 1819 S. Pneumo Ag Urine or CSF - Urine, Urine, Clean Catch [[id number]]   Urine, Clean Catch    Final result Component Value   Strep Pneumo Ag Negative             09/10/2023 1656 09/10/2023 1819 Legionella Antigen, Urine - Urine, Urine, Clean Catch [[id number]]   Urine, Clean Catch    Final result Component Value   LEGIONELLA ANTIGEN, URINE Negative             09/10/2023 1420 09/10/2023 1459 STAT Lactic Acid, Reflex [[id number]]    (Abnormal)   Blood from Arm, Right    Final result Component Value Units   Lactate 2.9 High Critical  mmol/L          09/10/2023 1028 09/12/2023 1045 Blood Culture - Blood, Arm, Left [[id number]]   Blood from Arm, Left    Preliminary result Component Value   Blood Culture No growth at 2 days P             09/10/2023 1024 09/10/2023 1051 CBC (No Diff) [[id number]]   (Abnormal)   Blood    Final result Component Value Units   WBC 21.54 High  10*3/mm3   RBC 3.19 Low  10*6/mm3   Hemoglobin 11.4 Low  g/dL   Hematocrit 33.4 Low  %   .7 High  fL   MCH 35.7 High  pg   MCHC 34.1 g/dL   RDW 12.2 Low  %   RDW-SD 47.4 fl   MPV 9.9 fL   Platelets 180 10*3/mm3          09/10/2023 1024 09/10/2023 1116 Basic Metabolic Panel [[id number]]    (Abnormal)   Blood    Final result Component Value Units   Glucose 173 High  mg/dL   BUN 29 High  mg/dL   Creatinine 0.99 mg/dL   Sodium 139 mmol/L   Potassium 4.8 mmol/L   Chloride 107 mmol/L   CO2 22.6 mmol/L   Calcium 9.2 mg/dL   BUN/Creatinine Ratio 29.3 High     Anion Gap 9.4 mmol/L   eGFR 74.2 mL/min/1.73        Assessment:  Active Hospital Problems    Diagnosis  POA    **COPD with acute exacerbation [J44.1]  Yes    Acute ischemic stroke [I63.9]  Yes    Paroxysmal atrial fibrillation [I48.0]  Clinically Undetermined    Pneumothorax on right [J93.9]  Yes    Acute on chronic respiratory failure with hypoxia [J96.21]  Yes    Cardiac arrest [I46.9]  Yes    Acute respiratory failure with hypoxia [J96.01]  Yes      Resolved Hospital Problems   No resolved problems to display.       Plan:    Rhinovirus with COPD acute exacerbation with large right pneumothorax status post chest tube per pulmonary.                -Serial chest x-ray               -Steroids weaned from IV to PO. He is on chronic steroids so monitor with taper for adrenal insufficiency.   -nebs  -Leukocytosis likely related to steroids, monitor. No fever. Procal is low  -Pulmonary and Thoracic Surgery following      Status  postcardiac arrest with subsequent multiple rib fractures  More chest discomfort today. Likely related to the previous CPR. Re-ordered low dose morphine for symptom control. CXR obtained and reviewed by Thoracic Surgery. Will also obtain EKG and troponin but likely is pain related to previous CPR/pleuritic pain.      Acute hypoxic respiratory failure on supplemental O2 but have weaned     Possible but not definitive PAF-RC with recent cardiac arrest -cardiology seen. Arrest may be allergic rx to ceftriaxone vs contrast vs PTX per Cardiology. Low concern for cardiac cause per Cardiology and ECHO unremarkable. Cardiology has seen again now with stroke- unclear though if truly had Afib at OSH and has not had here.     Obtained CT followed by MRI for slurred speech. +for acute stroke. Neurology has seen. PT/OT/ST. Neurochecks and NIH ordered. Added HTN agents and increased. Due to Chest tube Thoracic Surgery wants to wait on oral a/c so will now on therapeutic lovenox for the time being. As listed above may not do full a/c at discharge if no definitive evidence for Afib.     Outpatient ENT follow due to abnormalities noted on MRI above for direct visualization of suspected polyp     DW RN at bedside     Continue close monitoring in the hospital for multiple significant medical issues.        William Song MD  9/17/2023  13:09 EDT

## 2023-09-17 NOTE — PROGRESS NOTES
"    Chief Complaint: Traumatic pneumothorax, right  S/P: Chest tube placement    Subjective:  Symptoms:  Worsening.  He reports weakness.  No shortness of breath or chest pain.    Diet:  Adequate intake.  No nausea or vomiting.    Activity level: Impaired due to weakness.    Pain:  He reports no pain.    Denies any complaints.  Denies shortness of breath.      Vital Signs:  Temp:  [97.7 °F (36.5 °C)-98.3 °F (36.8 °C)] 98.3 °F (36.8 °C)  Heart Rate:  [79-95] 81  Resp:  [16-20] 16  BP: (123-163)/(64-89) 163/85    Intake & Output (last day)         09/16 0701  09/17 0700 09/17 0701  09/18 0700    P.O. 720     Total Intake(mL/kg) 720 (11.8)     Urine (mL/kg/hr) 400 (0.3)     Stool 0     Chest Tube 115     Total Output 515     Net +205           Urine Unmeasured Occurrence 1 x     Stool Unmeasured Occurrence 3 x             Objective:  General Appearance:  Comfortable, well-appearing and in no acute distress.    Vital signs: (most recent): Blood pressure 163/85, pulse 81, temperature 98.3 °F (36.8 °C), temperature source Oral, resp. rate 16, height 172.7 cm (68\"), weight 60.8 kg (134 lb), SpO2 100 %.    Lungs:  Normal effort and normal respiratory rate.  He is not in respiratory distress.  There are decreased breath sounds and rhonchi.  (Productive cough)  Heart: Normal rate.    Chest: Symmetric chest wall expansion. Chest wall tenderness present.    Abdomen: Abdomen is soft and non-distended.    Extremities: Decreased range of motion.    Neurological: Patient is alert.  (Slurred speech, decreased strength).    Pupils:  Pupils are equal, round, and reactive to light.    Skin:  Warm and dry.              Chest tube:   Site: Right, Clean, Dry, and Securement device intact  Suction: waterseal  Air Leak: negative  24 Hour Total: NRml     Results Review:     I reviewed the patient's new clinical results.  I reviewed the patient's new imaging results and agree with the interpretation.  Discussed with patient, nurse.    Imaging " Results (Last 24 Hours)       Procedure Component Value Units Date/Time    XR Chest 1 View [[id number]] Collected: 09/17/23 0708     Updated: 09/17/23 0713    Narrative:      ONE-VIEW PORTABLE CHEST AT 5:57 A.M.     HISTORY: Small gauge chest tube for right-sided pneumothorax.     FINDINGS: A small gauge chest tube remains in place in the right mid  chest laterally and there is some adjacent subcutaneous emphysema along  the right lateral chest wall that is unchanged from yesterday. There is  a suspicion of a very small right apical pneumothorax but obscured by  the upper right ribs. There is slight worsening of atelectasis at the  right base and improvement in atelectasis and pleural fluid at the left  base when compared to yesterday's exam.     This report was finalized on 9/17/2023 7:10 AM by Dr. Julius Reyna M.D.               Lab Results:     Lab Results (last 24 hours)       Procedure Component Value Units Date/Time    POC Glucose Once [[id number]]  (Normal) Collected: 09/17/23 0752    Specimen: Blood Updated: 09/17/23 0753     Glucose 85 mg/dL     POC Glucose Once [[id number]]  (Abnormal) Collected: 09/16/23 2125    Specimen: Blood Updated: 09/16/23 2126     Glucose 185 mg/dL     POC Glucose Once [[id number]]  (Normal) Collected: 09/16/23 1613    Specimen: Blood Updated: 09/16/23 1615     Glucose 129 mg/dL     Procalcitonin [[id number]]  (Normal) Collected: 09/16/23 1026    Specimen: Blood Updated: 09/16/23 1115     Procalcitonin 0.15 ng/mL     Narrative:      As a Marker for Sepsis (Non-Neonates):    1. <0.5 ng/mL represents a low risk of severe sepsis and/or septic shock.  2. >2 ng/mL represents a high risk of severe sepsis and/or septic shock.    As a Marker for Lower Respiratory Tract Infections that require antibiotic therapy:    PCT on Admission    Antibiotic Therapy       6-12 Hrs later    >0.5                Strongly Recommended  >0.25 - <0.5        Recommended   0.1 - 0.25          Discouraged           "    Remeasure/reassess PCT  <0.1                Strongly Discouraged     Remeasure/reassess PCT    As 28 day mortality risk marker: \"Change in Procalcitonin Result\" (>80% or <=80%) if Day 0 (or Day 1) and Day 4 values are available. Refer to http://www.[facility]-pct-calculator.com    Change in PCT <=80%  A decrease of PCT levels below or equal to 80% defines a positive change in PCT test result representing a higher risk for 28-day all-cause mortality of patients diagnosed with severe sepsis for septic shock.    Change in PCT >80%  A decrease of PCT levels of more than 80% defines a negative change in PCT result representing a lower risk for 28-day all-cause mortality of patients diagnosed with severe sepsis or septic shock.       Basic Metabolic Panel [[id number]]  (Abnormal) Collected: 09/16/23 1026    Specimen: Blood Updated: 09/16/23 1108     Glucose 122 mg/dL      BUN 29 mg/dL      Creatinine 0.85 mg/dL      Sodium 145 mmol/L      Potassium 3.5 mmol/L      Chloride 112 mmol/L      CO2 24.8 mmol/L      Calcium 9.0 mg/dL      BUN/Creatinine Ratio 34.1     Anion Gap 8.2 mmol/L      eGFR 84.6 mL/min/1.73     Narrative:      GFR Normal >60  Chronic Kidney Disease <60  Kidney Failure <15    The GFR formula is only valid for adults with stable renal function between ages 18 and 70.             Assessment & Plan       COPD with acute exacerbation    Cardiac arrest    Acute respiratory failure with hypoxia    Pneumothorax on right    Acute on chronic respiratory failure with hypoxia    Acute ischemic stroke    Paroxysmal atrial fibrillation       Assessment & Plan    This morning's chest x-ray was independently reviewed.  Improved pleural separation with small bore chest tube in place.    I did not appreciate a pneumothorax on this morning's chest film.  He did not have a air leak on physical examination    I place his chest tube to clamp trial this morning.    We will obtain a midday chest x-ray today.    If his lung is up " with his chest tube clamped, we will keep this clamped until tomorrow morning and at that time if his a.m. chest x-ray does not appreciate a pneumothorax or change, we will remove his chest tube.    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

## 2023-09-17 NOTE — PLAN OF CARE
Problem: Adult Inpatient Plan of Care  Goal: Plan of Care Review  Outcome: Ongoing, Progressing  Flowsheets (Taken 9/17/2023 1804)  Progress: no change  Outcome Evaluation: vss, pain more controlled, ambulated to chair. CT on WC, encouraged to deep cough, plan home with HH when ready for DC  Goal: Patient-Specific Goal (Individualized)  Outcome: Ongoing, Progressing  Goal: Absence of Hospital-Acquired Illness or Injury  Outcome: Ongoing, Progressing  Intervention: Identify and Manage Fall Risk  Recent Flowsheet Documentation  Taken 9/17/2023 1635 by Chitra Gamez RN  Safety Promotion/Fall Prevention: safety round/check completed  Taken 9/17/2023 1447 by Chitra Gamez RN  Safety Promotion/Fall Prevention: safety round/check completed  Taken 9/17/2023 1230 by Chitra Gamez RN  Safety Promotion/Fall Prevention: safety round/check completed  Taken 9/17/2023 0850 by Chitra Gamez RN  Safety Promotion/Fall Prevention:   safety round/check completed   nonskid shoes/slippers when out of bed   fall prevention program maintained   assistive device/personal items within reach  Intervention: Prevent Skin Injury  Recent Flowsheet Documentation  Taken 9/17/2023 0850 by Chitra Gamez RN  Body Position: position changed independently  Skin Protection:   adhesive use limited   tubing/devices free from skin contact  Intervention: Prevent and Manage VTE (Venous Thromboembolism) Risk  Recent Flowsheet Documentation  Taken 9/17/2023 1447 by Chitra Gamez RN  Activity Management: up in chair  Taken 9/17/2023 0850 by Chitra Gamez RN  Activity Management: activity encouraged  Intervention: Prevent Infection  Recent Flowsheet Documentation  Taken 9/17/2023 0850 by Chitra Gamez RN  Infection Prevention: single patient room provided  Goal: Optimal Comfort and Wellbeing  Outcome: Ongoing, Progressing  Intervention: Monitor Pain and Promote Comfort  Recent Flowsheet Documentation  Taken 9/17/2023 1635  by Chitra Gamez RN  Pain Management Interventions:   pain management plan reviewed with patient/caregiver   see MAR  Taken 9/17/2023 1447 by Chitra Gamez RN  Pain Management Interventions:   pain management plan reviewed with patient/caregiver   see MAR  Taken 9/17/2023 1230 by Chitra Gamez RN  Pain Management Interventions: pain management plan reviewed with patient/caregiver  Taken 9/17/2023 0850 by Chitra Gamez RN  Pain Management Interventions:   pain management plan reviewed with patient/caregiver   see MAR  Intervention: Provide Person-Centered Care  Recent Flowsheet Documentation  Taken 9/17/2023 1635 by Chitra Gamez RN  Trust Relationship/Rapport: care explained  Taken 9/17/2023 1447 by Chitra Gamez RN  Trust Relationship/Rapport: care explained  Taken 9/17/2023 1230 by Chitra Gamez RN  Trust Relationship/Rapport: care explained  Taken 9/17/2023 0850 by Chitra Gamez RN  Trust Relationship/Rapport: care explained  Goal: Readiness for Transition of Care  Outcome: Ongoing, Progressing     Problem: Fall Injury Risk  Goal: Absence of Fall and Fall-Related Injury  Outcome: Ongoing, Progressing  Intervention: Identify and Manage Contributors  Recent Flowsheet Documentation  Taken 9/17/2023 0850 by Chitra Gamez RN  Medication Review/Management: medications reviewed  Intervention: Promote Injury-Free Environment  Recent Flowsheet Documentation  Taken 9/17/2023 1635 by Chitra Gamez RN  Safety Promotion/Fall Prevention: safety round/check completed  Taken 9/17/2023 1447 by Chitra Gamez RN  Safety Promotion/Fall Prevention: safety round/check completed  Taken 9/17/2023 1230 by Chitra Gamez RN  Safety Promotion/Fall Prevention: safety round/check completed  Taken 9/17/2023 0850 by Chitra Gamez RN  Safety Promotion/Fall Prevention:   safety round/check completed   nonskid shoes/slippers when out of bed   fall prevention program maintained    assistive device/personal items within reach     Problem: Adjustment to Illness (Sepsis/Septic Shock)  Goal: Optimal Coping  Outcome: Ongoing, Progressing     Problem: Bleeding (Sepsis/Septic Shock)  Goal: Absence of Bleeding  Outcome: Ongoing, Progressing     Problem: Glycemic Control Impaired (Sepsis/Septic Shock)  Goal: Blood Glucose Level Within Desired Range  Outcome: Ongoing, Progressing  Intervention: Optimize Glycemic Control  Recent Flowsheet Documentation  Taken 9/17/2023 0850 by Chitra Gamez RN  Glycemic Management: blood glucose monitored     Problem: Infection Progression (Sepsis/Septic Shock)  Goal: Absence of Infection Signs and Symptoms  Outcome: Ongoing, Progressing  Intervention: Initiate Sepsis Management  Recent Flowsheet Documentation  Taken 9/17/2023 0850 by Chitra Gamez RN  Infection Prevention: single patient room provided  Isolation Precautions:   precautions maintained   droplet  Intervention: Promote Recovery  Recent Flowsheet Documentation  Taken 9/17/2023 1447 by Chitra Gamez, RN  Activity Management: up in chair  Taken 9/17/2023 0850 by Chitra Gamez RN  Activity Management: activity encouraged     Problem: Nutrition Impaired (Sepsis/Septic Shock)  Goal: Optimal Nutrition Intake  Outcome: Ongoing, Progressing     Problem: Skin Injury Risk Increased  Goal: Skin Health and Integrity  Outcome: Ongoing, Progressing  Intervention: Optimize Skin Protection  Recent Flowsheet Documentation  Taken 9/17/2023 0850 by Chitra Gamez RN  Pressure Reduction Techniques:   frequent weight shift encouraged   weight shift assistance provided  Pressure Reduction Devices:   alternating pressure pump (ADD)   positioning supports utilized  Skin Protection:   adhesive use limited   tubing/devices free from skin contact   Goal Outcome Evaluation:  Plan of Care Reviewed With: patient, family        Progress: no change  Outcome Evaluation: vss, pain more controlled, ambulated to chair.  CT on WC, encouraged to deep cough, plan home with HH when ready for DC

## 2023-09-17 NOTE — PLAN OF CARE
Problem: Adult Inpatient Plan of Care  Goal: Plan of Care Review  Outcome: Ongoing, Progressing  Flowsheets (Taken 9/17/2023 0355)  Outcome Evaluation: VSS. No c/o of pain. CT to WS. NIH 0. Turn q 2 hours.  Goal: Patient-Specific Goal (Individualized)  Outcome: Ongoing, Progressing  Goal: Absence of Hospital-Acquired Illness or Injury  Outcome: Ongoing, Progressing  Intervention: Identify and Manage Fall Risk  Recent Flowsheet Documentation  Taken 9/17/2023 0240 by Gissel Parry, RN  Safety Promotion/Fall Prevention:   activity supervised   clutter free environment maintained   assistive device/personal items within reach   lighting adjusted   mobility aid in reach   nonskid shoes/slippers when out of bed   safety round/check completed   toileting scheduled  Taken 9/17/2023 0024 by Gissel Parry, RN  Safety Promotion/Fall Prevention:   activity supervised   assistive device/personal items within reach   clutter free environment maintained   lighting adjusted   mobility aid in reach   safety round/check completed   toileting scheduled   nonskid shoes/slippers when out of bed  Taken 9/16/2023 2201 by Gissel Parry, RN  Safety Promotion/Fall Prevention:   activity supervised   assistive device/personal items within reach   clutter free environment maintained   lighting adjusted   mobility aid in reach   nonskid shoes/slippers when out of bed   safety round/check completed   toileting scheduled  Taken 9/16/2023 2050 by Gissel Parry, RN  Safety Promotion/Fall Prevention:   activity supervised   assistive device/personal items within reach   clutter free environment maintained   lighting adjusted   mobility aid in reach   nonskid shoes/slippers when out of bed   safety round/check completed   toileting scheduled  Intervention: Prevent Skin Injury  Recent Flowsheet Documentation  Taken 9/17/2023 0240 by Gissel Parry, RN  Skin Protection:   adhesive use limited   tubing/devices free  from skin contact  Taken 9/16/2023 2050 by Gissel Parry, RN  Skin Protection:   adhesive use limited   tubing/devices free from skin contact  Intervention: Prevent and Manage VTE (Venous Thromboembolism) Risk  Recent Flowsheet Documentation  Taken 9/17/2023 0240 by Gissel Parry, RN  Activity Management: bedrest  Taken 9/17/2023 0024 by Gissel Parry, RN  Activity Management: bedrest  Taken 9/16/2023 2201 by Gissel Parry, RN  Activity Management: bedrest  Taken 9/16/2023 2050 by Gissel Parry, RN  Activity Management: bedrest  Goal: Optimal Comfort and Wellbeing  Outcome: Ongoing, Progressing  Intervention: Provide Person-Centered Care  Recent Flowsheet Documentation  Taken 9/16/2023 2050 by Gissel Parry, RN  Trust Relationship/Rapport:   care explained   choices provided   emotional support provided   questions answered   questions encouraged   reassurance provided   thoughts/feelings acknowledged  Goal: Readiness for Transition of Care  Outcome: Ongoing, Progressing   Goal Outcome Evaluation:              Outcome Evaluation: VSS. No c/o of pain. CT to WS. NIH 0. Turn q 2 hours.

## 2023-09-18 ENCOUNTER — APPOINTMENT (OUTPATIENT)
Dept: GENERAL RADIOLOGY | Facility: HOSPITAL | Age: 88
DRG: 199 | End: 2023-09-18
Payer: MEDICARE

## 2023-09-18 LAB
GLUCOSE BLDC GLUCOMTR-MCNC: 106 MG/DL (ref 70–130)
GLUCOSE BLDC GLUCOMTR-MCNC: 106 MG/DL (ref 70–130)
GLUCOSE BLDC GLUCOMTR-MCNC: 173 MG/DL (ref 70–130)
GLUCOSE BLDC GLUCOMTR-MCNC: 95 MG/DL (ref 70–130)
GLUCOSE BLDC GLUCOMTR-MCNC: 97 MG/DL (ref 70–130)

## 2023-09-18 PROCEDURE — 94799 UNLISTED PULMONARY SVC/PX: CPT

## 2023-09-18 PROCEDURE — 94760 N-INVAS EAR/PLS OXIMETRY 1: CPT

## 2023-09-18 PROCEDURE — 99232 SBSQ HOSP IP/OBS MODERATE 35: CPT | Performed by: NURSE PRACTITIONER

## 2023-09-18 PROCEDURE — 71045 X-RAY EXAM CHEST 1 VIEW: CPT

## 2023-09-18 PROCEDURE — 97530 THERAPEUTIC ACTIVITIES: CPT

## 2023-09-18 PROCEDURE — 82948 REAGENT STRIP/BLOOD GLUCOSE: CPT

## 2023-09-18 PROCEDURE — 92526 ORAL FUNCTION THERAPY: CPT

## 2023-09-18 PROCEDURE — 25010000002 ENOXAPARIN PER 10 MG: Performed by: INTERNAL MEDICINE

## 2023-09-18 PROCEDURE — 94664 DEMO&/EVAL PT USE INHALER: CPT

## 2023-09-18 PROCEDURE — 63710000001 PREDNISONE PER 5 MG: Performed by: INTERNAL MEDICINE

## 2023-09-18 PROCEDURE — 63710000001 INSULIN LISPRO (HUMAN) PER 5 UNITS: Performed by: INTERNAL MEDICINE

## 2023-09-18 PROCEDURE — 94761 N-INVAS EAR/PLS OXIMETRY MLT: CPT

## 2023-09-18 RX ORDER — PREDNISONE 2.5 MG/1
2.5 TABLET ORAL
Status: DISCONTINUED | OUTPATIENT
Start: 2023-09-20 | End: 2023-09-25

## 2023-09-18 RX ORDER — PREDNISONE 10 MG/1
10 TABLET ORAL
Status: DISPENSED | OUTPATIENT
Start: 2023-09-18 | End: 2023-09-20

## 2023-09-18 RX ADMIN — ACETAMINOPHEN 1000 MG: 500 TABLET ORAL at 10:13

## 2023-09-18 RX ADMIN — TIOTROPIUM BROMIDE INHALATION SPRAY 2 PUFF: 3.12 SPRAY, METERED RESPIRATORY (INHALATION) at 07:14

## 2023-09-18 RX ADMIN — Medication 10 ML: at 10:16

## 2023-09-18 RX ADMIN — GUAIFENESIN 600 MG: 600 TABLET, EXTENDED RELEASE ORAL at 21:39

## 2023-09-18 RX ADMIN — GUAIFENESIN 600 MG: 600 TABLET, EXTENDED RELEASE ORAL at 10:12

## 2023-09-18 RX ADMIN — Medication 4 ML: at 07:03

## 2023-09-18 RX ADMIN — SENNOSIDES AND DOCUSATE SODIUM 2 TABLET: 50; 8.6 TABLET ORAL at 00:07

## 2023-09-18 RX ADMIN — Medication 10 ML: at 21:39

## 2023-09-18 RX ADMIN — LIDOCAINE 1 PATCH: 50 PATCH CUTANEOUS at 10:13

## 2023-09-18 RX ADMIN — MIRTAZAPINE 15 MG: 15 TABLET, ORALLY DISINTEGRATING ORAL at 21:39

## 2023-09-18 RX ADMIN — MIRTAZAPINE 15 MG: 15 TABLET, ORALLY DISINTEGRATING ORAL at 00:08

## 2023-09-18 RX ADMIN — ENOXAPARIN SODIUM 60 MG: 100 INJECTION SUBCUTANEOUS at 17:22

## 2023-09-18 RX ADMIN — IPRATROPIUM BROMIDE AND ALBUTEROL SULFATE 3 ML: 2.5; .5 SOLUTION RESPIRATORY (INHALATION) at 07:03

## 2023-09-18 RX ADMIN — GUAIFENESIN 600 MG: 600 TABLET, EXTENDED RELEASE ORAL at 00:08

## 2023-09-18 RX ADMIN — Medication 10 ML: at 00:08

## 2023-09-18 RX ADMIN — IPRATROPIUM BROMIDE AND ALBUTEROL SULFATE 3 ML: 2.5; .5 SOLUTION RESPIRATORY (INHALATION) at 15:27

## 2023-09-18 RX ADMIN — SENNOSIDES AND DOCUSATE SODIUM 2 TABLET: 50; 8.6 TABLET ORAL at 10:13

## 2023-09-18 RX ADMIN — BUDESONIDE 0.5 MG: 0.5 INHALANT ORAL at 07:08

## 2023-09-18 RX ADMIN — ARFORMOTEROL TARTRATE 15 MCG: 15 SOLUTION RESPIRATORY (INHALATION) at 07:11

## 2023-09-18 RX ADMIN — TAMSULOSIN HYDROCHLORIDE 0.8 MG: 0.4 CAPSULE ORAL at 10:12

## 2023-09-18 RX ADMIN — Medication 4 ML: at 15:27

## 2023-09-18 RX ADMIN — ENOXAPARIN SODIUM 60 MG: 100 INJECTION SUBCUTANEOUS at 03:54

## 2023-09-18 RX ADMIN — ATORVASTATIN CALCIUM 40 MG: 20 TABLET, FILM COATED ORAL at 10:12

## 2023-09-18 RX ADMIN — AMLODIPINE BESYLATE 10 MG: 10 TABLET ORAL at 10:12

## 2023-09-18 RX ADMIN — ARFORMOTEROL TARTRATE 15 MCG: 15 SOLUTION RESPIRATORY (INHALATION) at 20:17

## 2023-09-18 RX ADMIN — ACETAMINOPHEN 1000 MG: 500 TABLET ORAL at 21:39

## 2023-09-18 RX ADMIN — ACETAMINOPHEN 1000 MG: 500 TABLET ORAL at 17:00

## 2023-09-18 RX ADMIN — SENNOSIDES AND DOCUSATE SODIUM 2 TABLET: 50; 8.6 TABLET ORAL at 21:39

## 2023-09-18 RX ADMIN — ACETAMINOPHEN 1000 MG: 500 TABLET ORAL at 00:08

## 2023-09-18 RX ADMIN — Medication 4 ML: at 20:17

## 2023-09-18 RX ADMIN — PREDNISONE 2 MG: 1 TABLET ORAL at 10:12

## 2023-09-18 RX ADMIN — ASPIRIN 81 MG: 81 TABLET, COATED ORAL at 10:13

## 2023-09-18 RX ADMIN — INSULIN LISPRO 2 UNITS: 100 INJECTION, SOLUTION INTRAVENOUS; SUBCUTANEOUS at 21:38

## 2023-09-18 NOTE — PLAN OF CARE
Goal Outcome Evaluation:  Plan of Care Reviewed With: patient        Progress: improving  Outcome Evaluation: Pt seen with focus on assisting pt to sit and then stand at EOB with min A of 2.  Pt very fatigue, weak, pain with coughing efforts.  Pt is able to move B UE to assist self to stand.  Pt continues with chest tube.  Medically involved which limits activity tolerance.      Anticipated Discharge Disposition (OT): skilled nursing facility

## 2023-09-18 NOTE — PLAN OF CARE
Goal Outcome Evaluation:  Plan of Care Reviewed With: patient           Outcome Evaluation: Pt continues to require assistance for bed mobility, tranfers, and gait secondary to generlaized weakness, pain, and decreased activity tolerance. Pt quickly fatigues and only able to take a few sidesteps to HOB. PT will continue to follow to address strength, mobility, and gait.      Anticipated Discharge Disposition (PT): skilled nursing facility, home with home health, home with assist

## 2023-09-18 NOTE — PLAN OF CARE
Problem: Adult Inpatient Plan of Care  Goal: Plan of Care Review  Flowsheets (Taken 9/18/2023 4493)  Progress: no change  Plan of Care Reviewed With: patient  Outcome Evaluation: congested coarse sounds bilateral cough is weak unable to do incentive spirometry breathing treatments given flutter valve initiated per RT complained was soa sats were 92 on room air oxygen applied at 2 liters nasal cannula with sats above 95 states can't talk because he is dry due to mouth breathing encouraged to take po fluids alert and oriented sleeping at intervals turned q 2 hours chest tube to water seal air leak with cough serosanguinous drainage noted   Goal Outcome Evaluation:  Plan of Care Reviewed With: patient

## 2023-09-18 NOTE — PROGRESS NOTES
Dr. DESHAWN Dickerson    [address]        Patient ID:  Name:  Riky Rios  MRN:  [id number]  1935  87 y.o.  male            CC/Reason for visit: Traumatic pneumothorax status post CPR, resuscitated cardiac arrest, rhinovirus bronchitis, wheezing, COPD exacerbation, acute hypoxic respiratory failure, rib fractures from CPR     Interval hx: Patient still complains of chest pain and feeling lousy  Still on some oxygen       ROS: No fever, no syncope, no abdominal pain        Vitals:  Vitals:    09/18/23 0714 09/18/23 0747 09/18/23 1425 09/18/23 1527   BP:  151/81 150/77    BP Location:  Left arm Left arm    Patient Position:  Sitting Sitting    Pulse: 86 88 86 88   Resp: 20 20 18 18   Temp:  98.4 °F (36.9 °C) 98 °F (36.7 °C)    TempSrc:  Oral Oral    SpO2: 100% 98% 95% 98%   Weight:       Height:               Body mass index is 20.37 kg/m².    Intake/Output Summary (Last 24 hours) at 9/18/2023 1642  Last data filed at 9/18/2023 1425  Gross per 24 hour   Intake 490 ml   Output 510 ml   Net -20 ml       Exam:  GEN:  No distress  Alert, oriented x 3.   LUNGS: Diminished breath sounds bilaterally.  Right-sided chest tube removed, no use of accessory muscles  CV:  Distant heart tones , without murmur, no edema  ABD:  Non tender, no enlarged liver or masses      Scheduled meds:  acetaminophen, 1,000 mg, Oral, TID  amLODIPine, 10 mg, Oral, Q24H  arformoterol, 15 mcg, Nebulization, BID - RT  aspirin, 81 mg, Oral, Daily  atorvastatin, 40 mg, Oral, Daily  budesonide, 0.5 mg, Nebulization, Daily - RT  enoxaparin, 1 mg/kg, Subcutaneous, Q12H  guaiFENesin, 600 mg, Oral, BID  insulin lispro, 2-7 Units, Subcutaneous, 4x Daily AC & at Bedtime  lidocaine, 1 patch, Transdermal, Q24H  mirtazapine, 15 mg, Oral, Nightly  predniSONE, 10 mg, Oral, Daily With Breakfast  [START ON 9/20/2023] predniSONE, 2.5 mg, Oral, Daily With Breakfast  senna-docusate sodium, 2 tablet, Oral, BID  sodium chloride, 10 mL, Intravenous,  Q12H  sodium chloride, 4 mL, Nebulization, TID - RT  tamsulosin, 0.8 mg, Oral, Daily  tiotropium bromide monohydrate, 2 puff, Inhalation, Daily - RT      IV meds:                           Data Review:   I reviewed the patient's medications and new clinical results.            Results from last 7 days   Lab Units 09/17/23  1441 09/16/23  1026 09/16/23  0647 09/15/23  0443 09/14/23  0541 09/13/23  0530   SODIUM mmol/L 145 145  --  146* 145 143   POTASSIUM mmol/L 3.9 3.5  --  3.6 4.4 4.1   CHLORIDE mmol/L 111* 112*  --  112* 112* 112*   CO2 mmol/L 25.1 24.8  --  26.0 24.0 22.1   BUN mg/dL 27* 29*  --  29* 32* 34*   CREATININE mg/dL 0.96 0.85  --  0.85 0.80 0.88   CALCIUM mg/dL 8.5* 9.0  --  9.0 8.7 9.0   BILIRUBIN mg/dL  --   --   --   --   --  0.3   ALK PHOS U/L  --   --   --   --   --  77   ALT (SGPT) U/L  --   --   --   --   --  21   AST (SGOT) U/L  --   --   --   --   --  19   GLUCOSE mg/dL 117* 122*  --  77 146* 117*   WBC 10*3/mm3  --   --  13.85* 13.40* 14.43* 17.07*   HEMOGLOBIN g/dL  --   --  12.7* 12.6* 12.6* 12.1*   PLATELETS 10*3/mm3  --   --  180 171 175 179   PROCALCITONIN ng/mL  --  0.15  --   --   --   --                ASSESSMENT:     COPD with acute exacerbation    Cardiac arrest    Acute respiratory failure with hypoxia    Pneumothorax on right    Acute on chronic respiratory failure with hypoxia    Acute ischemic stroke    Paroxysmal atrial fibrillation  Rhinovirus bronchitis      PLAN:  Patient still coughing and wheezing.  Continue nebulized bronchodilators.  Procalcitonin negative which is reassuring.  Still complaining of chest pain due to rib fractures.  Thoracic surgery has removed right-sided chest tube today.  Repeat chest x-ray tomorrow.  Wean oxygen as tolerated.  We did not expect him to come off oxygen entirely due to underlying COPD and current acute rhinovirus bronchitis.  Continue DuoNeb treatments every 6 hours.  He really needs to increase mobility with physical therapy, get out of  bed in order to help clearance of secretions          Matty Dickerson MD  9/18/2023

## 2023-09-18 NOTE — PROGRESS NOTES
"    DAILY PROGRESS NOTE  [facility]    Patient Identification:  Name: Riky Rios  Age: 87 y.o.  Sex: male  :  1935  MRN: [id number]         Primary Care Physician: Provider, No Known    Subjective:  Interval History:     Still with chest tube  On oxygen  +slurred speech   Working with PT yesterday     Still with dyspnea the past couple of days persistent  On apap scheduled  On prn IV morphine       Scheduled Meds:acetaminophen, 1,000 mg, Oral, TID  amLODIPine, 10 mg, Oral, Q24H  arformoterol, 15 mcg, Nebulization, BID - RT  aspirin, 81 mg, Oral, Daily  atorvastatin, 40 mg, Oral, Daily  budesonide, 0.5 mg, Nebulization, Daily - RT  enoxaparin, 1 mg/kg, Subcutaneous, Q12H  guaiFENesin, 600 mg, Oral, BID  insulin lispro, 2-7 Units, Subcutaneous, 4x Daily AC & at Bedtime  lidocaine, 1 patch, Transdermal, Q24H  mirtazapine, 15 mg, Oral, Nightly  predniSONE, 2 mg, Oral, Daily With Breakfast  senna-docusate sodium, 2 tablet, Oral, BID  sodium chloride, 10 mL, Intravenous, Q12H  sodium chloride, 4 mL, Nebulization, TID - RT  tamsulosin, 0.8 mg, Oral, Daily  tiotropium bromide monohydrate, 2 puff, Inhalation, Daily - RT      Continuous Infusions:     Vital signs in last 24 hours:  Temp:  [97.8 °F (36.6 °C)-98.8 °F (37.1 °C)] 98.4 °F (36.9 °C)  Heart Rate:  [80-90] 88  Resp:  [16-20] 20  BP: (137-172)/(74-95) 151/81    Intake/Output:    Intake/Output Summary (Last 24 hours) at 2023 1150  Last data filed at 2023 1000  Gross per 24 hour   Intake 490 ml   Output 510 ml   Net -20 ml       Exam:  /81 (BP Location: Left arm, Patient Position: Sitting)   Pulse 88   Temp 98.4 °F (36.9 °C) (Oral)   Resp 20   Ht 172.7 cm (68\")   Wt 60.8 kg (134 lb)   SpO2 98%   BMI 20.37 kg/m²     General Appearance:    awake, acutely and chronically ill                                          Neck:   No JVD                         Lungs:   +rhonchi, slight resp distress                                  " Heart:    Regular rate and rhythm, S1 and S2 normal                  Abdomen:     Soft, nontender, bowel sounds active                  Extremities: Moving all, no cyanosis or edema                     Some upper airway congestion and dry mouth   +slurred speech. Gen weakness.   uncomfortable yesterday and today. Pain to chest with palpation     Overall similar exam to 9/17 and 9/18    Data Review:       XR Chest 1 View [[id number]] Endy as Reviewed   Order Status: Completed Collected: 09/14/23 0738    Updated: 09/14/23 0744   Narrative:     XR CHEST 1 VW-9/14/2023     HISTORY: Chest tube management.     Heart size is within normal limits. There is some mild probable  atelectasis of the right lower lung. Right-sided pigtail catheter  terminates over the lateral aspect of the right mid hemithorax. There is  relative lucency of the right apex compared to the left which is  probably related to small right apical pneumothorax with approximately  10 mm pleural separation in the upper lateral right hemithorax. Soft  tissue air is seen on the right.      Impression:     1. Small probable right apical pneumothorax.     This report was finalized on 9/14/2023 7:41 AM by Dr. Michael Antunez M.D.       MRI Brain With & Without Contrast [[id number]] Endy as Reviewed   Order Status: Completed Collected: 09/13/23 2101    Updated: 09/13/23 2152   Narrative:     MRI OF THE BRAIN WITH AND WITHOUT CONTRAST     CLINICAL HISTORY: Speech difficulty.     TECHNIQUE: MRI of the brain was obtained with sagittal T1, axial  pre-gadolinium and postgadolinium,  coronal postgadolinium T1,  axial  FLAIR, axial T2, axial gradient echo, and axial diffusion-weighted  images.     COMPARISON: CT head dated 09/13/2023.     FINDINGS:     There is a focus of acute infarction within the posterior aspect of the  left cerebellar hemisphere measuring up to 2.1 x 1.1 cm in greatest  axial dimensions that is within the left PICA distribution.  Additionally,  there are chronic infarcts within the left cerebellar  hemisphere within the left PICA distribution. The largest of these  measures up to 1.7 x 1.0 cm in greatest axial dimensions. There are  small foci of encephalomalacia within both occipital lobes compatible  with chronic infarcts within both PCA distributions. The largest of  these is seen within the right occipital lobe measuring up to 12 mm in  diameter.     There are moderate to severe changes of chronic small vessel ischemic  phenomenon. A chronic infarct is seen within the right corona radiata  measuring up to 1.1 cm in diameter. There is a focus of encephalomalacia  of uncertain etiology within the anterior and inferior portion of the  left temporal lobe measuring up to 2.9 x 6.3 cm in greatest axial  dimensions.     There are punctate foci of susceptibility artifact seen within the  corticomedullary interfaces of the left frontal and parietal lobes as  well as the right temporal lobe that are compatible with chronic  microhemorrhages likely secondary to underlying amyloid.     The ventricles, sulci, and cisterns are age-appropriate. The midline  intracranial anatomy is within normal limits. The major intracranial  flow related signal voids are within normal limits. There are no  abnormal foci of contrast enhancement within the brain parenchyma.     The extracranial structures are remarkable for mucosal thickening within  the maxillary sinuses, the ethmoid air cells, the sphenoid sinus.  Additionally, there are nonspecific nodular appearing foci noted within  the nasal cavities that are suspicious for polyps. Please correlate with  direct visual inspection.      Impression:        There is a focus of acute infarction within the posterior aspect of the  left cerebellar hemisphere measuring up to 2.1 x 1.1 cm in greatest  axial dimensions that is within the left PICA distribution. This finding  was discussed with the nurse caring for this patient, Jocelynn  THADDEUS Melendrez, on 09/13/2023 at approximately 8:45 PM.  She was instructed to  notify the physician caring for this patient with this result.     Additionally, there are findings compatible with chronic infarcts within  the left cerebellar hemisphere within the left PICA distribution and  within the posterior aspects of both occipital lobes within the PCA  distributions.     There are moderate to severe changes of chronic small vessel ischemic  phenomenon and there is a subcentimeter chronic infarct within the right  corona radiata.     There is a focus of encephalomalacia within the anterior and inferior  portion of the right temporal lobe that is of uncertain etiology.     Punctate foci of susceptibility artifact are seen within the  corticomedullary interfaces of the left frontal and parietal lobes as  well as the right temporal lobe that are compatible with chronic  microhemorrhages likely secondary to underlying amyloid.     There are moderate changes of inflammatory paranasal sinus disease.  Additionally, there are nonspecific nodular foci within the nasal  cavities that are suspicious for polyps. Correlation with direct visual  inspection is suggested.          Labs in chart were reviewed.  09/10/2023 2308 09/10/2023 2333 STAT Lactic Acid, Reflex [[id number]]   Blood    Final result Component Value Units   Lactate 1.0 mmol/L          09/10/2023 1707 09/10/2023 1749 STAT Lactic Acid, Reflex [[id number]]   (Abnormal)   Blood    Final result Component Value Units   Lactate 4.5 High Critical  mmol/L          09/10/2023 1656 09/10/2023 1819 S. Pneumo Ag Urine or CSF - Urine, Urine, Clean Catch [[id number]]   Urine, Clean Catch    Final result Component Value   Strep Pneumo Ag Negative             09/10/2023 1656 09/10/2023 1819 Legionella Antigen, Urine - Urine, Urine, Clean Catch [[id number]]   Urine, Clean Catch    Final result Component Value   LEGIONELLA ANTIGEN, URINE Negative             09/10/2023 1420 09/10/2023  1459 STAT Lactic Acid, Reflex [[id number]]   (Abnormal)   Blood from Arm, Right    Final result Component Value Units   Lactate 2.9 High Critical  mmol/L          09/10/2023 1028 09/12/2023 1045 Blood Culture - Blood, Arm, Left [[id number]]   Blood from Arm, Left    Preliminary result Component Value   Blood Culture No growth at 2 days P             09/10/2023 1024 09/10/2023 1051 CBC (No Diff) [[id number]]   (Abnormal)   Blood    Final result Component Value Units   WBC 21.54 High  10*3/mm3   RBC 3.19 Low  10*6/mm3   Hemoglobin 11.4 Low  g/dL   Hematocrit 33.4 Low  %   .7 High  fL   MCH 35.7 High  pg   MCHC 34.1 g/dL   RDW 12.2 Low  %   RDW-SD 47.4 fl   MPV 9.9 fL   Platelets 180 10*3/mm3          09/10/2023 1024 09/10/2023 1116 Basic Metabolic Panel [[id number]]    (Abnormal)   Blood    Final result Component Value Units   Glucose 173 High  mg/dL   BUN 29 High  mg/dL   Creatinine 0.99 mg/dL   Sodium 139 mmol/L   Potassium 4.8 mmol/L   Chloride 107 mmol/L   CO2 22.6 mmol/L   Calcium 9.2 mg/dL   BUN/Creatinine Ratio 29.3 High     Anion Gap 9.4 mmol/L   eGFR 74.2 mL/min/1.73        Assessment:  Active Hospital Problems    Diagnosis  POA    **COPD with acute exacerbation [J44.1]  Yes    Acute ischemic stroke [I63.9]  Yes    Paroxysmal atrial fibrillation [I48.0]  Clinically Undetermined    Pneumothorax on right [J93.9]  Yes    Acute on chronic respiratory failure with hypoxia [J96.21]  Yes    Cardiac arrest [I46.9]  Yes    Acute respiratory failure with hypoxia [J96.01]  Yes      Resolved Hospital Problems   No resolved problems to display.       Plan:    Rhinovirus with COPD acute exacerbation with large right pneumothorax status post chest tube per pulmonary.                -Serial chest x-ray               -Steroids weaned from IV to PO. He is on chronic steroids so monitor with taper for adrenal insufficiency. Because of this and his worse breathing the past 2 days I'm going to increase him to prednisone 10mg  daily x 2 days then can go back to 2mg daily (his chronic dose)  -nebs  -Leukocytosis likely related to steroids, monitor. No fever. Procal is low. Recheck in AM.   -Pulmonary and Thoracic Surgery following      Status postcardiac arrest with subsequent multiple rib fractures  More chest discomfort still persisent. Likely related to the previous CPR. Re-ordered low dose morphine for symptom control. CXR obtained and reviewed by Thoracic Surgery. troponin is flat.      Acute hypoxic respiratory failure on supplemental O2 but have weaned     Possible but not definitive PAF-RC with recent cardiac arrest -cardiology seen. Arrest may be allergic rx to ceftriaxone vs contrast vs PTX per Cardiology. Low concern for cardiac cause per Cardiology and ECHO unremarkable. Cardiology has seen again now with stroke- unclear though if truly had Afib at OSH and has not had here.     Obtained CT followed by MRI for slurred speech. +for acute stroke. Neurology has seen. PT/OT/ST. Neurochecks and NIH ordered. Added HTN agents and increased. Due to Chest tube Thoracic Surgery wants to wait on oral a/c so will now on therapeutic lovenox for the time being. As listed above may not do full a/c at discharge if no definitive evidence for Afib.     Outpatient ENT follow due to abnormalities noted on MRI above for direct visualization of suspected polyp     DW staff    Continue close monitoring in the hospital for multiple significant medical issues. Unfortunately he remains in poor health with his age of 87 and multiple issues going through including COPD exacerbation, s/p cardiac arrest, pneumothorax, acute stroke and other issues.        William Song MD  9/18/2023  11:50 EDT

## 2023-09-18 NOTE — THERAPY RE-EVALUATION
Acute Care - Speech Language Pathology   Swallow Re-Evaluation [facility]     Patient Name: Riky Rios  : 1935  MRN: [id number]  Today's Date: 2023               Admit Date: 2023    Visit Dx:   No diagnosis found.  Patient Active Problem List   Diagnosis    COPD with acute exacerbation    Cardiac arrest    Acute respiratory failure with hypoxia    Pneumothorax on right    Acute on chronic respiratory failure with hypoxia    Acute ischemic stroke    Paroxysmal atrial fibrillation     History reviewed. No pertinent past medical history.  History reviewed. No pertinent surgical history.    SLP Recommendation and Plan  SLP Swallowing Diagnosis: suspected pharyngeal dysphagia (23)  SLP Diet Recommendation: NPO, ice chips between meals after oral care, with supervision, water between meals after oral care, with supervision (23)  Recommended Precautions and Strategies: general aspiration precautions (23)  SLP Rec. for Method of Medication Administration: meds via alternate route (23)     Monitor for Signs of Aspiration: yes, notify SLP if any concerns (23)  Recommended Diagnostics: reassess via clinical swallow evaluation (23)  Swallow Criteria for Skilled Therapeutic Interventions Met: demonstrates skilled criteria (23)  Anticipated Discharge Disposition (SLP): unknown (23)  Rehab Potential/Prognosis, Swallowing: good, to achieve stated therapy goals (23)  Therapy Frequency (Swallow): PRN (23)  Predicted Duration Therapy Intervention (Days): until discharge (23)  Oral Care Recommendations: Before ice/water (23 143)                                      Oral Care Recommendations: Before ice/water (23)    Outcome Evaluation: Patient seen for bedside swallow re-assessment. No family at bedside. Repositioned to upright position with assistance from nursing assistant.  Vocal quality characterized as wet and dysphonic. Patient with persistant congested cough. Utilized yanker to manage secretions throughout assessment. Difficult to assess s/sx of aspiration vs. symptoms of rhinovirus throughout assessment. Patient had inconsistent cough across trials of ice chips, thin, nectar, and honey thick liquids. No overt s/sx observed with puree trials x3. Refused trials of solids. Patient reported that he has a decreased appetite and has not been eating very much. Due to poor secretion management and inconsistent s/sx of aspiration, recommend NPO with medication via alternate route. Patient may have ice chips or small amounts of water after oral care for free water protocol. ST to follow.      SWALLOW EVALUATION (last 72 hours)       SLP Adult Swallow Evaluation       Row Name 09/18/23 0687       Rehab Evaluation    Document Type re-evaluation  -SR    Subjective Information no complaints  -SR    Patient Observations alert;cooperative;agree to therapy  -SR    Patient Effort adequate  -SR    Symptoms Noted During/After Treatment none  -SR       General Information    Patient Profile Reviewed yes  -SR    Pertinent History Of Current Problem 87 y.o male with rhinovirus, COPD acute exacerbation, large right pneumothorax status post chest tube. MRI positive for acute stroke.  -SR    Current Method of Nutrition regular textures;thin liquids  -SR    Precautions/Limitations, Vision WFL;for purposes of eval  -SR    Precautions/Limitations, Hearing WFL;for purposes of eval  -SR    Prior Level of Function-Communication WFL  -SR    Prior Level of Function-Swallowing no diet consistency restrictions  -SR    Plans/Goals Discussed with patient;agreed upon  -SR    Barriers to Rehab medically complex  -SR       Pain Scale: Numbers Pre/Post-Treatment    Pretreatment Pain Rating 0/10 - no pain  -SR    Posttreatment Pain Rating 0/10 - no pain  -SR       Oral Motor Structure and Function    Dentition Assessment  natural, present and adequate  -SR    Secretion Management wet vocal quality;requires suctioning to control secretions  -SR    Mucosal Quality moist, healthy  -SR    Volitional Swallow weak  -SR    Volitional Cough weak  -SR       Oral Musculature and Cranial Nerve Assessment    Oral Motor General Assessment generalized oral motor weakness;vocal impairment  -SR    Vocal Impairment, Detail. Cranial Nerve X (Vagus) vocal quality abnormality (see comments)  dysphonic  -SR       General Eating/Swallowing Observations    Respiratory Support Currently in Use nasal cannula  -SR    O2 Liters 2L  -SR    Eating/Swallowing Skills self-fed;fed by SLP  -SR    Positioning During Eating upright 90 degree;upright in bed  -SR    Utensils Used spoon;cup;straw  -SR    Consistencies Trialed ice chips;thin liquids;nectar/syrup-thick liquids;honey-thick liquids  -SR       SLP Evaluation Clinical Impression    SLP Swallowing Diagnosis suspected pharyngeal dysphagia  -SR    Functional Impact risk of aspiration/pneumonia  -SR    Rehab Potential/Prognosis, Swallowing good, to achieve stated therapy goals  -SR    Swallow Criteria for Skilled Therapeutic Interventions Met demonstrates skilled criteria  -SR       Recommendations    Therapy Frequency (Swallow) PRN  -SR    Predicted Duration Therapy Intervention (Days) until discharge  -SR    SLP Diet Recommendation NPO;ice chips between meals after oral care, with supervision;water between meals after oral care, with supervision  -SR    Recommended Diagnostics reassess via clinical swallow evaluation  -SR    Recommended Precautions and Strategies general aspiration precautions  -SR    Oral Care Recommendations Before ice/water  -SR    SLP Rec. for Method of Medication Administration meds via alternate route  -SR    Monitor for Signs of Aspiration yes;notify SLP if any concerns  -SR    Anticipated Discharge Disposition (SLP) unknown  -SR       (LTG) Patient will demonstrate functional swallow for     Diet Texture (Demonstrate functional swallow) soft to chew (chopped) textures  -SR    Liquid viscosity (Demonstrate functional swallow) thin liquids  -SR    [place] (Demonstrate functional swallow) independently (over 90% accuracy)  -SR    Time Frame (Demonstrate functional swallow) by discharge  -SR    Progress/Outcomes (Demonstrate functional swallow) goal ongoing  -SR    Comment (Demonstrate functional swallow) Patient seen for bedside swallow re-assessment. No family at bedside. Repositioned to upright position with assistance from nursing assistant. Vocal quality characterized as wet and dysphonic.  Patient with persistant congested cough. Utilized yanker to manage secretions throughout assessment. Difficult to assess s/sx of aspiration vs. symptoms of rhinovirus throughout assessment. Patient had inconsistent cough across trials of ice chips, thin, nectar, and honey thick liquids. No overt s/sx observed with puree trials x3. Refused trials of solids. Patient reported that he has a decreased appetite and has not been eating very much.  Due to poor secretion management and inconsistent s/sx of aspiration, recommend NPO with medication via alternate route. Patient may have ice chips or small amounts of water after oral care for free water protocol. ST to follow.  -SR              User Key  (r) = Recorded By, (t) = Taken By, (c) = Cosigned By      Initials Name Effective Dates    SR Dianne Combs CCC-SLP 11/10/22 -                     EDUCATION  The patient has been educated in the following areas:   Dysphagia (Swallowing Impairment) Oral Care/Hydration NPO rationale.        SLP GOALS       Row Name 09/18/23 1430             (LTG) Patient will demonstrate functional swallow for    Diet Texture (Demonstrate functional swallow) soft to chew (chopped) textures  -SR      Liquid viscosity (Demonstrate functional swallow) thin liquids  -SR      [place] (Demonstrate functional swallow) independently (over  90% accuracy)  -SR      Time Frame (Demonstrate functional swallow) by discharge  -SR      Progress/Outcomes (Demonstrate functional swallow) goal ongoing  -SR      Comment (Demonstrate functional swallow) Patient seen for bedside swallow re-assessment. No family at bedside. Repositioned to upright position with assistance from nursing assistant. Vocal quality characterized as wet and dysphonic.  Patient with persistant congested cough. Utilized yanker to manage secretions throughout assessment. Difficult to assess s/sx of aspiration vs. symptoms of rhinovirus throughout assessment. Patient had inconsistent cough across trials of ice chips, thin, nectar, and honey thick liquids. No overt s/sx observed with puree trials x3. Refused trials of solids. Patient reported that he has a decreased appetite and has not been eating very much.  Due to poor secretion management and inconsistent s/sx of aspiration, recommend NPO with medication via alternate route. Patient may have ice chips or small amounts of water after oral care for free water protocol. ST to follow.  -SR                User Key  (r) = Recorded By, (t) = Taken By, (c) = Cosigned By      Initials Name Provider Type    Dianne Lopez CCC-SLP Speech and Language Pathologist                       Time Calculation:    Time Calculation- SLP       Row Name 09/18/23 1551             Time Calculation- SLP    SLP Start Time 1430  -SR      SLP Received On 09/18/23  -SR         Untimed Charges    [id number] Treatment Swallow Minutes 60  -SR         Total Minutes    Untimed Charges Total Minutes 60  -SR       Total Minutes 60  -SR                User Key  (r) = Recorded By, (t) = Taken By, (c) = Cosigned By      Initials Name Provider Type    Dianne Lopez CCC-SLP Speech and Language Pathologist                    Therapy Charges for Today       Code Description Service Date Service Provider Modifiers Qty    [id number]  ST TREATMENT SWALLOW 4 9/18/2023  Dianne Combs, CCC-SLP GN 1                 JUAN Fajardo  9/18/2023

## 2023-09-18 NOTE — THERAPY TREATMENT NOTE
Patient Name: Riky Rios  : 1935    MRN: [id number]                              Today's Date: 2023       Admit Date: 2023    Visit Dx: No diagnosis found.  Patient Active Problem List   Diagnosis    COPD with acute exacerbation    Cardiac arrest    Acute respiratory failure with hypoxia    Pneumothorax on right    Acute on chronic respiratory failure with hypoxia    Acute ischemic stroke    Paroxysmal atrial fibrillation     History reviewed. No pertinent past medical history.  History reviewed. No pertinent surgical history.   General Information       Row Name 23 1216          OT Time and Intention    Document Type therapy note (daily note)  -LE     Mode of Treatment occupational therapy;co-treatment;physical therapy  due to lines, poor activity tolerance and pain  -LE       Row Name 23 1216          General Information    Existing Precautions/Restrictions fall  chest tube to water seal  -LE     Barriers to Rehab medically complex  -LE       Row Name 23 1216          Cognition    Orientation Status (Cognition) oriented to;person  -LE       Row Name 23 1216          Safety Issues, Functional Mobility    Impairments Affecting Function (Mobility) endurance/activity tolerance;shortness of breath;strength  -LE     Comment, Safety Issues/Impairments (Mobility) non skid socks and gait belt.  -LE               User Key  (r) = Recorded By, (t) = Taken By, (c) = Cosigned By      Initials Name Provider Type    Carley Magana OTR Occupational Therapist                     Mobility/ADL's       Row Name 23 1217          Bed Mobility    Bed Mobility scooting/bridging  -LE     Scooting/Bridging House (Bed Mobility) maximum assist (25% patient effort);2 person assist;nonverbal cues (demo/gesture);verbal cues  -LE     Supine-Sit House (Bed Mobility) minimum assist (75% patient effort);2 person assist;nonverbal cues (demo/gesture);verbal cues  -LE     Sit-Supine  [place] (Bed Mobility) minimum assist (75% patient effort);2 person assist;nonverbal cues (demo/gesture);verbal cues  -LE     Bed Mobility, Safety Issues decreased use of legs for bridging/pushing;impaired trunk control for bed mobility  -LE     Assistive Device (Bed Mobility) bed rails;draw sheet;head of bed elevated  -       Row Name 09/18/23 1217          Transfers    Transfers sit-stand transfer;stand-sit transfer  -       Row Name 09/18/23 1217          Sit-Stand Transfer    Sit-Stand [place] (Transfers) minimum assist (75% patient effort);2 person assist;nonverbal cues (demo/gesture);verbal cues  -       Row Name 09/18/23 1217          Stand-Sit Transfer    Stand-Sit [place] (Transfers) nonverbal cues (demo/gesture);verbal cues;minimum assist (75% patient effort);2 person assist  -LE     Assistive Device (Stand-Sit Transfers) walker, front-wheeled  -       Row Name 09/18/23 1217          Functional Mobility    Functional Mobility- Comment sidestep along EOB with min A of 2.  -       Row Name 09/18/23 1217          Activities of Daily Living    BADL Assessment/Intervention toileting;lower body dressing;feeding  -       Row Name 09/18/23 1217          Lower Body Dressing Assessment/Training    [place] Level (Lower Body Dressing) don;socks;dependent (less than 25% patient effort)  -     Position (Lower Body Dressing) supine  -       Row Name 09/18/23 1217          Self-Feeding Assessment/Training    Comment, (Feeding) set up per pt and RN but pt not eating  much.  -LE               User Key  (r) = Recorded By, (t) = Taken By, (c) = Cosigned By      Initials Name Provider Type    Carley Magana OTR Occupational Therapist                   Obj/Interventions       Row Name 09/18/23 1219          Balance    Balance Assessment sitting static balance;standing static balance;standing dynamic balance  -LISANDRO     Static Sitting Balance standby assist  -LE     Static Standing Balance  minimal assist;2-person assist;verbal cues;non-verbal cues (demo/gesture)  -LE     Comment, Balance hand held assist  -LE               User Key  (r) = Recorded By, (t) = Taken By, (c) = Cosigned By      Initials Name Provider Type    Carley Magana, OTR Occupational Therapist                   Goals/Plan    No documentation.                  Clinical Impression       Row Name 09/18/23 1212          Pain Assessment    Pre/Posttreatment Pain Comment grimace and holding chest for coughing efforts.  OT places pillow to chest for support.  -LISANDRO     Pain Intervention(s) Repositioned;Rest;Emotional support  -       Row Name 09/18/23 1212          Plan of Care Review    Plan of Care Reviewed With patient  -LE     Progress improving  -LE     Outcome Evaluation Pt seen with focus on assisting pt to sit and then stand at EOB with min A of 2.  Pt very fatigue, weak, pain with coughing efforts.  Pt is able to move B UE to assist self to stand.  Pt continues with chest tube.  Medically involved which limits activity tolerance.  -       Row Name 09/18/23 1223          Therapy Assessment/Plan (OT)    Therapy Frequency (OT) 3 times/wk  -       Row Name 09/18/23 1212          Therapy Plan Review/Discharge Plan (OT)    Anticipated Discharge Disposition (OT) skilled nursing facility  -       Row Name 09/18/23 1212          Vital Signs    O2 Delivery Pre Treatment supplemental O2  -LE     O2 Delivery Intra Treatment supplemental O2  -LE     O2 Delivery Post Treatment supplemental O2  -LE     Pre Patient Position Supine  -LE     Intra Patient Position Standing  -LE     Post Patient Position Supine  -LE       Row Name 09/18/23 1212          Positioning and Restraints    Pre-Treatment Position in bed  -LE     Post Treatment Position bed  -LE     In Bed notified nsg;fowlers;call light within reach;encouraged to call for assist;exit alarm on;heels elevated  left pillow at chest for pt to support self with coughing efforts.  -LE                User Key  (r) = Recorded By, (t) = Taken By, (c) = Cosigned By      Initials Name Provider Type    Carley Magana OTR Occupational Therapist                   Outcome Measures       Row Name 09/18/23 1219          How much help from another is currently needed...    Putting on and taking off regular lower body clothing? 2  -LE     Bathing (including washing, rinsing, and drying) 2  -LE     Toileting (which includes using toilet bed pan or urinal) 2  -LE     Putting on and taking off regular upper body clothing 2  -LE     Taking care of personal grooming (such as brushing teeth) 2  -LE     Eating meals 3  -LE     AM-PAC 6 Clicks Score (OT) 13  -LE       Row Name 09/18/23 1219          Functional Assessment    Outcome Measure Options AM-PAC 6 Clicks Daily Activity (OT)  -LE               User Key  (r) = Recorded By, (t) = Taken By, (c) = Cosigned By      Initials Name Provider Type    Carley Magana OTR Occupational Therapist                    Occupational Therapy Education       Title: PT OT SLP Therapies (Done)       Topic: Occupational Therapy (Done)       Point: ADL training (Done)       Description:   Instruct learner(s) on proper safety adaptation and remediation techniques during self care or transfers.   Instruct in proper use of assistive devices.                  Learning Progress Summary             Patient Acceptance, E, VU,NR by KT at 9/18/2023 1028                         Point: Home exercise program (Done)       Description:   Instruct learner(s) on appropriate technique for monitoring, assisting and/or progressing therapeutic exercises/activities.                  Learning Progress Summary             Patient Acceptance, E, VU,NR by KT at 9/18/2023 1028                         Point: Precautions (Done)       Description:   Instruct learner(s) on prescribed precautions during self-care and functional transfers.                  Learning Progress Summary             Patient Acceptance,  NASEEM, ARMANDO,NR by DEWAYNE at 9/18/2023 1028                         Point: Body mechanics (Done)       Description:   Instruct learner(s) on proper positioning and spine alignment during self-care, functional mobility activities and/or exercises.                  Learning Progress Summary             Patient Acceptance, NASEEM, ARMANDO,NR by DEWAYNE at 9/18/2023 1028                                         User Key       Initials Effective Dates Name Provider Type Discipline     06/16/21 -  Chel De Anda, RN Registered Nurse Nurse                  OT Recommendation and Plan  Therapy Frequency (OT): 3 times/wk  Plan of Care Review  Plan of Care Reviewed With: patient  Progress: improving  Outcome Evaluation: Pt seen with focus on assisting pt to sit and then stand at EOB with min A of 2.  Pt very fatigue, weak, pain with coughing efforts.  Pt is able to move B UE to assist self to stand.  Pt continues with chest tube.  Medically involved which limits activity tolerance.     Time Calculation:         Time Calculation- OT       Row Name 09/18/23 1222             Time Calculation- OT    OT Start Time 1118  -LE      OT Stop Time 1134  -LE      OT Time Calculation (min) 16 min  -LE      Total Timed Code Minutes- OT 16 minute(s)  -LE      OT Received On 09/18/23  -LE      OT - Next Appointment 09/20/23  -LE         Timed Charges    49920 - OT Therapeutic Activity Minutes 16  -LE         Total Minutes    Timed Charges Total Minutes 16  -LE       Total Minutes 16  -LE                User Key  (r) = Recorded By, (t) = Taken By, (c) = Cosigned By      Initials Name Provider Type    Carley Magana OTSEAN Occupational Therapist                  Therapy Charges for Today       Code Description Service Date Service Provider Modifiers Qty    [id number]  OT THERAPEUTIC ACT EA 15 MIN 9/18/2023 Carley Lara OTR GO 1                 IDALIA Pino  9/18/2023

## 2023-09-18 NOTE — PLAN OF CARE
Goal Outcome Evaluation:              Outcome Evaluation: Patient seen for bedside swallow re-assessment. No family at bedside. Repositioned to upright position with assistance from nursing assistant. Vocal quality characterized as wet and dysphonic. Patient with persistant congested cough. Utilized yanker to manage secretions throughout assessment. Difficult to assess s/sx of aspiration vs. symptoms of rhinovirus throughout assessment. Patient had inconsistent cough across trials of ice chips, thin, nectar, and honey thick liquids. No overt s/sx observed with puree trials x3. Refused trials of solids. Patient reported that he has a decreased appetite and has not been eating very much.     Due to poor secretion management and inconsistent s/sx of aspiration, recommend NPO with medication via alternate route. Patient may have ice chips or small amounts of water after oral care for free water protocol. ST to follow.

## 2023-09-18 NOTE — THERAPY TREATMENT NOTE
Patient Name: Riky Rios  : 1935    MRN: [id number]                              Today's Date: 2023       Admit Date: 2023    Visit Dx: No diagnosis found.  Patient Active Problem List   Diagnosis    COPD with acute exacerbation    Cardiac arrest    Acute respiratory failure with hypoxia    Pneumothorax on right    Acute on chronic respiratory failure with hypoxia    Acute ischemic stroke    Paroxysmal atrial fibrillation     History reviewed. No pertinent past medical history.  History reviewed. No pertinent surgical history.   General Information       Row Name 23 1321          Physical Therapy Time and Intention    Document Type therapy note (daily note)  -     Mode of Treatment co-treatment;physical therapy;occupational therapy  -       Row Name 23 1321          General Information    Existing Precautions/Restrictions fall  chest tube to waterseal  -       Row Name 23 1321          Cognition    Orientation Status (Cognition) oriented to;person  -       Row Name 23 1321          Safety Issues, Functional Mobility    Impairments Affecting Function (Mobility) endurance/activity tolerance;shortness of breath;strength;pain  -               User Key  (r) = Recorded By, (t) = Taken By, (c) = Cosigned By      Initials Name Provider Type     Meg Fitzgerald, PT Physical Therapist                   Mobility       Row Name 23 1322          Bed Mobility    Bed Mobility supine-sit;sit-supine  -     Scooting/Bridging [place] (Bed Mobility) verbal cues;nonverbal cues (demo/gesture);maximum assist (25% patient effort);2 person assist  -     Supine-Sit [place] (Bed Mobility) verbal cues;nonverbal cues (demo/gesture);minimum assist (75% patient effort);2 person assist  -CH     Sit-Supine [place] (Bed Mobility) verbal cues;nonverbal cues (demo/gesture);minimum assist (75% patient effort);2 person assist  -     Assistive Device (Bed Mobility) bed  rails;draw sheet;head of bed elevated  -     Comment, (Bed Mobility) SBA for sitting balance  -       Row Name 09/18/23 1322          Sit-Stand Transfer    Sit-Stand [place] (Transfers) verbal cues;nonverbal cues (demo/gesture);minimum assist (75% patient effort);2 person assist  -     Assistive Device (Sit-Stand Transfers) --  HHA  -       Row Name 09/18/23 1322          Gait/Stairs (Locomotion)    [place] Level (Gait) verbal cues;nonverbal cues (demo/gesture);minimum assist (75% patient effort);2 person assist  -     Assistive Device (Gait) --  A  -     Distance in Feet (Gait) 4 side steps to HOB  -     Deviations/Abnormal Patterns (Gait) sumeet decreased;gait speed decreased;stride length decreased  -     Bilateral Gait Deviations forward flexed posture  -     Comment, (Gait/Stairs) gait distance limited by fatigue and weakness  -               User Key  (r) = Recorded By, (t) = Taken By, (c) = Cosigned By      Initials Name Provider Type     Meg Fitzgerald, PT Physical Therapist                   Obj/Interventions    No documentation.                  Goals/Plan    No documentation.                  Clinical Impression       Row Name 09/18/23 1325          Pain    Pain Location - chest  -     Pre/Posttreatment Pain Comment pt appears to be in pain when he coughs but does not rate it  -     Pain Intervention(s) Repositioned  -       Row Name 09/18/23 1325          Plan of Care Review    Plan of Care Reviewed With patient  -     Outcome Evaluation Pt continues to require assistance for bed mobility, tranfers, and gait secondary to generlaized weakness, pain, and decreased activity tolerance. Pt quickly fatigues and only able to take a few sidesteps to HOB. PT will continue to follow to address strength, mobility, and gait.  -       Row Name 09/18/23 1325          Positioning and Restraints    Pre-Treatment Position in bed  -     Post Treatment Position bed  -      In Bed supine;call light within reach;encouraged to call for assist;exit alarm on;notified nsg  -               User Key  (r) = Recorded By, (t) = Taken By, (c) = Cosigned By      Initials Name Provider Type    Meg Barriga, PT Physical Therapist                   Outcome Measures       Row Name 09/18/23 1328          How much help from another person do you currently need...    Turning from your back to your side while in flat bed without using bedrails? 3  -CH     Moving from lying on back to sitting on the side of a flat bed without bedrails? 2  -CH     Moving to and from a bed to a chair (including a wheelchair)? 2  -CH     Standing up from a chair using your arms (e.g., wheelchair, bedside chair)? 2  -CH     Climbing 3-5 steps with a railing? 1  -CH     To walk in hospital room? 2  -CH     AM-PAC 6 Clicks Score (PT) 12  -CH     Highest level of mobility 4 --> Transferred to chair/commode  -       Row Name 09/18/23 1328 09/18/23 1219       Functional Assessment    Outcome Measure Options AM-PAC 6 Clicks Basic Mobility (PT)  -CH AM-PAC 6 Clicks Daily Activity (OT)  -              User Key  (r) = Recorded By, (t) = Taken By, (c) = Cosigned By      Initials Name Provider Type    Carley Magana, OTR Occupational Therapist    Meg Barriga, PT Physical Therapist                                 Physical Therapy Education       Title: PT OT SLP Therapies (Done)       Topic: Physical Therapy (Done)       Point: Mobility training (Done)       Learning Progress Summary             Patient Acceptance, E,TB,D, VU,NR by  at 9/18/2023 1328    Acceptance, E, VU,NR by KT at 9/18/2023 1028    Acceptance, E,TB, VU,NR by  at 9/17/2023 1438    Acceptance, E,TB,D, VU,NR by  at 9/13/2023 1138    Acceptance, E, VU,NR by KT at 9/12/2023 1156    Acceptance, E,TB,D, VU,NR by  at 9/12/2023 0955                         Point: Home exercise program (Done)       Learning Progress Summary             Patient  Acceptance, E, VU,NR by KT at 9/18/2023 1028    Acceptance, E,TB, VU,NR by CB at 9/17/2023 1438    Acceptance, E,TB,D, VU,NR by PH at 9/13/2023 1138    Acceptance, E, VU,NR by KT at 9/12/2023 1156                         Point: Body mechanics (Done)       Learning Progress Summary             Patient Acceptance, E,TB,D, VU,NR by  at 9/18/2023 1328    Acceptance, E, VU,NR by KT at 9/18/2023 1028    Acceptance, E,TB,D, VU,NR by PH at 9/13/2023 1138    Acceptance, E, VU,NR by KT at 9/12/2023 1156    Acceptance, E,TB,D, VU,NR by  at 9/12/2023 0955                         Point: Precautions (Done)       Learning Progress Summary             Patient Acceptance, E,TB,D, VU,NR by  at 9/18/2023 1328    Acceptance, E, VU,NR by KT at 9/18/2023 1028    Acceptance, E,TB,D, VU,NR by PH at 9/13/2023 1138    Acceptance, E, VU,NR by KT at 9/12/2023 1156    Acceptance, E,TB,D, VU,NR by  at 9/12/2023 0955                                         User Key       Initials Effective Dates Name Provider Type Discipline     06/16/21 -  Meg Fitzgerald, PT Physical Therapist PT    KT 06/16/21 -  Chel De Anda RN Registered Nurse Nurse    PH 06/16/21 -  Sherri Whitten PTA Physical Therapist Assistant PT    CB 10/22/21 -  Mary Reyes PT Physical Therapist PT                  PT Recommendation and Plan  Planned Therapy Interventions (PT): balance training, bed mobility training, gait training, home exercise program, patient/family education, strengthening, transfer training  Plan of Care Reviewed With: patient  Outcome Evaluation: Pt continues to require assistance for bed mobility, tranfers, and gait secondary to generlaized weakness, pain, and decreased activity tolerance. Pt quickly fatigues and only able to take a few sidesteps to HOB. PT will continue to follow to address strength, mobility, and gait.     Time Calculation:         PT Charges       Row Name 09/18/23 7566             Time Calculation    Start  Time 1125  -      Stop Time 1139  -      Time Calculation (min) 14 min  -CH      PT Received On 09/18/23  -CH      PT - Next Appointment 09/19/23  -CH         Time Calculation- PT    Total Timed Code Minutes- PT 14 minute(s)  -CH         Timed Charges    04703 - PT Therapeutic Activity Minutes 14  -CH         Total Minutes    Timed Charges Total Minutes 14  -CH       Total Minutes 14  -CH                User Key  (r) = Recorded By, (t) = Taken By, (c) = Cosigned By      Initials Name Provider Type     Meg Fitzgerald PT Physical Therapist                  Therapy Charges for Today       Code Description Service Date Service Provider Modifiers Qty    [id number]  PT THERAPEUTIC ACT EA 15 MIN 9/18/2023 Meg Fitzgerald PT GP 1            PT G-Codes  Outcome Measure Options: AM-PAC 6 Clicks Basic Mobility (PT)  AM-PAC 6 Clicks Score (PT): 12  AM-PAC 6 Clicks Score (OT): 13  Modified [place] Scale: 3 - Moderate disability.  Requiring some help, but able to walk without assistance.  PT Discharge Summary  Anticipated Discharge Disposition (PT): skilled nursing facility, home with home health, home with assist    Meg Fitzgerald, MATA  9/18/2023

## 2023-09-18 NOTE — PROGRESS NOTES
"    Chief Complaint: Traumatic pneumothorax, right  S/P: Chest tube placement    Subjective:  Symptoms:  He reports weakness.  No shortness of breath or chest pain.  (unchanged).    Diet:  Adequate intake.  No nausea or vomiting.    Activity level: Impaired due to weakness.    Pain:  He complains of pain that is mild.    Resting in bed. Persistent congested cough.       Vital Signs:  Temp:  [98 °F (36.7 °C)-98.8 °F (37.1 °C)] 98.4 °F (36.9 °C)  Heart Rate:  [80-90] 86  Resp:  [16-20] 18  BP: (150-172)/(77-95) 150/77    Intake & Output (last day)         09/17 0701  09/18 0700 09/18 0701  09/19 0700    P.O. 250 240    Total Intake(mL/kg) 250 (4.1) 240 (3.9)    Urine (mL/kg/hr) 200 (0.1) 350 (0.7)    Stool      Chest Tube 160     Total Output 360 350    Net -110 -110          Urine Unmeasured Occurrence 1 x             Objective:  General Appearance:  Comfortable, in no acute distress and ill-appearing.    Vital signs: (most recent): Blood pressure 150/77, pulse 88, temperature 98 °F (36.7 °C), temperature source Oral, resp. rate 18, height 172.7 cm (68\"), weight 60.8 kg (134 lb), SpO2 98 %.    HEENT: (Congested cough, nasal cannula)    Lungs:  Normal effort and normal respiratory rate.  He is not in respiratory distress.  There are decreased breath sounds and rhonchi.  (Productive cough)  Heart: Normal rate.    Chest: Symmetric chest wall expansion. Chest wall tenderness present.    Abdomen: Abdomen is soft and non-distended.    Extremities: Decreased range of motion.    Neurological: Patient is alert.  (Slurred speech, decreased strength).    Pupils:  Pupils are equal, round, and reactive to light.    Skin:  Warm and dry.              Chest tube:   Site: Right, Clean, Dry, and Securement device intact  Suction: waterseal  Air Leak: negative  24 Hour Total:  160ml    Results Review:     I reviewed the patient's new clinical results.  I reviewed the patient's new imaging results and agree with the " interpretation.  Discussed with patient, nurse.    Imaging Results (Last 24 Hours)       Procedure Component Value Units Date/Time    XR Chest 1 View [[id number]] Resulted: 09/18/23 1431     Updated: 09/18/23 1431    XR Chest 1 View [[id number]] Collected: 09/18/23 0721     Updated: 09/18/23 0725    Narrative:      Clinical: Chest tube management     COMPARISON examination 9/17/2023     FINDINGS: Right-sided chest tube in position as before. Pneumothorax  resolved in the interim. The cardiomediastinal silhouette is stable.  There is bibasilar atelectasis. This has diminished on the right.  Unchanged on the left. Trace left pleural effusion also seen.  Subcutaneous emphysema right chest wall. The remainder is unremarkable.     This report was finalized on 9/18/2023 7:22 AM by Dr. Benjamín Mackay M.D.               Lab Results:     Lab Results (last 24 hours)       Procedure Component Value Units Date/Time    POC Glucose Once [[id number]]  (Normal) Collected: 09/18/23 1157    Specimen: Blood Updated: 09/18/23 1158     Glucose 106 mg/dL     POC Glucose Once [[id number]]  (Normal) Collected: 09/18/23 0823    Specimen: Blood Updated: 09/18/23 0824     Glucose 95 mg/dL     POC Glucose Once [[id number]]  (Normal) Collected: 09/18/23 0624    Specimen: Blood Updated: 09/18/23 0633     Glucose 97 mg/dL     POC Glucose Once [[id number]]  (Abnormal) Collected: 09/17/23 2044    Specimen: Blood Updated: 09/17/23 2052     Glucose 165 mg/dL     High Sensitivity Troponin T 2Hr [[id number]]  (Abnormal) Collected: 09/17/23 1655    Specimen: Blood Updated: 09/17/23 1747     HS Troponin T 53 ng/L      Troponin T Delta 0 ng/L     Narrative:      High Sensitive Troponin T Reference Range:  <10.0 ng/L- Negative Female for AMI  <15.0 ng/L- Negative Male for AMI  >=10 - Abnormal Female indicating possible myocardial injury.  >=15 - Abnormal Male indicating possible myocardial injury.   Clinicians would have to utilize clinical acumen, EKG,  Troponin, and serial changes to determine if it is an Acute Myocardial Infarction or myocardial injury due to an underlying chronic condition.         POC Glucose Once [[id number]]  (Abnormal) Collected: 09/17/23 1606    Specimen: Blood Updated: 09/17/23 1607     Glucose 211 mg/dL     High Sensitivity Troponin T [[id number]]  (Abnormal) Collected: 09/17/23 1441    Specimen: Blood Updated: 09/17/23 1600     HS Troponin T 53 ng/L     Narrative:      High Sensitive Troponin T Reference Range:  <10.0 ng/L- Negative Female for AMI  <15.0 ng/L- Negative Male for AMI  >=10 - Abnormal Female indicating possible myocardial injury.  >=15 - Abnormal Male indicating possible myocardial injury.   Clinicians would have to utilize clinical acumen, EKG, Troponin, and serial changes to determine if it is an Acute Myocardial Infarction or myocardial injury due to an underlying chronic condition.         Basic Metabolic Panel [[id number]]  (Abnormal) Collected: 09/17/23 1441    Specimen: Blood Updated: 09/17/23 1534     Glucose 117 mg/dL      BUN 27 mg/dL      Creatinine 0.96 mg/dL      Sodium 145 mmol/L      Potassium 3.9 mmol/L      Chloride 111 mmol/L      CO2 25.1 mmol/L      Calcium 8.5 mg/dL      BUN/Creatinine Ratio 28.1     Anion Gap 8.9 mmol/L      eGFR 77.0 mL/min/1.73     Narrative:      GFR Normal >60  Chronic Kidney Disease <60  Kidney Failure <15    The GFR formula is only valid for adults with stable renal function between ages 18 and 70.             Assessment & Plan       COPD with acute exacerbation    Cardiac arrest    Acute respiratory failure with hypoxia    Pneumothorax on right    Acute on chronic respiratory failure with hypoxia    Acute ischemic stroke    Paroxysmal atrial fibrillation       Assessment & Plan    This morning's chest x-ray was independently reviewed.  Significant pneumothorax with chest tube in place.    Right pneumothorax: Status post CT-guided chest tube placement.  Chest tube was to be clamped  overnight, unfortunately it was left to waterseal for the past 24 hours.  Patient underwent clamping trial today with no evidence of increased pneumothorax on imaging.  We will plan for chest tube removal today.    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

## 2023-09-19 ENCOUNTER — APPOINTMENT (OUTPATIENT)
Dept: GENERAL RADIOLOGY | Facility: HOSPITAL | Age: 88
DRG: 199 | End: 2023-09-19
Payer: MEDICARE

## 2023-09-19 PROBLEM — I63.542: Status: ACTIVE | Noted: 2023-09-19

## 2023-09-19 PROBLEM — J20.6 ACUTE BRONCHITIS DUE TO RHINOVIRUS: Status: ACTIVE | Noted: 2023-09-19

## 2023-09-19 PROBLEM — I10 HYPERTENSION: Status: ACTIVE | Noted: 2023-09-19

## 2023-09-19 PROBLEM — S22.31XA CLOSED FRACTURE OF ONE RIB OF RIGHT SIDE: Status: ACTIVE | Noted: 2023-09-19

## 2023-09-19 LAB
ANION GAP SERPL CALCULATED.3IONS-SCNC: 10.9 MMOL/L (ref 5–15)
BUN SERPL-MCNC: 30 MG/DL (ref 8–23)
BUN/CREAT SERPL: 33.7 (ref 7–25)
CALCIUM SPEC-SCNC: 8.7 MG/DL (ref 8.6–10.5)
CHLORIDE SERPL-SCNC: 110 MMOL/L (ref 98–107)
CO2 SERPL-SCNC: 21.1 MMOL/L (ref 22–29)
CREAT SERPL-MCNC: 0.89 MG/DL (ref 0.76–1.27)
DEPRECATED RDW RBC AUTO: 44.6 FL (ref 37–54)
EGFRCR SERPLBLD CKD-EPI 2021: 83.5 ML/MIN/1.73
ERYTHROCYTE [DISTWIDTH] IN BLOOD BY AUTOMATED COUNT: 11.8 % (ref 12.3–15.4)
GLUCOSE BLDC GLUCOMTR-MCNC: 120 MG/DL (ref 70–130)
GLUCOSE BLDC GLUCOMTR-MCNC: 139 MG/DL (ref 70–130)
GLUCOSE BLDC GLUCOMTR-MCNC: 171 MG/DL (ref 70–130)
GLUCOSE SERPL-MCNC: 118 MG/DL (ref 65–99)
HCT VFR BLD AUTO: 34.7 % (ref 37.5–51)
HGB BLD-MCNC: 12 G/DL (ref 13–17.7)
MCH RBC QN AUTO: 35.7 PG (ref 26.6–33)
MCHC RBC AUTO-ENTMCNC: 34.6 G/DL (ref 31.5–35.7)
MCV RBC AUTO: 103.3 FL (ref 79–97)
PLATELET # BLD AUTO: 185 10*3/MM3 (ref 140–450)
PMV BLD AUTO: 10.8 FL (ref 6–12)
POTASSIUM SERPL-SCNC: 4.7 MMOL/L (ref 3.5–5.2)
PROCALCITONIN SERPL-MCNC: 0.27 NG/ML (ref 0–0.25)
RBC # BLD AUTO: 3.36 10*6/MM3 (ref 4.14–5.8)
SODIUM SERPL-SCNC: 142 MMOL/L (ref 136–145)
WBC NRBC COR # BLD: 14.74 10*3/MM3 (ref 3.4–10.8)

## 2023-09-19 PROCEDURE — 84145 PROCALCITONIN (PCT): CPT | Performed by: INTERNAL MEDICINE

## 2023-09-19 PROCEDURE — 85027 COMPLETE CBC AUTOMATED: CPT | Performed by: INTERNAL MEDICINE

## 2023-09-19 PROCEDURE — 94799 UNLISTED PULMONARY SVC/PX: CPT

## 2023-09-19 PROCEDURE — 92526 ORAL FUNCTION THERAPY: CPT

## 2023-09-19 PROCEDURE — 63710000001 INSULIN LISPRO (HUMAN) PER 5 UNITS: Performed by: INTERNAL MEDICINE

## 2023-09-19 PROCEDURE — 71045 X-RAY EXAM CHEST 1 VIEW: CPT

## 2023-09-19 PROCEDURE — 25010000002 ENOXAPARIN PER 10 MG: Performed by: INTERNAL MEDICINE

## 2023-09-19 PROCEDURE — 82948 REAGENT STRIP/BLOOD GLUCOSE: CPT

## 2023-09-19 PROCEDURE — 97530 THERAPEUTIC ACTIVITIES: CPT

## 2023-09-19 PROCEDURE — 99231 SBSQ HOSP IP/OBS SF/LOW 25: CPT | Performed by: NURSE PRACTITIONER

## 2023-09-19 PROCEDURE — 87641 MR-STAPH DNA AMP PROBE: CPT | Performed by: INTERNAL MEDICINE

## 2023-09-19 PROCEDURE — 63710000001 PREDNISONE PER 5 MG: Performed by: INTERNAL MEDICINE

## 2023-09-19 PROCEDURE — 94664 DEMO&/EVAL PT USE INHALER: CPT

## 2023-09-19 PROCEDURE — 80048 BASIC METABOLIC PNL TOTAL CA: CPT | Performed by: INTERNAL MEDICINE

## 2023-09-19 PROCEDURE — 94761 N-INVAS EAR/PLS OXIMETRY MLT: CPT

## 2023-09-19 RX ORDER — ALLOPURINOL 300 MG/1
300 TABLET ORAL DAILY
Status: DISCONTINUED | OUTPATIENT
Start: 2023-09-19 | End: 2023-09-29 | Stop reason: HOSPADM

## 2023-09-19 RX ORDER — SODIUM CHLORIDE FOR INHALATION 7 %
4 VIAL, NEBULIZER (ML) INHALATION
Status: DISCONTINUED | OUTPATIENT
Start: 2023-09-20 | End: 2023-09-25

## 2023-09-19 RX ORDER — SODIUM CHLORIDE FOR INHALATION 7 %
4 VIAL, NEBULIZER (ML) INHALATION ONCE AS NEEDED
Status: DISCONTINUED | OUTPATIENT
Start: 2023-09-19 | End: 2023-09-29 | Stop reason: HOSPADM

## 2023-09-19 RX ORDER — ALBUTEROL SULFATE 2.5 MG/3ML
2.5 SOLUTION RESPIRATORY (INHALATION) ONCE AS NEEDED
Status: DISCONTINUED | OUTPATIENT
Start: 2023-09-19 | End: 2023-09-21

## 2023-09-19 RX ADMIN — SENNOSIDES AND DOCUSATE SODIUM 2 TABLET: 50; 8.6 TABLET ORAL at 09:22

## 2023-09-19 RX ADMIN — GUAIFENESIN 600 MG: 600 TABLET, EXTENDED RELEASE ORAL at 09:22

## 2023-09-19 RX ADMIN — INSULIN LISPRO 2 UNITS: 100 INJECTION, SOLUTION INTRAVENOUS; SUBCUTANEOUS at 12:00

## 2023-09-19 RX ADMIN — SODIUM CHLORIDE 500 ML: 9 INJECTION, SOLUTION INTRAVENOUS at 18:18

## 2023-09-19 RX ADMIN — ATORVASTATIN CALCIUM 40 MG: 20 TABLET, FILM COATED ORAL at 09:21

## 2023-09-19 RX ADMIN — ALBUTEROL SULFATE 2.5 MG: 2.5 SOLUTION RESPIRATORY (INHALATION) at 15:37

## 2023-09-19 RX ADMIN — GUAIFENESIN 600 MG: 600 TABLET, EXTENDED RELEASE ORAL at 21:22

## 2023-09-19 RX ADMIN — SENNOSIDES AND DOCUSATE SODIUM 2 TABLET: 50; 8.6 TABLET ORAL at 21:23

## 2023-09-19 RX ADMIN — ARFORMOTEROL TARTRATE 15 MCG: 15 SOLUTION RESPIRATORY (INHALATION) at 19:29

## 2023-09-19 RX ADMIN — LIDOCAINE 1 PATCH: 50 PATCH CUTANEOUS at 09:23

## 2023-09-19 RX ADMIN — APIXABAN 2.5 MG: 2.5 TABLET, FILM COATED ORAL at 21:23

## 2023-09-19 RX ADMIN — TAMSULOSIN HYDROCHLORIDE 0.8 MG: 0.4 CAPSULE ORAL at 09:22

## 2023-09-19 RX ADMIN — ASPIRIN 81 MG: 81 TABLET, COATED ORAL at 09:22

## 2023-09-19 RX ADMIN — ACETAMINOPHEN 1000 MG: 500 TABLET ORAL at 09:21

## 2023-09-19 RX ADMIN — ACETAMINOPHEN 1000 MG: 500 TABLET ORAL at 21:23

## 2023-09-19 RX ADMIN — ENOXAPARIN SODIUM 60 MG: 100 INJECTION SUBCUTANEOUS at 17:00

## 2023-09-19 RX ADMIN — AMLODIPINE BESYLATE 10 MG: 10 TABLET ORAL at 09:21

## 2023-09-19 RX ADMIN — ALLOPURINOL 300 MG: 300 TABLET ORAL at 16:59

## 2023-09-19 RX ADMIN — Medication 4 ML: at 15:37

## 2023-09-19 RX ADMIN — TIOTROPIUM BROMIDE INHALATION SPRAY 2 PUFF: 3.12 SPRAY, METERED RESPIRATORY (INHALATION) at 08:15

## 2023-09-19 RX ADMIN — Medication 10 ML: at 09:23

## 2023-09-19 RX ADMIN — ARFORMOTEROL TARTRATE 15 MCG: 15 SOLUTION RESPIRATORY (INHALATION) at 08:10

## 2023-09-19 RX ADMIN — ENOXAPARIN SODIUM 60 MG: 100 INJECTION SUBCUTANEOUS at 03:51

## 2023-09-19 RX ADMIN — MIRTAZAPINE 15 MG: 15 TABLET, ORALLY DISINTEGRATING ORAL at 21:22

## 2023-09-19 RX ADMIN — Medication 4 ML: at 08:06

## 2023-09-19 RX ADMIN — ACETAMINOPHEN 1000 MG: 500 TABLET ORAL at 17:00

## 2023-09-19 RX ADMIN — PREDNISONE 10 MG: 10 TABLET ORAL at 09:22

## 2023-09-19 RX ADMIN — BUDESONIDE 0.5 MG: 0.5 INHALANT ORAL at 08:08

## 2023-09-19 NOTE — PLAN OF CARE
Goal Outcome Evaluation:  Plan of Care Reviewed With: patient        Progress: no change     Vital signs stable. Alert and oriented x 3. On 2 liters oxygen per nasal cannula. Normal sinus cardiac rhythm. Speech recommends pureed diet with NTL. Not eating or drinking much. No urine output today. Bladder scan = 250 cc. Dr. Schmitz notified, awaiting orders. Turned every 2 hours. Sat up in recliner for few hours. PT worked with patient in room. Very weak, fatigued easily. Unable to ambulate. Weak cough effort. Breath sounds congested. Seems depressed, withdrawn. Falls risk protocol in place. Bed / chair alarm on. Resting in bed with eyes closed. Will likely need rehab vs skilled care after discharge. Call light within patient's reach.

## 2023-09-19 NOTE — PROGRESS NOTES
Dr. DESHAWN Dickerson    [address]        Patient ID:  Name:  Riky Rios  MRN:  [id number]  1935  87 y.o.  male            CC/Reason for visit: Traumatic pneumothorax status post CPR, resuscitated cardiac arrest, rhinovirus bronchitis, wheezing, COPD exacerbation, acute hypoxic respiratory failure, rib fractures from CPR     Interval hx: Patient still has cough and wheezing.  Still having chest soreness  Still requiring a few liters of oxygen     ROS: No fever, no syncope, no abdominal pain    Vitals:  Vitals:    09/19/23 0823 09/19/23 1100 09/19/23 1442 09/19/23 1537   BP:  140/75 126/73    BP Location:  Right arm Right arm    Patient Position:  Lying Sitting    Pulse: 83 90 87    Resp: 18 18 16 16   Temp:  97.4 °F (36.3 °C) 97.5 °F (36.4 °C)    TempSrc:  Oral Oral    SpO2: 90% 92% 90%    Weight:       Height:               Body mass index is 20.37 kg/m².    Intake/Output Summary (Last 24 hours) at 9/19/2023 1615  Last data filed at 9/19/2023 1300  Gross per 24 hour   Intake 850 ml   Output 300 ml   Net 550 ml       Exam:  GEN:  No distress  Alert, oriented x 3.   LUNGS: Few wheezes and scattered rhonchi bilat, no use of accessory muscles  CV:  Normal S1S2, without murmur, no edema        Scheduled meds:  acetaminophen, 1,000 mg, Oral, TID  allopurinol, 300 mg, Oral, Daily  amLODIPine, 10 mg, Oral, Q24H  arformoterol, 15 mcg, Nebulization, BID - RT  aspirin, 81 mg, Oral, Daily  atorvastatin, 40 mg, Oral, Daily  budesonide, 0.5 mg, Nebulization, Daily - RT  enoxaparin, 1 mg/kg, Subcutaneous, Q12H  guaiFENesin, 600 mg, Oral, BID  lidocaine, 1 patch, Transdermal, Q24H  mirtazapine, 15 mg, Oral, Nightly  predniSONE, 10 mg, Oral, Daily With Breakfast  [START ON 9/20/2023] predniSONE, 2.5 mg, Oral, Daily With Breakfast  senna-docusate sodium, 2 tablet, Oral, BID  [START ON 9/20/2023] sodium chloride, 4 mL, Nebulization, BID - RT  tamsulosin, 0.8 mg, Oral, Daily  tiotropium bromide monohydrate, 2  puff, Inhalation, Daily - RT      IV meds:                           Data Review:   I reviewed the patient's medications and new clinical results.    COVID19   Date Value Ref Range Status   09/10/2023 Not Detected Not Detected - Ref. Range Final         Lab Results   Component Value Date    CALCIUM 8.7 09/19/2023     Results from last 7 days   Lab Units 09/19/23  0443 09/17/23  1441 09/16/23  1026 09/16/23  0647 09/15/23  0443 09/14/23  0541 09/13/23  0530   SODIUM mmol/L 142 145 145  --  146*   < > 143   POTASSIUM mmol/L 4.7 3.9 3.5  --  3.6   < > 4.1   CHLORIDE mmol/L 110* 111* 112*  --  112*   < > 112*   CO2 mmol/L 21.1* 25.1 24.8  --  26.0   < > 22.1   BUN mg/dL 30* 27* 29*  --  29*   < > 34*   CREATININE mg/dL 0.89 0.96 0.85  --  0.85   < > 0.88   CALCIUM mg/dL 8.7 8.5* 9.0  --  9.0   < > 9.0   BILIRUBIN mg/dL  --   --   --   --   --   --  0.3   ALK PHOS U/L  --   --   --   --   --   --  77   ALT (SGPT) U/L  --   --   --   --   --   --  21   AST (SGOT) U/L  --   --   --   --   --   --  19   GLUCOSE mg/dL 118* 117* 122*  --  77   < > 117*   WBC 10*3/mm3 14.74*  --   --  13.85* 13.40*   < > 17.07*   HEMOGLOBIN g/dL 12.0*  --   --  12.7* 12.6*   < > 12.1*   PLATELETS 10*3/mm3 185  --   --  180 171   < > 179   PROCALCITONIN ng/mL 0.27*  --  0.15  --   --   --   --     < > = values in this interval not displayed.               ASSESSMENT:   COPD with acute exacerbation    Cardiac arrest    Acute respiratory failure with hypoxia    Pneumothorax on right    Acute on chronic respiratory failure with hypoxia    Acute ischemic stroke    Paroxysmal atrial fibrillation  Rhinovirus bronchitis      PLAN:  Continue nebulized bronchodilators and flutter valve.  Patient needs to ambulate frequently daily to enhance clearance of respiratory secretions.  Increased procalcitonin and leukocytosis.  Order chest x-ray tomorrow morning and repeat procalcitonin.  Try to induce sputum for culture.  If this looks like healthcare  associated pneumonia tomorrow we will start antibiotics.          Matty Dickerson MD  9/19/2023

## 2023-09-19 NOTE — PLAN OF CARE
Goal Outcome Evaluation:              Outcome Evaluation: Re-evaluation of swallow completed. Recommend upgrade to puree diet with nectar thick liquids. No straws. Meds crushed with nectar thick liquid or puree. Sitting upright, slow rate, small bites/sips. Will proceed with VFSS next date to further assess swallow function as voice is difficult to assess.

## 2023-09-19 NOTE — THERAPY RE-EVALUATION
Acute Care - Speech Language Pathology   Swallow Re-Evaluation [facility]     Patient Name: Riky Rios  : 1935  MRN: [id number]  Today's Date: 2023               Admit Date: 2023    Visit Dx:   No diagnosis found.  Patient Active Problem List   Diagnosis    COPD with acute exacerbation    Cardiac arrest    Acute respiratory failure with hypoxia    Pneumothorax on right    Acute on chronic respiratory failure with hypoxia    Acute ischemic stroke    Paroxysmal atrial fibrillation     History reviewed. No pertinent past medical history.  History reviewed. No pertinent surgical history.    SLP Recommendation and Plan  SLP Swallowing Diagnosis: suspected pharyngeal dysphagia (23)  SLP Diet Recommendation: puree, nectar thick liquids (23)  Recommended Precautions and Strategies: upright posture during/after eating, small bites of food and sips of liquid, no straw, general aspiration precautions (23)  SLP Rec. for Method of Medication Administration: meds crushed, with thick liquids, with puree, as tolerated (23)     Monitor for Signs of Aspiration: yes, notify SLP if any concerns (23)  Recommended Diagnostics: VFSS ([facility]BS) (23)  Swallow Criteria for Skilled Therapeutic Interventions Met: demonstrates skilled criteria (23)  Anticipated Discharge Disposition (SLP): unknown (23)  Rehab Potential/Prognosis, Swallowing: good, to achieve stated therapy goals (23)  Therapy Frequency (Swallow): PRN (23)  Predicted Duration Therapy Intervention (Days): until discharge (23)  Oral Care Recommendations: Oral Care BID/PRN (23)                                      Oral Care Recommendations: Oral Care BID/PRN (23)    Outcome Evaluation: Re-evaluation of swallow completed. Full report pending. Recommend upgrade to puree diet with nectar thick liquids. Meds crushed with nectar  thick liquid or puree. Sitting upright, slow rate, small bites/sips. Will proceed with VFSS next date to further assess swallow function as voice is difficult to assess.      SWALLOW EVALUATION (last 72 hours)       SLP Adult Swallow Evaluation       Row Name 09/19/23 1200 09/18/23 1647                Rehab Evaluation    Document Type re-evaluation  -CR re-evaluation  -SR       Subjective Information no complaints  -CR no complaints  -SR       Patient Observations alert;cooperative  -CR alert;cooperative;agree to therapy  -SR       Patient Effort good  -CR adequate  -SR       Symptoms Noted During/After Treatment none  -CR none  -SR          General Information    Patient Profile Reviewed yes  -CR yes  -SR       Pertinent History Of Current Problem -- 87 y.o male with rhinovirus, COPD acute exacerbation, large right pneumothorax status post chest tube. MRI positive for acute stroke.  -SR       Current Method of Nutrition -- regular textures;thin liquids  -SR       Precautions/Limitations, Vision -- WFL;for purposes of eval  -SR       Precautions/Limitations, Hearing -- WFL;for purposes of eval  -SR       Prior Level of Function-Communication -- WFL  -SR       Prior Level of Function-Swallowing -- no diet consistency restrictions  -SR       Plans/Goals Discussed with -- patient;agreed upon  -SR       Barriers to Rehab -- medically complex  -SR          Pain Scale: Numbers Pre/Post-Treatment    Pretreatment Pain Rating 10/10 -CR 0/10 - no pain  -SR       Posttreatment Pain Rating 10/10 - patient complaining of right chest/abdomen pain  -CR 0/10 - no pain  -SR          Oral Motor Structure and Function    Dentition Assessment natural, present and adequate  -CR natural, present and adequate  -SR       Secretion Management --  using suction with little to no output and no change in vocal quality  -CR wet vocal quality;requires suctioning to control secretions  -SR       Mucosal Quality moist, healthy  -CR moist, healthy   -SR       Volitional Swallow -- weak  -SR       Volitional Cough -- weak  -SR          Oral Musculature and Cranial Nerve Assessment    Oral Motor General Assessment -- generalized oral motor weakness;vocal impairment  -SR       Vocal Impairment, Detail. Cranial Nerve X (Vagus) -- vocal quality abnormality (see comments)  dysphonic  -SR          General Eating/Swallowing Observations    Respiratory Support Currently in Use -- nasal cannula  -SR       O2 Liters -- 2L  -SR       Eating/Swallowing Skills -- self-fed;fed by SLP  -SR       Positioning During Eating -- upright 90 degree;upright in bed  -SR       Utensils Used -- spoon;cup;straw  -SR       Consistencies Trialed -- ice chips;thin liquids;nectar/syrup-thick liquids;honey-thick liquids  -SR          SLP Evaluation Clinical Impression    SLP Swallowing Diagnosis suspected pharyngeal dysphagia  -CR suspected pharyngeal dysphagia  -SR       Functional Impact risk of aspiration/pneumonia  -CR risk of aspiration/pneumonia  -SR       Rehab Potential/Prognosis, Swallowing good, to achieve stated therapy goals  -CR good, to achieve stated therapy goals  -SR       Swallow Criteria for Skilled Therapeutic Interventions Met demonstrates skilled criteria  -CR demonstrates skilled criteria  -SR          Recommendations    Therapy Frequency (Swallow) PRN  -CR PRN  -SR       Predicted Duration Therapy Intervention (Days) until discharge  -CR until discharge  -SR       SLP Diet Recommendation puree;nectar thick liquids  -CR NPO;ice chips between meals after oral care, with supervision;water between meals after oral care, with supervision  -SR       Recommended Diagnostics VFSS ([facility] [place], [place])  -CR reassess via clinical swallow evaluation  -SR       Recommended Precautions and Strategies upright posture during/after eating;small bites of food and sips of liquid;no straw;general aspiration precautions  -CR general aspiration precautions  -SR       Oral Care Recommendations Oral Care  BID/PRN  -CR Before ice/water  -SR       SLP Rec. for Method of Medication Administration meds crushed;with thick liquids;with puree;as tolerated  -CR meds via alternate route  -SR       Monitor for Signs of Aspiration yes;notify SLP if any concerns  -CR yes;notify SLP if any concerns  -SR       Anticipated Discharge Disposition (SLP) unknown  -CR unknown  -SR          (LTG) Patient will demonstrate functional swallow for    Diet Texture (Demonstrate functional swallow) soft to chew (chopped) textures  -CR soft to chew (chopped) textures  -SR       Liquid viscosity (Demonstrate functional swallow) thin liquids  -CR thin liquids  -SR       [place] (Demonstrate functional swallow) independently (over 90% accuracy)  -CR independently (over 90% accuracy)  -SR       Time Frame (Demonstrate functional swallow) by discharge  -CR by discharge  -SR       Progress/Outcomes (Demonstrate functional swallow) goal ongoing  -CR goal ongoing  -SR       Comment (Demonstrate functional swallow) Re-evaluation completed. Patient upright in recliner. Weak and strained vocal quality. Patient complaining of abdomen pain on right side. Using suction following semi-productive weak cough; little to no secretion output. No overt s/s of aspiration with ice chip, thin liquid by spoon, nectar thick liquid by spoon/cup or puree trials. Wet vocal quality and throat clear following thins by cup. Delayed cough with nectar thick liquid by straw. Recommend patient initiate puree diet with nectar thick liquids. No straws. Meds crushed with nectar thick liquid or puree. Sitting upright, slow rate, small bites/sips. Will proceed with VFSS next date to further assess swallow function as voice is difficult to assess at bedside.  -CR Patient seen for bedside swallow re-assessment. No family at bedside. Repositioned to upright position with assistance from nursing assistant. Vocal quality characterized as wet and dysphonic.  Patient with persistant  congested cough. Utilized yanker to manage secretions throughout assessment. Difficult to assess s/sx of aspiration vs. symptoms of rhinovirus throughout assessment. Patient had inconsistent cough across trials of ice chips, thin, nectar, and honey thick liquids. No overt s/sx observed with puree trials x3. Refused trials of solids. Patient reported that he has a decreased appetite and has not been eating very much.  Due to poor secretion management and inconsistent s/sx of aspiration, recommend NPO with medication via alternate route. Patient may have ice chips or small amounts of water after oral care for free water protocol. ST to follow.  -SR                 User Key  (r) = Recorded By, (t) = Taken By, (c) = Cosigned By      Initials Name Effective Dates    SR Dianne Combs CCC-SLP 11/10/22 -     Delmy Guidry, SLP 08/28/23 -                     EDUCATION  The patient has been educated in the following areas:   Dysphagia (Swallowing Impairment).        SLP GOALS       Row Name 09/19/23 1200 09/18/23 1430          (LTG) Patient will demonstrate functional swallow for    Diet Texture (Demonstrate functional swallow) soft to chew (chopped) textures  -CR soft to chew (chopped) textures  -SR     Liquid viscosity (Demonstrate functional swallow) thin liquids  -CR thin liquids  -SR     [place] (Demonstrate functional swallow) independently (over 90% accuracy)  -CR independently (over 90% accuracy)  -SR     Time Frame (Demonstrate functional swallow) by discharge  -CR by discharge  -SR     Progress/Outcomes (Demonstrate functional swallow) goal ongoing  -CR goal ongoing  -SR     Comment (Demonstrate functional swallow) Re-evaluation completed. Patient upright in recliner. Weak and strained vocal quality. Patient complaining of abdomen pain on right side. Using suction following semi-productive weak cough; little to no secretion output. No overt s/s of aspiration with ice chip, thin liquid by spoon,  nectar thick liquid by spoon/cup or puree trials. Wet vocal quality and throat clear following thins by cup. Delayed cough with nectar thick liquid by straw. Recommend patient initiate puree diet with nectar thick liquids. No straws. Meds crushed with nectar thick liquid or puree. Sitting upright, slow rate, small bites/sips. Will proceed with VFSS next date to further assess swallow function as voice is difficult to assess at bedside.  -CR Patient seen for bedside swallow re-assessment. No family at bedside. Repositioned to upright position with assistance from nursing assistant. Vocal quality characterized as wet and dysphonic.  Patient with persistant congested cough. Utilized yanker to manage secretions throughout assessment. Difficult to assess s/sx of aspiration vs. symptoms of rhinovirus throughout assessment. Patient had inconsistent cough across trials of ice chips, thin, nectar, and honey thick liquids. No overt s/sx observed with puree trials x3. Refused trials of solids. Patient reported that he has a decreased appetite and has not been eating very much.  Due to poor secretion management and inconsistent s/sx of aspiration, recommend NPO with medication via alternate route. Patient may have ice chips or small amounts of water after oral care for free water protocol. ST to follow.  -SR               User Key  (r) = Recorded By, (t) = Taken By, (c) = Cosigned By      Initials Name Provider Type    Dianne Lopez, CCC-SLP Speech and Language Pathologist    Delmy Guidry, SLP Speech and Language Pathologist                       Time Calculation:    Time Calculation- SLP       Row Name 09/19/23 1213             Time Calculation- SLP    SLP Start Time 0800  -CR      SLP Received On 09/19/23  -CR         Untimed Charges    [id number] Treatment Swallow Minutes 60  -CR         Total Minutes    Untimed Charges Total Minutes 60  -CR       Total Minutes 60  -CR                User Key  (r) = Recorded By,  (t) = Taken By, (c) = Cosigned By      Initials Name Provider Type    Delmy Guidry, SLP Speech and Language Pathologist                    Therapy Charges for Today       Code Description Service Date Service Provider Modifiers Qty    [id number] HC ST TREATMENT SWALLOW 4 9/19/2023 Delmy Esparza SLP GN 1                 CALEB Shah  9/19/2023

## 2023-09-19 NOTE — PLAN OF CARE
Goal Outcome Evaluation:  Plan of Care Reviewed With: patient           Outcome Evaluation: Pt much more awake today during his PT session compared to yesterday. Pt up in chair before and after PT. Pt performed B LE therapeutic exercises and maintained O2 sats >90% on 2L. Upon taking a few steps away from chair O2 sats decreased to 79% and pt with increased work of breathing. O2 was titrated to 3 LPM with RN's permission to assist pt in recovering O2 sats. Pt still required increase time to acheive >90% O2 sats. PT will continue to follow to address strength, mobility, and gait.      Anticipated Discharge Disposition (PT): skilled nursing facility, home with home health, home with assist

## 2023-09-19 NOTE — PROGRESS NOTES
"    Chief Complaint: Traumatic pneumothorax, right  S/P: Chest tube placement    Subjective:  Symptoms:  He reports weakness.  No shortness of breath or chest pain.  (unchanged).    Diet:  Adequate intake.  No nausea or vomiting.    Activity level: Impaired due to weakness.    Pain:  He complains of pain that is mild.    Resting in bed. Persistent congested cough.       Vital Signs:  Temp:  [97.4 °F (36.3 °C)-99 °F (37.2 °C)] 97.5 °F (36.4 °C)  Heart Rate:  [79-90] 87  Resp:  [16-18] 16  BP: (126-151)/(73-83) 126/73    Intake & Output (last day)         09/18 0701  09/19 0700 09/19 0701  09/20 0700    P.O. 850 360    Total Intake(mL/kg) 850 (14) 360 (5.9)    Urine (mL/kg/hr) 900 (0.6)     Chest Tube 0     Total Output 900     Net -50 +360          Urine Unmeasured Occurrence 2 x             Objective:  General Appearance:  Comfortable, in no acute distress and ill-appearing.    Vital signs: (most recent): Blood pressure 126/73, pulse 87, temperature 97.5 °F (36.4 °C), temperature source Oral, resp. rate 16, height 172.7 cm (68\"), weight 60.8 kg (134 lb), SpO2 90 %.    HEENT: (Congested cough, nasal cannula)    Lungs:  Normal effort and normal respiratory rate.  He is not in respiratory distress.  There are decreased breath sounds and rhonchi.  (Productive cough)  Heart: Normal rate.    Chest: Symmetric chest wall expansion. Chest wall tenderness present.    Abdomen: Abdomen is soft and non-distended.    Extremities: Decreased range of motion.    Neurological: Patient is alert.  (Slurred speech, decreased strength).    Pupils:  Pupils are equal, round, and reactive to light.    Skin:  Warm and dry.            Results Review:     I reviewed the patient's new clinical results.  I reviewed the patient's new imaging results and agree with the interpretation.  Discussed with patient, nurse.    Imaging Results (Last 24 Hours)       Procedure Component Value Units Date/Time    XR Chest 1 View [[id number]] Collected: 09/19/23 " 0921     Updated: 09/19/23 0927    Narrative:      XR CHEST 1 VW-     HISTORY: Chest tube management.     COMPARISON: Chest radiograph 9/18/2023     FINDINGS:    A single view of the chest was obtained. A previously noted right chest  tube has been removed. The cardiac silhouette and mediastinal and hilar  contours are not significantly changed. There is calcific aortic  atherosclerosis. A trace right pleural effusion and trace right apical  pneumothorax are similar. A small left pleural effusion and adjacent  left basilar airspace opacity have slightly progressed. There is  slightly improved subcutaneous emphysema in the right chest wall. There  is calcific atherosclerosis projecting over the bilateral axillae.     This report was finalized on 9/19/2023 9:24 AM by Dr. Aby Barboza M.D.       XR Chest 1 View [[id number]] Collected: 09/18/23 1554     Updated: 09/18/23 1600    Narrative:      AP CHEST     HISTORY: Follow-up right pneumothorax     COMPARISON: Earlier today     FINDINGS: Right chest tube stable in position. Possible minimal right  apical pneumothorax, measuring only a few millimeters. Stable right  chest wall subcutaneous air. Stable bibasilar atelectasis, left greater  than right. Heart size stable.       Impression:      Stable position of right chest tube. Possible minimal right apical  pneumothorax        This report was finalized on 9/18/2023 3:57 PM by Dr. Jenaro Garza M.D.               Lab Results:     Lab Results (last 24 hours)       Procedure Component Value Units Date/Time    POC Glucose Once [[id number]]  (Abnormal) Collected: 09/19/23 1059    Specimen: Blood Updated: 09/19/23 1100     Glucose 171 mg/dL     CBC (No Diff) [[id number]]  (Abnormal) Collected: 09/19/23 0443    Specimen: Blood Updated: 09/19/23 0616     WBC 14.74 10*3/mm3      RBC 3.36 10*6/mm3      Hemoglobin 12.0 g/dL      Hematocrit 34.7 %      .3 fL      MCH 35.7 pg      MCHC 34.6 g/dL      RDW 11.8 %      RDW-SD  "44.6 fl      MPV 10.8 fL      Platelets 185 10*3/mm3     Procalcitonin [[id number]]  (Abnormal) Collected: 09/19/23 0443    Specimen: Blood Updated: 09/19/23 0612     Procalcitonin 0.27 ng/mL     Narrative:      As a Marker for Sepsis (Non-Neonates):    1. <0.5 ng/mL represents a low risk of severe sepsis and/or septic shock.  2. >2 ng/mL represents a high risk of severe sepsis and/or septic shock.    As a Marker for Lower Respiratory Tract Infections that require antibiotic therapy:    PCT on Admission    Antibiotic Therapy       6-12 Hrs later    >0.5                Strongly Recommended  >0.25 - <0.5        Recommended   0.1 - 0.25          Discouraged              Remeasure/reassess PCT  <0.1                Strongly Discouraged     Remeasure/reassess PCT    As 28 day mortality risk marker: \"Change in Procalcitonin Result\" (>80% or <=80%) if Day 0 (or Day 1) and Day 4 values are available. Refer to http://www.[employer]-pct-calculator.com    Change in PCT <=80%  A decrease of PCT levels below or equal to 80% defines a positive change in PCT test result representing a higher risk for 28-day all-cause mortality of patients diagnosed with severe sepsis for septic shock.    Change in PCT >80%  A decrease of PCT levels of more than 80% defines a negative change in PCT result representing a lower risk for 28-day all-cause mortality of patients diagnosed with severe sepsis or septic shock.       Basic Metabolic Panel [[id number]]  (Abnormal) Collected: 09/19/23 0443    Specimen: Blood Updated: 09/19/23 0607     Glucose 118 mg/dL      BUN 30 mg/dL      Creatinine 0.89 mg/dL      Sodium 142 mmol/L      Potassium 4.7 mmol/L      Comment: Slight hemolysis detected by analyzer. Results may be affected.        Chloride 110 mmol/L      CO2 21.1 mmol/L      Calcium 8.7 mg/dL      BUN/Creatinine Ratio 33.7     Anion Gap 10.9 mmol/L      eGFR 83.5 mL/min/1.73     Narrative:      GFR Normal >60  Chronic Kidney Disease <60  Kidney Failure " <15    The GFR formula is only valid for adults with stable renal function between ages 18 and 70.    POC Glucose Once [[id number]]  (Normal) Collected: 09/19/23 0559    Specimen: Blood Updated: 09/19/23 0604     Glucose 120 mg/dL     POC Glucose Once [[id number]]  (Abnormal) Collected: 09/18/23 2056    Specimen: Blood Updated: 09/18/23 2100     Glucose 173 mg/dL     POC Glucose Once [[id number]]  (Normal) Collected: 09/18/23 1711    Specimen: Blood Updated: 09/18/23 1712     Glucose 106 mg/dL              Assessment & Plan       COPD with acute exacerbation    Cardiac arrest    Acute respiratory failure with hypoxia    Pneumothorax on right    Acute ischemic stroke    Paroxysmal atrial fibrillation    Acute bronchitis due to Rhinovirus    Hypertension    Acute left cerebellar infarct    Closed fracture of one rib of right side       Assessment & Plan    Independently reviewed this morning's chest x-ray which demonstrates very minimal pleural separation.  No significant pneumothorax.    Right pneumothorax: Patient is resting comfortably.  Chest x-ray post chest tube removal demonstrates no evidence of significant pneumothorax.  Patient will follow-up with pulmonary medicine.    Okay to resume anticoagulation at any time from our standpoint.    We will sign off but are are happy to see the patient in the future if needed.  Please reconsult at any time.    LAZARO Mantilla  [facility]  09/19/23  15:21 EDT

## 2023-09-19 NOTE — PROGRESS NOTES
Name: Riky Rios ADMIT: 2023   : 1935  PCP: Provider, No Known    MRN: [id number] LOS: 10 days   AGE/SEX: 87 y.o. male  ROOM: [facility] ([place])     Subjective   Subjective   Does not feel well in general but no specific complaints.  Breathing okay.  Has not worked with physical therapy today.       Objective   Objective   Vital Signs  Temp:  [97.4 °F (36.3 °C)-99 °F (37.2 °C)] 97.4 °F (36.3 °C)  Heart Rate:  [79-90] 90  Resp:  [16-18] 18  BP: (140-151)/(75-83) 140/75  SpO2:  [90 %-98 %] 92 %  on  Flow (L/min):  [2] 2;   Device (Oxygen Therapy): nasal cannula  Body mass index is 20.37 kg/m².  Physical Exam  Vitals and nursing note reviewed.   Constitutional:       General: He is not in acute distress.     Appearance: He is ill-appearing.   Cardiovascular:      Rate and Rhythm: Normal rate and regular rhythm.   Pulmonary:      Effort: Pulmonary effort is normal.      Breath sounds: Normal breath sounds.   Abdominal:      General: Bowel sounds are normal.      Palpations: Abdomen is soft.      Tenderness: There is no abdominal tenderness.   Musculoskeletal:         General: No swelling.   Skin:     General: Skin is warm and dry.   Neurological:      Mental Status: He is alert. Mental status is at baseline.     Results Review     I reviewed the patient's new clinical results.  Results from last 7 days   Lab Units 23  0443 23  0647 09/15/23  0443 23  0541   WBC 10*3/mm3 14.74* 13.85* 13.40* 14.43*   HEMOGLOBIN g/dL 12.0* 12.7* 12.6* 12.6*   PLATELETS 10*3/mm3 185 180 171 175     Results from last 7 days   Lab Units 23  0443 23  1441 23  1026 09/15/23  0443   SODIUM mmol/L 142 145 145 146*   POTASSIUM mmol/L 4.7 3.9 3.5 3.6   CHLORIDE mmol/L 110* 111* 112* 112*   CO2 mmol/L 21.1* 25.1 24.8 26.0   BUN mg/dL 30* 27* 29* 29*   CREATININE mg/dL 0.89 0.96 0.85 0.85   GLUCOSE mg/dL 118* 117* 122* 77   EGFR mL/min/1.73 83.5 77.0 84.6 84.6     Results from last 7 days   Lab Units  09/13/23  0530   ALBUMIN g/dL 3.0*   BILIRUBIN mg/dL 0.3   ALK PHOS U/L 77   AST (SGOT) U/L 19   ALT (SGPT) U/L 21     Results from last 7 days   Lab Units 09/19/23  0443 09/17/23  1441 09/16/23  1026 09/15/23  0443 09/14/23  0541 09/13/23  0530   CALCIUM mg/dL 8.7 8.5* 9.0 9.0   < > 9.0   ALBUMIN g/dL  --   --   --   --   --  3.0*    < > = values in this interval not displayed.     Results from last 7 days   Lab Units 09/19/23  0443 09/16/23  1026   PROCALCITONIN ng/mL 0.27* 0.15     Glucose   Date/Time Value Ref Range Status   09/19/2023 1059 171 (H) 70 - 130 mg/dL Final   09/19/2023 0559 120 70 - 130 mg/dL Final   09/18/2023 2056 173 (H) 70 - 130 mg/dL Final   09/18/2023 1711 106 70 - 130 mg/dL Final   09/18/2023 1157 106 70 - 130 mg/dL Final   09/18/2023 0823 95 70 - 130 mg/dL Final   09/18/2023 0624 97 70 - 130 mg/dL Final       XR Chest 1 View    Result Date: 9/18/2023  Stable position of right chest tube. Possible minimal right apical pneumothorax   This report was finalized on 9/18/2023 3:57 PM by Dr. Jenaro Garza M.D.       I have personally reviewed all medications:  Scheduled Medications  acetaminophen, 1,000 mg, Oral, TID  amLODIPine, 10 mg, Oral, Q24H  arformoterol, 15 mcg, Nebulization, BID - RT  aspirin, 81 mg, Oral, Daily  atorvastatin, 40 mg, Oral, Daily  budesonide, 0.5 mg, Nebulization, Daily - RT  enoxaparin, 1 mg/kg, Subcutaneous, Q12H  guaiFENesin, 600 mg, Oral, BID  insulin lispro, 2-7 Units, Subcutaneous, 4x Daily AC & at Bedtime  lidocaine, 1 patch, Transdermal, Q24H  mirtazapine, 15 mg, Oral, Nightly  predniSONE, 10 mg, Oral, Daily With Breakfast  [START ON 9/20/2023] predniSONE, 2.5 mg, Oral, Daily With Breakfast  senna-docusate sodium, 2 tablet, Oral, BID  sodium chloride, 10 mL, Intravenous, Q12H  sodium chloride, 4 mL, Nebulization, TID - RT  tamsulosin, 0.8 mg, Oral, Daily  tiotropium bromide monohydrate, 2 puff, Inhalation, Daily - RT    Infusions   Diet  Diet: Regular/House Diet;  No Straw; Texture: Pureed (NDD 1); Fluid Consistency: Nectar Thick    I have personally reviewed:  [x]  Laboratory   [x]  Microbiology   [x]  Radiology   [x]  EKG/Telemetry  [x]  Cardiology/Vascular   []  Pathology    []  Records       Assessment/Plan     Active Hospital Problems    Diagnosis  POA    **COPD with acute exacerbation [J44.1]  Yes    Acute bronchitis due to Rhinovirus [J20.6]  Yes    Hypertension [I10]  Yes    Acute left cerebellar infarct [I63.542]  No    Closed fracture of one rib of right side [S22.31XA]  No    Acute ischemic stroke [I63.9]  Yes    Paroxysmal atrial fibrillation [I48.0]  Clinically Undetermined    Pneumothorax on right [J93.9]  Yes    Cardiac arrest [I46.9]  Yes    Acute respiratory failure with hypoxia [J96.01]  Yes      Resolved Hospital Problems   No resolved problems to display.              Complicated hospital course reviewed.  Seems to be improving clinically.  Chest tube was removed yesterday.  Breathing comfortably on 2 L/min during my exam with O2 sat 91%.  Weaning down to his home dose steroid.  Pulmonology and thoracic surgery following.  Continue pulmonary hygiene and bronchodilators.    Needs to work aggressively with physical therapy.  His intent is to go home with home health.  CCP following.    Chest wall pain improving.  Pain secondary to rib fractures.  Discontinue morphine.  Roxicodone available.  Continue incentive spirometer.  Thoracic surgery still monitoring chest x-ray daily.    Neurologically stable.  Neurology signed off with recommendations for full anticoagulation and statin therapy at discharge.  While not 100% certain he had atrial fibrillation cardiology also agrees with anticoagulating.  Now the chest tube is removed we will see if thoracic surgery okay with Eliquis 2.5 mg twice daily as recommended by cardiology.    Not diabetic, dc accuchecks      Begin Eliquis adjusted dose per criteria 2.5 mg twice daily once chest tube removed and cleared by  cardiothoracic team-see note regarding aspirin above  Continue high-dose statin as written-on aspirin 81 mg and statin 40 mg daily prior to arrival  Outpatient ENT follow due to abnormalities noted on MRI above for direct visualization of suspected polyp.  Follow up neurology 3 months.  Full code.  Discussed with patient.  Anticipate discharge home with HH vs SNU facility when cleared by consultants..      Celso Schmitz MD  [facility]  09/19/23  12:49 EDT       5

## 2023-09-19 NOTE — PLAN OF CARE
Problem: Adult Inpatient Plan of Care  Goal: Plan of Care Review  Flowsheets (Taken 9/19/2023 4071)  Progress: no change  Plan of Care Reviewed With: patient  Outcome Evaluation: lungs diminished with coarse crackles bilaterally 02 sats upper 90's on 2 liters nasal cannula very weak unable to produce a forceful cough took meds with boost and tolerated well didnt seem to choke does not have much of an appetite states does feel better with the chest tube out turned q 2 hours with off loading of heels/pressure points intake and output are low   Goal Outcome Evaluation:  Plan of Care Reviewed With: patient        Progress: no change  Outcome Evaluation: lungs diminished with coarse crackles bilaterally 02 sats upper 90's on 2 liters nasal cannula very weak unable to produce a forceful cough took meds with boost and tolerated well didnt seem to choke does not have much of an appetite states does feel better with the chest tube out turned q 2 hours with off loading of heels/pressure points intake and output are low

## 2023-09-19 NOTE — PROGRESS NOTES
"Nutrition Services    Patient Name:  Riky Rios  YOB: 1935  MRN: [id number]  Admit Date:  9/9/2023    Assessment Date:  09/19/23    Summary:  Nutrition assessment initiated due to los of 10. Pt here with multiple medical issues listed below.  SLP evals noted from yesterday and today - diet changed to puree/ nectar thick liquids. Po intake has been very poor for several days.  Boost was ordered on 9/15. Unfortunately this is not nectar thick - will change to mighty shakes. Pt currently feeding himself and tolerating the puree food.  He is agreeable to the try mighty shakes and requests vanilla. Encouraged po intake.    RD will cont to follow clinical course, nutrition needs.    CLINICAL NUTRITION ASSESSMENT      Reason for Assessment Length of Stay     Diagnosis/Problem   COPD, bronchitis, HTN, L cerebral infarct, rib fx, ischemic stroke, PAF, PTX, cardiac arrest, resp failure   Medical/Surgical History History reviewed. No pertinent past medical history.    History reviewed. No pertinent surgical history.     Encounter Information        Nutrition History:     Factors Affecting Intake: altered mental status, decreased appetite, swallow impairment     Anthropometrics        Current Height  Current Weight  BMI kg/m2 Height: 172.7 cm (68\")  Weight: 60.8 kg (134 lb) (09/11/23 0959)  Body mass index is 20.37 kg/m².   Adjusted BMI (if applicable)    BMI Category Normal/Healthy (18.4 - 24.9)       Admission Weight 60.8kg       Ideal Body Weight (IBW) 68.4kg       Usual Body Weight (UBW) 132lb 9/2022   Weight Trend Stable       Weight History Wt Readings from Last 30 Encounters:   09/11/23 0959 60.8 kg (134 lb)   09/10/23 1728 60.8 kg (134 lb)   09/13/23 1745 60.8 kg (134 lb)      --  Tests/Procedures        Tests/Procedures Clinical Swallow Evaluation, CT scan, X-Ray     Labs       Pertinent Labs    Results from last 7 days   Lab Units 09/19/23  0443 09/17/23  1441 09/16/23  1026 09/14/23  0541 " 09/13/23  0530   SODIUM mmol/L 142 145 145   < > 143   POTASSIUM mmol/L 4.7 3.9 3.5   < > 4.1   CHLORIDE mmol/L 110* 111* 112*   < > 112*   CO2 mmol/L 21.1* 25.1 24.8   < > 22.1   BUN mg/dL 30* 27* 29*   < > 34*   CREATININE mg/dL 0.89 0.96 0.85   < > 0.88   CALCIUM mg/dL 8.7 8.5* 9.0   < > 9.0   BILIRUBIN mg/dL  --   --   --   --  0.3   ALK PHOS U/L  --   --   --   --  77   ALT (SGPT) U/L  --   --   --   --  21   AST (SGOT) U/L  --   --   --   --  19   GLUCOSE mg/dL 118* 117* 122*   < > 117*    < > = values in this interval not displayed.     Results from last 7 days   Lab Units 09/19/23  0443 09/15/23  0443 09/14/23  0541 09/13/23  0530   HEMOGLOBIN g/dL 12.0*   < > 12.6* 12.1*   HEMATOCRIT % 34.7*   < > 35.8* 34.6*   WBC 10*3/mm3 14.74*   < > 14.43* 17.07*   TRIGLYCERIDES mg/dL  --   --  82  --    ALBUMIN g/dL  --   --   --  3.0*    < > = values in this interval not displayed.     Results from last 7 days   Lab Units 09/19/23 0443 09/16/23  0647 09/15/23  0443 09/14/23  0541 09/13/23  0530   PLATELETS 10*3/mm3 185 180 171 175 179     COVID19   Date Value Ref Range Status   09/10/2023 Not Detected Not Detected - Ref. Range Final     Lab Results   Component Value Date    HGBA1C 5.40 09/14/2023          Medications           Scheduled Medications acetaminophen, 1,000 mg, Oral, TID  allopurinol, 300 mg, Oral, Daily  amLODIPine, 10 mg, Oral, Q24H  arformoterol, 15 mcg, Nebulization, BID - RT  aspirin, 81 mg, Oral, Daily  atorvastatin, 40 mg, Oral, Daily  budesonide, 0.5 mg, Nebulization, Daily - RT  enoxaparin, 1 mg/kg, Subcutaneous, Q12H  guaiFENesin, 600 mg, Oral, BID  lidocaine, 1 patch, Transdermal, Q24H  mirtazapine, 15 mg, Oral, Nightly  predniSONE, 10 mg, Oral, Daily With Breakfast  [START ON 9/20/2023] predniSONE, 2.5 mg, Oral, Daily With Breakfast  senna-docusate sodium, 2 tablet, Oral, BID  sodium chloride, 4 mL, Nebulization, TID - RT  tamsulosin, 0.8 mg, Oral, Daily  tiotropium bromide monohydrate, 2  puff, Inhalation, Daily - RT       Infusions     PRN Medications   [DISCONTINUED] acetaminophen **OR** [DISCONTINUED] acetaminophen **OR** acetaminophen    albuterol    aluminum-magnesium hydroxide-simethicone    senna-docusate sodium **AND** polyethylene glycol **AND** bisacodyl **AND** bisacodyl    ipratropium-albuterol    melatonin    nitroglycerin    ondansetron **OR** ondansetron    oxyCODONE     Physical Findings          General Appearance alert, disoriented, hard of hearing, on oxygen therapy   Oral/Mouth Cavity tooth or teeth missing   Edema  not assessed   Gastrointestinal last bowel movement: 9/16   Skin  bruising   Tubes/Drains/Lines none   NFPE Unable to perform due to: Pt sitting in chair eating lunch   --  Current Nutrition Orders & Evaluation of Intake       Oral Nutrition     Food Allergies NKFA   Current PO Diet Diet: Regular/House Diet; No Straw; Texture: Pureed (NDD 1); Fluid Consistency: Nectar Thick   Supplement Boost Plus, TID   PO Evaluation     % PO Intake/# of Days 0-bites   --  PES STATEMENT / NUTRITION DIAGNOSIS      Nutrition Dx Problem  Problem: Inadequate Oral Intake  Etiology: Medical Diagnosis - CVA    Signs/Symptoms: Report of Minimal PO Intake     --  NUTRITION INTERVENTION / PLAN OF CARE      Intervention Goal(s) Maintain nutrition status, Reduce/improve symptoms, Meet estimated needs, Disease management/therapy, Tolerate PO , Increase intake, and Maintain weight         RD Intervention/Action Interview for preferences, Encourage intake, Continue to monitor, Care plan reviewed, and Recommend/order:           Prescription/Orders:       PO Diet       Supplements Mighty shakes instead of boost      Snacks       Enteral Nutrition       Parenteral Nutrition    New Prescription Ordered? Yes   --      Monitor/Evaluation Per protocol   Discharge Plan/Needs Pending clinical course   --    RD to follow per protocol.    Electronically signed by:  Judie Chew RD  09/19/23 13:40 EDT

## 2023-09-19 NOTE — PLAN OF CARE
Goal Outcome Evaluation:  Plan of Care Reviewed With: patient        Progress: no change     Vital signs stable. Patient with poor cough effort. Breath sounds congested. Patient not able to clear secretions. Tonsil tip suction at bedside. Up with assistance of two out of bed. Very weak and unsteady on feet. Physical therapy worked with patient to get out of bed and stand.  Bed alarm on. Falls risk protocol in place. Not eating meals, only takes a few bites. Boost taken with each tray. Daughter visiting this afternoon. Turned and repositioned every 2 hours. Hob elevated 45 degrees. Right chest tube removed per thoracic nurse practitioner. Duoderm dressing applied. Patient pale and easily tires with any activity. Voiding per urinal small amounts, 100 cc with each voiding. On 2 liters oxygen per nasal cannula. Normal sinus cardiac rhythm. Resting in bed with eyes closed. Call light within patient's reach.

## 2023-09-19 NOTE — THERAPY TREATMENT NOTE
Patient Name: Riky Rios  : 1935    MRN: [id number]                              Today's Date: 2023       Admit Date: 2023    Visit Dx: No diagnosis found.  Patient Active Problem List   Diagnosis    COPD with acute exacerbation    Cardiac arrest    Acute respiratory failure with hypoxia    Pneumothorax on right    Acute ischemic stroke    Paroxysmal atrial fibrillation    Acute bronchitis due to Rhinovirus    Hypertension    Acute left cerebellar infarct    Closed fracture of one rib of right side     History reviewed. No pertinent past medical history.  History reviewed. No pertinent surgical history.   General Information       Row Name 23 1639          Physical Therapy Time and Intention    Document Type therapy note (daily note)  -     Mode of Treatment individual therapy;physical therapy  -       Row Name 23 163          General Information    Existing Precautions/Restrictions fall;oxygen therapy device and L/min  -       Row Name 23 163          Cognition    Orientation Status (Cognition) oriented to;person  -       Row Name 23 163          Safety Issues, Functional Mobility    Impairments Affecting Function (Mobility) endurance/activity tolerance;shortness of breath;strength;pain  -               User Key  (r) = Recorded By, (t) = Taken By, (c) = Cosigned By      Initials Name Provider Type     Meg Fitzgerald, PT Physical Therapist                   Mobility       Row Name 23 163          Bed Mobility    Bed Mobility supine-sit;sit-supine  -     Supine-Sit [place] (Bed Mobility) not tested  -     Sit-Supine [place] (Bed Mobility) not tested  -     Comment, (Bed Mobility) pt sitting in chair  -       Row Name 23 163          Sit-Stand Transfer    Sit-Stand [place] (Transfers) verbal cues;nonverbal cues (demo/gesture);minimum assist (75% patient effort);2 person assist  -     Assistive Device (Sit-Stand  Transfers) walker, front-wheeled  -       Row Name 09/19/23 1639          Gait/Stairs (Locomotion)    [place] Level (Gait) verbal cues;nonverbal cues (demo/gesture);minimum assist (75% patient effort);2 person assist  -     Assistive Device (Gait) walker, front-wheeled  -     Distance in Feet (Gait) 6 ft  -     Deviations/Abnormal Patterns (Gait) sumeet decreased;gait speed decreased;stride length decreased  -     Bilateral Gait Deviations forward flexed posture  -     Comment, (Gait/Stairs) Pt took a few steps forward and O2 sats dropped to 79%. Pt was returned to chair, after about a minute of seated rest break, O2 sats still in low 80s, PT titrated O2 up to 3LPM. after about 3 minutes O2 sats slowly recovered to 91%  -               User Key  (r) = Recorded By, (t) = Taken By, (c) = Cosigned By      Initials Name Provider Type     Meg Fitzgerald, PT Physical Therapist                   Obj/Interventions       Row Name 09/19/23 1642          Motor Skills    Therapeutic Exercise --  10 reps B LE AP, LAQ, and seated marches  -               User Key  (r) = Recorded By, (t) = Taken By, (c) = Cosigned By      Initials Name Provider Type     Meg Fitzgerald, PT Physical Therapist                   Goals/Plan       Row Name 09/19/23 1646          Bed Mobility Goal 1 (PT)    Activity/Assistive Device (Bed Mobility Goal 1, PT) bed mobility activities, all  -     [place] Level/Cues Needed (Bed Mobility Goal 1, PT) supervision required  -     Time Frame (Bed Mobility Goal 1, PT) 1 week  -CH     Progress/Outcomes (Bed Mobility Goal 1, PT) goal ongoing  -       Row Name 09/19/23 1646          Transfer Goal 1 (PT)    Activity/Assistive Device (Transfer Goal 1, PT) transfers, all;walker, rolling  -     [place] Level/Cues Needed (Transfer Goal 1, PT) supervision required  -CH     Time Frame (Transfer Goal 1, PT) 1 week  -CH     Progress/Outcome (Transfer Goal 1, PT) goal ongoing   -       Row Name 09/19/23 1646          Gait Training Goal 1 (PT)    Activity/Assistive Device (Gait Training Goal 1, PT) gait (walking locomotion);walker, rolling  -     [place] Level (Gait Training Goal 1, PT) supervision required  -CH     Distance (Gait Training Goal 1, PT) 150  -CH     Time Frame (Gait Training Goal 1, PT) 1 week  -     Progress/Outcome (Gait Training Goal 1, PT) goal ongoing  -               User Key  (r) = Recorded By, (t) = Taken By, (c) = Cosigned By      Initials Name Provider Type     Meg Fitzgerald, PT Physical Therapist                   Clinical Impression       Row Name 09/19/23 1642          Pain    Pre/Posttreatment Pain Comment chest pain with coughing  -     Pain Intervention(s) Repositioned  -       Row Name 09/19/23 1642          Plan of Care Review    Plan of Care Reviewed With patient  -     Outcome Evaluation Pt much more awake today during his PT session compared to yesterday. Pt up in chair before and after PT. Pt performed B LE therapeutic exercises and maintained O2 sats >90% on 2L. Upon taking a few steps away from chair O2 sats decreased to 79% and pt with increased work of breathing. O2 was titrated to 3 LPM with RN's permission to assist pt in recovering O2 sats. Pt still required increase time to acheive >90% O2 sats. PT will continue to follow to address strength, mobility, and gait.  -       Row Name 09/19/23 1642          Positioning and Restraints    Pre-Treatment Position sitting in chair/recliner  -     Post Treatment Position chair  -     In Chair reclined;call light within reach;encouraged to call for assist;exit alarm on;notified nsg  -               User Key  (r) = Recorded By, (t) = Taken By, (c) = Cosigned By      Initials Name Provider Type     Meg Fitzgerald, PT Physical Therapist                   Outcome Measures       Row Name 09/19/23 1647          How much help from another person do you currently need...     Turning from your back to your side while in flat bed without using bedrails? 3  -CH     Moving from lying on back to sitting on the side of a flat bed without bedrails? 3  -CH     Moving to and from a bed to a chair (including a wheelchair)? 3  -CH     Standing up from a chair using your arms (e.g., wheelchair, bedside chair)? 3  -CH     Climbing 3-5 steps with a railing? 1  -CH     To walk in hospital room? 2  -CH     AM-PAC 6 Clicks Score (PT) 15  -CH     Highest level of mobility 4 --> Transferred to chair/commode  -       Row Name 09/19/23 1647          Functional Assessment    Outcome Measure Options AM-PAC 6 Clicks Basic Mobility (PT)  -               User Key  (r) = Recorded By, (t) = Taken By, (c) = Cosigned By      Initials Name Provider Type     Meg Fitzgerald, PT Physical Therapist                                 Physical Therapy Education       Title: PT OT SLP Therapies (Done)       Topic: Physical Therapy (Done)       Point: Mobility training (Done)       Learning Progress Summary             Patient Acceptance, E,TB,D, VU,NR by  at 9/19/2023 1647    Acceptance, E, VU,NR by KT at 9/19/2023 1533    Acceptance, E,TB,D, VU,NR by  at 9/18/2023 1328    Acceptance, E, VU,NR by KT at 9/18/2023 1028    Acceptance, E,TB, VU,NR by CB at 9/17/2023 1438    Acceptance, E,TB,D, VU,NR by  at 9/13/2023 1138    Acceptance, E, VU,NR by KT at 9/12/2023 1156    Acceptance, E,TB,D, VU,NR by  at 9/12/2023 0955                         Point: Home exercise program (Done)       Learning Progress Summary             Patient Acceptance, E,TB,D, VU,NR by  at 9/19/2023 1647    Acceptance, E, VU,NR by KT at 9/19/2023 1533    Acceptance, E, VU,NR by KT at 9/18/2023 1028    Acceptance, E,TB, VU,NR by CB at 9/17/2023 1438    Acceptance, E,TB,D, VU,NR by  at 9/13/2023 1138    Acceptance, E, VU,NR by KT at 9/12/2023 1156                         Point: Body mechanics (Done)       Learning Progress Summary              Patient Acceptance, E,TB,D, VU,NR by  at 9/19/2023 1647    Acceptance, E, VU,NR by KT at 9/19/2023 1533    Acceptance, E,TB,D, VU,NR by  at 9/18/2023 1328    Acceptance, E, VU,NR by KT at 9/18/2023 1028    Acceptance, E,TB,D, VU,NR by PH at 9/13/2023 1138    Acceptance, E, VU,NR by KT at 9/12/2023 1156    Acceptance, E,TB,D, VU,NR by  at 9/12/2023 0955                         Point: Precautions (Done)       Learning Progress Summary             Patient Acceptance, E,TB,D, VU,NR by  at 9/19/2023 1647    Acceptance, E, VU,NR by KT at 9/19/2023 1533    Acceptance, E,TB,D, VU,NR by  at 9/18/2023 1328    Acceptance, E, VU,NR by KT at 9/18/2023 1028    Acceptance, E,TB,D, VU,NR by  at 9/13/2023 1138    Acceptance, E, VU,NR by KT at 9/12/2023 1156    Acceptance, E,TB,D, VU,NR by  at 9/12/2023 0955                                         User Key       Initials Effective Dates Name Provider Type Discipline     06/16/21 -  Meg Fitzgerald, PT Physical Therapist PT    KT 06/16/21 -  Chel De Anda RN Registered Nurse Nurse    PH 06/16/21 -  Sherri Whitten PTA Physical Therapist Assistant PT    CB 10/22/21 -  Mary Reyes PT Physical Therapist PT                  PT Recommendation and Plan  Planned Therapy Interventions (PT): balance training, bed mobility training, gait training, home exercise program, patient/family education, strengthening, transfer training  Plan of Care Reviewed With: patient  Outcome Evaluation: Pt much more awake today during his PT session compared to yesterday. Pt up in chair before and after PT. Pt performed B LE therapeutic exercises and maintained O2 sats >90% on 2L. Upon taking a few steps away from chair O2 sats decreased to 79% and pt with increased work of breathing. O2 was titrated to 3 LPM with RN's permission to assist pt in recovering O2 sats. Pt still required increase time to acheive >90% O2 sats. PT will continue to follow to address strength,  mobility, and gait.     Time Calculation:         PT Charges       Row Name 09/19/23 1647             Time Calculation    Start Time 1505  -      Stop Time 1518  -CH      Time Calculation (min) 13 min  -CH      PT Received On 09/19/23  -      PT - Next Appointment 09/20/23  -      PT Goal Re-Cert Due Date 09/19/23  -         Time Calculation- PT    Total Timed Code Minutes- PT 13 minute(s)  -CH         Timed Charges    75471 - PT Therapeutic Activity Minutes 13  -CH         Total Minutes    Timed Charges Total Minutes 13  -CH       Total Minutes 13  -CH                User Key  (r) = Recorded By, (t) = Taken By, (c) = Cosigned By      Initials Name Provider Type     Meg Fitzgerald, PT Physical Therapist                  Therapy Charges for Today       Code Description Service Date Service Provider Modifiers Qty    63207788579  PT THERAPEUTIC ACT EA 15 MIN 9/18/2023 Meg Fitzgerald, PT GP 1    65098124662 HC PT THERAPEUTIC ACT EA 15 MIN 9/19/2023 Meg Fitzgerald, PT GP 1            PT G-Codes  Outcome Measure Options: AM-PAC 6 Clicks Basic Mobility (PT)  AM-PAC 6 Clicks Score (PT): 15  AM-PAC 6 Clicks Score (OT): 13  Modified [place] Scale: 3 - Moderate disability.  Requiring some help, but able to walk without assistance.  PT Discharge Summary  Anticipated Discharge Disposition (PT): skilled nursing facility, home with home health, home with assist    Meg Fitzgerald, PT  9/19/2023

## 2023-09-20 ENCOUNTER — APPOINTMENT (OUTPATIENT)
Dept: GENERAL RADIOLOGY | Facility: HOSPITAL | Age: 88
DRG: 199 | End: 2023-09-20
Payer: MEDICARE

## 2023-09-20 LAB
ANION GAP SERPL CALCULATED.3IONS-SCNC: 9.4 MMOL/L (ref 5–15)
ARTERIAL PATENCY WRIST A: ABNORMAL
ATMOSPHERIC PRESS: 749.4 MMHG
BASE EXCESS BLDA CALC-SCNC: 0.2 MMOL/L (ref 0–2)
BDY SITE: ABNORMAL
BUN SERPL-MCNC: 33 MG/DL (ref 8–23)
BUN/CREAT SERPL: 32 (ref 7–25)
CALCIUM SPEC-SCNC: 8.7 MG/DL (ref 8.6–10.5)
CHLORIDE SERPL-SCNC: 110 MMOL/L (ref 98–107)
CO2 BLDA-SCNC: 22.5 MMOL/L (ref 23–27)
CO2 SERPL-SCNC: 24.6 MMOL/L (ref 22–29)
CREAT SERPL-MCNC: 1.03 MG/DL (ref 0.76–1.27)
DEPRECATED RDW RBC AUTO: 47.5 FL (ref 37–54)
DEVICE COMMENT: ABNORMAL
EGFRCR SERPLBLD CKD-EPI 2021: 70.7 ML/MIN/1.73
ERYTHROCYTE [DISTWIDTH] IN BLOOD BY AUTOMATED COUNT: 12.5 % (ref 12.3–15.4)
GAS FLOW AIRWAY: 3 LPM
GLUCOSE SERPL-MCNC: 103 MG/DL (ref 65–99)
HCO3 BLDA-SCNC: 21.7 MMOL/L (ref 22–28)
HCT VFR BLD AUTO: 32.9 % (ref 37.5–51)
HEMODILUTION: NO
HGB BLD-MCNC: 11.3 G/DL (ref 13–17.7)
MCH RBC QN AUTO: 35.8 PG (ref 26.6–33)
MCHC RBC AUTO-ENTMCNC: 34.3 G/DL (ref 31.5–35.7)
MCV RBC AUTO: 104.1 FL (ref 79–97)
MODALITY: ABNORMAL
MRSA DNA SPEC QL NAA+PROBE: ABNORMAL
PCO2 BLDA: 26.6 MM HG (ref 35–45)
PH BLDA: 7.52 PH UNITS (ref 7.35–7.45)
PLATELET # BLD AUTO: 174 10*3/MM3 (ref 140–450)
PMV BLD AUTO: 10.3 FL (ref 6–12)
PO2 BLDA: 65.6 MM HG (ref 80–100)
POTASSIUM SERPL-SCNC: 3.7 MMOL/L (ref 3.5–5.2)
PROCALCITONIN SERPL-MCNC: 0.24 NG/ML (ref 0–0.25)
RBC # BLD AUTO: 3.16 10*6/MM3 (ref 4.14–5.8)
SAO2 % BLDCOA: 95 % (ref 92–98.5)
SET MECH RESP RATE: 16
SODIUM SERPL-SCNC: 144 MMOL/L (ref 136–145)
TOTAL RATE: 16 BREATHS/MINUTE
WBC NRBC COR # BLD: 16.48 10*3/MM3 (ref 3.4–10.8)

## 2023-09-20 PROCEDURE — 94799 UNLISTED PULMONARY SVC/PX: CPT

## 2023-09-20 PROCEDURE — 92611 MOTION FLUOROSCOPY/SWALLOW: CPT | Performed by: SPEECH-LANGUAGE PATHOLOGIST

## 2023-09-20 PROCEDURE — 82803 BLOOD GASES ANY COMBINATION: CPT

## 2023-09-20 PROCEDURE — 71046 X-RAY EXAM CHEST 2 VIEWS: CPT

## 2023-09-20 PROCEDURE — 80048 BASIC METABOLIC PNL TOTAL CA: CPT | Performed by: HOSPITALIST

## 2023-09-20 PROCEDURE — 94664 DEMO&/EVAL PT USE INHALER: CPT

## 2023-09-20 PROCEDURE — 74230 X-RAY XM SWLNG FUNCJ C+: CPT

## 2023-09-20 PROCEDURE — 25010000002 VANCOMYCIN 10 G RECONSTITUTED SOLUTION: Performed by: INTERNAL MEDICINE

## 2023-09-20 PROCEDURE — 85027 COMPLETE CBC AUTOMATED: CPT | Performed by: HOSPITALIST

## 2023-09-20 PROCEDURE — 94761 N-INVAS EAR/PLS OXIMETRY MLT: CPT

## 2023-09-20 PROCEDURE — 25010000002 METHYLPREDNISOLONE PER 125 MG: Performed by: INTERNAL MEDICINE

## 2023-09-20 PROCEDURE — 97530 THERAPEUTIC ACTIVITIES: CPT

## 2023-09-20 PROCEDURE — 36600 WITHDRAWAL OF ARTERIAL BLOOD: CPT

## 2023-09-20 PROCEDURE — 97110 THERAPEUTIC EXERCISES: CPT

## 2023-09-20 PROCEDURE — 84145 PROCALCITONIN (PCT): CPT | Performed by: INTERNAL MEDICINE

## 2023-09-20 PROCEDURE — 25010000002 VANCOMYCIN PER 500 MG: Performed by: HOSPITALIST

## 2023-09-20 RX ORDER — BUMETANIDE 0.25 MG/ML
1 INJECTION INTRAMUSCULAR; INTRAVENOUS ONCE
Status: COMPLETED | OUTPATIENT
Start: 2023-09-20 | End: 2023-09-20

## 2023-09-20 RX ORDER — METHYLPREDNISOLONE SODIUM SUCCINATE 125 MG/2ML
80 INJECTION, POWDER, LYOPHILIZED, FOR SOLUTION INTRAMUSCULAR; INTRAVENOUS ONCE
Status: COMPLETED | OUTPATIENT
Start: 2023-09-20 | End: 2023-09-20

## 2023-09-20 RX ORDER — DIPHENHYDRAMINE HYDROCHLORIDE 50 MG/ML
12.5 INJECTION INTRAMUSCULAR; INTRAVENOUS EVERY 6 HOURS PRN
Status: DISCONTINUED | OUTPATIENT
Start: 2023-09-20 | End: 2023-09-21

## 2023-09-20 RX ADMIN — GUAIFENESIN 600 MG: 600 TABLET, EXTENDED RELEASE ORAL at 09:00

## 2023-09-20 RX ADMIN — BARIUM SULFATE 50 ML: 400 SUSPENSION ORAL at 11:34

## 2023-09-20 RX ADMIN — ARFORMOTEROL TARTRATE 15 MCG: 15 SOLUTION RESPIRATORY (INHALATION) at 07:39

## 2023-09-20 RX ADMIN — BARIUM SULFATE 55 ML: 0.81 POWDER, FOR SUSPENSION ORAL at 11:34

## 2023-09-20 RX ADMIN — ATORVASTATIN CALCIUM 40 MG: 20 TABLET, FILM COATED ORAL at 09:00

## 2023-09-20 RX ADMIN — Medication 4 ML: at 21:40

## 2023-09-20 RX ADMIN — ASPIRIN 81 MG: 81 TABLET, COATED ORAL at 09:00

## 2023-09-20 RX ADMIN — TIOTROPIUM BROMIDE INHALATION SPRAY 2 PUFF: 3.12 SPRAY, METERED RESPIRATORY (INHALATION) at 07:39

## 2023-09-20 RX ADMIN — ACETAMINOPHEN 1000 MG: 500 TABLET ORAL at 09:00

## 2023-09-20 RX ADMIN — AMLODIPINE BESYLATE 10 MG: 10 TABLET ORAL at 09:01

## 2023-09-20 RX ADMIN — ALLOPURINOL 300 MG: 300 TABLET ORAL at 09:00

## 2023-09-20 RX ADMIN — Medication 4 ML: at 07:39

## 2023-09-20 RX ADMIN — SENNOSIDES AND DOCUSATE SODIUM 2 TABLET: 50; 8.6 TABLET ORAL at 09:01

## 2023-09-20 RX ADMIN — TAMSULOSIN HYDROCHLORIDE 0.8 MG: 0.4 CAPSULE ORAL at 09:00

## 2023-09-20 RX ADMIN — BUMETANIDE 1 MG: 0.25 INJECTION INTRAMUSCULAR; INTRAVENOUS at 15:35

## 2023-09-20 RX ADMIN — LIDOCAINE 1 PATCH: 50 PATCH CUTANEOUS at 09:00

## 2023-09-20 RX ADMIN — APIXABAN 2.5 MG: 2.5 TABLET, FILM COATED ORAL at 09:00

## 2023-09-20 RX ADMIN — VANCOMYCIN HYDROCHLORIDE 500 MG: 500 INJECTION, POWDER, LYOPHILIZED, FOR SOLUTION INTRAVENOUS at 15:37

## 2023-09-20 RX ADMIN — BUDESONIDE 0.5 MG: 0.5 INHALANT ORAL at 07:38

## 2023-09-20 RX ADMIN — VANCOMYCIN HYDROCHLORIDE 1250 MG: 10 INJECTION, POWDER, LYOPHILIZED, FOR SOLUTION INTRAVENOUS at 00:52

## 2023-09-20 RX ADMIN — ARFORMOTEROL TARTRATE 15 MCG: 15 SOLUTION RESPIRATORY (INHALATION) at 21:36

## 2023-09-20 RX ADMIN — METHYLPREDNISOLONE SODIUM SUCCINATE 80 MG: 125 INJECTION, POWDER, FOR SOLUTION INTRAMUSCULAR; INTRAVENOUS at 13:42

## 2023-09-20 NOTE — PLAN OF CARE
Goal Outcome Evaluation:  Plan of Care Reviewed With: patient        Progress: improving  Outcome Evaluation: Pt seen by PT this AM for tx. Pt has just transferred to chair w/ RN and aide w/ RN incr O2 to 10 L. Pt mostly in low 90s for O2 during session w/ freq cues to breath through nc (mouth breathing observed). Pt stood req cGA/min A x 2 w/ useo fww. Pt amb approx 15' w/ pt desatting to 86% and seated at EOB for 3 min rest. Pt then amb 10' back to chair w/ finger monitor not operational. Monitor began working after approx 45 sec in chair and was at 96%. Pt performed ther ex for strengthening and to incr endurance. PT will prog as pt enrrique.      Anticipated Discharge Disposition (PT): skilled nursing facility

## 2023-09-20 NOTE — CASE MANAGEMENT/SOCIAL WORK
Continued Stay Note  [facility]     Patient Name: Riky Rios  MRN: [id number]  Today's Date: 9/20/2023    Admit Date: 9/9/2023    Plan: Home with HH vs sNF   Discharge Plan       Row Name 09/20/23 1320       Plan    Plan Home with HH vs sNF    Provided Post Acute Provider List? Yes    Post Acute Provider List Home Health    Delivered To Support Person  Pts son/Brian    Plan Comments Spoke with pts son over phone and discussed possible need for rehab at discharge. Pt states that if rehab needed, he would want [facility].  Referral placed in [facility] for [facility].  [facility] unable to accept, pt son states he has no preference for HH agency outside of [facility].  Referral placed in [facility] for [facility].  IVAN Rios RN.                   Discharge Codes    No documentation.                 Expected Discharge Date and Time       Expected Discharge Date Expected Discharge Time    Sep 20, 2023               Modesta Rios, RN

## 2023-09-20 NOTE — PLAN OF CARE
Goal Outcome Evaluation:  Plan of Care Reviewed With: patient           Outcome Evaluation: VFSS completed with Dr Mackay. Patient demonstrated trace penetration with thin and nectar thick liquids. Trace silent aspiration observed x1 with straw sips of thin liquids. Mild vallecular residue with nectar, with moderate pharyngeal residue with puree and solids.  REC puree diet with thin liquids by cup.  Meds with puree.  Upright during and 30-60 minutes after all po intake.  No straws.  Will f/u for tolerance and ability to upgrade.  Please contact if pt showing s/s aspiration during meals.

## 2023-09-20 NOTE — PROGRESS NOTES
"[facility] Clinical Pharmacy Services: Vancomycin Pharmacokinetic Initial Consult Note    Riky Rios is a 87 y.o. male who is on day 1 of pharmacy to dose vancomycin.    Indication: Pneumonia  Consulting Provider: Amanda Poole MD   Planned Duration of Therapy: 7 days   Loading Dose Ordered or Given: 1250 mg on 9/20 at 0052  MRSA PCR performed: + for MRSA  Culture/Source:   9/19 bld cxs NG  9/19 respiratory + Rhinovirus/Enterovirus  9/19 S. Pneumo NG  9/19 Legionella NG  Target: -600 mg/L.hr   Pertinent Vanc Dosing History:   Other Antimicrobials: None    Vitals/Labs  Ht: 172.7 cm (68\"); Wt: 60.8 kg (134 lb)  Temp Readings from Last 1 Encounters:   09/19/23 97.7 °F (36.5 °C) (Oral)    Estimated Creatinine Clearance: 51.2 mL/min (by C-G formula based on SCr of 0.89 mg/dL).       Results from last 7 days   Lab Units 09/19/23  0443 09/17/23  1441 09/16/23  1026 09/16/23  0647 09/15/23  0443   CREATININE mg/dL 0.89 0.96 0.85  --  0.85   WBC 10*3/mm3 14.74*  --   --  13.85* 13.40*     Assessment/Plan:    Vancomycin Dose:   500 mg IV every  12  hours  Predictive AUC level for the dose ordered is 429 mg/L.hr, which is within the target of 400-600 mg/L.hr  Vanc Trough has been ordered for 9/21  at 1230     Pharmacy will follow patient's kidney function and will adjust doses and obtain levels as necessary. Thank you for involving pharmacy in this patient's care. Please contact pharmacy with any questions or concerns.                           Kim Braga [facility]  Clinical Pharmacist    "

## 2023-09-20 NOTE — THERAPY TREATMENT NOTE
Patient Name: Riky Rios  : 1935    MRN: [id number]                              Today's Date: 2023       Admit Date: 2023    Visit Dx: No diagnosis found.  Patient Active Problem List   Diagnosis    COPD with acute exacerbation    Cardiac arrest    Acute respiratory failure with hypoxia    Pneumothorax on right    Paroxysmal atrial fibrillation    Acute bronchitis due to Rhinovirus    Hypertension    Acute left cerebellar infarct    Closed fracture of one rib of right side     History reviewed. No pertinent past medical history.  History reviewed. No pertinent surgical history.   General Information       Row Name 23 1001          Physical Therapy Time and Intention    Document Type therapy note (daily note)  -PH     Mode of Treatment physical therapy  -PH       Row Name 23 1001          General Information    Existing Precautions/Restrictions fall;oxygen therapy device and L/min  on 10L after recent transfer to chair w/ nsg  -PH     Barriers to Rehab medically complex;previous functional deficit  -PH       Row Name 23 1001          Safety Issues, Functional Mobility    Impairments Affecting Function (Mobility) balance;endurance/activity tolerance;strength  -PH     Comment, Safety Issues/Impairments (Mobility) gt belt and non skid socks donned  -PH               User Key  (r) = Recorded By, (t) = Taken By, (c) = Cosigned By      Initials Name Provider Type    PH Sherri Whitten PTA Physical Therapist Assistant                   Mobility       Row Name 23 1002          Bed Mobility    Scooting/Bridging [place] (Bed Mobility) not tested  -PH     Supine-Sit [place] (Bed Mobility) not tested  -PH     Comment, (Bed Mobility) pt [facility] after recent transfer w/ RN  -PH       Row Name 23 1002          Sit-Stand Transfer    Sit-Stand [place] (Transfers) contact guard;2 person assist;verbal cues;nonverbal cues (demo/gesture);minimum assist (75% patient  effort)  -PH     Assistive Device (Sit-Stand Transfers) walker, front-wheeled  -PH       Row Name 09/20/23 1002          Gait/Stairs (Locomotion)    [place] Level (Gait) verbal cues;nonverbal cues (demo/gesture);contact guard;minimum assist (75% patient effort);2 person assist  -PH     Assistive Device (Gait) walker, front-wheeled  -PH     Distance in Feet (Gait) 15' w/ approx 3 min seated rest EOB when pt desatted to 86% then approx 10' back to chair  -PH     Deviations/Abnormal Patterns (Gait) sumeet decreased;gait speed decreased;stride length decreased  -PH     Bilateral Gait Deviations forward flexed posture  -PH     [place] Level (Stairs) unable to assess  -PH     Comment, (Gait/Stairs) mild unsteadines at times w/ no overt LOB. Finger monitor became non operational as pt amb from EOB to chair and beg working approx 45 after seated in chair - 96%.  -PH               User Key  (r) = Recorded By, (t) = Taken By, (c) = Cosigned By      Initials Name Provider Type     Sherri Whitten PTA Physical Therapist Assistant                   Obj/Interventions       Row Name 09/20/23 1004          Motor Skills    Therapeutic Exercise other (see comments)  BAP, LAQ, seated march, shldr flexion, punches; x 10 reps all  -PH       Row Name 09/20/23 1004          Balance    Balance Assessment sitting static balance;standing static balance  -PH     Static Sitting Balance standby assist  -PH     Static Standing Balance contact guard;2-person assist  -PH     Position/Device Used, Standing Balance walker, front-wheeled  -PH               User Key  (r) = Recorded By, (t) = Taken By, (c) = Cosigned By      Initials Name Provider Type     Sherri Whitten PTA Physical Therapist Assistant                   Goals/Plan    No documentation.                  Clinical Impression       Row Name 09/20/23 1005          Pain    Pretreatment Pain Rating 10/10  -PH     Posttreatment Pain Rating 10/10  -PH      Pre/Posttreatment Pain Comment at site of prior chest tube insertion  -PH     Pain Intervention(s) Repositioned;Rest  -PH     Additional Documentation Pain Scale: Numbers Pre/Post-Treatment (Group)  -PH       Row Name 09/20/23 1005          Plan of Care Review    Plan of Care Reviewed With patient  -PH     Progress improving  -PH     Outcome Evaluation Pt seen by PT this AM for tx. Pt has just transferred to chair w/ RN and aide w/ RN incr O2 to 10 L. Pt mostly in low 90s for O2 during session w/ freq cues to breath through nc (mouth breathing observed). Pt stood req cGA/min A x 2 w/ useo fww. Pt amb approx 15' w/ pt desatting to 86% and seated at EOB for 3 min rest. Pt then amb 10' back to chair w/ finger monitor not operational. Monitor began working after approx 45 sec in chair and was at 96%. Pt performed ther ex for strengthening and to incr endurance. PT will prog as pt enrrique.  -PH       Row Name 09/20/23 1005          Vital Signs    Pre SpO2 (%) 91  10L HF  -PH     O2 Delivery Pre Treatment hi-flow  -PH     Intra SpO2 (%) 86  -PH     O2 Delivery Intra Treatment hi-flow  -PH     Post SpO2 (%) 91  -PH     O2 Delivery Post Treatment hi-flow  -PH       Row Name 09/20/23 1005          Positioning and Restraints    Pre-Treatment Position sitting in chair/recliner  -PH     Post Treatment Position chair  -PH     In Chair reclined;call light within reach;encouraged to call for assist;exit alarm on;notified nsg  -PH               User Key  (r) = Recorded By, (t) = Taken By, (c) = Cosigned By      Initials Name Provider Type    PH Sherri Whitten PTA Physical Therapist Assistant                   Outcome Measures       Row Name 09/20/23 1009          How much help from another person do you currently need...    Turning from your back to your side while in flat bed without using bedrails? 3  -PH     Moving from lying on back to sitting on the side of a flat bed without bedrails? 3  -PH     Moving to and from a bed  to a chair (including a wheelchair)? 3  -PH     Standing up from a chair using your arms (e.g., wheelchair, bedside chair)? 3  -PH     Climbing 3-5 steps with a railing? 1  -PH     To walk in hospital room? 3  -PH     AM-PAC 6 Clicks Score (PT) 16  -PH     Highest level of mobility 5 --> Static standing  -PH       Row Name 09/20/23 1009          Functional Assessment    Outcome Measure Options AM-PAC 6 Clicks Basic Mobility (PT)  -PH               User Key  (r) = Recorded By, (t) = Taken By, (c) = Cosigned By      Initials Name Provider Type    Sherri Garcia, PTA Physical Therapist Assistant                                 Physical Therapy Education       Title: PT OT SLP Therapies (Done)       Topic: Physical Therapy (Done)       Point: Mobility training (Done)       Learning Progress Summary             Patient Acceptance, E,TB,D, VU,NR by  at 9/20/2023 1009    Acceptance, E,TB,D, VU,NR by  at 9/19/2023 1647    Acceptance, E, VU,NR by KT at 9/19/2023 1533    Acceptance, E,TB,D, VU,NR by  at 9/18/2023 1328    Acceptance, E, VU,NR by KT at 9/18/2023 1028    Acceptance, E,TB, VU,NR by CB at 9/17/2023 1438    Acceptance, E,TB,D, VU,NR by  at 9/13/2023 1138    Acceptance, E, VU,NR by KT at 9/12/2023 1156    Acceptance, E,TB,D, VU,NR by  at 9/12/2023 0955                         Point: Home exercise program (Done)       Learning Progress Summary             Patient Acceptance, E,TB,D, VU,NR by  at 9/20/2023 1009    Acceptance, E,TB,D, VU,NR by  at 9/19/2023 1647    Acceptance, E, VU,NR by KT at 9/19/2023 1533    Acceptance, E, VU,NR by KT at 9/18/2023 1028    Acceptance, E,TB, VU,NR by CB at 9/17/2023 1438    Acceptance, E,TB,D, VU,NR by  at 9/13/2023 1138    Acceptance, E, VU,NR by KT at 9/12/2023 1156                         Point: Body mechanics (Done)       Learning Progress Summary             Patient Acceptance, E,TB,D, VU,NR by PH at 9/20/2023 1009    Acceptance, E,TB,D, VU,NR by  at  9/19/2023 1647    Acceptance, E, VU,NR by KT at 9/19/2023 1533    Acceptance, E,TB,D, VU,NR by  at 9/18/2023 1328    Acceptance, E, VU,NR by KT at 9/18/2023 1028    Acceptance, E,TB,D, VU,NR by PH at 9/13/2023 1138    Acceptance, E, VU,NR by KT at 9/12/2023 1156    Acceptance, E,TB,D, VU,NR by  at 9/12/2023 0955                         Point: Precautions (Done)       Learning Progress Summary             Patient Acceptance, E,TB,D, VU,NR by PH at 9/20/2023 1009    Acceptance, E,TB,D, VU,NR by  at 9/19/2023 1647    Acceptance, E, VU,NR by KT at 9/19/2023 1533    Acceptance, E,TB,D, VU,NR by  at 9/18/2023 1328    Acceptance, E, VU,NR by KT at 9/18/2023 1028    Acceptance, E,TB,D, VU,NR by  at 9/13/2023 1138    Acceptance, E, VU,NR by KT at 9/12/2023 1156    Acceptance, E,TB,D, VU,NR by  at 9/12/2023 0955                                         User Key       Initials Effective Dates Name Provider Type Discipline     06/16/21 -  Meg Fitzgerald, PT Physical Therapist PT    KT 06/16/21 -  Chel De Anda RN Registered Nurse Nurse    PH 06/16/21 -  Sherri Whitten PTA Physical Therapist Assistant PT    CB 10/22/21 -  Mary Reyes PT Physical Therapist PT                  PT Recommendation and Plan     Plan of Care Reviewed With: patient  Progress: improving  Outcome Evaluation: Pt seen by PT this AM for tx. Pt has just transferred to chair w/ RN and aide w/ RN incr O2 to 10 L. Pt mostly in low 90s for O2 during session w/ freq cues to breath through nc (mouth breathing observed). Pt stood req cGA/min A x 2 w/ useo fww. Pt amb approx 15' w/ pt desatting to 86% and seated at EOB for 3 min rest. Pt then amb 10' back to chair w/ finger monitor not operational. Monitor began working after approx 45 sec in chair and was at 96%. Pt performed ther ex for strengthening and to incr endurance. PT will prog as pt enrrique.     Time Calculation:         PT Charges       Row Name 09/20/23 3081              Time Calculation    Start Time 0906  -PH      Stop Time 0930  -PH      Time Calculation (min) 24 min  -PH      PT Received On 09/20/23  -PH      PT - Next Appointment 09/21/23  -PH         Timed Charges    98552 - PT Therapeutic Exercise Minutes 9  -PH      10007 - PT Therapeutic Activity Minutes 15  -PH         Total Minutes    Timed Charges Total Minutes 24  -PH       Total Minutes 24  -PH                User Key  (r) = Recorded By, (t) = Taken By, (c) = Cosigned By      Initials Name Provider Type     Sherri Whitten PTA Physical Therapist Assistant                  Therapy Charges for Today       Code Description Service Date Service Provider Modifiers Qty    51199575783 HC PT THER PROC EA 15 MIN 9/20/2023 Sherri Whitten, MEGAN GP 1    51166855845 HC PT THERAPEUTIC ACT EA 15 MIN 9/20/2023 Sherri Whitten, MEGAN GP 1    80469951661 HC PT THER SUPP EA 15 MIN 9/20/2023 Sherri Whitten, MEGAN GP 1            PT G-Codes  Outcome Measure Options: AM-PAC 6 Clicks Basic Mobility (PT)  AM-PAC 6 Clicks Score (PT): 16  AM-PAC 6 Clicks Score (OT): 13  Modified [place] Scale: 3 - Moderate disability.  Requiring some help, but able to walk without assistance.  PT Discharge Summary  Anticipated Discharge Disposition (PT): skilled nursing facility    Sherri Whitten PTA  9/20/2023

## 2023-09-20 NOTE — MBS/VFSS/FEES
Acute Care - Speech Language Pathology   Swallow Initial Evaluation [facility]     Patient Name: Riky Rios  : 1935  MRN: [id number]  Today's Date: 2023               Admit Date: 2023    Visit Dx:   No diagnosis found.  Patient Active Problem List   Diagnosis    COPD with acute exacerbation    Cardiac arrest    Acute respiratory failure with hypoxia    Pneumothorax on right    Paroxysmal atrial fibrillation    Acute bronchitis due to Rhinovirus    Hypertension    Acute left cerebellar infarct    Closed fracture of one rib of right side     History reviewed. No pertinent past medical history.  History reviewed. No pertinent surgical history.    SLP Recommendation and Plan  SLP Swallowing Diagnosis: mild-moderate (23 1030)  SLP Diet Recommendation: puree, thin liquids (23 103)  Recommended Precautions and Strategies: upright posture during/after eating, small bites of food and sips of liquid, no straw (23 103)  SLP Rec. for Method of Medication Administration: with puree (23 103)     Monitor for Signs of Aspiration: notify SLP if any concerns (23 103)     Swallow Criteria for Skilled Therapeutic Interventions Met: demonstrates skilled criteria (23 103)  Anticipated Discharge Disposition (SLP): unknown (23 103)  Rehab Potential/Prognosis, Swallowing: good, to achieve stated therapy goals (23)  Therapy Frequency (Swallow): PRN (23 103)  Predicted Duration Therapy Intervention (Days): until discharge (23 1030)  Oral Care Recommendations: Oral Care BID/PRN (23 1030)                                      Oral Care Recommendations: Oral Care BID/PRN (23 1030)    Plan of Care Reviewed With: patient  Outcome Evaluation: VFSS completed with Dr Mackay. Patient demonstrated trace penetration with thin and nectar thick liquids. Trace silent aspiration observed x1 with straw sips of thin liquids. Mild vallecular residue with  nectar, with moderate pharyngeal residue with puree and solids.  REC puree diet with thin liquids by cup.  Meds with puree.  Upright during and 30-60 minutes after all po intake.  No straws.  Will f/u for tolerance and ability to upgrade.  Please contact if pt showing s/s aspiration during meals.      SWALLOW EVALUATION (last 72 hours)       SLP Adult Swallow Evaluation       Row Name 09/20/23 1030 09/19/23 1200 09/18/23 1430             Rehab Evaluation    Document Type evaluation  -SA re-evaluation  -CR re-evaluation  -SR      Subjective Information -- no complaints  -CR no complaints  -SR      Patient Observations -- alert;cooperative  -CR alert;cooperative;agree to therapy  -SR      Patient Effort -- good  -CR adequate  -SR      Symptoms Noted During/After Treatment -- none  -CR none  -SR         General Information    Patient Profile Reviewed yes  -SA yes  -CR yes  -SR      Pertinent History Of Current Problem -- -- 87 y.o male with rhinovirus, COPD acute exacerbation, large right pneumothorax status post chest tube. MRI positive for acute stroke.  -SR      Current Method of Nutrition pureed;nectar/syrup-thick liquids  -SA -- regular textures;thin liquids  -SR      Precautions/Limitations, Vision -- -- WFL;for purposes of eval  -SR      Precautions/Limitations, Hearing -- -- WFL;for purposes of eval  -SR      Prior Level of Function-Communication -- -- WFL  -SR      Prior Level of Function-Swallowing -- -- no diet consistency restrictions  -SR      Plans/Goals Discussed with -- -- patient;agreed upon  -SR      Barriers to Rehab -- -- medically complex  -SR         Pain Scale: Numbers Pre/Post-Treatment    Pretreatment Pain Rating 0/10 - no pain  -SA 10/10  -CR 0/10 - no pain  -SR      Posttreatment Pain Rating 0/10 - no pain  -SA 10/10  -CR 0/10 - no pain  -SR      Pain Location - Side/Orientation -- Right  -CR --      Pain Location - -- chest;abdomen  -CR --         Oral Motor Structure and Function     Dentition Assessment -- natural, present and adequate  -CR natural, present and adequate  -SR      Secretion Management -- --  using suction with little to no output and no change in vocal quality  -CR wet vocal quality;requires suctioning to control secretions  -SR      Mucosal Quality -- moist, healthy  -CR moist, healthy  -SR      Volitional Swallow -- -- weak  -SR      Volitional Cough -- -- weak  -SR         Oral Musculature and Cranial Nerve Assessment    Oral Motor General Assessment -- -- generalized oral motor weakness;vocal impairment  -SR      Vocal Impairment, Detail. Cranial Nerve X (Vagus) -- -- vocal quality abnormality (see comments)  dysphonic  -SR         General Eating/Swallowing Observations    Respiratory Support Currently in Use -- -- nasal cannula  -SR      O2 Liters -- -- 2L  -SR      Eating/Swallowing Skills -- -- self-fed;fed by SLP  -SR      Positioning During Eating -- -- upright 90 degree;upright in bed  -SR      Utensils Used -- -- spoon;cup;straw  -SR      Consistencies Trialed -- -- ice chips;thin liquids;nectar/syrup-thick liquids;honey-thick liquids  -SR         MBS/VFSS    Utensils Used spoon;cup;straw  -SA -- --      Consistencies Trialed soft to chew textures;mixed consistency;pureed;thin liquids;nectar/syrup-thick liquids  -SA -- --         MBS/VFSS Interpretation    Oral Prep Phase impaired oral phase of swallowing  -SA -- --      Oral Transit Phase impaired  -SA -- --      Oral Residue impaired  -SA -- --         Oral Preparatory Phase    Oral Preparatory Phase prolonged manipulation  -SA -- --      Prolonged Manipulation mechanical soft;mixed consistency  -SA -- --         Oral Transit Phase    Impaired Oral Transit Phase premature spillage of liquids into pharynx  -SA -- --      Premature Spillage of Liquids into Pharynx all consistencies tested  -SA -- --         Oral Residue    Impaired Oral Residue diffuse residue throughout oral cavity  -SA -- --      Diffuse Residue  throughout Oral Cavity pudding/puree;mixed consistency;mechanical soft  -SA -- --      Response to Oral Residue cleared residue;with cued swallow  -SA -- --         Initiation of Pharyngeal Swallow    Pharyngeal Phase impaired pharyngeal phase of swallowing  -SA -- --      Penetration During the Swallow thin liquids;nectar-thick liquids  -SA -- --      Aspiration During the Swallow thin liquids;other (see comments)  x1 w/ straw  -SA -- --      Depth of Penetration shallow  -SA -- --      Response to Penetration No  -SA -- --      No spontaneous response to penetration and non-effective laryngeal clearance with cue (see comments)  -SA -- --      Response to Aspiration No  -SA -- --      Pharyngeal Residue other (see comments);nectar-thick liquids;pudding/puree;mixed consistency;mechanical soft  mild w/ nectar;moderate with puree and soft/mixed  -SA -- --      Response to Residue partial residue clearance;other (see comments)  cued multiple swallow  -SA -- --         SLP Communication to Radiology    Summary Statement VFSS completed with Dr Mackay.  Patient demonstrated trace penetration with thin and nectar thick liquids.  Trace silent aspiration observed x1 with straw sips of thin liquids.  Mild vallecular residue with nectar, with moderate pharyngeal residue with puree and solids.  -SA -- --         SLP Evaluation Clinical Impression    SLP Swallowing Diagnosis mild-moderate  -SA suspected pharyngeal dysphagia  -CR suspected pharyngeal dysphagia  -SR      Functional Impact risk of aspiration/pneumonia  -SA risk of aspiration/pneumonia  -CR risk of aspiration/pneumonia  -SR      Rehab Potential/Prognosis, Swallowing good, to achieve stated therapy goals  -SA good, to achieve stated therapy goals  -CR good, to achieve stated therapy goals  -SR      Swallow Criteria for Skilled Therapeutic Interventions Met demonstrates skilled criteria  -SA demonstrates skilled criteria  -CR demonstrates skilled criteria  -SR          Recommendations    Therapy Frequency (Swallow) PRN  -SA PRN  -CR PRN  -SR      Predicted Duration Therapy Intervention (Days) until discharge  -SA until discharge  -CR until discharge  -SR      SLP Diet Recommendation puree;thin liquids  -SA puree;nectar thick liquids  -CR NPO;ice chips between meals after oral care, with supervision;water between meals after oral care, with supervision  -SR      Recommended Diagnostics -- VFSS (MBS)  -CR reassess via clinical swallow evaluation  -SR      Recommended Precautions and Strategies upright posture during/after eating;small bites of food and sips of liquid;no straw  -SA upright posture during/after eating;small bites of food and sips of liquid;no straw;general aspiration precautions  -CR general aspiration precautions  -SR      Oral Care Recommendations Oral Care BID/PRN  -SA Oral Care BID/PRN  -CR Before ice/water  -SR      SLP Rec. for Method of Medication Administration with puree  -SA meds crushed;with thick liquids;with puree;as tolerated  -CR meds via alternate route  -SR      Monitor for Signs of Aspiration notify SLP if any concerns  -SA yes;notify SLP if any concerns  -CR yes;notify SLP if any concerns  -SR      Anticipated Discharge Disposition (SLP) unknown  -SA unknown  -CR unknown  -SR         (LTG) Patient will demonstrate functional swallow for    Diet Texture (Demonstrate functional swallow) -- soft to chew (chopped) textures  -CR soft to chew (chopped) textures  -SR      Liquid viscosity (Demonstrate functional swallow) -- thin liquids  -CR thin liquids  -SR      [place] (Demonstrate functional swallow) -- independently (over 90% accuracy)  -CR independently (over 90% accuracy)  -SR      Time Frame (Demonstrate functional swallow) -- by discharge  -CR by discharge  -SR      Progress/Outcomes (Demonstrate functional swallow) -- goal ongoing  -CR goal ongoing  -SR      Comment (Demonstrate functional swallow) -- Re-evaluation completed. Patient  upright in recliner. Weak and strained vocal quality. Patient complaining of abdomen pain on right side. Using suction following semi-productive weak cough; little to no secretion output. No overt s/s of aspiration with ice chip, thin liquid by spoon, nectar thick liquid by spoon/cup or puree trials. Wet vocal quality and throat clear following thins by cup. Delayed cough with nectar thick liquid by straw. Recommend patient initiate puree diet with nectar thick liquids. No straws. Meds crushed with nectar thick liquid or puree. Sitting upright, slow rate, small bites/sips. Will proceed with VFSS next date to further assess swallow function as voice is difficult to assess at bedside.  -CR Patient seen for bedside swallow re-assessment. No family at bedside. Repositioned to upright position with assistance from nursing assistant. Vocal quality characterized as wet and dysphonic.  Patient with persistant congested cough. Utilized yanker to manage secretions throughout assessment. Difficult to assess s/sx of aspiration vs. symptoms of rhinovirus throughout assessment. Patient had inconsistent cough across trials of ice chips, thin, nectar, and honey thick liquids. No overt s/sx observed with puree trials x3. Refused trials of solids. Patient reported that he has a decreased appetite and has not been eating very much.  Due to poor secretion management and inconsistent s/sx of aspiration, recommend NPO with medication via alternate route. Patient may have ice chips or small amounts of water after oral care for free water protocol. ST to follow.  -SR                User Key  (r) = Recorded By, (t) = Taken By, (c) = Cosigned By      Initials Name Effective Dates    Nury Casarez, MS CCC-SLP 07/11/23 -     Dianne Lopez CCC-SLP 11/10/22 -     Delmy Guidry, SLP 08/28/23 -                     EDUCATION  The patient has been educated in the following areas:   Dysphagia (Swallowing Impairment) Oral  Care/Hydration Modified Diet Instruction.        SLP GOALS       Row Name 09/19/23 1200 09/18/23 1430          (LTG) Patient will demonstrate functional swallow for    Diet Texture (Demonstrate functional swallow) soft to chew (chopped) textures  -CR soft to chew (chopped) textures  -SR     Liquid viscosity (Demonstrate functional swallow) thin liquids  -CR thin liquids  -SR     [place] (Demonstrate functional swallow) independently (over 90% accuracy)  -CR independently (over 90% accuracy)  -SR     Time Frame (Demonstrate functional swallow) by discharge  -CR by discharge  -SR     Progress/Outcomes (Demonstrate functional swallow) goal ongoing  -CR goal ongoing  -SR     Comment (Demonstrate functional swallow) Re-evaluation completed. Patient upright in recliner. Weak and strained vocal quality. Patient complaining of abdomen pain on right side. Using suction following semi-productive weak cough; little to no secretion output. No overt s/s of aspiration with ice chip, thin liquid by spoon, nectar thick liquid by spoon/cup or puree trials. Wet vocal quality and throat clear following thins by cup. Delayed cough with nectar thick liquid by straw. Recommend patient initiate puree diet with nectar thick liquids. No straws. Meds crushed with nectar thick liquid or puree. Sitting upright, slow rate, small bites/sips. Will proceed with VFSS next date to further assess swallow function as voice is difficult to assess at bedside.  -CR Patient seen for bedside swallow re-assessment. No family at bedside. Repositioned to upright position with assistance from nursing assistant. Vocal quality characterized as wet and dysphonic.  Patient with persistant congested cough. Utilized yanker to manage secretions throughout assessment. Difficult to assess s/sx of aspiration vs. symptoms of rhinovirus throughout assessment. Patient had inconsistent cough across trials of ice chips, thin, nectar, and honey thick liquids. No overt  s/sx observed with puree trials x3. Refused trials of solids. Patient reported that he has a decreased appetite and has not been eating very much.  Due to poor secretion management and inconsistent s/sx of aspiration, recommend NPO with medication via alternate route. Patient may have ice chips or small amounts of water after oral care for free water protocol. ST to follow.  -SR               User Key  (r) = Recorded By, (t) = Taken By, (c) = Cosigned By      Initials Name Provider Type    Dianne Lopez CCC-SLP Speech and Language Pathologist    Delmy Guidry SLP Speech and Language Pathologist                       Time Calculation:    Time Calculation- SLP       Row Name 09/20/23 1319             Time Calculation- SLP    SLP Start Time 1030  -                User Key  (r) = Recorded By, (t) = Taken By, (c) = Cosigned By      Initials Name Provider Type    Nury Casarez MS CCC-SLP Speech and Language Pathologist                    Therapy Charges for Today       Code Description Service Date Service Provider Modifiers Qty    47015080863 HC ST MOTION FLUORO EVAL SWALLOW 5 9/20/2023 Nury Melendrez MS CCC-CALEB GN 1                 MS JUAN Haley  9/20/2023

## 2023-09-20 NOTE — PROGRESS NOTES
Name: Riky Rios ADMIT: 2023   : 1935  PCP: Provider, No Known    MRN: [id number] LOS: 11 days   AGE/SEX: 87 y.o. male  ROOM: [facility]     Subjective   Subjective   Seems more fatigued today.  Laying in bed on high flow oxygen.  Very weak voice.       Objective   Objective   Vital Signs  Temp:  [97.5 °F (36.4 °C)-98.2 °F (36.8 °C)] 97.5 °F (36.4 °C)  Heart Rate:  [81-99] 99  Resp:  [16-22] 22  BP: (126-174)/(73-98) 174/98  SpO2:  [88 %-97 %] 97 %  on  Flow (L/min):  [2-10] 10;   Device (Oxygen Therapy): high-flow nasal cannula  Body mass index is 20.37 kg/m².  Physical Exam  Vitals and nursing note reviewed.   Constitutional:       General: He is not in acute distress.     Appearance: He is ill-appearing.   Cardiovascular:      Rate and Rhythm: Regular rhythm. Tachycardia present.   Pulmonary:      Effort: Tachypnea and respiratory distress present.      Breath sounds: Decreased air movement present. Rhonchi present.   Abdominal:      General: Bowel sounds are normal.      Palpations: Abdomen is soft.      Tenderness: There is no abdominal tenderness.   Musculoskeletal:         General: No swelling.   Skin:     General: Skin is warm and dry.   Neurological:      Mental Status: He is alert. Mental status is at baseline.     Results Review     I reviewed the patient's new clinical results.  Results from last 7 days   Lab Units 23  0549 23  0443 23  0647 09/15/23  0443   WBC 10*3/mm3 16.48* 14.74* 13.85* 13.40*   HEMOGLOBIN g/dL 11.3* 12.0* 12.7* 12.6*   PLATELETS 10*3/mm3 174 185 180 171       Results from last 7 days   Lab Units 23  0549 23  0443 23  1441 23  1026   SODIUM mmol/L 144 142 145 145   POTASSIUM mmol/L 3.7 4.7 3.9 3.5   CHLORIDE mmol/L 110* 110* 111* 112*   CO2 mmol/L 24.6 21.1* 25.1 24.8   BUN mg/dL 33* 30* 27* 29*   CREATININE mg/dL 1.03 0.89 0.96 0.85   GLUCOSE mg/dL 103* 118* 117* 122*   EGFR mL/min/1.73 70.7 83.5 77.0 84.6             Results  from last 7 days   Lab Units 09/20/23  0549 09/19/23  0443 09/17/23  1441 09/16/23  1026   CALCIUM mg/dL 8.7 8.7 8.5* 9.0       Results from last 7 days   Lab Units 09/20/23  0549 09/19/23  0443 09/16/23  1026   PROCALCITONIN ng/mL 0.24 0.27* 0.15       Glucose   Date/Time Value Ref Range Status   09/19/2023 1605 139 (H) 70 - 130 mg/dL Final   09/19/2023 1059 171 (H) 70 - 130 mg/dL Final   09/19/2023 0559 120 70 - 130 mg/dL Final   09/18/2023 2056 173 (H) 70 - 130 mg/dL Final   09/18/2023 1711 106 70 - 130 mg/dL Final   09/18/2023 1157 106 70 - 130 mg/dL Final   09/18/2023 0823 95 70 - 130 mg/dL Final       XR Chest 1 View    Result Date: 9/18/2023  Stable position of right chest tube. Possible minimal right apical pneumothorax   This report was finalized on 9/18/2023 3:57 PM by Dr. Jenaro Garza M.D.       I have personally reviewed all medications:  Scheduled Medications  acetaminophen, 1,000 mg, Oral, TID  allopurinol, 300 mg, Oral, Daily  amLODIPine, 10 mg, Oral, Q24H  apixaban, 2.5 mg, Oral, Q12H  arformoterol, 15 mcg, Nebulization, BID - RT  aspirin, 81 mg, Oral, Daily  atorvastatin, 40 mg, Oral, Daily  budesonide, 0.5 mg, Nebulization, Daily - RT  guaiFENesin, 600 mg, Oral, BID  lidocaine, 1 patch, Transdermal, Q24H  mirtazapine, 15 mg, Oral, Nightly  predniSONE, 2.5 mg, Oral, Daily With Breakfast  senna-docusate sodium, 2 tablet, Oral, BID  sodium chloride, 4 mL, Nebulization, BID - RT  tamsulosin, 0.8 mg, Oral, Daily  tiotropium bromide monohydrate, 2 puff, Inhalation, Daily - RT  vancomycin, 500 mg, Intravenous, Q12H    Infusions  Pharmacy to dose vancomycin,     Diet  Diet: Regular/House Diet; No Straw; Texture: Pureed (NDD 1); Fluid Consistency: Nectar Thick    I have personally reviewed:  [x]  Laboratory   [x]  Microbiology   [x]  Radiology   [x]  EKG/Telemetry  [x]  Cardiology/Vascular   []  Pathology    []  Records       Assessment/Plan     Active Hospital Problems    Diagnosis  POA    **COPD with  acute exacerbation [J44.1]  Yes    Acute bronchitis due to Rhinovirus [J20.6]  Yes    Hypertension [I10]  Yes    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              Patient with worsening hypoxia today and increased work of breathing.  Chest x-ray done after worsening of symptoms still shows resolution of pneumothorax.  He has small left-sided pleural effusion but no obvious pulmonary edema.  Slight increase in size right base opacity.  Pulmonology notified and have ordered Bumex and Solu-Medrol.  He had been weaned back down to his home dose prednisone.  Blood pressure stable.  Doubt symptoms due to adrenal insufficiency.  He has very weak voice and weak cough.  Having pain with coughing due to rib injury.  Needs better pulmonary hygiene.  Mucous plugging?  Defer antibiotics to pulmonology.    Neurologically stable.  Neurology signed off with recommendations for full anticoagulation and statin therapy at discharge.  While not 100% certain he had atrial fibrillation cardiology also agrees with anticoagulating..  Cleared to be on Eliquis by thoracic surgery which was started yesterday.      Continue high-dose statin as written-on aspirin 81 mg and statin 40 mg daily prior to arrival  Outpatient ENT follow due to abnormalities noted on MRI above for direct visualization of suspected polyp.  Follow up neurology 3 months.  Full code.  Discussed with patient and nursing staff.  Anticipate discharge to SNU facility timing yet to be determined..      Celso Schmitz MD  [facility]  09/20/23  12:00 EDT

## 2023-09-20 NOTE — DISCHARGE PLACEMENT REQUEST
"Riky Bauer (87 y.o. Male)       Date of Birth   1935    Social Security Number       Address   [address] [place] [address]  [facility] [address]    Home Phone   [phone]    MRN   [id number]       Amish   None    Marital Status                               Admission Date   9/9/23    Admission Type   Emergency    Admitting Provider   Raul Rueda MD    Attending Provider   Celso Schmitz MD    Department, Room/Bed   [address], E562/1       Discharge Date       Discharge Disposition       Discharge Destination                                 Attending Provider: Celso Schmitz MD    Allergies: Rocephin [Ceftriaxone], Iodinated Contrast Media    Isolation: Droplet, Contact   Infection: Rhinovirus  (09/10/23), MRSA (09/20/23)   Code Status: CPR    Ht: 172.7 cm (68\")   Wt: 60.8 kg (134 lb)    Admission Cmt: None   Principal Problem: COPD with acute exacerbation [J44.1]                   Active Insurance as of 9/9/2023       Primary Coverage       Payor Plan Insurance Group Employer/Plan Group    HUMANA MEDICARE REPLACEMENT HUMANA MEDICARE REPLACEMENT [id number]       Payor Plan Address Payor Plan Phone Number Payor Plan Fax Number Effective Dates    [address] [address] [phone]  1/1/2018 - None Entered    [facility] [phone]         Subscriber Name Subscriber Birth Date Member ID       RIKY BAUER 1935 [id number]                     Emergency Contacts        (Rel.) Home Phone Work Phone Mobile Phone    Viviana Bauer (Daughter) -- -- [phone]    BAUERTEQUILA CAMEJO (Son) [phone] -- --              Emergency Contact Information       Name Relation Home Work Mobile    Viviana Bauer Daughter   [phone]    TEQUILA BAUER Son [phone]            "

## 2023-09-21 LAB
ALBUMIN SERPL-MCNC: 3 G/DL (ref 3.5–5.2)
ALBUMIN/GLOB SERPL: 1.1 G/DL
ALP SERPL-CCNC: 155 U/L (ref 39–117)
ALT SERPL W P-5'-P-CCNC: 198 U/L (ref 1–41)
ANION GAP SERPL CALCULATED.3IONS-SCNC: 12 MMOL/L (ref 5–15)
AST SERPL-CCNC: 73 U/L (ref 1–40)
BASOPHILS # BLD AUTO: 0.03 10*3/MM3 (ref 0–0.2)
BASOPHILS NFR BLD AUTO: 0.2 % (ref 0–1.5)
BILIRUB SERPL-MCNC: 0.5 MG/DL (ref 0–1.2)
BUN SERPL-MCNC: 35 MG/DL (ref 8–23)
BUN/CREAT SERPL: 30.7 (ref 7–25)
CALCIUM SPEC-SCNC: 9 MG/DL (ref 8.6–10.5)
CHLORIDE SERPL-SCNC: 111 MMOL/L (ref 98–107)
CO2 SERPL-SCNC: 23 MMOL/L (ref 22–29)
CREAT SERPL-MCNC: 1.14 MG/DL (ref 0.76–1.27)
DEPRECATED RDW RBC AUTO: 45.5 FL (ref 37–54)
EGFRCR SERPLBLD CKD-EPI 2021: 62.6 ML/MIN/1.73
EOSINOPHIL # BLD AUTO: 0 10*3/MM3 (ref 0–0.4)
EOSINOPHIL NFR BLD AUTO: 0 % (ref 0.3–6.2)
ERYTHROCYTE [DISTWIDTH] IN BLOOD BY AUTOMATED COUNT: 12.1 % (ref 12.3–15.4)
GLOBULIN UR ELPH-MCNC: 2.7 GM/DL
GLUCOSE SERPL-MCNC: 127 MG/DL (ref 65–99)
HCT VFR BLD AUTO: 31.4 % (ref 37.5–51)
HGB BLD-MCNC: 11 G/DL (ref 13–17.7)
IMM GRANULOCYTES # BLD AUTO: 0.39 10*3/MM3 (ref 0–0.05)
IMM GRANULOCYTES NFR BLD AUTO: 2.3 % (ref 0–0.5)
LYMPHOCYTES # BLD AUTO: 0.89 10*3/MM3 (ref 0.7–3.1)
LYMPHOCYTES NFR BLD AUTO: 5.4 % (ref 19.6–45.3)
MAGNESIUM SERPL-MCNC: 1.9 MG/DL (ref 1.6–2.4)
MCH RBC QN AUTO: 35.9 PG (ref 26.6–33)
MCHC RBC AUTO-ENTMCNC: 35 G/DL (ref 31.5–35.7)
MCV RBC AUTO: 102.6 FL (ref 79–97)
MONOCYTES # BLD AUTO: 0.59 10*3/MM3 (ref 0.1–0.9)
MONOCYTES NFR BLD AUTO: 3.6 % (ref 5–12)
NEUTROPHILS NFR BLD AUTO: 14.7 10*3/MM3 (ref 1.7–7)
NEUTROPHILS NFR BLD AUTO: 88.5 % (ref 42.7–76)
NRBC BLD AUTO-RTO: 0 /100 WBC (ref 0–0.2)
PHOSPHATE SERPL-MCNC: 3.1 MG/DL (ref 2.5–4.5)
PLATELET # BLD AUTO: 185 10*3/MM3 (ref 140–450)
PMV BLD AUTO: 10.8 FL (ref 6–12)
POTASSIUM SERPL-SCNC: 3.8 MMOL/L (ref 3.5–5.2)
PROCALCITONIN SERPL-MCNC: 0.24 NG/ML (ref 0–0.25)
PROT SERPL-MCNC: 5.7 G/DL (ref 6–8.5)
RBC # BLD AUTO: 3.06 10*6/MM3 (ref 4.14–5.8)
SODIUM SERPL-SCNC: 146 MMOL/L (ref 136–145)
VANCOMYCIN TROUGH SERPL-MCNC: 16.1 MCG/ML (ref 5–20)
WBC NRBC COR # BLD: 16.6 10*3/MM3 (ref 3.4–10.8)

## 2023-09-21 PROCEDURE — 84145 PROCALCITONIN (PCT): CPT | Performed by: INTERNAL MEDICINE

## 2023-09-21 PROCEDURE — 84100 ASSAY OF PHOSPHORUS: CPT | Performed by: INTERNAL MEDICINE

## 2023-09-21 PROCEDURE — 80202 ASSAY OF VANCOMYCIN: CPT | Performed by: HOSPITALIST

## 2023-09-21 PROCEDURE — 94799 UNLISTED PULMONARY SVC/PX: CPT

## 2023-09-21 PROCEDURE — 94761 N-INVAS EAR/PLS OXIMETRY MLT: CPT

## 2023-09-21 PROCEDURE — 25010000002 VANCOMYCIN PER 500 MG: Performed by: HOSPITALIST

## 2023-09-21 PROCEDURE — 97530 THERAPEUTIC ACTIVITIES: CPT

## 2023-09-21 PROCEDURE — 94760 N-INVAS EAR/PLS OXIMETRY 1: CPT

## 2023-09-21 PROCEDURE — 63710000001 PREDNISONE PER 5 MG: Performed by: INTERNAL MEDICINE

## 2023-09-21 PROCEDURE — 85025 COMPLETE CBC W/AUTO DIFF WBC: CPT | Performed by: INTERNAL MEDICINE

## 2023-09-21 PROCEDURE — 94664 DEMO&/EVAL PT USE INHALER: CPT

## 2023-09-21 PROCEDURE — 83735 ASSAY OF MAGNESIUM: CPT | Performed by: INTERNAL MEDICINE

## 2023-09-21 PROCEDURE — 80053 COMPREHEN METABOLIC PANEL: CPT | Performed by: INTERNAL MEDICINE

## 2023-09-21 RX ORDER — OLANZAPINE 10 MG/1
2.5 INJECTION, POWDER, LYOPHILIZED, FOR SOLUTION INTRAMUSCULAR EVERY 8 HOURS PRN
Status: DISCONTINUED | OUTPATIENT
Start: 2023-09-21 | End: 2023-09-29 | Stop reason: HOSPADM

## 2023-09-21 RX ORDER — OLANZAPINE 5 MG/1
5 TABLET ORAL NIGHTLY
Status: DISCONTINUED | OUTPATIENT
Start: 2023-09-21 | End: 2023-09-23

## 2023-09-21 RX ORDER — ASPIRIN 81 MG/1
81 TABLET, CHEWABLE ORAL DAILY
Status: DISCONTINUED | OUTPATIENT
Start: 2023-09-21 | End: 2023-09-29 | Stop reason: HOSPADM

## 2023-09-21 RX ADMIN — ARFORMOTEROL TARTRATE 15 MCG: 15 SOLUTION RESPIRATORY (INHALATION) at 19:50

## 2023-09-21 RX ADMIN — Medication 4 ML: at 08:20

## 2023-09-21 RX ADMIN — MIRTAZAPINE 15 MG: 15 TABLET, ORALLY DISINTEGRATING ORAL at 22:09

## 2023-09-21 RX ADMIN — APIXABAN 2.5 MG: 2.5 TABLET, FILM COATED ORAL at 22:09

## 2023-09-21 RX ADMIN — GUAIFENESIN 600 MG: 600 TABLET, EXTENDED RELEASE ORAL at 22:09

## 2023-09-21 RX ADMIN — PREDNISONE 2.5 MG: 2.5 TABLET ORAL at 10:50

## 2023-09-21 RX ADMIN — VANCOMYCIN HYDROCHLORIDE 500 MG: 500 INJECTION, POWDER, LYOPHILIZED, FOR SOLUTION INTRAVENOUS at 01:43

## 2023-09-21 RX ADMIN — ATORVASTATIN CALCIUM 40 MG: 20 TABLET, FILM COATED ORAL at 10:49

## 2023-09-21 RX ADMIN — ALLOPURINOL 300 MG: 300 TABLET ORAL at 10:50

## 2023-09-21 RX ADMIN — AMLODIPINE BESYLATE 10 MG: 10 TABLET ORAL at 10:49

## 2023-09-21 RX ADMIN — LIDOCAINE 1 PATCH: 50 PATCH CUTANEOUS at 10:51

## 2023-09-21 RX ADMIN — ACETAMINOPHEN 1000 MG: 500 TABLET ORAL at 10:50

## 2023-09-21 RX ADMIN — TIOTROPIUM BROMIDE INHALATION SPRAY 2 PUFF: 3.12 SPRAY, METERED RESPIRATORY (INHALATION) at 08:27

## 2023-09-21 RX ADMIN — SENNOSIDES AND DOCUSATE SODIUM 2 TABLET: 50; 8.6 TABLET ORAL at 22:09

## 2023-09-21 RX ADMIN — ARFORMOTEROL TARTRATE 15 MCG: 15 SOLUTION RESPIRATORY (INHALATION) at 08:11

## 2023-09-21 RX ADMIN — ACETAMINOPHEN 1000 MG: 500 TABLET ORAL at 15:59

## 2023-09-21 RX ADMIN — SENNOSIDES AND DOCUSATE SODIUM 2 TABLET: 50; 8.6 TABLET ORAL at 10:49

## 2023-09-21 RX ADMIN — ACETAMINOPHEN 1000 MG: 500 TABLET ORAL at 22:09

## 2023-09-21 RX ADMIN — VANCOMYCIN HYDROCHLORIDE 500 MG: 500 INJECTION, POWDER, LYOPHILIZED, FOR SOLUTION INTRAVENOUS at 14:11

## 2023-09-21 RX ADMIN — BUDESONIDE 0.5 MG: 0.5 INHALANT ORAL at 08:17

## 2023-09-21 RX ADMIN — ASPIRIN 81 MG: 81 TABLET, CHEWABLE ORAL at 10:48

## 2023-09-21 RX ADMIN — APIXABAN 2.5 MG: 2.5 TABLET, FILM COATED ORAL at 10:50

## 2023-09-21 RX ADMIN — Medication 4 ML: at 19:54

## 2023-09-21 RX ADMIN — OLANZAPINE 5 MG: 5 TABLET, FILM COATED ORAL at 22:09

## 2023-09-21 NOTE — NURSING NOTE
Patient has been up most of the night and is showing some confusion. Patient has ripped off his heart monitor and O2 sensor multiple times and RN has went in there to educate patient on why he has to wear this and put back on patient gets agitated. RN has encouraged patient to try and get some rest.

## 2023-09-21 NOTE — PROGRESS NOTES
Dr. DESHAWN Dickerson    [address]        Patient ID:  Name:  Riky Rios  MRN:  [id number]  1935  87 y.o.  male            CC/Reason for visit: Traumatic pneumothorax status post CPR, resuscitated cardiac arrest, rhinovirus bronchitis, wheezing, COPD exacerbation, acute hypoxic respiratory failure, rib fractures from CPR     Interval hx: Patient had significant hypoxemic episode earlier  Now feels fine  On 3 L O2 now and doing well       ROS: Has right chest pain.  No fever, no syncope, no abdominal pain    Vitals:  Vitals:    09/20/23 1100 09/20/23 1430 09/20/23 1500 09/20/23 1957   BP:   126/45 147/79   BP Location:   Right arm    Patient Position:   Lying    Pulse: 99  86 90   Resp: 22  22 16   Temp: 97.5 °F (36.4 °C)  97.8 °F (36.6 °C) 98.8 °F (37.1 °C)   TempSrc: Oral  Oral Oral   SpO2:  96% 94% 91%   Weight:       Height:               Body mass index is 20.37 kg/m².    Intake/Output Summary (Last 24 hours) at 9/20/2023 2033  Last data filed at 9/20/2023 1834  Gross per 24 hour   Intake 490 ml   Output 750 ml   Net -260 ml       Exam:  GEN:  No distress  Alert, oriented x 3. Low voice  LUNGS: Crackles both bases are much improved, no wheezing, diminished BS bilat, no use of accessory muscles  CV:  Normal S1S2, without murmur, no edema  ABD:  Non tender, no enlarged liver or masses      Scheduled meds:  acetaminophen, 1,000 mg, Oral, TID  allopurinol, 300 mg, Oral, Daily  amLODIPine, 10 mg, Oral, Q24H  apixaban, 2.5 mg, Oral, Q12H  arformoterol, 15 mcg, Nebulization, BID - RT  aspirin, 81 mg, Oral, Daily  atorvastatin, 40 mg, Oral, Daily  budesonide, 0.5 mg, Nebulization, Daily - RT  guaiFENesin, 600 mg, Oral, BID  lidocaine, 1 patch, Transdermal, Q24H  mirtazapine, 15 mg, Oral, Nightly  predniSONE, 2.5 mg, Oral, Daily With Breakfast  senna-docusate sodium, 2 tablet, Oral, BID  sodium chloride, 4 mL, Nebulization, BID - RT  tamsulosin, 0.8 mg, Oral, Daily  tiotropium bromide monohydrate, 2  puff, Inhalation, Daily - RT  vancomycin, 500 mg, Intravenous, Q12H      IV meds:                      Pharmacy to dose vancomycin,         Data Review:   I reviewed the patient's medications and new clinical results.    COVID19   Date Value Ref Range Status   09/10/2023 Not Detected Not Detected - Ref. Range Final         Lab Results   Component Value Date    CALCIUM 8.7 09/20/2023     Results from last 7 days   Lab Units 09/20/23  0549 09/19/23  0443 09/17/23  1441 09/16/23  1026 09/16/23  0647   SODIUM mmol/L 144 142 145 145  --    POTASSIUM mmol/L 3.7 4.7 3.9 3.5  --    CHLORIDE mmol/L 110* 110* 111* 112*  --    CO2 mmol/L 24.6 21.1* 25.1 24.8  --    BUN mg/dL 33* 30* 27* 29*  --    CREATININE mg/dL 1.03 0.89 0.96 0.85  --    CALCIUM mg/dL 8.7 8.7 8.5* 9.0  --    GLUCOSE mg/dL 103* 118* 117* 122*  --    WBC 10*3/mm3 16.48* 14.74*  --   --  13.85*   HEMOGLOBIN g/dL 11.3* 12.0*  --   --  12.7*   PLATELETS 10*3/mm3 174 185  --   --  180   PROCALCITONIN ng/mL 0.24 0.27*  --  0.15  --              Results from last 7 days   Lab Units 09/17/23  1655 09/17/23  1441   HSTROP T ng/L 53* 53*     Results from last 7 days   Lab Units 09/20/23  1703   PH, ARTERIAL pH units 7.519*   PCO2, ARTERIAL mm Hg 26.6*   PO2 ART mm Hg 65.6*   O2 SATURATION ART % 95.0   FLOW RATE lpm 3.0000   MODALITY  Cannula       Estimated Creatinine Clearance: 44.3 mL/min (by C-G formula based on SCr of 1.03 mg/dL).      ASSESSMENT:     COPD with acute exacerbation    Cardiac arrest    Acute respiratory failure with hypoxia    Pneumothorax on right    Paroxysmal atrial fibrillation    Acute bronchitis due to Rhinovirus    Hypertension    Acute left cerebellar infarct    Closed fracture of one rib of right side        PLAN:  Right PTX resolved on CXR  No major changes on CXR  May have had mucous plug earlier causing transient hypoxemia  Continue weaning O2  Received one dose of diuretics and felt better  No longer wheezing so no need for ongoing  steroids  Continue Duonebs  Ambulate daily        Matty Dickerson MD  9/20/2023

## 2023-09-21 NOTE — PROGRESS NOTES
Dr. DESHAWN Dickerson    [address]        Patient ID:  Name:  Riky Rios  MRN:  [id number]  1935  87 y.o.  male            CC/Reason for visit: Traumatic pneumothorax status post CPR, resuscitated cardiac arrest, rhinovirus bronchitis, wheezing, COPD exacerbation, acute hypoxic respiratory failure, rib fractures from CPR     Interval hx: Patient says he cannot stand the food in the hospital and he wants to go home right away.  He has not required oxygen today.  Still some right-sided chest soreness  No shortness of breath at rest     ROS: No fever, no syncope, no abdominal pain    Vitals:  Vitals:    09/21/23 0827 09/21/23 1100 09/21/23 1151 09/21/23 1500   BP:  157/69  148/66   BP Location:  Right arm  Right arm   Patient Position:  Sitting  Lying   Pulse: 92 92 91 93   Resp:  18  18   Temp:  97.6 °F (36.4 °C)  98.1 °F (36.7 °C)   TempSrc:  Oral  Oral   SpO2: 100% 100% 93% 95%   Weight:       Height:               Body mass index is 20.37 kg/m².    Intake/Output Summary (Last 24 hours) at 9/21/2023 1528  Last data filed at 9/20/2023 2128  Gross per 24 hour   Intake --   Output 750 ml   Net -750 ml       Exam:  GEN:  Poorly nourished elderly who appears frail and chronically ill  Alert, oriented x 3.  Very soft voice  LUNGS: Distant and diminished breath sounds over the bases bilat, no use of accessory muscles  CV:  Normal S1S2, without murmur, no edema  ABD:  Non tender, no enlarged liver or masses      Scheduled meds:  acetaminophen, 1,000 mg, Oral, TID  allopurinol, 300 mg, Oral, Daily  amLODIPine, 10 mg, Oral, Q24H  apixaban, 2.5 mg, Oral, Q12H  arformoterol, 15 mcg, Nebulization, BID - RT  aspirin, 81 mg, Oral, Daily  atorvastatin, 40 mg, Oral, Daily  budesonide, 0.5 mg, Nebulization, Daily - RT  guaiFENesin, 600 mg, Oral, BID  lidocaine, 1 patch, Transdermal, Q24H  mirtazapine, 15 mg, Oral, Nightly  OLANZapine, 5 mg, Oral, Nightly  predniSONE, 2.5 mg, Oral, Daily With  Breakfast  senna-docusate sodium, 2 tablet, Oral, BID  sodium chloride, 4 mL, Nebulization, BID - RT  tamsulosin, 0.8 mg, Oral, Daily  tiotropium bromide monohydrate, 2 puff, Inhalation, Daily - RT  vancomycin, 500 mg, Intravenous, Q12H      IV meds:                      Pharmacy to dose vancomycin,         Data Review:   I reviewed the patient's medications and new clinical results.    COVID19   Date Value Ref Range Status   09/10/2023 Not Detected Not Detected - Ref. Range Final         Lab Results   Component Value Date    CALCIUM 9.0 09/21/2023    PHOS 3.1 09/21/2023    MG 1.9 09/21/2023     Results from last 7 days   Lab Units 09/21/23  0729 09/20/23  0549 09/19/23  0443   SODIUM mmol/L 146* 144 142   POTASSIUM mmol/L 3.8 3.7 4.7   CHLORIDE mmol/L 111* 110* 110*   CO2 mmol/L 23.0 24.6 21.1*   BUN mg/dL 35* 33* 30*   CREATININE mg/dL 1.14 1.03 0.89   CALCIUM mg/dL 9.0 8.7 8.7   BILIRUBIN mg/dL 0.5  --   --    ALK PHOS U/L 155*  --   --    ALT (SGPT) U/L 198*  --   --    AST (SGOT) U/L 73*  --   --    GLUCOSE mg/dL 127* 103* 118*   WBC 10*3/mm3 16.60* 16.48* 14.74*   HEMOGLOBIN g/dL 11.0* 11.3* 12.0*   PLATELETS 10*3/mm3 185 174 185   PROCALCITONIN ng/mL 0.24 0.24 0.27*             Results from last 7 days   Lab Units 09/17/23  1655 09/17/23  1441   HSTROP T ng/L 53* 53*     Results from last 7 days   Lab Units 09/20/23  1703   PH, ARTERIAL pH units 7.519*   PCO2, ARTERIAL mm Hg 26.6*   PO2 ART mm Hg 65.6*   O2 SATURATION ART % 95.0   FLOW RATE lpm 3.0000   MODALITY  Cannula           ASSESSMENT:   COPD with acute exacerbation    Cardiac arrest    Acute respiratory failure with hypoxia    Pneumothorax on right    Paroxysmal atrial fibrillation    Acute bronchitis due to Rhinovirus    Hypertension    Acute left cerebellar infarct    Closed fracture of one rib of right side      PLAN:  From the respiratory standpoint the patient has stabilized.  He is not requiring any oxygen.  Chest tube was removed several days  ago by thoracic surgery.  Pneumothorax has resolved.  He does not need any additional chest imaging at this time.  He needs to participate with physical therapy and get out of bed to continue mobilizing respiratory secretions.  Continue flutter valve.  Use DuoNeb every 6 hours only as needed for wheezing.  Continue with maintenance Brovana and Pulmicort and Spiriva for his COPD.  These 3 medications (Brovana, nebulized Pulmicort and Spiriva Respimat) can be quite expensive as outpatient, even with prescription drug coverage.  If the patient cannot afford these medications, I would simplified his regimen down to Brovana twice a day only.  Use DuoNeb as needed at home.  We will be available as needed        Matty Dickerson MD  9/21/2023

## 2023-09-21 NOTE — PLAN OF CARE
Problem: Adult Inpatient Plan of Care  Goal: Plan of Care Review  Outcome: Ongoing, Progressing  Flowsheets (Taken 9/21/2023 1845)  Progress: improving  Outcome Evaluation: VSS, pt weaned to RA, confused at times, ambulated with staff, no c/o pain  Goal: Patient-Specific Goal (Individualized)  Outcome: Ongoing, Progressing  Goal: Absence of Hospital-Acquired Illness or Injury  Outcome: Ongoing, Progressing  Intervention: Identify and Manage Fall Risk  Recent Flowsheet Documentation  Taken 9/21/2023 1839 by Chitra Gamez RN  Safety Promotion/Fall Prevention: safety round/check completed  Taken 9/21/2023 1600 by Chitra Gamez RN  Safety Promotion/Fall Prevention: safety round/check completed  Taken 9/21/2023 1400 by Chitra Gamez RN  Safety Promotion/Fall Prevention: safety round/check completed  Taken 9/21/2023 1050 by Chitra Gamez RN  Safety Promotion/Fall Prevention: safety round/check completed  Taken 9/21/2023 0840 by Chitra Gamez RN  Safety Promotion/Fall Prevention:   safety round/check completed   nonskid shoes/slippers when out of bed   fall prevention program maintained   clutter free environment maintained  Intervention: Prevent Skin Injury  Recent Flowsheet Documentation  Taken 9/21/2023 1839 by Chitra Gamez RN  Body Position: supine  Taken 9/21/2023 1400 by Chitra Gamez RN  Body Position:   turned   left   tilted  Intervention: Prevent and Manage VTE (Venous Thromboembolism) Risk  Recent Flowsheet Documentation  Taken 9/21/2023 1600 by Chitra Gamez RN  Activity Management: activity encouraged  Taken 9/21/2023 1050 by Chitra Gamez RN  Activity Management: up in chair  Taken 9/21/2023 0840 by Chitra Gamez RN  Activity Management: activity encouraged  Intervention: Prevent Infection  Recent Flowsheet Documentation  Taken 9/21/2023 0840 by Chitra Gamez RN  Infection Prevention: single patient room provided  Goal: Optimal Comfort and  Wellbeing  Outcome: Ongoing, Progressing  Intervention: Provide Person-Centered Care  Recent Flowsheet Documentation  Taken 9/21/2023 1400 by Chitra Gamez RN  Trust Relationship/Rapport: care explained  Taken 9/21/2023 1050 by Chitra Gamez RN  Trust Relationship/Rapport:   care explained   reassurance provided  Taken 9/21/2023 0840 by Chitra Gamez RN  Trust Relationship/Rapport: care explained  Goal: Readiness for Transition of Care  Outcome: Ongoing, Progressing     Problem: Fall Injury Risk  Goal: Absence of Fall and Fall-Related Injury  Outcome: Ongoing, Progressing  Intervention: Identify and Manage Contributors  Recent Flowsheet Documentation  Taken 9/21/2023 1050 by Chitra Gamez RN  Medication Review/Management: medications reviewed  Taken 9/21/2023 0840 by Chitra Gamez RN  Medication Review/Management: medications reviewed  Intervention: Promote Injury-Free Environment  Recent Flowsheet Documentation  Taken 9/21/2023 1839 by Chitra Gamez RN  Safety Promotion/Fall Prevention: safety round/check completed  Taken 9/21/2023 1600 by Chitra Gamez RN  Safety Promotion/Fall Prevention: safety round/check completed  Taken 9/21/2023 1400 by Chitra Gamez RN  Safety Promotion/Fall Prevention: safety round/check completed  Taken 9/21/2023 1050 by Chitra Gamez RN  Safety Promotion/Fall Prevention: safety round/check completed  Taken 9/21/2023 0840 by Chitra Gamez RN  Safety Promotion/Fall Prevention:   safety round/check completed   nonskid shoes/slippers when out of bed   fall prevention program maintained   clutter free environment maintained     Problem: Adjustment to Illness (Sepsis/Septic Shock)  Goal: Optimal Coping  Outcome: Ongoing, Progressing     Problem: Bleeding (Sepsis/Septic Shock)  Goal: Absence of Bleeding  Outcome: Ongoing, Progressing     Problem: Glycemic Control Impaired (Sepsis/Septic Shock)  Goal: Blood Glucose Level Within Desired  Range  Outcome: Ongoing, Progressing     Problem: Infection Progression (Sepsis/Septic Shock)  Goal: Absence of Infection Signs and Symptoms  Outcome: Ongoing, Progressing  Intervention: Initiate Sepsis Management  Recent Flowsheet Documentation  Taken 9/21/2023 0840 by Chitra Gamez, RN  Infection Prevention: single patient room provided  Isolation Precautions:   precautions maintained   droplet   contact  Intervention: Promote Recovery  Recent Flowsheet Documentation  Taken 9/21/2023 1600 by Chitra Gamez, RN  Activity Management: activity encouraged  Taken 9/21/2023 1050 by Chitra Gamez, RN  Activity Management: up in chair  Taken 9/21/2023 0840 by Chitra Gamez, RN  Activity Management: activity encouraged     Problem: Nutrition Impaired (Sepsis/Septic Shock)  Goal: Optimal Nutrition Intake  Outcome: Ongoing, Progressing     Problem: Skin Injury Risk Increased  Goal: Skin Health and Integrity  Outcome: Ongoing, Progressing  Intervention: Optimize Skin Protection  Recent Flowsheet Documentation  Taken 9/21/2023 1839 by Chitra Gamez, RN  Head of Bed (HOB) Positioning: HOB at 45 degrees  Taken 9/21/2023 1400 by Chitra Gamez, RN  Head of Bed (HOB) Positioning: HOB at 30 degrees   Goal Outcome Evaluation:  Plan of Care Reviewed With: patient, family        Progress: improving  Outcome Evaluation: VSS, pt weaned to RA, confused at times, ambulated with staff, no c/o pain

## 2023-09-21 NOTE — PLAN OF CARE
Problem: Adult Inpatient Plan of Care  Goal: Plan of Care Review  Outcome: Ongoing, Progressing  Goal: Patient-Specific Goal (Individualized)  Outcome: Ongoing, Progressing  Goal: Absence of Hospital-Acquired Illness or Injury  Outcome: Ongoing, Progressing  Intervention: Identify and Manage Fall Risk  Recent Flowsheet Documentation  Taken 9/21/2023 0200 by Dai Freitas RN  Safety Promotion/Fall Prevention:   safety round/check completed   nonskid shoes/slippers when out of bed   lighting adjusted   fall prevention program maintained   clutter free environment maintained  Taken 9/21/2023 0000 by Dai Freitas RN  Safety Promotion/Fall Prevention:   safety round/check completed   nonskid shoes/slippers when out of bed   lighting adjusted   fall prevention program maintained   clutter free environment maintained  Taken 9/20/2023 2200 by Dai Freitas RN  Safety Promotion/Fall Prevention:   safety round/check completed   nonskid shoes/slippers when out of bed   lighting adjusted   fall prevention program maintained   clutter free environment maintained  Taken 9/20/2023 2000 by Dai Freitas RN  Safety Promotion/Fall Prevention:   safety round/check completed   nonskid shoes/slippers when out of bed   lighting adjusted   fall prevention program maintained   clutter free environment maintained  Intervention: Prevent Skin Injury  Recent Flowsheet Documentation  Taken 9/21/2023 0200 by Dai Freitas RN  Body Position: weight shifting  Taken 9/21/2023 0000 by Dai Freitas RN  Body Position:   weight shifting   supine, legs elevated  Taken 9/20/2023 2200 by Dai Freitas RN  Body Position: weight shifting  Taken 9/20/2023 2000 by Dai Freitas RN  Body Position: weight shifting  Intervention: Prevent and Manage VTE (Venous Thromboembolism) Risk  Recent Flowsheet Documentation  Taken 9/21/2023 0200 by Dai Freitas RN  Activity Management: activity minimized  Taken 9/21/2023 0000 by  Dai Freitas, RN  Activity Management: activity minimized  Taken 9/20/2023 2200 by Dai Freitas, RN  Activity Management: activity minimized  Taken 9/20/2023 2000 by Dai Freitas RN  Activity Management: activity minimized  Intervention: Prevent Infection  Recent Flowsheet Documentation  Taken 9/21/2023 0200 by Dai Freitas RN  Infection Prevention:   visitors restricted/screened   single patient room provided   personal protective equipment utilized   rest/sleep promoted   hand hygiene promoted  Taken 9/21/2023 0000 by Dai Freitas RN  Infection Prevention:   visitors restricted/screened   single patient room provided   rest/sleep promoted   personal protective equipment utilized   hand hygiene promoted  Taken 9/20/2023 2200 by Dai Freitas RN  Infection Prevention:   visitors restricted/screened   single patient room provided   rest/sleep promoted   personal protective equipment utilized   hand hygiene promoted  Taken 9/20/2023 2000 by Dai Freitas RN  Infection Prevention:   visitors restricted/screened   single patient room provided   rest/sleep promoted   personal protective equipment utilized   hand hygiene promoted  Goal: Optimal Comfort and Wellbeing  Outcome: Ongoing, Progressing  Goal: Readiness for Transition of Care  Outcome: Ongoing, Progressing     Problem: Fall Injury Risk  Goal: Absence of Fall and Fall-Related Injury  Outcome: Ongoing, Progressing  Intervention: Identify and Manage Contributors  Recent Flowsheet Documentation  Taken 9/20/2023 2000 by Dai Freitas RN  Medication Review/Management: medications reviewed  Intervention: Promote Injury-Free Environment  Recent Flowsheet Documentation  Taken 9/21/2023 0200 by Dai Freitas, RN  Safety Promotion/Fall Prevention:   safety round/check completed   nonskid shoes/slippers when out of bed   lighting adjusted   fall prevention program maintained   clutter free environment maintained  Taken 9/21/2023 0000 by  Dai Freitas, RN  Safety Promotion/Fall Prevention:   safety round/check completed   nonskid shoes/slippers when out of bed   lighting adjusted   fall prevention program maintained   clutter free environment maintained  Taken 9/20/2023 2200 by Dai Freitas RN  Safety Promotion/Fall Prevention:   safety round/check completed   nonskid shoes/slippers when out of bed   lighting adjusted   fall prevention program maintained   clutter free environment maintained  Taken 9/20/2023 2000 by Dai Freitas RN  Safety Promotion/Fall Prevention:   safety round/check completed   nonskid shoes/slippers when out of bed   lighting adjusted   fall prevention program maintained   clutter free environment maintained     Problem: Adjustment to Illness (Sepsis/Septic Shock)  Goal: Optimal Coping  Outcome: Ongoing, Progressing     Problem: Bleeding (Sepsis/Septic Shock)  Goal: Absence of Bleeding  Outcome: Ongoing, Progressing     Problem: Glycemic Control Impaired (Sepsis/Septic Shock)  Goal: Blood Glucose Level Within Desired Range  Outcome: Ongoing, Progressing     Problem: Infection Progression (Sepsis/Septic Shock)  Goal: Absence of Infection Signs and Symptoms  Outcome: Ongoing, Progressing  Intervention: Initiate Sepsis Management  Recent Flowsheet Documentation  Taken 9/21/2023 0200 by Dai Freitas RN  Infection Prevention:   visitors restricted/screened   single patient room provided   personal protective equipment utilized   rest/sleep promoted   hand hygiene promoted  Taken 9/21/2023 0000 by Dai Freitas RN  Infection Prevention:   visitors restricted/screened   single patient room provided   rest/sleep promoted   personal protective equipment utilized   hand hygiene promoted  Taken 9/20/2023 2200 by Dai Freitas RN  Infection Prevention:   visitors restricted/screened   single patient room provided   rest/sleep promoted   personal protective equipment utilized   hand hygiene promoted  Taken  9/20/2023 2000 by Dai Freitas, RN  Infection Prevention:   visitors restricted/screened   single patient room provided   rest/sleep promoted   personal protective equipment utilized   hand hygiene promoted  Intervention: Promote Recovery  Recent Flowsheet Documentation  Taken 9/21/2023 0200 by Dai Freitas RN  Activity Management: activity minimized  Taken 9/21/2023 0000 by Dai Freitas RN  Activity Management: activity minimized  Taken 9/20/2023 2200 by Dai Freitas RN  Activity Management: activity minimized  Taken 9/20/2023 2000 by Dai Freitas RN  Activity Management: activity minimized     Problem: Nutrition Impaired (Sepsis/Septic Shock)  Goal: Optimal Nutrition Intake  Outcome: Ongoing, Progressing     Problem: Skin Injury Risk Increased  Goal: Skin Health and Integrity  Outcome: Ongoing, Progressing  Intervention: Optimize Skin Protection  Recent Flowsheet Documentation  Taken 9/21/2023 0200 by Dai Freitas RN  Head of Bed (HOB) Positioning: HOB elevated  Taken 9/21/2023 0000 by Dai Freitas RN  Head of Bed (HOB) Positioning: HOB elevated  Taken 9/20/2023 2200 by Dai Freitas RN  Head of Bed (HOB) Positioning: HOB elevated  Taken 9/20/2023 2000 by Dia Freitas RN  Head of Bed (HOB) Positioning: HOB elevated

## 2023-09-21 NOTE — PLAN OF CARE
Goal Outcome Evaluation:  Plan of Care Reviewed With: patient        Progress: improving  Outcome Evaluation: Pt seen by PT this AM for tx. Pt was in bed and verbalized that he was upset at being unable to talk to spouse. Pt provided emotional support and was assisted in calling spouse at end of session.Pt sat up to EOB w/ SBA. Pt was impulsive and attempted to stand w/ o being directed although sat down when requested. Pt frequently discussing his need to locate his car and was redirected after it was explained that he would not be needing his vehicle. Pt stood req CGA x 2 and use of fww. 2nd person assisting after RN reported B knees buckling w/ staff a few times yesterday. Pt amb 25' then sat in chair for brief rest followed by 20' req CGA x 2 and use of fww. O2 sats remained in low to mid 90's  on 2L of O2 throughout session.  Pt cued to reduce speed and for directional changes req a repeat of cues. Pt slow and unsteady w/ no B knee buckling noted today and no overt LOB. PT uic at end of session w/ alarm set. PT will prog as pt enrrique.      Anticipated Discharge Disposition (PT): skilled nursing facility

## 2023-09-21 NOTE — NURSING NOTE
Cwon consult received. Patient with a significant skin tear to left upper arm. Partial flap present and viable. Dressing removed and wound cleansed with sterile saline. I added a pack of petrolatum to 1 piece of petrolatum gauze and completely covered skin tear. I did this to ensure or at least limit the adherence to fragile skin and further trauma when dressing changed. Of note, patient is on chronic anticoagulation. At time of care, bleeding is controlled. The then covered the petrolatum gauze with an ABD pad, wrapped with Kerlix and secure with ace wrap.  I will recommend this dressing be changed every day.    Patient also with a partial thickness wound to left lateral abdomen. Base is clean and moist. Primary RN reports moderate bleeding from this site.  After cleansing with NS, I covered open wound with a piece of Opticell Ag then secured with an optifoam dressing.  Patient tolerated all wound care.  All orders have been placed in Epic.

## 2023-09-21 NOTE — THERAPY TREATMENT NOTE
Patient Name: Riky Rios  : 1935    MRN: [id number]                              Today's Date: 2023       Admit Date: 2023    Visit Dx: No diagnosis found.  Patient Active Problem List   Diagnosis    COPD with acute exacerbation    Cardiac arrest    Acute respiratory failure with hypoxia    Pneumothorax on right    Paroxysmal atrial fibrillation    Acute bronchitis due to Rhinovirus    Hypertension    Acute left cerebellar infarct    Closed fracture of one rib of right side     History reviewed. No pertinent past medical history.  History reviewed. No pertinent surgical history.   General Information       Row Name 23 1027          Physical Therapy Time and Intention    Document Type therapy note (daily note)  -     Mode of Treatment physical therapy  -       Row Name 23 1027          General Information    Existing Precautions/Restrictions oxygen therapy device and L/min  -       Row Name 23 1027          Cognition    Orientation Status (Cognition) oriented x 4  -       Row Name 23 1027          Safety Issues, Functional Mobility    Comment, Safety Issues/Impairments (Mobility) gt belt and non skid socks donned;  -               User Key  (r) = Recorded By, (t) = Taken By, (c) = Cosigned By      Initials Name Provider Type    PH Sherri Whitten PTA Physical Therapist Assistant                   Mobility       Row Name 23 1028          Bed Mobility    Bed Mobility supine-sit  -PH     Supine-Sit [place] (Bed Mobility) standby assist  -     Assistive Device (Bed Mobility) bed rails;head of bed elevated  -PH     Comment, (Bed Mobility) slow to EOB; pt verbalized that he was up and wanted to talk to his spouse; pt provided emotional support; pt voicing concern about finding his car freq and req redirect  -       Row Name 23 1028          Sit-Stand Transfer    Sit-Stand [place] (Transfers) contact guard;2 person assist;verbal  cues;nonverbal cues (demo/gesture)  -PH     Assistive Device (Sit-Stand Transfers) walker, front-wheeled  -PH     Comment, (Sit-Stand Transfer) 2x STS; 1x from EOB and 1x from chair  -PH       Row Name 09/21/23 1028          Gait/Stairs (Locomotion)    [place] Level (Gait) contact guard;2 person assist;verbal cues;nonverbal cues (demo/gesture)  -PH     Distance in Feet (Gait) 25', 20'  -PH     Deviations/Abnormal Patterns (Gait) sumeet decreased;gait speed decreased;stride length decreased;ataxic  -PH     Bilateral Gait Deviations forward flexed posture;heel strike decreased  -PH     [place] Level (Stairs) not tested  -PH     Comment, (Gait/Stairs) pt slow and unsteady w/ no overt LOB; cues to reduce speed for small space. Extra time to follow instructions when given. Sats remained in low to mid 90's during gait.  -PH               User Key  (r) = Recorded By, (t) = Taken By, (c) = Cosigned By      Initials Name Provider Type     Sherri Whitten PTA Physical Therapist Assistant                   Obj/Interventions       Row Name 09/21/23 1031          Motor Skills    Therapeutic Exercise other (see comments)  -PH       Row Name 09/21/23 1031          Balance    Balance Assessment sitting static balance;standing static balance  -PH     Static Sitting Balance standby assist  -PH     Static Standing Balance contact guard;verbal cues;non-verbal cues (demo/gesture)  -PH     Position/Device Used, Standing Balance walker, front-wheeled  -PH               User Key  (r) = Recorded By, (t) = Taken By, (c) = Cosigned By      Initials Name Provider Type     Sherri Whitten PTA Physical Therapist Assistant                   Goals/Plan    No documentation.                  Clinical Impression       Row Name 09/21/23 1031          Pain    Pre/Posttreatment Pain Comment complaint of rib pain unrated  -PH     Pain Intervention(s) Repositioned;Rest  -       Row Name 09/21/23 1031          Plan of Care  Review    Plan of Care Reviewed With patient  -PH     Progress improving  -PH     Outcome Evaluation Pt seen by PT this AM for tx. Pt was in bed and verbalized that he was upset at being unable to talk to spouse. Pt provided emotional support and was assisted in calling spouse at end of session.Pt sat up to EOB w/ SBA. Pt was impulsive and attempted to stand w/ o being directed although sat down when requested. Pt frequently discussing his need to locate his car and was redirected after it was explained that he would not be needing his vehicle. Pt stood req CGA x 2 and use of fww. 2nd person assisting after RN reported B knees buckling w/ staff a few times yesterday. Pt amb 25' then sat in chair for brief rest followed by 20' req CGA x 2 and use of fww. O2 sats remained in low to mid 90's  on 2L of O2 throughout session.  Pt cued to reduce speed and for directional changes req a repeat of cues. Pt slow and unsteady w/ no B knee buckling noted today and no overt LOB. PT uic at end of session w/ alarm set. PT will prog as pt enrrique.  -PH       Row Name 09/21/23 1031          Vital Signs    O2 Delivery Pre Treatment supplemental O2  -PH     Intra SpO2 (%) 93  -PH     O2 Delivery Intra Treatment supplemental O2  -PH     Post SpO2 (%) 93  -PH     O2 Delivery Post Treatment supplemental O2  -PH       Row Name 09/21/23 1031          Positioning and Restraints    Pre-Treatment Position in bed  -PH     Post Treatment Position chair  -PH     In Chair reclined;call light within reach;encouraged to call for assist;exit alarm on;notified nsg  -PH               User Key  (r) = Recorded By, (t) = Taken By, (c) = Cosigned By      Initials Name Provider Type    PH Sherri Whitten PTA Physical Therapist Assistant                   Outcome Measures       Row Name 09/21/23 1038          How much help from another person do you currently need...    Turning from your back to your side while in flat bed without using bedrails? 4  -PH      Moving from lying on back to sitting on the side of a flat bed without bedrails? 3  -PH     Moving to and from a bed to a chair (including a wheelchair)? 3  -PH     Standing up from a chair using your arms (e.g., wheelchair, bedside chair)? 3  -PH     Climbing 3-5 steps with a railing? 2  -PH     To walk in hospital room? 3  -PH     AM-PAC 6 Clicks Score (PT) 18  -PH     Highest level of mobility 6 --> Walked 10 steps or more  -PH       Row Name 09/21/23 1038          Functional Assessment    Outcome Measure Options AM-PAC 6 Clicks Basic Mobility (PT)  -PH               User Key  (r) = Recorded By, (t) = Taken By, (c) = Cosigned By      Initials Name Provider Type    Sherri Garcia PTA Physical Therapist Assistant                                 Physical Therapy Education       Title: PT OT SLP Therapies (Done)       Topic: Physical Therapy (Done)       Point: Mobility training (Done)       Learning Progress Summary             Patient Acceptance, E,TB, VU,NR by  at 9/21/2023 1039    Acceptance, E,TB,D, VU,NR by  at 9/20/2023 1009    Acceptance, E,TB,D, VU,NR by  at 9/19/2023 1647    Acceptance, E, VU,NR by KT at 9/19/2023 1533    Acceptance, E,TB,D, VU,NR by  at 9/18/2023 1328    Acceptance, E, VU,NR by KT at 9/18/2023 1028    Acceptance, E,TB, VU,NR by  at 9/17/2023 1438    Acceptance, E,TB,D, VU,NR by  at 9/13/2023 1138    Acceptance, E, VU,NR by KT at 9/12/2023 1156    Acceptance, E,TB,D, VU,NR by  at 9/12/2023 0955                         Point: Home exercise program (Done)       Learning Progress Summary             Patient Acceptance, E,TB,D, VU,NR by  at 9/20/2023 1009    Acceptance, E,TB,D, VU,NR by  at 9/19/2023 1647    Acceptance, E, VU,NR by KT at 9/19/2023 1533    Acceptance, E, VU,NR by KT at 9/18/2023 1028    Acceptance, E,TB, VU,NR by CB at 9/17/2023 1438    Acceptance, E,TB,D, VU,NR by PH at 9/13/2023 1138    Acceptance, E, VU,NR by KT at 9/12/2023 1156                          Point: Body mechanics (Done)       Learning Progress Summary             Patient Acceptance, E,TB, VU,NR by  at 9/21/2023 1039    Acceptance, E,TB,D, VU,NR by  at 9/20/2023 1009    Acceptance, E,TB,D, VU,NR by  at 9/19/2023 1647    Acceptance, E, VU,NR by KT at 9/19/2023 1533    Acceptance, E,TB,D, VU,NR by  at 9/18/2023 1328    Acceptance, E, VU,NR by KT at 9/18/2023 1028    Acceptance, E,TB,D, VU,NR by  at 9/13/2023 1138    Acceptance, E, VU,NR by KT at 9/12/2023 1156    Acceptance, E,TB,D, VU,NR by  at 9/12/2023 0955                         Point: Precautions (Done)       Learning Progress Summary             Patient Acceptance, E,TB, VU,NR by  at 9/21/2023 1039    Acceptance, E,TB,D, VU,NR by  at 9/20/2023 1009    Acceptance, E,TB,D, VU,NR by  at 9/19/2023 1647    Acceptance, E, VU,NR by KT at 9/19/2023 1533    Acceptance, E,TB,D, VU,NR by  at 9/18/2023 1328    Acceptance, E, VU,NR by KT at 9/18/2023 1028    Acceptance, E,TB,D, VU,NR by  at 9/13/2023 1138    Acceptance, E, VU,NR by KT at 9/12/2023 1156    Acceptance, E,TB,D, VU,NR by  at 9/12/2023 0955                                         User Key       Initials Effective Dates Name Provider Type Discipline     06/16/21 -  Meg Fitzgerald PT Physical Therapist PT    KT 06/16/21 -  Chel De Anda, RN Registered Nurse Nurse     06/16/21 -  Sherri Whitten PTA Physical Therapist Assistant PT    CB 10/22/21 -  Mary Reyes PT Physical Therapist PT                  PT Recommendation and Plan     Plan of Care Reviewed With: patient  Progress: improving  Outcome Evaluation: Pt seen by PT this AM for tx. Pt was in bed and verbalized that he was upset at being unable to talk to spouse. Pt provided emotional support and was assisted in calling spouse at end of session.Pt sat up to EOB w/ SBA. Pt was impulsive and attempted to stand w/ o being directed although sat down when requested. Pt frequently discussing  his need to locate his car and was redirected after it was explained that he would not be needing his vehicle. Pt stood req CGA x 2 and use of fww. 2nd person assisting after RN reported B knees buckling w/ staff a few times yesterday. Pt amb 25' then sat in chair for brief rest followed by 20' req CGA x 2 and use of fww. O2 sats remained in low to mid 90's  on 2L of O2 throughout session.  Pt cued to reduce speed and for directional changes req a repeat of cues. Pt slow and unsteady w/ no B knee buckling noted today and no overt LOB. PT uic at end of session w/ alarm set. PT will prog as pt enrrique.     Time Calculation:         PT Charges       Row Name 09/21/23 1039             Time Calculation    Start Time 0854  -PH      Stop Time 0910  -PH      Time Calculation (min) 16 min  -PH      PT Received On 09/21/23  -PH      PT - Next Appointment 09/22/23  -PH         Timed Charges    98903 - PT Therapeutic Activity Minutes 16  -PH         Total Minutes    Timed Charges Total Minutes 16  -PH       Total Minutes 16  -PH                User Key  (r) = Recorded By, (t) = Taken By, (c) = Cosigned By      Initials Name Provider Type    PH Sherri Whitten, PTA Physical Therapist Assistant                  Therapy Charges for Today       Code Description Service Date Service Provider Modifiers Qty    98197998877 HC PT THER PROC EA 15 MIN 9/20/2023 [place], Sherri, PTA GP 1    45393163174 HC PT THERAPEUTIC ACT EA 15 MIN 9/20/2023 [place], Sherri, PTA GP 1    41152301779 HC PT THER SUPP EA 15 MIN 9/20/2023 [place], Sherri, PTA GP 1    54109713003 HC PT THERAPEUTIC ACT EA 15 MIN 9/21/2023 [place], Sherri, PTA GP 1    61040561158 HC PT THER SUPP EA 15 MIN 9/21/2023 [place], Sherri, PTA GP 1            PT G-Codes  Outcome Measure Options: AM-PAC 6 Clicks Basic Mobility (PT)  AM-PAC 6 Clicks Score (PT): 18  AM-PAC 6 Clicks Score (OT): 13  Modified Sonam Scale: 3 - Moderate disability.   Requiring some help, but able to walk without assistance.  PT Discharge Summary  Anticipated Discharge Disposition (PT): skilled nursing facility    Sherri Whitten, PTA  9/21/2023

## 2023-09-21 NOTE — PROGRESS NOTES
"[facility] Clinical Pharmacy Services: Vancomycin Monitoring Note    Riky Rios is a 87 y.o. male who is on day 2/7 of pharmacy to dose vancomycin for Pneumonia.    Previous Vancomycin Dose: 500 mg every 12 hours   Updated Cultures and Sensitivities:   -9/19 MRSA screen: Positive   Results from last 7 days   Lab Units 09/21/23  1257   VANCOMYCIN TR mcg/mL 16.10     Vitals/Labs  Ht: 172.7 cm (68\"); Wt: 60.8 kg (134 lb)   Temp Readings from Last 1 Encounters:   09/21/23 97.6 °F (36.4 °C) (Oral)     Estimated Creatinine Clearance: 40 mL/min (by C-G formula based on SCr of 1.14 mg/dL).     Results from last 7 days   Lab Units 09/21/23  0729 09/20/23  0549 09/19/23  0443   CREATININE mg/dL 1.14 1.03 0.89   WBC 10*3/mm3 16.60* 16.48* 14.74*     Assessment/Plan  Trough collected at steady state returned at 16.1 mg/L (~11.25 hr level) with a corresponding therapeutic AUC of 566 mg/L*hr.   No changes indicated at this time.  Current Vancomycin Dose: Continue 500 mg IV every  12  hours.  Next Level Date and Time:In the next 3-4 or sooner if clinically indicated.  We will continue to monitor patient changes and renal function     Thank you for involving pharmacy in this patient's care. Please contact pharmacy with any questions or concerns.       Cheyenne Gowers, [facility]  Clinical Pharmacist    "

## 2023-09-21 NOTE — PROGRESS NOTES
"    Name: Riky Rios ADMIT: 2023   : 1935  PCP: Provider, No Known    MRN: [id number] LOS: 12 days   AGE/SEX: 87 y.o. male  ROOM: E5/1     Subjective   Subjective   Confused overnight and still this morning when I saw him.  Otherwise states he feels \"100% better.\"       Objective   Objective   Vital Signs  Temp:  [97.6 °F (36.4 °C)-98.8 °F (37.1 °C)] 97.6 °F (36.4 °C)  Heart Rate:  [83-95] 91  Resp:  [16-22] 18  BP: ()/(45-80) 157/69  SpO2:  [91 %-100 %] 93 %  on  Flow (L/min):  [1-10] 1;   Device (Oxygen Therapy): nasal cannula  Body mass index is 20.37 kg/m².  Physical Exam  Vitals and nursing note reviewed.   Constitutional:       General: He is not in acute distress.     Appearance: He is ill-appearing.   Cardiovascular:      Rate and Rhythm: Normal rate and regular rhythm.   Pulmonary:      Effort: Pulmonary effort is normal.      Breath sounds: Decreased air movement present. Rhonchi present.   Abdominal:      General: Bowel sounds are normal.      Palpations: Abdomen is soft.      Tenderness: There is no abdominal tenderness.   Musculoskeletal:         General: No swelling.   Skin:     General: Skin is warm and dry.   Neurological:      Mental Status: He is alert. Mental status is at baseline.     Results Review     I reviewed the patient's new clinical results.  Results from last 7 days   Lab Units 23  0729 23  0549 23  0647   WBC 10*3/mm3 16.60* 16.48* 14.74* 13.85*   HEMOGLOBIN g/dL 11.0* 11.3* 12.0* 12.7*   PLATELETS 10*3/mm3 185 174 185 180       Results from last 7 days   Lab Units 23  0729 23  0549 23  0443 23  1441   SODIUM mmol/L 146* 144 142 145   POTASSIUM mmol/L 3.8 3.7 4.7 3.9   CHLORIDE mmol/L 111* 110* 110* 111*   CO2 mmol/L 23.0 24.6 21.1* 25.1   BUN mg/dL 35* 33* 30* 27*   CREATININE mg/dL 1.14 1.03 0.89 0.96   GLUCOSE mg/dL 127* 103* 118* 117*   EGFR mL/min/1.73 62.6 70.7 83.5 77.0       Results from last 7 days "   Lab Units 09/21/23  0729   ALBUMIN g/dL 3.0*   BILIRUBIN mg/dL 0.5   ALK PHOS U/L 155*   AST (SGOT) U/L 73*   ALT (SGPT) U/L 198*       Results from last 7 days   Lab Units 09/21/23  0729 09/20/23  0549 09/19/23 0443 09/17/23  1441   CALCIUM mg/dL 9.0 8.7 8.7 8.5*   ALBUMIN g/dL 3.0*  --   --   --    MAGNESIUM mg/dL 1.9  --   --   --    PHOSPHORUS mg/dL 3.1  --   --   --        Results from last 7 days   Lab Units 09/21/23  0729 09/20/23  0549 09/19/23 0443 09/16/23  1026   PROCALCITONIN ng/mL 0.24 0.24 0.27* 0.15       Glucose   Date/Time Value Ref Range Status   09/19/2023 1605 139 (H) 70 - 130 mg/dL Final   09/19/2023 1059 171 (H) 70 - 130 mg/dL Final   09/19/2023 0559 120 70 - 130 mg/dL Final   09/18/2023 2056 173 (H) 70 - 130 mg/dL Final   09/18/2023 1711 106 70 - 130 mg/dL Final       No radiology results for the last day    I have personally reviewed all medications:  Scheduled Medications  acetaminophen, 1,000 mg, Oral, TID  allopurinol, 300 mg, Oral, Daily  amLODIPine, 10 mg, Oral, Q24H  apixaban, 2.5 mg, Oral, Q12H  arformoterol, 15 mcg, Nebulization, BID - RT  aspirin, 81 mg, Oral, Daily  atorvastatin, 40 mg, Oral, Daily  budesonide, 0.5 mg, Nebulization, Daily - RT  guaiFENesin, 600 mg, Oral, BID  lidocaine, 1 patch, Transdermal, Q24H  mirtazapine, 15 mg, Oral, Nightly  predniSONE, 2.5 mg, Oral, Daily With Breakfast  senna-docusate sodium, 2 tablet, Oral, BID  sodium chloride, 4 mL, Nebulization, BID - RT  tamsulosin, 0.8 mg, Oral, Daily  tiotropium bromide monohydrate, 2 puff, Inhalation, Daily - RT  vancomycin, 500 mg, Intravenous, Q12H    Infusions  Pharmacy to dose vancomycin,     Diet  Diet: Regular/House Diet; No Straw; Texture: Pureed (NDD 1); Fluid Consistency: Thin (IDDSI 0)    I have personally reviewed:  [x]  Laboratory   [x]  Microbiology   [x]  Radiology   [x]  EKG/Telemetry  [x]  Cardiology/Vascular   []  Pathology    []  Records       Assessment/Plan     Active Hospital Problems     Diagnosis  POA    **COPD with acute exacerbation [J44.1]  Yes    Acute bronchitis due to Rhinovirus [J20.6]  Yes    Hypertension [I10]  Yes    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          Yesterday's episode of severe hypoxia seems resolved now.  Suspect mucous plugging.  He did receive Bumex and Solu-Medrol.  Sodium up slightly.  Likely a little dry.  No further diuresis planned.  Pulmonology to follow-up.    Seems to have ongoing intermittent confusion likely encephalopathy versus hospital delirium.  Suspects related to his severe hypoxia yesterday and may be also the steroids.  He is currently on his home chronic dose of prednisone.  BP somewhat elevated today.  Do not suspect adrenal insufficiency.  We will make available low-dose olanzapine.  Will reconsult neurology if mental status does not return to baseline.    Uncertain significance of elevated LFTs.  No complaint of abdominal pain.  Will reassess in a.m.  Consider ultrasound.    Neurology signed off with recommendations for full anticoagulation and statin therapy at discharge.  While not 100% certain he had atrial fibrillation cardiology also agrees with anticoagulating..  Cleared to be on Eliquis by thoracic surgery which was started 9/20.      Continue high-dose statin as written-on aspirin 81 mg and statin 40 mg daily prior to arrival  Outpatient ENT follow due to abnormalities noted on MRI above for direct visualization of suspected polyp.  Follow up neurology 3 months.  Full code.  Discussed with patient, nursing staff, and Dr. Dickerson .  Anticipate discharge to SNU facility timing yet to be determined..      Celso Schmitz,  MD [facility]  09/21/23  12:50 EDT

## 2023-09-22 LAB
ALBUMIN SERPL-MCNC: 3 G/DL (ref 3.5–5.2)
ALBUMIN/GLOB SERPL: 1.3 G/DL
ALP SERPL-CCNC: 140 U/L (ref 39–117)
ALT SERPL W P-5'-P-CCNC: 189 U/L (ref 1–41)
ANION GAP SERPL CALCULATED.3IONS-SCNC: 11 MMOL/L (ref 5–15)
AST SERPL-CCNC: 75 U/L (ref 1–40)
BILIRUB SERPL-MCNC: 0.5 MG/DL (ref 0–1.2)
BUN SERPL-MCNC: 36 MG/DL (ref 8–23)
BUN/CREAT SERPL: 34.6 (ref 7–25)
CALCIUM SPEC-SCNC: 8.4 MG/DL (ref 8.6–10.5)
CHLORIDE SERPL-SCNC: 113 MMOL/L (ref 98–107)
CO2 SERPL-SCNC: 24 MMOL/L (ref 22–29)
CREAT SERPL-MCNC: 1.04 MG/DL (ref 0.76–1.27)
DEPRECATED RDW RBC AUTO: 47.1 FL (ref 37–54)
EGFRCR SERPLBLD CKD-EPI 2021: 69.9 ML/MIN/1.73
ERYTHROCYTE [DISTWIDTH] IN BLOOD BY AUTOMATED COUNT: 12.4 % (ref 12.3–15.4)
GLOBULIN UR ELPH-MCNC: 2.4 GM/DL
GLUCOSE SERPL-MCNC: 100 MG/DL (ref 65–99)
HCT VFR BLD AUTO: 29.8 % (ref 37.5–51)
HGB BLD-MCNC: 10.3 G/DL (ref 13–17.7)
MCH RBC QN AUTO: 35.8 PG (ref 26.6–33)
MCHC RBC AUTO-ENTMCNC: 34.6 G/DL (ref 31.5–35.7)
MCV RBC AUTO: 103.5 FL (ref 79–97)
PLATELET # BLD AUTO: 190 10*3/MM3 (ref 140–450)
PMV BLD AUTO: 10.2 FL (ref 6–12)
POTASSIUM SERPL-SCNC: 3.2 MMOL/L (ref 3.5–5.2)
POTASSIUM SERPL-SCNC: 4.2 MMOL/L (ref 3.5–5.2)
PROT SERPL-MCNC: 5.4 G/DL (ref 6–8.5)
RBC # BLD AUTO: 2.88 10*6/MM3 (ref 4.14–5.8)
SODIUM SERPL-SCNC: 148 MMOL/L (ref 136–145)
WBC NRBC COR # BLD: 17.51 10*3/MM3 (ref 3.4–10.8)

## 2023-09-22 PROCEDURE — 80053 COMPREHEN METABOLIC PANEL: CPT | Performed by: HOSPITALIST

## 2023-09-22 PROCEDURE — 97530 THERAPEUTIC ACTIVITIES: CPT

## 2023-09-22 PROCEDURE — 94664 DEMO&/EVAL PT USE INHALER: CPT

## 2023-09-22 PROCEDURE — 63710000001 PREDNISONE PER 5 MG: Performed by: INTERNAL MEDICINE

## 2023-09-22 PROCEDURE — 94799 UNLISTED PULMONARY SVC/PX: CPT

## 2023-09-22 PROCEDURE — 94761 N-INVAS EAR/PLS OXIMETRY MLT: CPT

## 2023-09-22 PROCEDURE — 97535 SELF CARE MNGMENT TRAINING: CPT

## 2023-09-22 PROCEDURE — 25010000002 VANCOMYCIN PER 500 MG: Performed by: HOSPITALIST

## 2023-09-22 PROCEDURE — 85027 COMPLETE CBC AUTOMATED: CPT | Performed by: HOSPITALIST

## 2023-09-22 PROCEDURE — 84132 ASSAY OF SERUM POTASSIUM: CPT | Performed by: HOSPITALIST

## 2023-09-22 RX ORDER — POTASSIUM CHLORIDE 750 MG/1
40 TABLET, FILM COATED, EXTENDED RELEASE ORAL EVERY 4 HOURS
Status: COMPLETED | OUTPATIENT
Start: 2023-09-22 | End: 2023-09-22

## 2023-09-22 RX ADMIN — BUDESONIDE 0.5 MG: 0.5 INHALANT ORAL at 07:23

## 2023-09-22 RX ADMIN — ARFORMOTEROL TARTRATE 15 MCG: 15 SOLUTION RESPIRATORY (INHALATION) at 07:22

## 2023-09-22 RX ADMIN — GUAIFENESIN 600 MG: 600 TABLET, EXTENDED RELEASE ORAL at 09:41

## 2023-09-22 RX ADMIN — OLANZAPINE 2.5 MG: 10 INJECTION, POWDER, FOR SOLUTION INTRAMUSCULAR at 04:19

## 2023-09-22 RX ADMIN — POTASSIUM CHLORIDE 40 MEQ: 750 TABLET, EXTENDED RELEASE ORAL at 14:30

## 2023-09-22 RX ADMIN — TAMSULOSIN HYDROCHLORIDE 0.8 MG: 0.4 CAPSULE ORAL at 09:41

## 2023-09-22 RX ADMIN — ALLOPURINOL 300 MG: 300 TABLET ORAL at 09:41

## 2023-09-22 RX ADMIN — ATORVASTATIN CALCIUM 40 MG: 20 TABLET, FILM COATED ORAL at 09:41

## 2023-09-22 RX ADMIN — SENNOSIDES AND DOCUSATE SODIUM 2 TABLET: 50; 8.6 TABLET ORAL at 20:06

## 2023-09-22 RX ADMIN — APIXABAN 2.5 MG: 2.5 TABLET, FILM COATED ORAL at 09:42

## 2023-09-22 RX ADMIN — PREDNISONE 2.5 MG: 2.5 TABLET ORAL at 09:41

## 2023-09-22 RX ADMIN — GUAIFENESIN 600 MG: 600 TABLET, EXTENDED RELEASE ORAL at 20:07

## 2023-09-22 RX ADMIN — OLANZAPINE 5 MG: 5 TABLET, FILM COATED ORAL at 20:07

## 2023-09-22 RX ADMIN — LIDOCAINE 1 PATCH: 50 PATCH CUTANEOUS at 09:42

## 2023-09-22 RX ADMIN — ACETAMINOPHEN 1000 MG: 500 TABLET ORAL at 21:59

## 2023-09-22 RX ADMIN — Medication 4 ML: at 07:24

## 2023-09-22 RX ADMIN — ACETAMINOPHEN 1000 MG: 500 TABLET ORAL at 17:00

## 2023-09-22 RX ADMIN — Medication 4 ML: at 19:35

## 2023-09-22 RX ADMIN — ARFORMOTEROL TARTRATE 15 MCG: 15 SOLUTION RESPIRATORY (INHALATION) at 19:30

## 2023-09-22 RX ADMIN — ACETAMINOPHEN 1000 MG: 500 TABLET ORAL at 09:41

## 2023-09-22 RX ADMIN — APIXABAN 2.5 MG: 2.5 TABLET, FILM COATED ORAL at 20:07

## 2023-09-22 RX ADMIN — MIRTAZAPINE 15 MG: 15 TABLET, ORALLY DISINTEGRATING ORAL at 20:07

## 2023-09-22 RX ADMIN — POTASSIUM CHLORIDE 40 MEQ: 750 TABLET, EXTENDED RELEASE ORAL at 17:44

## 2023-09-22 RX ADMIN — ASPIRIN 81 MG: 81 TABLET, CHEWABLE ORAL at 09:41

## 2023-09-22 RX ADMIN — AMLODIPINE BESYLATE 10 MG: 10 TABLET ORAL at 09:41

## 2023-09-22 RX ADMIN — VANCOMYCIN HYDROCHLORIDE 500 MG: 500 INJECTION, POWDER, LYOPHILIZED, FOR SOLUTION INTRAVENOUS at 00:49

## 2023-09-22 NOTE — PROGRESS NOTES
Name: Riky Rios ADMIT: 2023   : 1935  PCP: Provider, No Known    MRN: [id number] LOS: 13 days   AGE/SEX: 87 y.o. male  ROOM: [facility]/     Subjective   Subjective   Sitting up in chair.  No specific complaints though seems irritable.       Objective   Objective   Vital Signs  Temp:  [97.6 °F (36.4 °C)-98.1 °F (36.7 °C)] 97.8 °F (36.6 °C)  Heart Rate:  [84-96] 86  Resp:  [16-18] 18  BP: (117-157)/(66-82) 117/82  SpO2:  [93 %-100 %] 100 %  on  Flow (L/min):  [1] 1;   Device (Oxygen Therapy): room air  Body mass index is 20.37 kg/m².  Physical Exam  Vitals and nursing note reviewed.   Constitutional:       Appearance: He is ill-appearing.   Cardiovascular:      Rate and Rhythm: Normal rate and regular rhythm.   Pulmonary:      Effort: Pulmonary effort is normal.      Breath sounds: Decreased air movement present. Rhonchi present.   Abdominal:      General: Bowel sounds are normal.      Palpations: Abdomen is soft.      Tenderness: There is no abdominal tenderness.   Musculoskeletal:         General: No swelling.   Skin:     General: Skin is warm and dry.   Neurological:      Mental Status: He is alert.     Results Review     I reviewed the patient's new clinical results.  Results from last 7 days   Lab Units 23  0503 23  0729 23  0549 23  0443   WBC 10*3/mm3 17.51* 16.60* 16.48* 14.74*   HEMOGLOBIN g/dL 10.3* 11.0* 11.3* 12.0*   PLATELETS 10*3/mm3 190 185 174 185       Results from last 7 days   Lab Units 23  0503 23  0729 23  0549 23  0443   SODIUM mmol/L 148* 146* 144 142   POTASSIUM mmol/L 3.2* 3.8 3.7 4.7   CHLORIDE mmol/L 113* 111* 110* 110*   CO2 mmol/L 24.0 23.0 24.6 21.1*   BUN mg/dL 36* 35* 33* 30*   CREATININE mg/dL 1.04 1.14 1.03 0.89   GLUCOSE mg/dL 100* 127* 103* 118*   EGFR mL/min/1.73 69.9 62.6 70.7 83.5       Results from last 7 days   Lab Units 23  0503 23  0729   ALBUMIN g/dL 3.0* 3.0*   BILIRUBIN mg/dL 0.5 0.5   ALK PHOS U/L  140* 155*   AST (SGOT) U/L 75* 73*   ALT (SGPT) U/L 189* 198*       Results from last 7 days   Lab Units 09/22/23  0503 09/21/23  0729 09/20/23  0549 09/19/23  0443   CALCIUM mg/dL 8.4* 9.0 8.7 8.7   ALBUMIN g/dL 3.0* 3.0*  --   --    MAGNESIUM mg/dL  --  1.9  --   --    PHOSPHORUS mg/dL  --  3.1  --   --        Results from last 7 days   Lab Units 09/21/23  0729 09/20/23  0549 09/19/23  0443 09/16/23  1026   PROCALCITONIN ng/mL 0.24 0.24 0.27* 0.15       Glucose   Date/Time Value Ref Range Status   09/19/2023 1605 139 (H) 70 - 130 mg/dL Final   09/19/2023 1059 171 (H) 70 - 130 mg/dL Final       No radiology results for the last day    I have personally reviewed all medications:  Scheduled Medications  acetaminophen, 1,000 mg, Oral, TID  allopurinol, 300 mg, Oral, Daily  amLODIPine, 10 mg, Oral, Q24H  apixaban, 2.5 mg, Oral, Q12H  arformoterol, 15 mcg, Nebulization, BID - RT  aspirin, 81 mg, Oral, Daily  atorvastatin, 40 mg, Oral, Daily  budesonide, 0.5 mg, Nebulization, Daily - RT  guaiFENesin, 600 mg, Oral, BID  lidocaine, 1 patch, Transdermal, Q24H  mirtazapine, 15 mg, Oral, Nightly  OLANZapine, 5 mg, Oral, Nightly  predniSONE, 2.5 mg, Oral, Daily With Breakfast  senna-docusate sodium, 2 tablet, Oral, BID  sodium chloride, 4 mL, Nebulization, BID - RT  tamsulosin, 0.8 mg, Oral, Daily  tiotropium bromide monohydrate, 2 puff, Inhalation, Daily - RT  vancomycin, 500 mg, Intravenous, Q12H    Infusions  Pharmacy to dose vancomycin,     Diet  Diet: Regular/House Diet; No Straw; Texture: Pureed (NDD 1); Fluid Consistency: Thin (IDDSI 0)    I have personally reviewed:  [x]  Laboratory   [x]  Microbiology   [x]  Radiology   [x]  EKG/Telemetry  [x]  Cardiology/Vascular   []  Pathology    []  Records       Assessment/Plan     Active Hospital Problems    Diagnosis  POA    **COPD with acute exacerbation [J44.1]  Yes    Acute bronchitis due to Rhinovirus [J20.6]  Yes    Hypertension [I10]  Yes    Acute left cerebellar infarct  [I63.542]  No    Closed fracture of one rib of right side [S22.31XA]  No    Paroxysmal atrial fibrillation [I48.0]  Clinically Undetermined    Pneumothorax on right [J93.9]  Yes    Cardiac arrest [I46.9]  Yes    Acute respiratory failure with hypoxia [J96.01]  Yes      Resolved Hospital Problems   No resolved problems to display.       87 y.o. male with COPD with acute exacerbation.    Respiratory status much improved from where he was 2 days ago.  Now on room air.  Likely mucous plug.  No evidence of pneumonia on follow-up x-ray.  Discussed with pulmonology who states vancomycin can be discontinued.  Pneumothorax resolved.  He did receive high-dose steroid and diuretic 2 days ago when he was decompensated.  Likely explains his persistent leukocytosis and mild hypernatremia.  Will replace potassium.  Encourage water intake.  Recheck procalcitonin but expect WBC to improve.    Confusion seems much better today.  Still seems a little irritable.  Continue supportive care.    Uncertain significance of elevated LFTs.  Relatively stable today.  Continue monitoring.  Suspect due to acute illness 2 days ago.  As long as stable or trending down and no GI complaints no plans to work-up further currently.    Neurology signed off with recommendations for full anticoagulation and statin therapy at discharge.  While not 100% certain he had atrial fibrillation cardiology also agrees with anticoagulating..  Cleared to be on Eliquis by thoracic surgery which was started 9/20.      Continue high-dose statin as written-on aspirin 81 mg and statin 40 mg daily prior to arrival  Outpatient ENT follow due to abnormalities noted on MRI above for direct visualization of suspected polyp.  Follow up neurology 3 months.  Full code.  Discussed with patient, nursing staff, and Dr. Dickerson .  Anticipate discharge to SNU facility timing yet to be determined..      Celso Schmitz MD  [facility]  09/22/23  10:25 EDT

## 2023-09-22 NOTE — PLAN OF CARE
Goal Outcome Evaluation:  Plan of Care Reviewed With: patient, son        Progress: improving  Outcome Evaluation: Pt presents to OT in bed, difficult to understand, confused.  Pt does better when assisted in moistening mouth with toothette with impaired grasp on toothette in R hand. Pt agreeable to moving to EOB with Mod A then xfer to chair with  Mod A.  Static balance work with posture and trunk activation cueing completed due to posterior lean and impaired base of support.  Once up in chair pt completes UE ex.  Pt partially assist with lower body dressing task.  Once up in chair pt more easily understood but continues with confusion.  Son present and reports new confusion and RN present and contributes to PNA.  Will cont to follow for skilled OT.  Anticipate SNU at d/c.      Anticipated Discharge Disposition (OT): skilled nursing facility

## 2023-09-22 NOTE — SIGNIFICANT NOTE
09/22/23 1515   OTHER   Discipline physical therapy assistant   Rehab Time/Intention   Session Not Performed patient/family declined treatment  (Six family members just arrived at room for a well needed visit this late PM. PT will follow up Monday 9/22. Recommend pt up and amb w/ staff as able.)

## 2023-09-22 NOTE — THERAPY TREATMENT NOTE
Patient Name: Riky Rios  : 1935    MRN: [id number]                              Today's Date: 2023       Admit Date: 2023    Visit Dx: No diagnosis found.  Patient Active Problem List   Diagnosis    COPD with acute exacerbation    Cardiac arrest    Acute respiratory failure with hypoxia    Pneumothorax on right    Paroxysmal atrial fibrillation    Acute bronchitis due to Rhinovirus    Hypertension    Acute left cerebellar infarct    Closed fracture of one rib of right side     History reviewed. No pertinent past medical history.  History reviewed. No pertinent surgical history.   General Information       Row Name 23 1137          OT Time and Intention    Document Type therapy note (daily note)  -LE     Mode of Treatment individual therapy;occupational therapy  -LE       Row Name 23 1137          General Information    Existing Precautions/Restrictions fall  -LE       Row Name 23 1137          Cognition    Orientation Status (Cognition) oriented to;person  confusion noted.  OT helps pt make lunch choices on phone with dietary.  -LE       Row Name 23 1137          Safety Issues, Functional Mobility    Impairments Affecting Function (Mobility) balance;endurance/activity tolerance;strength;pain  -LE     Comment, Safety Issues/Impairments (Mobility) non skid socks and gait belt.  -LE               User Key  (r) = Recorded By, (t) = Taken By, (c) = Cosigned By      Initials Name Provider Type    Carley Magana OTR Occupational Therapist                     Mobility/ADL's       Row Name 23 1138          Bed Mobility    Bed Mobility supine-sit  -LE     Supine-Sit [place] (Bed Mobility) moderate assist (50% patient effort);verbal cues;nonverbal cues (demo/gesture)  -LE     Bed Mobility, Safety Issues cognitive deficits limit understanding;decreased use of arms for pushing/pulling;decreased use of legs for bridging/pushing;impaired trunk control for bed mobility  -LE      Assistive Device (Bed Mobility) bed rails;head of bed elevated  -LE     Comment, (Bed Mobility) assist with semi log roll.   impaired sequencing for task.  -[facility] Name 09/22/23 1138          Transfers    Transfers sit-stand transfer;stand-sit transfer;bed-chair transfer  -LE       Row Name 09/22/23 113          Bed-Chair Transfer    Bed-Chair [place] (Transfers) moderate assist (50% patient effort);verbal cues;nonverbal cues (demo/gesture)  -[facility] Name 09/22/23 1138          Sit-Stand Transfer    Sit-Stand [place] (Transfers) verbal cues;moderate assist (50% patient effort);supervision  -[facility] Name 09/22/23 1138          Stand-Sit Transfer    Stand-Sit [place] (Transfers) verbal cues;supervision;moderate assist (50% patient effort)  -LE       Row Name 09/22/23 [facility]8          Functional Mobility    Functional Mobility- Comment few turn steps to chair with mod A.  -[facility] Name 09/22/23 1138          Activities of Daily Living    BADL Assessment/Intervention toileting;feeding;grooming;upper body dressing;bathing;lower body dressing  -LE       Row Name 09/22/23 [facility]8          Lower Body Dressing Assessment/Training    [place] Level (Lower Body Dressing) don;socks;maximum assist (25% patient effort)  -LE     Position (Lower Body Dressing) supported sitting  -LE     Comment, (Lower Body Dressing) pt makes effort but assist to pull off socks and to mostly isabel socks  -LE       Row Name 09/22/23 1138          Toileting Assessment/Training    Comment, (Toileting) has urinal in room. anticpate would need assist to use urinal.  -[facility] Name 09/22/23 1138          Grooming Assessment/Training    [place] Level (Grooming) set up;verbal cues;minimum assist (75% patient effort)  -LE     Position (Grooming) supine  -LE     Comment, (Grooming) impaired grasp and confusion to hold toothette for oral care  -LE               User Key  (r) = Recorded By, (t) = Taken By, (c) =  "Cosigned By      Initials Name Provider Type    Carley Magana OTR Occupational Therapist                   Obj/Interventions       Row Name 09/22/23 1140          Sensory Assessment (Somatosensory)    Sensory Assessment (Somatosensory) unable/difficult to assess  -[facility] Name 09/22/23 1140          Shoulder (Therapeutic Exercise)    Shoulder (Therapeutic Exercise) AROM (active range of motion)  -     Shoulder AROM (Therapeutic Exercise) bilateral;flexion;extension;10 repetitions;sitting  FF and B shld taps.  guide assit for shld tap.  -       Row Name 09/22/23 1140          Motor Skills    Therapeutic Exercise shoulder  -[facility] Name 09/22/23 1140          Balance    Balance Assessment sitting static balance;standing static balance  -     Static Sitting Balance standby assist  -     Static Standing Balance moderate assist;verbal cues;non-verbal cues (demo/gesture)  -     Balance Interventions standing;static  -LE     Comment, Balance cues posture and trunk activation as pt with flexed posture, min/mod improved posture  -               User Key  (r) = Recorded By, (t) = Taken By, (c) = Cosigned By      Initials Name Provider Type    Carley Magana OTR Occupational Therapist                   Goals/Plan    No documentation.                  Clinical Impression       [facility] Name 09/22/23 1133          Pain Assessment    Pretreatment Pain Rating --  \"15\"  -LE     Pain Location - Side/Orientation Right  -LE     Pain Location generalized  -LE     Pain Location - flank  -LE     Pre/Posttreatment Pain Comment RN present and aware.  -LE     Pain Intervention(s) Repositioned;Rest;Emotional support  -       Row Name 09/22/23 1133          Plan of Care Review    Plan of Care Reviewed With patient;son  -LE     Progress improving  -LE     Outcome Evaluation Pt presents to OT in bed, difficult to understand, confused.  Pt does better when assisted in moistening mouth with toothette with impaired grasp on " toothette in R hand. Pt agreeable to moving to EOB with Mod A then xfer to chair with  Mod A.  Static balance work with posture and trunk activation cueing completed due to posterior lean and impaired base of support.  Once up in chair pt completes UE ex.  Pt partially assist with lower body dressing task.  Once up in chair pt more easily understood but continues with confusion.  Son present and reports new confusion and RN present and contributes to PNA.  Will cont to follow for skilled OT.  Anticipate SNU at d/c.  -LE       Row Name 09/22/23 1133          Therapy Plan Review/Discharge Plan (OT)    Anticipated Discharge Disposition (OT) skilled nursing facility  -       Row Name 09/22/23 1133          Vital Signs    Pre SpO2 (%) 98  -LE     O2 Delivery Pre Treatment room air  -LE     Pre Patient Position Supine  -LE     Intra Patient Position Standing  -LE     Post Patient Position Sitting  -LE       Row Name 09/22/23 1133          Positioning and Restraints    Pre-Treatment Position in bed  -LE     Post Treatment Position chair  -LE     In Chair reclined;call light within reach;encouraged to call for assist;exit alarm on;with family/caregiver;with nsg  -LE               User Key  (r) = Recorded By, (t) = Taken By, (c) = Cosigned By      Initials Name Provider Type    Carley Magana OTR Occupational Therapist                   Outcome Measures       Row Name 09/22/23 1142          How much help from another is currently needed...    Putting on and taking off regular lower body clothing? 2  -LE     Bathing (including washing, rinsing, and drying) 2  -LE     Toileting (which includes using toilet bed pan or urinal) 2  -LE     Putting on and taking off regular upper body clothing 2  -LE     Taking care of personal grooming (such as brushing teeth) 2  -LE     Eating meals 2  -LE     AM-PAC 6 Clicks Score (OT) 12  -LE       Row Name 09/22/23 1142          Functional Assessment    Outcome Measure Options --  -LE                User Key  (r) = Recorded By, (t) = Taken By, (c) = Cosigned By      Initials Name Provider Type    Carley Magana OTR Occupational Therapist                    Occupational Therapy Education       Title: PT OT SLP Therapies (Done)       Topic: Occupational Therapy (Done)       Point: ADL training (Done)       Description:   Instruct learner(s) on proper safety adaptation and remediation techniques during self care or transfers.   Instruct in proper use of assistive devices.                  Learning Progress Summary             Patient Acceptance, E, VU,NR by KT at 9/19/2023 1533    Acceptance, E, VU,NR by KT at 9/18/2023 1028                         Point: Home exercise program (Done)       Description:   Instruct learner(s) on appropriate technique for monitoring, assisting and/or progressing therapeutic exercises/activities.                  Learning Progress Summary             Patient Acceptance, E, VU,NR by KT at 9/19/2023 1533    Acceptance, E, VU,NR by KT at 9/18/2023 1028                         Point: Precautions (Done)       Description:   Instruct learner(s) on prescribed precautions during self-care and functional transfers.                  Learning Progress Summary             Patient Acceptance, E, VU,NR by KT at 9/19/2023 1533    Acceptance, E, VU,NR by KT at 9/18/2023 1028                         Point: Body mechanics (Done)       Description:   Instruct learner(s) on proper positioning and spine alignment during self-care, functional mobility activities and/or exercises.                  Learning Progress Summary             Patient Acceptance, E, VU,NR by KT at 9/19/2023 1533    Acceptance, E, VU,NR by KT at 9/18/2023 1028                                         User Key       Initials Effective Dates Name Provider Type Discipline     06/16/21 -  Chel De Anda, RN Registered Nurse Nurse                  OT Recommendation and Plan  Therapy Frequency (OT): 3 times/wk  Plan of  Care Review  Plan of Care Reviewed With: patient, son  Progress: improving  Outcome Evaluation: Pt presents to OT in bed, difficult to understand, confused.  Pt does better when assisted in moistening mouth with toothette with impaired grasp on toothette in R hand. Pt agreeable to moving to EOB with Mod A then xfer to chair with  Mod A.  Static balance work with posture and trunk activation cueing completed due to posterior lean and impaired base of support.  Once up in chair pt completes UE ex.  Pt partially assist with lower body dressing task.  Once up in chair pt more easily understood but continues with confusion.  Son present and reports new confusion and RN present and contributes to PNA.  Will cont to follow for skilled OT.  Anticipate SNU at d/c.     Time Calculation:         Time Calculation- OT       Row Name 09/22/23 1142             Time Calculation- OT    OT Start Time 0914  -LE      OT Stop Time 0940  -LE      OT Time Calculation (min) 26 min  -LE      Total Timed Code Minutes- OT 26 minute(s)  -LE      OT Received On 09/22/23  -LE      OT - Next Appointment 09/25/23  -LE         Timed Charges    50657 - OT Therapeutic Activity Minutes 15  -LE      03416 - OT Self Care/Mgmt Minutes 10  -LE         Total Minutes    Timed Charges Total Minutes 25  -LE       Total Minutes 25  -LE                User Key  (r) = Recorded By, (t) = Taken By, (c) = Cosigned By      Initials Name Provider Type    Carley Magana OTR Occupational Therapist                  Therapy Charges for Today       Code Description Service Date Service Provider Modifiers Qty    76734767854 HC OT THERAPEUTIC ACT EA 15 MIN 9/22/2023 Carley Lara OTR GO 1    10494344296 HC OT SELF CARE/MGMT/TRAIN EA 15 MIN 9/22/2023 Carley Lara OTR GO 1                 IDALIA Pino  9/22/2023

## 2023-09-22 NOTE — PLAN OF CARE
Goal Outcome Evaluation:      VSS. RA. AxO x4 when asked questions at beginning of shift, but forgetful and has short-term memory loss, then progressively has gotten more confused and forgetful, then agitation through night. Prn zyprexa IM given. Pureed, thin liquids diet, meds crushed w/ pureed food. Ax2 to BSC. Urinal at bedside. L arm skin tear dressing  w/ bloody drainage, dressing changed w/ vaseline, vaseline gauze, ABD pads, kerlex wrapped, ace wrapped. Abd scab CDI, opticell, meplex.

## 2023-09-22 NOTE — CASE MANAGEMENT/SOCIAL WORK
Continued Stay Note  [facility]     Patient Name: Riky Rios  MRN: [id number]  Today's Date: 9/22/2023    Admit Date: 9/9/2023    Plan: SNF   Discharge Plan       Row Name 09/22/23 1107       Plan    Plan SNF    Patient/Family in Agreement with Plan yes    Plan Comments Plan remains rehab at discharge. [facility] following.  Pharmacy updated.  CCP continues to follow.  IVAN Rios RN                   Discharge Codes    No documentation.                 Expected Discharge Date and Time       Expected Discharge Date Expected Discharge Time    Sep 20, 2023               Modesta Rios, RN

## 2023-09-22 NOTE — PROGRESS NOTES
"Nutrition Services    Patient Name:  Riky Rios  YOB: 1935  MRN: [id number]  Admit Date:  9/9/2023    Assessment Date:  09/22/23    Summary:  Nutrition follow up. Diet upgraded from nectar thick liquids to thin liquids on 9/20 (VFSS). Intake remains poor. Per flowsheet, only eating 0-25% of meals. Tried to discuss with him but I couldn't understand what he was trying to tell me other than he was irritated about being in the chair.  Encouraged po intake.    Will switch might shakes back to Boost and see how he does with them    RD will cont to follow clinical course, nutrition needs.    CLINICAL NUTRITION ASSESSMENT      Reason for Assessment Follow-up Protocol     Diagnosis/Problem   COPD, bronchitis, HTN, L cerebral infarct, rib fx, ischemic stroke, PAF, PTX, cardiac arrest, resp failure   Medical/Surgical History History reviewed. No pertinent past medical history.    History reviewed. No pertinent surgical history.     Encounter Information        Nutrition History:     Factors Affecting Intake: altered mental status, decreased appetite, swallow impairment     Anthropometrics        Current Height  Current Weight  BMI kg/m2 Height: 172.7 cm (68\")  Weight: 60.8 kg (134 lb) (09/11/23 0959)  Body mass index is 20.37 kg/m².   Adjusted BMI (if applicable)    BMI Category Normal/Healthy (18.4 - 24.9)       Admission Weight 60.8kg       Ideal Body Weight (IBW) 68.4kg       Usual Body Weight (UBW) 132lb 9/2022   Weight Trend Stable       Weight History Wt Readings from Last 30 Encounters:   09/11/23 0959 60.8 kg (134 lb)   09/10/23 1728 60.8 kg (134 lb)   09/13/23 1745 60.8 kg (134 lb)      --  Tests/Procedures        Tests/Procedures VFSS     Labs       Pertinent Labs    Results from last 7 days   Lab Units 09/22/23  0503 09/21/23  0729 09/20/23  0549   SODIUM mmol/L 148* 146* 144   POTASSIUM mmol/L 3.2* 3.8 3.7   CHLORIDE mmol/L 113* 111* 110*   CO2 mmol/L 24.0 23.0 24.6   BUN mg/dL 36* 35* 33* "   CREATININE mg/dL 1.04 1.14 1.03   CALCIUM mg/dL 8.4* 9.0 8.7   BILIRUBIN mg/dL 0.5 0.5  --    ALK PHOS U/L 140* 155*  --    ALT (SGPT) U/L 189* 198*  --    AST (SGOT) U/L 75* 73*  --    GLUCOSE mg/dL 100* 127* 103*       Results from last 7 days   Lab Units 09/22/23  0503 09/21/23  0729   MAGNESIUM mg/dL  --  1.9   PHOSPHORUS mg/dL  --  3.1   HEMOGLOBIN g/dL 10.3* 11.0*   HEMATOCRIT % 29.8* 31.4*   WBC 10*3/mm3 17.51* 16.60*   ALBUMIN g/dL 3.0* 3.0*       Results from last 7 days   Lab Units 09/22/23  0503 09/21/23  0729 09/20/23  0549 09/19/23  0443 09/16/23  0647   PLATELETS 10*3/mm3 190 185 174 185 180       COVID19   Date Value Ref Range Status   09/10/2023 Not Detected Not Detected - Ref. Range Final     Lab Results   Component Value Date    HGBA1C 5.40 09/14/2023          Medications           Scheduled Medications acetaminophen, 1,000 mg, Oral, TID  allopurinol, 300 mg, Oral, Daily  amLODIPine, 10 mg, Oral, Q24H  apixaban, 2.5 mg, Oral, Q12H  arformoterol, 15 mcg, Nebulization, BID - RT  aspirin, 81 mg, Oral, Daily  atorvastatin, 40 mg, Oral, Daily  budesonide, 0.5 mg, Nebulization, Daily - RT  guaiFENesin, 600 mg, Oral, BID  lidocaine, 1 patch, Transdermal, Q24H  mirtazapine, 15 mg, Oral, Nightly  OLANZapine, 5 mg, Oral, Nightly  potassium chloride ER, 40 mEq, Oral, Q4H  predniSONE, 2.5 mg, Oral, Daily With Breakfast  senna-docusate sodium, 2 tablet, Oral, BID  sodium chloride, 4 mL, Nebulization, BID - RT  tamsulosin, 0.8 mg, Oral, Daily  tiotropium bromide monohydrate, 2 puff, Inhalation, Daily - RT       Infusions     PRN Medications   [DISCONTINUED] acetaminophen **OR** [DISCONTINUED] acetaminophen **OR** acetaminophen    senna-docusate sodium **AND** polyethylene glycol **AND** bisacodyl **AND** bisacodyl    ipratropium-albuterol    nitroglycerin    OLANZapine    ondansetron **OR** ondansetron    Potassium Replacement - Follow Nurse / BPA Driven Protocol    sodium chloride     Physical Findings           General Appearance alert, disoriented, hard of hearing, on oxygen therapy   Oral/Mouth Cavity tooth or teeth missing   Edema  not assessed   Gastrointestinal last bowel movement: 9/22, large   Skin  bruising, skin tear, scab on abd   Tubes/Drains/Lines none   NFPE Unable to perform due to: Pt sitting in chair eating lunch   --  Current Nutrition Orders & Evaluation of Intake       Oral Nutrition     Food Allergies NKFA   Current PO Diet Diet: Regular/House Diet; No Straw; Texture: Pureed (NDD 1); Fluid Consistency: Thin (IDDSI 0)   Supplement Mighty Shake, TID   PO Evaluation     % PO Intake/# of Days 0-25%   --  PES STATEMENT / NUTRITION DIAGNOSIS      Nutrition Dx Problem  Problem: Inadequate Oral Intake  Etiology: Medical Diagnosis - CVA    Signs/Symptoms: Report of Minimal PO Intake     --  NUTRITION INTERVENTION / PLAN OF CARE      Intervention Goal(s) Maintain nutrition status, Reduce/improve symptoms, Meet estimated needs, Disease management/therapy, Tolerate PO , Increase intake, and Maintain weight         RD Intervention/Action Encourage intake, Continue to monitor, Care plan reviewed, and Recommend/order:           Prescription/Orders:       PO Diet       Supplements Boost with meals      Snacks       Enteral Nutrition       Parenteral Nutrition    New Prescription Ordered? Yes   --      Monitor/Evaluation Per protocol   Discharge Plan/Needs Pending clinical course   --    RD to follow per protocol.    Electronically signed by:  Judie Chew RD  09/22/23 14:59 EDT

## 2023-09-23 LAB
ALBUMIN SERPL-MCNC: 3 G/DL (ref 3.5–5.2)
ALBUMIN/GLOB SERPL: 1.2 G/DL
ALP SERPL-CCNC: 130 U/L (ref 39–117)
ALT SERPL W P-5'-P-CCNC: 144 U/L (ref 1–41)
ANION GAP SERPL CALCULATED.3IONS-SCNC: 12 MMOL/L (ref 5–15)
AST SERPL-CCNC: 45 U/L (ref 1–40)
BILIRUB SERPL-MCNC: 0.5 MG/DL (ref 0–1.2)
BUN SERPL-MCNC: 31 MG/DL (ref 8–23)
BUN/CREAT SERPL: 28.7 (ref 7–25)
CALCIUM SPEC-SCNC: 8.9 MG/DL (ref 8.6–10.5)
CHLORIDE SERPL-SCNC: 119 MMOL/L (ref 98–107)
CO2 SERPL-SCNC: 21 MMOL/L (ref 22–29)
CREAT SERPL-MCNC: 1.08 MG/DL (ref 0.76–1.27)
DEPRECATED RDW RBC AUTO: 45.2 FL (ref 37–54)
EGFRCR SERPLBLD CKD-EPI 2021: 66.8 ML/MIN/1.73
ERYTHROCYTE [DISTWIDTH] IN BLOOD BY AUTOMATED COUNT: 12.1 % (ref 12.3–15.4)
GLOBULIN UR ELPH-MCNC: 2.6 GM/DL
GLUCOSE SERPL-MCNC: 99 MG/DL (ref 65–99)
HCT VFR BLD AUTO: 27.4 % (ref 37.5–51)
HGB BLD-MCNC: 9.1 G/DL (ref 13–17.7)
MCH RBC QN AUTO: 34.5 PG (ref 26.6–33)
MCHC RBC AUTO-ENTMCNC: 33.2 G/DL (ref 31.5–35.7)
MCV RBC AUTO: 103.8 FL (ref 79–97)
PLATELET # BLD AUTO: 190 10*3/MM3 (ref 140–450)
PMV BLD AUTO: 10.3 FL (ref 6–12)
POTASSIUM SERPL-SCNC: 4.5 MMOL/L (ref 3.5–5.2)
PROCALCITONIN SERPL-MCNC: 0.2 NG/ML (ref 0–0.25)
PROT SERPL-MCNC: 5.6 G/DL (ref 6–8.5)
RBC # BLD AUTO: 2.64 10*6/MM3 (ref 4.14–5.8)
SODIUM SERPL-SCNC: 152 MMOL/L (ref 136–145)
WBC NRBC COR # BLD: 16.66 10*3/MM3 (ref 3.4–10.8)

## 2023-09-23 PROCEDURE — 85027 COMPLETE CBC AUTOMATED: CPT | Performed by: HOSPITALIST

## 2023-09-23 PROCEDURE — 94799 UNLISTED PULMONARY SVC/PX: CPT

## 2023-09-23 PROCEDURE — 94761 N-INVAS EAR/PLS OXIMETRY MLT: CPT

## 2023-09-23 PROCEDURE — 94664 DEMO&/EVAL PT USE INHALER: CPT

## 2023-09-23 PROCEDURE — 80053 COMPREHEN METABOLIC PANEL: CPT | Performed by: HOSPITALIST

## 2023-09-23 PROCEDURE — 84145 PROCALCITONIN (PCT): CPT | Performed by: HOSPITALIST

## 2023-09-23 PROCEDURE — 94760 N-INVAS EAR/PLS OXIMETRY 1: CPT

## 2023-09-23 PROCEDURE — 63710000001 PREDNISONE PER 5 MG: Performed by: INTERNAL MEDICINE

## 2023-09-23 RX ORDER — DEXTROSE MONOHYDRATE 50 MG/ML
125 INJECTION, SOLUTION INTRAVENOUS CONTINUOUS
Status: DISCONTINUED | OUTPATIENT
Start: 2023-09-23 | End: 2023-09-25

## 2023-09-23 RX ADMIN — ARFORMOTEROL TARTRATE 15 MCG: 15 SOLUTION RESPIRATORY (INHALATION) at 20:22

## 2023-09-23 RX ADMIN — ARFORMOTEROL TARTRATE 15 MCG: 15 SOLUTION RESPIRATORY (INHALATION) at 07:27

## 2023-09-23 RX ADMIN — Medication 4 ML: at 07:27

## 2023-09-23 RX ADMIN — Medication 4 ML: at 20:27

## 2023-09-23 RX ADMIN — DEXTROSE MONOHYDRATE 75 ML/HR: 50 INJECTION, SOLUTION INTRAVENOUS at 09:40

## 2023-09-23 RX ADMIN — LIDOCAINE 1 PATCH: 50 PATCH CUTANEOUS at 09:37

## 2023-09-23 RX ADMIN — BUDESONIDE 0.5 MG: 0.5 INHALANT ORAL at 07:27

## 2023-09-23 RX ADMIN — TIOTROPIUM BROMIDE INHALATION SPRAY 2 PUFF: 3.12 SPRAY, METERED RESPIRATORY (INHALATION) at 07:28

## 2023-09-23 NOTE — PLAN OF CARE
Goal Outcome Evaluation:           Progress: no change  Outcome Evaluation: VSS, pt alert to self only. Speech garbled secondary to CVA. Issues with confusion and agitation overnight, Zyprexa given. Awaiting placement for SNF @ discharge. Will continue to follow closely and await further orders.

## 2023-09-23 NOTE — PLAN OF CARE
Goal Outcome Evaluation:  Plan of Care Reviewed With: patient        Progress: no change       Vital signs stable. Pale color, skin fragile / thin with multiple areas of bruising noted on arms. Skin tear noted on left upper inner arm. Dressing change done with vaseline gauze, dry 4 x 4 gauze, kerlix and ace wrap. Elevated up on pillow. Bed alarm on. Up with assistance of two out of bed. Very unsteady and weak on feet. Tires easily, unable to ambulate. PT / OT worked with patient in room. Sat up in recliner for 2 hours. On room air. Sinus rhythm with pacs / pvcs. Eating poorly. Drinks boost on occasion. Remains in contact / droplet isolation. Speech garbled, hard to understand at times. Alert and oriented to person and place. Disoriented to time and situation. Resting in bed with eyes closed. Call light within patient's reach.

## 2023-09-23 NOTE — PROGRESS NOTES
Name: Riky Rios ADMIT: 2023   : 1935  PCP: Provider, No Known    MRN: [id number] LOS: 14 days   AGE/SEX: 87 y.o. male  ROOM: [facility]/     Subjective   Subjective   Sleeping but awakens to voice but still seems rather sedated.  Pulling at his brief.  Discussed with nurse.  Reportedly just voided.  Will check PVR.  No sedating medications given this morning.       Objective   Objective   Vital Signs  Temp:  [97.7 °F (36.5 °C)-98 °F (36.7 °C)] 98 °F (36.7 °C)  Heart Rate:  [] 98  Resp:  [16-18] 18  BP: (113-143)/(59-86) 143/59  SpO2:  [92 %-99 %] 97 %  on   ;   Device (Oxygen Therapy): room air  Body mass index is 20.37 kg/m².  Physical Exam  Vitals and nursing note reviewed.   Constitutional:       Appearance: He is ill-appearing.   Cardiovascular:      Rate and Rhythm: Normal rate and regular rhythm.   Pulmonary:      Effort: Pulmonary effort is normal.      Breath sounds: Decreased air movement present. Rhonchi present.   Abdominal:      General: Bowel sounds are normal.      Palpations: Abdomen is soft.      Tenderness: There is no abdominal tenderness.   Musculoskeletal:         General: No swelling.   Skin:     General: Skin is warm and dry.   Neurological:      Mental Status: He is alert.     Results Review     I reviewed the patient's new clinical results.  Results from last 7 days   Lab Units 23  0612 23  0503 23  0729 23  0549   WBC 10*3/mm3 16.66* 17.51* 16.60* 16.48*   HEMOGLOBIN g/dL 9.1* 10.3* 11.0* 11.3*   PLATELETS 10*3/mm3 190 190 185 174       Results from last 7 days   Lab Units 23  0612 235 23  0503 23  0729 23  0549   SODIUM mmol/L 152*  --  148* 146* 144   POTASSIUM mmol/L 4.5 4.2 3.2* 3.8 3.7   CHLORIDE mmol/L 119*  --  113* 111* 110*   CO2 mmol/L 21.0*  --  24.0 23.0 24.6   BUN mg/dL 31*  --  36* 35* 33*   CREATININE mg/dL 1.08  --  1.04 1.14 1.03   GLUCOSE mg/dL 99  --  100* 127* 103*   EGFR mL/min/1.73 66.8  --   69.9 62.6 70.7       Results from last 7 days   Lab Units 09/23/23  0612 09/22/23  0503 09/21/23  0729   ALBUMIN g/dL 3.0* 3.0* 3.0*   BILIRUBIN mg/dL 0.5 0.5 0.5   ALK PHOS U/L 130* 140* 155*   AST (SGOT) U/L 45* 75* 73*   ALT (SGPT) U/L 144* 189* 198*       Results from last 7 days   Lab Units 09/23/23  0612 09/22/23  0503 09/21/23  0729 09/20/23  0549   CALCIUM mg/dL 8.9 8.4* 9.0 8.7   ALBUMIN g/dL 3.0* 3.0* 3.0*  --    MAGNESIUM mg/dL  --   --  1.9  --    PHOSPHORUS mg/dL  --   --  3.1  --        Results from last 7 days   Lab Units 09/23/23  0612 09/21/23  0729 09/20/23  0549 09/19/23  0443   PROCALCITONIN ng/mL 0.20 0.24 0.24 0.27*       No results found for: HGBA1C, POCGLU      No radiology results for the last day    I have personally reviewed all medications:  Scheduled Medications  acetaminophen, 1,000 mg, Oral, TID  allopurinol, 300 mg, Oral, Daily  amLODIPine, 10 mg, Oral, Q24H  apixaban, 2.5 mg, Oral, Q12H  arformoterol, 15 mcg, Nebulization, BID - RT  aspirin, 81 mg, Oral, Daily  atorvastatin, 40 mg, Oral, Daily  budesonide, 0.5 mg, Nebulization, Daily - RT  guaiFENesin, 600 mg, Oral, BID  lidocaine, 1 patch, Transdermal, Q24H  mirtazapine, 15 mg, Oral, Nightly  OLANZapine, 5 mg, Oral, Nightly  predniSONE, 2.5 mg, Oral, Daily With Breakfast  senna-docusate sodium, 2 tablet, Oral, BID  sodium chloride, 4 mL, Nebulization, BID - RT  tamsulosin, 0.8 mg, Oral, Daily  tiotropium bromide monohydrate, 2 puff, Inhalation, Daily - RT    Infusions     Diet  Diet: Regular/House Diet; No Straw; Texture: Pureed (NDD 1); Fluid Consistency: Thin (IDDSI 0)    I have personally reviewed:  [x]  Laboratory   [x]  Microbiology   [x]  Radiology   [x]  EKG/Telemetry  [x]  Cardiology/Vascular   []  Pathology    []  Records       Assessment/Plan     Active Hospital Problems    Diagnosis  POA    **COPD with acute exacerbation [J44.1]  Yes    Acute bronchitis due to Rhinovirus [J20.6]  Yes    Hypertension [I10]  Yes    Acute  left cerebellar infarct [I63.542]  No    Closed fracture of one rib of right side [S22.31XA]  No    Paroxysmal atrial fibrillation [I48.0]  Clinically Undetermined    Pneumothorax on right [J93.9]  Yes    Cardiac arrest [I46.9]  Yes    Acute respiratory failure with hypoxia [J96.01]  Yes      Resolved Hospital Problems   No resolved problems to display.       87 y.o. male with COPD with acute exacerbation.    Psychiatry following but will plan to stop scheduled Zyprexa.  Obviously prefer him more alert and interactive.  Not taking his medications this morning and not eating breakfast.  Sodium elevated and written to encourage water intake yesterday but he is unable to do this.  We will add D5W and monitor sodium.  Certainly dehydration and hypernatremia could be contributing to decreased mental status.  Respiratory status seems relatively stable.     Uncertain significance of elevated LFTs.  Relatively stable.  Continue monitoring.  Suspect due to acute illness.  As long as stable or trending down and no GI complaints no plans to work-up further currently.    Neurology signed off with recommendations for full anticoagulation and statin therapy at discharge.  While not 100% certain he had atrial fibrillation cardiology also agrees with anticoagulating..  Cleared to be on Eliquis by thoracic surgery which was started 9/20.      Continue high-dose statin as written-on aspirin 81 mg and statin 40 mg daily prior to arrival  Outpatient ENT follow due to abnormalities noted on MRI above for direct visualization of suspected polyp.  Follow up neurology 3 months.  Full code.  Discussed with patient and nursing staff.  Anticipate discharge to SNU facility timing yet to be determined..      Celso Schmitz MD  [facility]  09/23/23  09:02 EDT

## 2023-09-24 LAB
ALBUMIN SERPL-MCNC: 2.9 G/DL (ref 3.5–5.2)
ALBUMIN/GLOB SERPL: 1.1 G/DL
ALP SERPL-CCNC: 121 U/L (ref 39–117)
ALT SERPL W P-5'-P-CCNC: 99 U/L (ref 1–41)
ANION GAP SERPL CALCULATED.3IONS-SCNC: 12 MMOL/L (ref 5–15)
AST SERPL-CCNC: 29 U/L (ref 1–40)
BILIRUB SERPL-MCNC: 0.6 MG/DL (ref 0–1.2)
BUN SERPL-MCNC: 21 MG/DL (ref 8–23)
BUN/CREAT SERPL: 21.6 (ref 7–25)
CALCIUM SPEC-SCNC: 8.8 MG/DL (ref 8.6–10.5)
CHLORIDE SERPL-SCNC: 113 MMOL/L (ref 98–107)
CO2 SERPL-SCNC: 23 MMOL/L (ref 22–29)
CORTIS SERPL-MCNC: 11.8 MCG/DL
CREAT SERPL-MCNC: 0.97 MG/DL (ref 0.76–1.27)
DEPRECATED RDW RBC AUTO: 45.8 FL (ref 37–54)
EGFRCR SERPLBLD CKD-EPI 2021: 76 ML/MIN/1.73
ERYTHROCYTE [DISTWIDTH] IN BLOOD BY AUTOMATED COUNT: 12.2 % (ref 12.3–15.4)
GLOBULIN UR ELPH-MCNC: 2.7 GM/DL
GLUCOSE SERPL-MCNC: 122 MG/DL (ref 65–99)
HCT VFR BLD AUTO: 28.6 % (ref 37.5–51)
HGB BLD-MCNC: 9.8 G/DL (ref 13–17.7)
MCH RBC QN AUTO: 35.6 PG (ref 26.6–33)
MCHC RBC AUTO-ENTMCNC: 34.3 G/DL (ref 31.5–35.7)
MCV RBC AUTO: 104 FL (ref 79–97)
PLATELET # BLD AUTO: 205 10*3/MM3 (ref 140–450)
PMV BLD AUTO: 10.2 FL (ref 6–12)
POTASSIUM SERPL-SCNC: 4.1 MMOL/L (ref 3.5–5.2)
PROCALCITONIN SERPL-MCNC: 0.17 NG/ML (ref 0–0.25)
PROT SERPL-MCNC: 5.6 G/DL (ref 6–8.5)
RBC # BLD AUTO: 2.75 10*6/MM3 (ref 4.14–5.8)
SODIUM SERPL-SCNC: 148 MMOL/L (ref 136–145)
WBC NRBC COR # BLD: 16.02 10*3/MM3 (ref 3.4–10.8)

## 2023-09-24 PROCEDURE — 94664 DEMO&/EVAL PT USE INHALER: CPT

## 2023-09-24 PROCEDURE — 84145 PROCALCITONIN (PCT): CPT | Performed by: HOSPITALIST

## 2023-09-24 PROCEDURE — 94761 N-INVAS EAR/PLS OXIMETRY MLT: CPT

## 2023-09-24 PROCEDURE — 94799 UNLISTED PULMONARY SVC/PX: CPT

## 2023-09-24 PROCEDURE — 82533 TOTAL CORTISOL: CPT | Performed by: HOSPITALIST

## 2023-09-24 PROCEDURE — 80053 COMPREHEN METABOLIC PANEL: CPT | Performed by: HOSPITALIST

## 2023-09-24 PROCEDURE — 85027 COMPLETE CBC AUTOMATED: CPT | Performed by: HOSPITALIST

## 2023-09-24 PROCEDURE — 94760 N-INVAS EAR/PLS OXIMETRY 1: CPT

## 2023-09-24 RX ADMIN — ARFORMOTEROL TARTRATE 15 MCG: 15 SOLUTION RESPIRATORY (INHALATION) at 07:27

## 2023-09-24 RX ADMIN — TIOTROPIUM BROMIDE INHALATION SPRAY 2 PUFF: 3.12 SPRAY, METERED RESPIRATORY (INHALATION) at 07:27

## 2023-09-24 RX ADMIN — AMLODIPINE BESYLATE 10 MG: 10 TABLET ORAL at 08:40

## 2023-09-24 RX ADMIN — SODIUM CHLORIDE 500 ML: 9 INJECTION, SOLUTION INTRAVENOUS at 12:49

## 2023-09-24 RX ADMIN — APIXABAN 2.5 MG: 2.5 TABLET, FILM COATED ORAL at 08:41

## 2023-09-24 RX ADMIN — Medication 4 ML: at 07:26

## 2023-09-24 RX ADMIN — ASPIRIN 81 MG: 81 TABLET, CHEWABLE ORAL at 08:40

## 2023-09-24 RX ADMIN — APIXABAN 2.5 MG: 2.5 TABLET, FILM COATED ORAL at 22:38

## 2023-09-24 RX ADMIN — ALLOPURINOL 300 MG: 300 TABLET ORAL at 08:40

## 2023-09-24 RX ADMIN — DEXTROSE MONOHYDRATE 75 ML/HR: 50 INJECTION, SOLUTION INTRAVENOUS at 00:12

## 2023-09-24 RX ADMIN — LIDOCAINE 1 PATCH: 50 PATCH CUTANEOUS at 08:40

## 2023-09-24 RX ADMIN — MIRTAZAPINE 15 MG: 15 TABLET, ORALLY DISINTEGRATING ORAL at 22:38

## 2023-09-24 RX ADMIN — DEXTROSE MONOHYDRATE 125 ML/HR: 50 INJECTION, SOLUTION INTRAVENOUS at 22:37

## 2023-09-24 RX ADMIN — Medication 4 ML: at 19:47

## 2023-09-24 RX ADMIN — ARFORMOTEROL TARTRATE 15 MCG: 15 SOLUTION RESPIRATORY (INHALATION) at 19:46

## 2023-09-24 RX ADMIN — TAMSULOSIN HYDROCHLORIDE 0.8 MG: 0.4 CAPSULE ORAL at 08:42

## 2023-09-24 RX ADMIN — BUDESONIDE 0.5 MG: 0.5 INHALANT ORAL at 07:26

## 2023-09-24 NOTE — PROGRESS NOTES
Name: Riky Rios ADMIT: 2023   : 1935  PCP: Provider, No Known    MRN: [id number] LOS: 15 days   AGE/SEX: 87 y.o. male  ROOM: [facility]     Subjective   Subjective   Looks about the same as yesterday.  Laying in bed.  Does open eyes to normal voice but mumbles responses to questions only.  Keeps his mouth open.  Does not follow commands and is a little restless.       Objective   Objective   Vital Signs  Temp:  [97.6 °F (36.4 °C)-99.1 °F (37.3 °C)] 98.1 °F (36.7 °C)  Heart Rate:  [] 95  Resp:  [18-20] 20  BP: ()/() 154/70  SpO2:  [93 %-100 %] 100 %  on  Flow (L/min):  [2] 2;   Device (Oxygen Therapy): nasal cannula  Body mass index is 20.37 kg/m².  Physical Exam  Vitals and nursing note reviewed.   Constitutional:       Appearance: He is ill-appearing.   Cardiovascular:      Rate and Rhythm: Normal rate and regular rhythm.   Pulmonary:      Effort: Pulmonary effort is normal.      Breath sounds: Decreased air movement present. Rhonchi present.   Abdominal:      General: Bowel sounds are normal.      Palpations: Abdomen is soft.      Tenderness: There is no abdominal tenderness.   Musculoskeletal:         General: No swelling.   Skin:     General: Skin is warm and dry.   Neurological:      Mental Status: He is alert.     Results Review     I reviewed the patient's new clinical results.  Results from last 7 days   Lab Units 23  0532 23  0612 23  0503 23  0729   WBC 10*3/mm3 16.02* 16.66* 17.51* 16.60*   HEMOGLOBIN g/dL 9.8* 9.1* 10.3* 11.0*   PLATELETS 10*3/mm3 205 190 190 185       Results from last 7 days   Lab Units 23  0532 23  0612 23  2115 23  0503 23  0729   SODIUM mmol/L 148* 152*  --  148* 146*   POTASSIUM mmol/L 4.1 4.5 4.2 3.2* 3.8   CHLORIDE mmol/L 113* 119*  --  113* 111*   CO2 mmol/L 23.0 21.0*  --  24.0 23.0   BUN mg/dL 21 31*  --  36* 35*   CREATININE mg/dL 0.97 1.08  --  1.04 1.14   GLUCOSE mg/dL 122* 99  --  100*  127*   EGFR mL/min/1.73 76.0 66.8  --  69.9 62.6       Results from last 7 days   Lab Units 09/24/23  0532 09/23/23  0612 09/22/23  0503 09/21/23  0729   ALBUMIN g/dL 2.9* 3.0* 3.0* 3.0*   BILIRUBIN mg/dL 0.6 0.5 0.5 0.5   ALK PHOS U/L 121* 130* 140* 155*   AST (SGOT) U/L 29 45* 75* 73*   ALT (SGPT) U/L 99* 144* 189* 198*       Results from last 7 days   Lab Units 09/24/23  0532 09/23/23  0612 09/22/23  0503 09/21/23  0729   CALCIUM mg/dL 8.8 8.9 8.4* 9.0   ALBUMIN g/dL 2.9* 3.0* 3.0* 3.0*   MAGNESIUM mg/dL  --   --   --  1.9   PHOSPHORUS mg/dL  --   --   --  3.1       Results from last 7 days   Lab Units 09/23/23  0612 09/21/23  0729 09/20/23  0549 09/19/23  0443   PROCALCITONIN ng/mL 0.20 0.24 0.24 0.27*       No results found for: HGBA1C, POCGLU      No radiology results for the last day    I have personally reviewed all medications:  Scheduled Medications  acetaminophen, 1,000 mg, Oral, TID  allopurinol, 300 mg, Oral, Daily  amLODIPine, 10 mg, Oral, Q24H  apixaban, 2.5 mg, Oral, Q12H  arformoterol, 15 mcg, Nebulization, BID - RT  aspirin, 81 mg, Oral, Daily  atorvastatin, 40 mg, Oral, Daily  budesonide, 0.5 mg, Nebulization, Daily - RT  guaiFENesin, 600 mg, Oral, BID  lidocaine, 1 patch, Transdermal, Q24H  mirtazapine, 15 mg, Oral, Nightly  predniSONE, 2.5 mg, Oral, Daily With Breakfast  senna-docusate sodium, 2 tablet, Oral, BID  sodium chloride, 4 mL, Nebulization, BID - RT  tamsulosin, 0.8 mg, Oral, Daily  tiotropium bromide monohydrate, 2 puff, Inhalation, Daily - RT    Infusions  dextrose, 75 mL/hr, Last Rate: 75 mL/hr (09/24/23 0012)    Diet  Diet: Regular/House Diet; No Straw; Texture: Pureed (NDD 1); Fluid Consistency: Thin (IDDSI 0)    I have personally reviewed:  [x]  Laboratory   [x]  Microbiology   [x]  Radiology   [x]  EKG/Telemetry  [x]  Cardiology/Vascular   []  Pathology    []  Records       Assessment/Plan     Active Hospital Problems    Diagnosis  POA    **COPD with acute exacerbation [J44.1]   Yes    Acute bronchitis due to Rhinovirus [J20.6]  Yes    Hypertension [I10]  Yes    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       87 y.o. male with COPD with acute exacerbation.    WBC remains elevated but trending down.  Likely due to steroids received few days ago.  Sodium slightly improved with addition of D5W.  He still seems very dry and will give a small bolus of saline and increase rate D5W.  Continue monitoring sodium.  Due to worsening mental status will get head CT.  Check cortisol level.  Seemed better when he was on higher dose steroids.  May need to taper back to home regimen again slower.  LFTs trending better without abdominal complaints.    Neurology signed off with recommendations for full anticoagulation and statin therapy at discharge.  While not 100% certain he had atrial fibrillation cardiology also agrees with anticoagulating..  Cleared to be on Eliquis by thoracic surgery which was started 9/20.      Continue high-dose statin as written-on aspirin 81 mg and statin 40 mg daily prior to arrival  Outpatient ENT follow due to abnormalities noted on MRI above for direct visualization of suspected polyp.  Follow up neurology 3 months.  Full code.  Discussed with patient and nursing staff.  Anticipate discharge to SNU facility timing yet to be determined..      Celso Schmitz MD  [facility]  09/24/23  11:12 EDT

## 2023-09-24 NOTE — PLAN OF CARE
Goal Outcome Evaluation:  Plan of Care Reviewed With: patient        Progress: declining  Outcome Evaluation: VSS except tachycardic and having runs of Afib at times. A&O to self only. Speech garbled. Pt fidgets often - refused all meds/drinks. IVF infusing per order. Will continue to monitor.

## 2023-09-24 NOTE — PLAN OF CARE
Goal Outcome Evaluation:  Plan of Care Reviewed With: patient        Progress: declining  Outcome Evaluation: Monitor pain,labs,and vitals. VSS except in and out of AFIB and sinus tach at times O2 2L NC. IV fluids. Patient refusing meals and medications. Will continuet to monitor.

## 2023-09-25 ENCOUNTER — APPOINTMENT (OUTPATIENT)
Dept: CT IMAGING | Facility: HOSPITAL | Age: 88
DRG: 199 | End: 2023-09-25
Payer: MEDICARE

## 2023-09-25 ENCOUNTER — APPOINTMENT (OUTPATIENT)
Dept: GENERAL RADIOLOGY | Facility: HOSPITAL | Age: 88
DRG: 199 | End: 2023-09-25
Payer: MEDICARE

## 2023-09-25 LAB
ALBUMIN SERPL-MCNC: 2.6 G/DL (ref 3.5–5.2)
ALBUMIN/GLOB SERPL: 0.9 G/DL
ALP SERPL-CCNC: 104 U/L (ref 39–117)
ALT SERPL W P-5'-P-CCNC: 67 U/L (ref 1–41)
ANION GAP SERPL CALCULATED.3IONS-SCNC: 8.6 MMOL/L (ref 5–15)
ARTERIAL PATENCY WRIST A: ABNORMAL
AST SERPL-CCNC: 22 U/L (ref 1–40)
ATMOSPHERIC PRESS: 749.7 MMHG
BACTERIA UR QL AUTO: ABNORMAL /HPF
BASE EXCESS BLDA CALC-SCNC: 0.2 MMOL/L (ref 0–2)
BDY SITE: ABNORMAL
BILIRUB SERPL-MCNC: 0.7 MG/DL (ref 0–1.2)
BILIRUB UR QL STRIP: NEGATIVE
BUN SERPL-MCNC: 14 MG/DL (ref 8–23)
BUN/CREAT SERPL: 17.5 (ref 7–25)
CALCIUM SPEC-SCNC: 8.2 MG/DL (ref 8.6–10.5)
CHLORIDE SERPL-SCNC: 107 MMOL/L (ref 98–107)
CLARITY UR: CLEAR
CO2 BLDA-SCNC: 24.6 MMOL/L (ref 23–27)
CO2 SERPL-SCNC: 22.4 MMOL/L (ref 22–29)
COLOR UR: YELLOW
CREAT SERPL-MCNC: 0.8 MG/DL (ref 0.76–1.27)
DEPRECATED RDW RBC AUTO: 46.6 FL (ref 37–54)
DEVICE COMMENT: ABNORMAL
EGFRCR SERPLBLD CKD-EPI 2021: 86.2 ML/MIN/1.73
ERYTHROCYTE [DISTWIDTH] IN BLOOD BY AUTOMATED COUNT: 12 % (ref 12.3–15.4)
GLOBULIN UR ELPH-MCNC: 2.9 GM/DL
GLUCOSE SERPL-MCNC: 160 MG/DL (ref 65–99)
GLUCOSE UR STRIP-MCNC: NEGATIVE MG/DL
HCO3 BLDA-SCNC: 23.6 MMOL/L (ref 22–28)
HCT VFR BLD AUTO: 27.3 % (ref 37.5–51)
HEMODILUTION: NO
HGB BLD-MCNC: 9.2 G/DL (ref 13–17.7)
HGB UR QL STRIP.AUTO: ABNORMAL
HYALINE CASTS UR QL AUTO: ABNORMAL /LPF
KETONES UR QL STRIP: NEGATIVE
LEUKOCYTE ESTERASE UR QL STRIP.AUTO: NEGATIVE
MCH RBC QN AUTO: 35.5 PG (ref 26.6–33)
MCHC RBC AUTO-ENTMCNC: 33.7 G/DL (ref 31.5–35.7)
MCV RBC AUTO: 105.4 FL (ref 79–97)
MODALITY: ABNORMAL
NITRITE UR QL STRIP: NEGATIVE
PCO2 BLDA: 32.4 MM HG (ref 35–45)
PH BLDA: 7.47 PH UNITS (ref 7.35–7.45)
PH UR STRIP.AUTO: 5.5 [PH] (ref 5–8)
PLATELET # BLD AUTO: 206 10*3/MM3 (ref 140–450)
PMV BLD AUTO: 10.2 FL (ref 6–12)
PO2 BLDA: 72.9 MM HG (ref 80–100)
POTASSIUM SERPL-SCNC: 3.6 MMOL/L (ref 3.5–5.2)
PROT SERPL-MCNC: 5.5 G/DL (ref 6–8.5)
PROT UR QL STRIP: ABNORMAL
RBC # BLD AUTO: 2.59 10*6/MM3 (ref 4.14–5.8)
RBC # UR STRIP: ABNORMAL /HPF
REF LAB TEST METHOD: ABNORMAL
SAO2 % BLDCOA: 95.6 % (ref 92–98.5)
SODIUM SERPL-SCNC: 138 MMOL/L (ref 136–145)
SP GR UR STRIP: 1.01 (ref 1–1.03)
SQUAMOUS #/AREA URNS HPF: ABNORMAL /HPF
TOTAL RATE: 18 BREATHS/MINUTE
UROBILINOGEN UR QL STRIP: ABNORMAL
WBC # UR STRIP: ABNORMAL /HPF
WBC NRBC COR # BLD: 16.94 10*3/MM3 (ref 3.4–10.8)

## 2023-09-25 PROCEDURE — 94761 N-INVAS EAR/PLS OXIMETRY MLT: CPT

## 2023-09-25 PROCEDURE — 97530 THERAPEUTIC ACTIVITIES: CPT

## 2023-09-25 PROCEDURE — 94799 UNLISTED PULMONARY SVC/PX: CPT

## 2023-09-25 PROCEDURE — 70450 CT HEAD/BRAIN W/O DYE: CPT

## 2023-09-25 PROCEDURE — 25010000002 METHYLPREDNISOLONE PER 40 MG: Performed by: HOSPITALIST

## 2023-09-25 PROCEDURE — 85027 COMPLETE CBC AUTOMATED: CPT | Performed by: HOSPITALIST

## 2023-09-25 PROCEDURE — 63710000001 PREDNISONE PER 5 MG: Performed by: INTERNAL MEDICINE

## 2023-09-25 PROCEDURE — 82803 BLOOD GASES ANY COMBINATION: CPT

## 2023-09-25 PROCEDURE — 71045 X-RAY EXAM CHEST 1 VIEW: CPT

## 2023-09-25 PROCEDURE — 80053 COMPREHEN METABOLIC PANEL: CPT | Performed by: HOSPITALIST

## 2023-09-25 PROCEDURE — 36600 WITHDRAWAL OF ARTERIAL BLOOD: CPT

## 2023-09-25 PROCEDURE — 94664 DEMO&/EVAL PT USE INHALER: CPT

## 2023-09-25 PROCEDURE — 81001 URINALYSIS AUTO W/SCOPE: CPT | Performed by: HOSPITALIST

## 2023-09-25 RX ORDER — METHYLPREDNISOLONE SODIUM SUCCINATE 40 MG/ML
40 INJECTION, POWDER, LYOPHILIZED, FOR SOLUTION INTRAMUSCULAR; INTRAVENOUS EVERY 8 HOURS
Status: DISCONTINUED | OUTPATIENT
Start: 2023-09-25 | End: 2023-09-28

## 2023-09-25 RX ORDER — DEXTROSE MONOHYDRATE, SODIUM CHLORIDE, AND POTASSIUM CHLORIDE 50; 1.49; 4.5 G/1000ML; G/1000ML; G/1000ML
75 INJECTION, SOLUTION INTRAVENOUS CONTINUOUS
Status: DISCONTINUED | OUTPATIENT
Start: 2023-09-25 | End: 2023-09-28

## 2023-09-25 RX ADMIN — ASPIRIN 81 MG: 81 TABLET, CHEWABLE ORAL at 09:10

## 2023-09-25 RX ADMIN — POTASSIUM CHLORIDE, DEXTROSE MONOHYDRATE AND SODIUM CHLORIDE 75 ML/HR: 150; 5; 450 INJECTION, SOLUTION INTRAVENOUS at 15:40

## 2023-09-25 RX ADMIN — METHYLPREDNISOLONE SODIUM SUCCINATE 40 MG: 40 INJECTION INTRAMUSCULAR; INTRAVENOUS at 18:39

## 2023-09-25 RX ADMIN — Medication 4 ML: at 07:33

## 2023-09-25 RX ADMIN — ATORVASTATIN CALCIUM 40 MG: 20 TABLET, FILM COATED ORAL at 09:10

## 2023-09-25 RX ADMIN — BUDESONIDE 0.5 MG: 0.5 INHALANT ORAL at 07:32

## 2023-09-25 RX ADMIN — ARFORMOTEROL TARTRATE 15 MCG: 15 SOLUTION RESPIRATORY (INHALATION) at 07:31

## 2023-09-25 RX ADMIN — TAMSULOSIN HYDROCHLORIDE 0.8 MG: 0.4 CAPSULE ORAL at 09:10

## 2023-09-25 RX ADMIN — APIXABAN 2.5 MG: 2.5 TABLET, FILM COATED ORAL at 09:10

## 2023-09-25 RX ADMIN — ALLOPURINOL 300 MG: 300 TABLET ORAL at 09:10

## 2023-09-25 RX ADMIN — IPRATROPIUM BROMIDE AND ALBUTEROL SULFATE 3 ML: 2.5; .5 SOLUTION RESPIRATORY (INHALATION) at 07:30

## 2023-09-25 RX ADMIN — AMLODIPINE BESYLATE 10 MG: 10 TABLET ORAL at 09:10

## 2023-09-25 RX ADMIN — ARFORMOTEROL TARTRATE 15 MCG: 15 SOLUTION RESPIRATORY (INHALATION) at 19:30

## 2023-09-25 RX ADMIN — DEXTROSE MONOHYDRATE 125 ML/HR: 50 INJECTION, SOLUTION INTRAVENOUS at 05:57

## 2023-09-25 RX ADMIN — PREDNISONE 2.5 MG: 2.5 TABLET ORAL at 09:10

## 2023-09-25 NOTE — PLAN OF CARE
Goal Outcome Evaluation:                      Attempted to see x2 for diet tolerance and follow up; however, patient is too lethargic at this time to participate. RN reports patient has not been eating and when attempting medications he is holding them and that she has had to suction everything from oral cavity. She has made him NPO; recommend continued NPO at this time until alertness improves and will follow up for re-evaluation as appropriate.

## 2023-09-25 NOTE — PLAN OF CARE
Problem: Adult Inpatient Plan of Care  Goal: Plan of Care Review  Outcome: Ongoing, Progressing  Flowsheets (Taken 9/25/2023 1752)  Progress: declining  Plan of Care Reviewed With: patient  Outcome Evaluation: vitals stable, arouses to voice, incoherant speech, wax/wan lethargy, unsafe for oral intake, speech therapy attempted to see pt but lethargic, arouses to voice but doesnt follow commands, cxr completed, UA completed after straight cath with 14fr coude, small blood clot noted during in/out cath, turn q2hrs, accumax pump intact,  Goal: Patient-Specific Goal (Individualized)  Outcome: Ongoing, Progressing  Goal: Absence of Hospital-Acquired Illness or Injury  Outcome: Ongoing, Progressing  Intervention: Identify and Manage Fall Risk  Recent Flowsheet Documentation  Taken 9/25/2023 1230 by Dai May RN  Safety Promotion/Fall Prevention:   safety round/check completed   room organization consistent   nonskid shoes/slippers when out of bed   lighting adjusted   fall prevention program maintained   clutter free environment maintained   assistive device/personal items within reach  Taken 9/25/2023 1030 by Dai May RN  Safety Promotion/Fall Prevention:   safety round/check completed   room organization consistent   nonskid shoes/slippers when out of bed   lighting adjusted   fall prevention program maintained   clutter free environment maintained   assistive device/personal items within reach  Goal: Optimal Comfort and Wellbeing  Outcome: Ongoing, Progressing  Goal: Readiness for Transition of Care  Outcome: Ongoing, Progressing   Goal Outcome Evaluation:  Plan of Care Reviewed With: patient        Progress: declining  Outcome Evaluation: vitals stable, arouses to voice, incoherant speech, wax/wan lethargy, unsafe for oral intake, speech therapy attempted to see pt but lethargic, arouses to voice but doesnt follow commands, cxr completed, UA completed after straight cath with 14fr coude, small  blood clot noted during in/out cath, turn q2hrs, accumax pump intact,

## 2023-09-25 NOTE — PROGRESS NOTES
Name: Riky Rios ADMIT: 2023   : 1935  PCP: Provider, No Known    MRN: [id number] LOS: 16 days   AGE/SEX: 87 y.o. male  ROOM: [facility]/     Subjective   Subjective   Sleeping but does awaken to voice.  Mumbles responses but does not follow commands or really enunciate any responses to my questions       Objective   Objective   Vital Signs  Temp:  [97.5 °F (36.4 °C)-99.4 °F (37.4 °C)] 97.5 °F (36.4 °C)  Heart Rate:  [] 90  Resp:  [18-20] 18  BP: ()/(66-85) 109/84  SpO2:  [92 %-98 %] 98 %  on  Flow (L/min):  [1-2] 1;   Device (Oxygen Therapy): nasal cannula  Body mass index is 20.37 kg/m².  Physical Exam  Vitals and nursing note reviewed.   Constitutional:       Appearance: He is ill-appearing.   HENT:      Mouth/Throat:      Mouth: Mucous membranes are dry.   Cardiovascular:      Rate and Rhythm: Normal rate and regular rhythm.   Pulmonary:      Effort: Pulmonary effort is normal.      Breath sounds: Decreased air movement present. Rhonchi present.   Abdominal:      General: Bowel sounds are normal.      Palpations: Abdomen is soft.      Tenderness: There is no abdominal tenderness.   Musculoskeletal:         General: No swelling.   Skin:     General: Skin is warm and dry.   Neurological:      Mental Status: He is lethargic.   Psychiatric:         Cognition and Memory: Cognition is impaired.     Results Review     I reviewed the patient's new clinical results.  Results from last 7 days   Lab Units 23  0507 23  0532 23  0612 23  0503   WBC 10*3/mm3 16.94* 16.02* 16.66* 17.51*   HEMOGLOBIN g/dL 9.2* 9.8* 9.1* 10.3*   PLATELETS 10*3/mm3 206 205 190 190       Results from last 7 days   Lab Units 23  0507 23  0532 23  0612 23  2115 23  0503   SODIUM mmol/L 138 148* 152*  --  148*   POTASSIUM mmol/L 3.6 4.1 4.5 4.2 3.2*   CHLORIDE mmol/L 107 113* 119*  --  113*   CO2 mmol/L 22.4 23.0 21.0*  --  24.0   BUN mg/dL 14 21 31*  --  36*   CREATININE  mg/dL 0.80 0.97 1.08  --  1.04   GLUCOSE mg/dL 160* 122* 99  --  100*   EGFR mL/min/1.73 86.2 76.0 66.8  --  69.9       Results from last 7 days   Lab Units 09/25/23  0507 09/24/23  0532 09/23/23  0612 09/22/23  0503   ALBUMIN g/dL 2.6* 2.9* 3.0* 3.0*   BILIRUBIN mg/dL 0.7 0.6 0.5 0.5   ALK PHOS U/L 104 121* 130* 140*   AST (SGOT) U/L 22 29 45* 75*   ALT (SGPT) U/L 67* 99* 144* 189*       Results from last 7 days   Lab Units 09/25/23  0507 09/24/23  0532 09/23/23  0612 09/22/23  0503 09/21/23  0729   CALCIUM mg/dL 8.2* 8.8 8.9 8.4* 9.0   ALBUMIN g/dL 2.6* 2.9* 3.0* 3.0* 3.0*   MAGNESIUM mg/dL  --   --   --   --  1.9   PHOSPHORUS mg/dL  --   --   --   --  3.1       Results from last 7 days   Lab Units 09/24/23  0532 09/23/23  0612 09/21/23  0729 09/20/23  0549   PROCALCITONIN ng/mL 0.17 0.20 0.24 0.24       No results found for: HGBA1C, POCGLU      No radiology results for the last day    I have personally reviewed all medications:  Scheduled Medications  allopurinol, 300 mg, Oral, Daily  amLODIPine, 10 mg, Oral, Q24H  apixaban, 2.5 mg, Oral, Q12H  arformoterol, 15 mcg, Nebulization, BID - RT  aspirin, 81 mg, Oral, Daily  atorvastatin, 40 mg, Oral, Daily  budesonide, 0.5 mg, Nebulization, Daily - RT  guaiFENesin, 600 mg, Oral, BID  lidocaine, 1 patch, Transdermal, Q24H  mirtazapine, 15 mg, Oral, Nightly  predniSONE, 2.5 mg, Oral, Daily With Breakfast  senna-docusate sodium, 2 tablet, Oral, BID  sodium chloride, 4 mL, Nebulization, BID - RT  tamsulosin, 0.8 mg, Oral, Daily  tiotropium bromide monohydrate, 2 puff, Inhalation, Daily - RT    Infusions  dextrose, 125 mL/hr, Last Rate: 125 mL/hr (09/25/23 0557)    Diet  Diet: Regular/House Diet; No Straw; Texture: Pureed (NDD 1); Fluid Consistency: Thin (IDDSI 0)    I have personally reviewed:  [x]  Laboratory   [x]  Microbiology   [x]  Radiology   [x]  EKG/Telemetry  [x]  Cardiology/Vascular   []  Pathology    []  Records       Assessment/Plan     Active Hospital Problems     Diagnosis  POA    **COPD with acute exacerbation [J44.1]  Yes    Acute bronchitis due to Rhinovirus [J20.6]  Yes    Hypertension [I10]  Yes    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       87 y.o. male with COPD with acute exacerbation.    Mental status still is not improving.  Sodium has been corrected.  He has asterixis as if may be a metabolic encephalopathy but no identifiable cause at present time.  We will check a catheterized urinalysis.  Not alert enough for swallowing at present time.  Restart IV fluids.  N.p.o. for now.  Check head CT.  Repeat chest x-ray.  Check ABG.  Note PCT and cortisol yesterday normal.  If no reversible causes for his encephalopathy will need to consider comfort measures.    LFTs normalizing    Neurology signed off with recommendations for full anticoagulation and statin therapy at discharge.  While not 100% certain he had atrial fibrillation cardiology also agrees with anticoagulating..  Cleared to be on Eliquis by thoracic surgery which was started 9/20.      Continue high-dose statin as written-on aspirin 81 mg and statin 40 mg daily prior to arrival  Outpatient ENT follow due to abnormalities noted on MRI above for direct visualization of suspected polyp.  Follow up neurology 3 months.  Full code.  Discussed with patient and nursing staff.  Anticipate discharge to SNU facility timing yet to be determined..      Celso Schmitz MD  [facility]  09/25/23  08:39 EDT    ADDENDUM  No new findings on chest x-ray and head CT.  Normal PCO2 on blood gas.  Urinalysis without obvious infection.  Despite his cortisol being normal yesterday I will try  him again on IV steroid.  Otherwise do not see obvious reversible cause for his deteriorating mental status.  Continue IV fluids.  If no improvement by tomorrow we will discuss pursuing palliative measures with family.    Electronically signed by Celso Schmitz MD, 09/25/23, 3:39 PM EDT.

## 2023-09-25 NOTE — PLAN OF CARE
Goal Outcome Evaluation:  Plan of Care Reviewed With: patient        Progress: no change  Outcome Evaluation: VSS except runs of Afib at times, otherwise SR/ST on monitor. Meds given as allowed crushed with thickened water. Patient with baseline garbled speech, fidgety, moving around in bed, sleeping in between care.  IVF@125/hr. Will continue to monitor.

## 2023-09-25 NOTE — THERAPY TREATMENT NOTE
Patient Name: Riky Rios  : 1935    MRN: [id number]                              Today's Date: 2023       Admit Date: 2023    Visit Dx: No diagnosis found.  Patient Active Problem List   Diagnosis    COPD with acute exacerbation    Cardiac arrest    Acute respiratory failure with hypoxia    Pneumothorax on right    Paroxysmal atrial fibrillation    Acute bronchitis due to Rhinovirus    Hypertension    Acute left cerebellar infarct    Closed fracture of one rib of right side     History reviewed. No pertinent past medical history.  History reviewed. No pertinent surgical history.   General Information       Row Name 23 1416          Physical Therapy Time and Intention    Document Type therapy note (daily note)  -     Mode of Treatment individual therapy;physical therapy  -       Row Name 23 1416          General Information    Existing Precautions/Restrictions fall  -       Row Name 23 1416          Cognition    Orientation Status (Cognition) oriented to;person  -       Row Name 23 1416          Safety Issues, Functional Mobility    Impairments Affecting Function (Mobility) balance;endurance/activity tolerance;strength;pain  -     Comment, Safety Issues/Impairments (Mobility) pt very sleepy during PT session but was able to wake up to sit sit EOB and participate in therapy  -               User Key  (r) = Recorded By, (t) = Taken By, (c) = Cosigned By      Initials Name Provider Type     Meg Fitzgerald, PT Physical Therapist                   Mobility       Row Name 23 1417          Bed Mobility    Bed Mobility supine-sit;sit-supine  -     Supine-Sit [place] (Bed Mobility) verbal cues;nonverbal cues (demo/gesture);moderate assist (50% patient effort)  -     Sit-Supine [place] (Bed Mobility) verbal cues;nonverbal cues (demo/gesture);moderate assist (50% patient effort)  -     Assistive Device (Bed Mobility) bed rails;head of bed  elevated  -[facility] Name 09/25/23 1417          Sit-Stand Transfer    Sit-Stand [place] (Transfers) verbal cues;nonverbal cues (demo/gesture);moderate assist (50% patient effort)  -     Assistive Device (Sit-Stand Transfers) --  A  -     Comment, (Sit-Stand Transfer) pt performed sit to stand x2, shakey and impaired balance noted  -[facility] Name 09/25/23 1417          Gait/Stairs (Locomotion)    [place] Level (Gait) verbal cues;nonverbal cues (demo/gesture);moderate assist (50% patient effort)  -     Assistive Device (Gait) --  HHA  -     Distance in Feet (Gait) 4 side steps to HOB + 5 reps marching in place  -     Deviations/Abnormal Patterns (Gait) sumeet decreased;gait speed decreased;stride length decreased;ataxic  -     Bilateral Gait Deviations forward flexed posture;heel strike decreased  -     Comment, (Gait/Stairs) pt shakey and having difficulty with weight shifting especially during marches  -               User Key  (r) = Recorded By, (t) = Taken By, (c) = Cosigned By      Initials Name Provider Type     Meg Fitzgerald, PT Physical Therapist                   Obj/Interventions    No documentation.                  Goals/Plan       [facility] Name 09/25/23 1428          Bed Mobility Goal 1 (PT)    Activity/Assistive Device (Bed Mobility Goal 1, PT) bed mobility activities, all  -     [place] Level/Cues Needed (Bed Mobility Goal 1, PT) contact guard required  -     Time Frame (Bed Mobility Goal 1, PT) 1 week  -     Progress/Outcomes (Bed Mobility Goal 1, PT) goal revised this date  -[facility] Name 09/25/23 1428          Transfer Goal 1 (PT)    Activity/Assistive Device (Transfer Goal 1, PT) transfers, all;walker, rolling  -     [place] Level/Cues Needed (Transfer Goal 1, PT) contact guard required  -     Time Frame (Transfer Goal 1, PT) 1 week  -     Progress/Outcome (Transfer Goal 1, PT) goal revised this date  -[facility] Name 09/25/23 1428           Gait Training Goal 1 (PT)    Activity/Assistive Device (Gait Training Goal 1, PT) gait (walking locomotion);walker, rolling  -CH     [place] Level (Gait Training Goal 1, PT) contact guard required  -CH     Distance (Gait Training Goal 1, PT) 50  -CH     Time Frame (Gait Training Goal 1, PT) 1 week  -     Progress/Outcome (Gait Training Goal 1, PT) goal revised this date  -       Row Name 09/25/23 1428          Therapy Assessment/Plan (PT)    Planned Therapy Interventions (PT) balance training;bed mobility training;gait training;home exercise program;patient/family education;strengthening;transfer training  -               User Key  (r) = Recorded By, (t) = Taken By, (c) = Cosigned By      Initials Name Provider Type    Meg Barriga, PT Physical Therapist                   Clinical Impression       Row Name 09/25/23 1423          Pain    Pretreatment Pain Rating 0/10 - no pain  -     Posttreatment Pain Rating 0/10 - no pain  -       Row Name 09/25/23 1423          Plan of Care Review    Plan of Care Reviewed With patient  -     Outcome Evaluation Pt very sleepy intially during PT session but was able to wake up to participate in EOB and standing activities. Pt with impaired balance and difficulty weight shifting to take steps or march in place. Pt reports some dizziness with standing but systolic BP was 115. PT will continue to follow to address strength, mobility, and gait.  -       Row Name 09/25/23 1423          Positioning and Restraints    Pre-Treatment Position in bed  -     Post Treatment Position bed  -     In Bed supine;call light within reach;encouraged to call for assist;exit alarm on  -               User Key  (r) = Recorded By, (t) = Taken By, (c) = Cosigned By      Initials Name Provider Type    Meg Barriga, PT Physical Therapist                   Outcome Measures       Row Name 09/25/23 1424          How much help from another person do you currently  need...    Turning from your back to your side while in flat bed without using bedrails? 2  -CH     Moving from lying on back to sitting on the side of a flat bed without bedrails? 2  -CH     Moving to and from a bed to a chair (including a wheelchair)? 2  -CH     Standing up from a chair using your arms (e.g., wheelchair, bedside chair)? 2  -CH     Climbing 3-5 steps with a railing? 1  -CH     To walk in hospital room? 2  -CH     AM-PAC 6 Clicks Score (PT) 11  -CH     Highest level of mobility 4 --> Transferred to chair/commode  -       Row Name 09/25/23 1426          Functional Assessment    Outcome Measure Options AM-PAC 6 Clicks Basic Mobility (PT)  -               User Key  (r) = Recorded By, (t) = Taken By, (c) = Cosigned By      Initials Name Provider Type     Meg Fitzgerald, PT Physical Therapist                                 Physical Therapy Education       Title: PT OT SLP Therapies (Done)       Topic: Physical Therapy (Done)       Point: Mobility training (Done)       Learning Progress Summary             Patient Acceptance, E,TB,D, VU,NR by  at 9/25/2023 1426    Acceptance, E, VU,NR by KT at 9/22/2023 1232    Acceptance, E,TB, VU,NR by  at 9/21/2023 1039    Acceptance, E,TB,D, VU,NR by  at 9/20/2023 1009    Acceptance, E,TB,D, VU,NR by  at 9/19/2023 1647    Acceptance, E, VU,NR by KT at 9/19/2023 1533    Acceptance, E,TB,D, VU,NR by  at 9/18/2023 1328    Acceptance, E, VU,NR by KT at 9/18/2023 1028    Acceptance, E,TB, VU,NR by  at 9/17/2023 1438    Acceptance, E,TB,D, VU,NR by  at 9/13/2023 1138    Acceptance, E, VU,NR by KT at 9/12/2023 1156    Acceptance, E,TB,D, VU,NR by  at 9/12/2023 0955                         Point: Home exercise program (Done)       Learning Progress Summary             Patient Acceptance, E,TB,D, VU,NR by  at 9/25/2023 1426    Acceptance, E, VU,NR by KT at 9/22/2023 1232    Acceptance, E,TB,D, ARMANDO,NR by PH at 9/20/2023 1009    Acceptance, E,JARRED BRYAN,  VU,NR by CH at 9/19/2023 1647    Acceptance, E, VU,NR by KT at 9/19/2023 1533    Acceptance, E, VU,NR by KT at 9/18/2023 1028    Acceptance, E,TB, VU,NR by CB at 9/17/2023 1438    Acceptance, E,TB,D, VU,NR by PH at 9/13/2023 1138    Acceptance, E, VU,NR by KT at 9/12/2023 1156                         Point: Body mechanics (Done)       Learning Progress Summary             Patient Acceptance, E,TB,D, VU,NR by CH at 9/25/2023 1426    Acceptance, E, VU,NR by KT at 9/22/2023 1232    Acceptance, E,TB, VU,NR by PH at 9/21/2023 1039    Acceptance, E,TB,D, VU,NR by PH at 9/20/2023 1009    Acceptance, E,TB,D, VU,NR by CH at 9/19/2023 1647    Acceptance, E, VU,NR by KT at 9/19/2023 1533    Acceptance, E,TB,D, VU,NR by CH at 9/18/2023 1328    Acceptance, E, VU,NR by KT at 9/18/2023 1028    Acceptance, E,TB,D, VU,NR by PH at 9/13/2023 1138    Acceptance, E, VU,NR by KT at 9/12/2023 1156    Acceptance, E,TB,D, VU,NR by CH at 9/12/2023 0955                         Point: Precautions (Done)       Learning Progress Summary             Patient Acceptance, E,TB,D, VU,NR by CH at 9/25/2023 1426    Acceptance, E, VU,NR by KT at 9/22/2023 1232    Acceptance, E,TB, VU,NR by PH at 9/21/2023 1039    Acceptance, E,TB,D, VU,NR by PH at 9/20/2023 1009    Acceptance, E,TB,D, VU,NR by CH at 9/19/2023 1647    Acceptance, E, VU,NR by KT at 9/19/2023 1533    Acceptance, E,TB,D, VU,NR by CH at 9/18/2023 1328    Acceptance, E, VU,NR by KT at 9/18/2023 1028    Acceptance, E,TB,D, VU,NR by  at 9/13/2023 1138    Acceptance, E, VU,NR by  at 9/12/2023 1156    Acceptance, E,TB,D, VU,NR by  at 9/12/2023 0955                                         User Key       Initials Effective Dates Name Provider Type Discipline     06/16/21 -  Meg Fitzgerald, PT Physical Therapist PT    KT 06/16/21 -  Chel De Anda, RN Registered Nurse Nurse    PH 06/16/21 -  Sherri Whitten PTA Physical Therapist Assistant PT    CB 10/22/21 -  Mary Reyes,  PT Physical Therapist PT                  PT Recommendation and Plan  Planned Therapy Interventions (PT): balance training, bed mobility training, gait training, home exercise program, patient/family education, strengthening, transfer training  Plan of Care Reviewed With: patient  Outcome Evaluation: Pt very sleepy intially during PT session but was able to wake up to participate in EOB and standing activities. Pt with impaired balance and difficulty weight shifting to take steps or march in place. Pt reports some dizziness with standing but systolic BP was 115. PT will continue to follow to address strength, mobility, and gait.     Time Calculation:         PT Charges       Row Name 09/25/23 1427             Time Calculation    Start Time 1138  -CH      Stop Time 1156  -CH      Time Calculation (min) 18 min  -CH      PT Received On 09/25/23  -      PT - Next Appointment 09/26/23  -      PT Goal Re-Cert Due Date 10/02/23  -         Time Calculation- PT    Total Timed Code Minutes- PT 18 minute(s)  -CH         Timed Charges    25093 - PT Therapeutic Activity Minutes 18  -CH         Total Minutes    Timed Charges Total Minutes 18  -CH       Total Minutes 18  -CH                User Key  (r) = Recorded By, (t) = Taken By, (c) = Cosigned By      Initials Name Provider Type     Meg Fitzgerald, PT Physical Therapist                  Therapy Charges for Today       Code Description Service Date Service Provider Modifiers Qty    11311883287 HC PT THERAPEUTIC ACT EA 15 MIN 9/25/2023 Meg Fitzgerald PT GP 1            PT G-Codes  Outcome Measure Options: AM-PAC 6 Clicks Basic Mobility (PT)  AM-PAC 6 Clicks Score (PT): 11  AM-PAC 6 Clicks Score (OT): 12  Modified [place] Scale: 3 - Moderate disability.  Requiring some help, but able to walk without assistance.  PT Discharge Summary  Anticipated Discharge Disposition (PT): skilled nursing facility    Meg Fitzgerald PT  9/25/2023

## 2023-09-25 NOTE — PLAN OF CARE
Goal Outcome Evaluation:  Plan of Care Reviewed With: patient           Outcome Evaluation: Pt very sleepy intially during PT session but was able to wake up to participate in EOB and standing activities. Pt with impaired balance and difficulty weight shifting to take steps or march in place. Pt reports some dizziness with standing but systolic BP was 115. PT will continue to follow to address strength, mobility, and gait.      Anticipated Discharge Disposition (PT): skilled nursing facility

## 2023-09-26 ENCOUNTER — APPOINTMENT (OUTPATIENT)
Dept: CT IMAGING | Facility: HOSPITAL | Age: 88
DRG: 199 | End: 2023-09-26
Payer: MEDICARE

## 2023-09-26 LAB
ANION GAP SERPL CALCULATED.3IONS-SCNC: 12.6 MMOL/L (ref 5–15)
BUN SERPL-MCNC: 15 MG/DL (ref 8–23)
BUN/CREAT SERPL: 18.1 (ref 7–25)
CALCIUM SPEC-SCNC: 8.2 MG/DL (ref 8.6–10.5)
CHLORIDE SERPL-SCNC: 105 MMOL/L (ref 98–107)
CO2 SERPL-SCNC: 19.4 MMOL/L (ref 22–29)
CREAT SERPL-MCNC: 0.83 MG/DL (ref 0.76–1.27)
DEPRECATED RDW RBC AUTO: 44.3 FL (ref 37–54)
EGFRCR SERPLBLD CKD-EPI 2021: 85.2 ML/MIN/1.73
ERYTHROCYTE [DISTWIDTH] IN BLOOD BY AUTOMATED COUNT: 12 % (ref 12.3–15.4)
GLUCOSE SERPL-MCNC: 157 MG/DL (ref 65–99)
HCT VFR BLD AUTO: 26.9 % (ref 37.5–51)
HGB BLD-MCNC: 9.3 G/DL (ref 13–17.7)
MCH RBC QN AUTO: 35.5 PG (ref 26.6–33)
MCHC RBC AUTO-ENTMCNC: 34.6 G/DL (ref 31.5–35.7)
MCV RBC AUTO: 102.7 FL (ref 79–97)
PLATELET # BLD AUTO: 233 10*3/MM3 (ref 140–450)
PMV BLD AUTO: 10.4 FL (ref 6–12)
POTASSIUM SERPL-SCNC: 4.3 MMOL/L (ref 3.5–5.2)
RBC # BLD AUTO: 2.62 10*6/MM3 (ref 4.14–5.8)
SODIUM SERPL-SCNC: 137 MMOL/L (ref 136–145)
WBC NRBC COR # BLD: 12.27 10*3/MM3 (ref 3.4–10.8)

## 2023-09-26 PROCEDURE — 85027 COMPLETE CBC AUTOMATED: CPT | Performed by: HOSPITALIST

## 2023-09-26 PROCEDURE — 92610 EVALUATE SWALLOWING FUNCTION: CPT

## 2023-09-26 PROCEDURE — 80048 BASIC METABOLIC PNL TOTAL CA: CPT | Performed by: HOSPITALIST

## 2023-09-26 PROCEDURE — 25010000002 METHYLPREDNISOLONE PER 40 MG: Performed by: HOSPITALIST

## 2023-09-26 PROCEDURE — 94799 UNLISTED PULMONARY SVC/PX: CPT

## 2023-09-26 PROCEDURE — 97530 THERAPEUTIC ACTIVITIES: CPT

## 2023-09-26 PROCEDURE — 94761 N-INVAS EAR/PLS OXIMETRY MLT: CPT

## 2023-09-26 PROCEDURE — 94664 DEMO&/EVAL PT USE INHALER: CPT

## 2023-09-26 RX ADMIN — APIXABAN 2.5 MG: 2.5 TABLET, FILM COATED ORAL at 14:40

## 2023-09-26 RX ADMIN — SENNOSIDES AND DOCUSATE SODIUM 2 TABLET: 50; 8.6 TABLET ORAL at 21:13

## 2023-09-26 RX ADMIN — METHYLPREDNISOLONE SODIUM SUCCINATE 40 MG: 40 INJECTION INTRAMUSCULAR; INTRAVENOUS at 09:18

## 2023-09-26 RX ADMIN — SENNOSIDES AND DOCUSATE SODIUM 2 TABLET: 50; 8.6 TABLET ORAL at 14:40

## 2023-09-26 RX ADMIN — GUAIFENESIN 600 MG: 600 TABLET, EXTENDED RELEASE ORAL at 21:12

## 2023-09-26 RX ADMIN — GUAIFENESIN 600 MG: 600 TABLET, EXTENDED RELEASE ORAL at 14:40

## 2023-09-26 RX ADMIN — POTASSIUM CHLORIDE, DEXTROSE MONOHYDRATE AND SODIUM CHLORIDE 75 ML/HR: 150; 5; 450 INJECTION, SOLUTION INTRAVENOUS at 04:45

## 2023-09-26 RX ADMIN — LIDOCAINE 1 PATCH: 50 PATCH CUTANEOUS at 09:18

## 2023-09-26 RX ADMIN — ATORVASTATIN CALCIUM 40 MG: 20 TABLET, FILM COATED ORAL at 14:40

## 2023-09-26 RX ADMIN — ARFORMOTEROL TARTRATE 15 MCG: 15 SOLUTION RESPIRATORY (INHALATION) at 19:44

## 2023-09-26 RX ADMIN — ARFORMOTEROL TARTRATE 15 MCG: 15 SOLUTION RESPIRATORY (INHALATION) at 07:19

## 2023-09-26 RX ADMIN — ALLOPURINOL 300 MG: 300 TABLET ORAL at 14:38

## 2023-09-26 RX ADMIN — APIXABAN 2.5 MG: 2.5 TABLET, FILM COATED ORAL at 21:12

## 2023-09-26 RX ADMIN — METHYLPREDNISOLONE SODIUM SUCCINATE 40 MG: 40 INJECTION INTRAMUSCULAR; INTRAVENOUS at 17:09

## 2023-09-26 RX ADMIN — TAMSULOSIN HYDROCHLORIDE 0.8 MG: 0.4 CAPSULE ORAL at 14:41

## 2023-09-26 RX ADMIN — POTASSIUM CHLORIDE, DEXTROSE MONOHYDRATE AND SODIUM CHLORIDE 75 ML/HR: 150; 5; 450 INJECTION, SOLUTION INTRAVENOUS at 17:09

## 2023-09-26 RX ADMIN — ASPIRIN 81 MG: 81 TABLET, CHEWABLE ORAL at 14:40

## 2023-09-26 RX ADMIN — AMLODIPINE BESYLATE 10 MG: 10 TABLET ORAL at 14:40

## 2023-09-26 RX ADMIN — METHYLPREDNISOLONE SODIUM SUCCINATE 40 MG: 40 INJECTION INTRAMUSCULAR; INTRAVENOUS at 03:01

## 2023-09-26 RX ADMIN — BUDESONIDE 0.5 MG: 0.5 INHALANT ORAL at 07:18

## 2023-09-26 NOTE — PLAN OF CARE
Goal Outcome Evaluation:      Reevaluation of swallow this date with patient alert and able to follow commands.  Pt is appropriate for puree/thin diet, meds crushed in puree, upright for all PO, feed only when alert, assist as needed.  ST to follow for diet tolerance.                   DISPLAY PLAN FREE TEXT

## 2023-09-26 NOTE — PROGRESS NOTES
"Nutrition Services    Patient Name:  Riky Rios  YOB: 1935  MRN: [id number]  Admit Date:  9/9/2023    Assessment Date:  09/26/23    Summary:  Nutrition follow up. Was made NPO and repeat SLP eval completed. Back on puree/thin liquids.  Palliative care consult pending. Discussed po intake with daughter at bedside. Pt needs tray set up and encouragement to eat. Likes ice cream.    Will add a  might shake to lunch and see how he does with them, Cont Boost    RD will cont to follow clinical course, nutrition needs.    CLINICAL NUTRITION ASSESSMENT      Reason for Assessment Follow-up Protocol     Diagnosis/Problem   COPD, bronchitis, HTN, L cerebral infarct, rib fx, ischemic stroke, PAF, PTX, cardiac arrest, resp failure   Medical/Surgical History History reviewed. No pertinent past medical history.    History reviewed. No pertinent surgical history.     Encounter Information        Nutrition History:  Drinks boost at home per dtr. Eats bfast, no lunch and a small dinner typically.   Factors Affecting Intake: altered mental status, decreased appetite, swallow impairment     Anthropometrics        Current Height  Current Weight  BMI kg/m2 Height: 172.7 cm (68\")  Weight: 60.8 kg (134 lb) (09/11/23 0959)  Body mass index is 20.37 kg/m².   Adjusted BMI (if applicable)    BMI Category Normal/Healthy (18.4 - 24.9)       Admission Weight 60.8kg       Ideal Body Weight (IBW) 68.4kg       Usual Body Weight (UBW) 132lb 9/2022   Weight Trend Stable       Weight History Wt Readings from Last 30 Encounters:   09/11/23 0959 60.8 kg (134 lb)   09/10/23 1728 60.8 kg (134 lb)   09/13/23 1745 60.8 kg (134 lb)      --  Tests/Procedures        Tests/Procedures Clinical Swallow Evaluation     Labs       Pertinent Labs    Results from last 7 days   Lab Units 09/26/23  0557 09/25/23  0507 09/24/23  0532 09/23/23  0612   SODIUM mmol/L 137 138 148* 152*   POTASSIUM mmol/L 4.3 3.6 4.1 4.5   CHLORIDE mmol/L 105 107 113* 119* "   CO2 mmol/L 19.4* 22.4 23.0 21.0*   BUN mg/dL 15 14 21 31*   CREATININE mg/dL 0.83 0.80 0.97 1.08   CALCIUM mg/dL 8.2* 8.2* 8.8 8.9   BILIRUBIN mg/dL  --  0.7 0.6 0.5   ALK PHOS U/L  --  104 121* 130*   ALT (SGPT) U/L  --  67* 99* 144*   AST (SGOT) U/L  --  22 29 45*   GLUCOSE mg/dL 157* 160* 122* 99       Results from last 7 days   Lab Units 09/26/23  0557 09/25/23  0507 09/22/23  0503 09/21/23  0729   MAGNESIUM mg/dL  --   --   --  1.9   PHOSPHORUS mg/dL  --   --   --  3.1   HEMOGLOBIN g/dL 9.3* 9.2*   < > 11.0*   HEMATOCRIT % 26.9* 27.3*   < > 31.4*   WBC 10*3/mm3 12.27* 16.94*   < > 16.60*   ALBUMIN g/dL  --  2.6*   < > 3.0*    < > = values in this interval not displayed.       Results from last 7 days   Lab Units 09/26/23  0557 09/25/23  0507 09/24/23  0532 09/23/23  0612 09/22/23  0503   PLATELETS 10*3/mm3 233 206 205 190 190       COVID19   Date Value Ref Range Status   09/10/2023 Not Detected Not Detected - Ref. Range Final     Lab Results   Component Value Date    HGBA1C 5.40 09/14/2023          Medications           Scheduled Medications allopurinol, 300 mg, Oral, Daily  amLODIPine, 10 mg, Oral, Q24H  apixaban, 2.5 mg, Oral, Q12H  arformoterol, 15 mcg, Nebulization, BID - RT  aspirin, 81 mg, Oral, Daily  atorvastatin, 40 mg, Oral, Daily  budesonide, 0.5 mg, Nebulization, Daily - RT  guaiFENesin, 600 mg, Oral, BID  lidocaine, 1 patch, Transdermal, Q24H  methylPREDNISolone sodium succinate, 40 mg, Intravenous, Q8H  senna-docusate sodium, 2 tablet, Oral, BID  tamsulosin, 0.8 mg, Oral, Daily  tiotropium bromide monohydrate, 2 puff, Inhalation, Daily - RT       Infusions dextrose 5 % and sodium chloride 0.45 % with KCl 20 mEq/L, 75 mL/hr, Last Rate: 75 mL/hr (09/26/23 3595)       PRN Medications   [DISCONTINUED] acetaminophen **OR** [DISCONTINUED] acetaminophen **OR** acetaminophen    senna-docusate sodium **AND** polyethylene glycol **AND** bisacodyl **AND** bisacodyl    ipratropium-albuterol     nitroglycerin    OLANZapine    ondansetron **OR** ondansetron    Potassium Replacement - Follow Nurse / BPA Driven Protocol    sodium chloride     Physical Findings          General Appearance alert, disoriented, hard of hearing, on oxygen therapy   Oral/Mouth Cavity tooth or teeth missing   Edema  2+ (mild)   Gastrointestinal last bowel movement: 9/22, large   Skin  bruising, skin tear, scab on abd   Tubes/Drains/Lines none   NFPE Unable to perform due to: Pt sitting in chair eating lunch   --  Current Nutrition Orders & Evaluation of Intake       Oral Nutrition     Food Allergies NKFA   Current PO Diet Diet: Regular/House Diet; Texture: Pureed (NDD 1); Fluid Consistency: Thin (IDDSI 0)   Supplement Boost Plus, TID   PO Evaluation     % PO Intake/# of Days 0-25%   --  PES STATEMENT / NUTRITION DIAGNOSIS      Nutrition Dx Problem  Problem: Inadequate Oral Intake  Etiology: Medical Diagnosis - CVA    Signs/Symptoms: Report of Minimal PO Intake     --  NUTRITION INTERVENTION / PLAN OF CARE      Intervention Goal(s) Maintain nutrition status, Reduce/improve symptoms, Meet estimated needs, Disease management/therapy, Tolerate PO , Increase intake, and Maintain weight         RD Intervention/Action Encourage intake, Continue to monitor, Care plan reviewed, and Recommend/order:           Prescription/Orders:       PO Diet       Supplement Mighty shake at lunch      Snacks       Enteral Nutrition       Parenteral Nutrition    New Prescription Ordered? Yes   --      Monitor/Evaluation Per protocol   Discharge Plan/Needs Pending clinical course   --    RD to follow per protocol.    Electronically signed by:  Judie Chew RD  09/26/23 13:21 EDT

## 2023-09-26 NOTE — THERAPY TREATMENT NOTE
Patient Name: Riky Rios  : 1935    MRN: [id number]                              Today's Date: 2023       Admit Date: 2023    Visit Dx: No diagnosis found.  Patient Active Problem List   Diagnosis    COPD with acute exacerbation    Cardiac arrest    Acute respiratory failure with hypoxia    Pneumothorax on right    Paroxysmal atrial fibrillation    Acute bronchitis due to Rhinovirus    Hypertension    Acute left cerebellar infarct    Closed fracture of one rib of right side     History reviewed. No pertinent past medical history.  History reviewed. No pertinent surgical history.   General Information       Row Name 23          Physical Therapy Time and Intention    Document Type therapy note (daily note)  -     Mode of Treatment physical therapy  -       Row Name 23 143          General Information    Existing Precautions/Restrictions fall  -     Barriers to Rehab medically complex;previous functional deficit  -       Row Name 23          Safety Issues, Functional Mobility    Impairments Affecting Function (Mobility) balance;endurance/activity tolerance;strength  -     Comment, Safety Issues/Impairments (Mobility) gt belt and non skid socks donned; much more alert today  -               User Key  (r) = Recorded By, (t) = Taken By, (c) = Cosigned By      Initials Name Provider Type    PH Sherri Whitten PTA Physical Therapist Assistant                   Mobility       Row Name 23          Bed Mobility    Bed Mobility supine-sit  -PH     Supine-Sit [place] (Bed Mobility) supervision;verbal cues  -     Comment, (Bed Mobility) pt pulled on this PTAs hand to sit up to EOB; pt SBA for sit bal at EOB; difficult to understand  -       Row Name 23          Transfers    Comment, (Transfers) pt impulsively attempted to stand 3x although sat down when directed to do so.  -       Row Name 23          Sit-Stand  Transfer    Sit-Stand [place] (Transfers) minimum assist (75% patient effort);verbal cues;1 person assist;1 person to manage equipment  -PH     Assistive Device (Sit-Stand Transfers) walker, front-wheeled  -PH       Row Name 09/26/23 1431          Gait/Stairs (Locomotion)    [place] Level (Gait) contact guard;verbal cues;1 person assist;1 person to manage equipment  -PH     Assistive Device (Gait) walker, front-wheeled  -PH     Distance in Feet (Gait) 12' around bed to chair  -PH     Deviations/Abnormal Patterns (Gait) sumeet decreased;gait speed decreased;stride length decreased  -PH     Bilateral Gait Deviations forward flexed posture  -PH     [place] Level (Stairs) unable to assess  -PH     Comment, (Gait/Stairs) tremors noted in B UE; SOA noted and in mid 90's after gait; slow and mildly unsteady w/ no overt LOB  -PH               User Key  (r) = Recorded By, (t) = Taken By, (c) = Cosigned By      Initials Name Provider Type     Sherri Whitten PTA Physical Therapist Assistant                   Obj/Interventions       Row Name 09/26/23 1433          Motor Skills    Therapeutic Exercise other (see comments)  BAP, LAQ, seated march, shldr flexion, punches; x 15 -20 reps  -PH       Row Name 09/26/23 1433          Balance    Balance Assessment sitting static balance;standing static balance  -PH     Static Sitting Balance standby assist  -PH     Static Standing Balance contact guard  -PH     Position/Device Used, Standing Balance walker, front-wheeled  -PH               User Key  (r) = Recorded By, (t) = Taken By, (c) = Cosigned By      Initials Name Provider Type     Sherri Whitten PTA Physical Therapist Assistant                   Goals/Plan    No documentation.                  Clinical Impression       Row Name 09/26/23 1434          Pain    Pretreatment Pain Rating 0/10 - no pain  -PH     Posttreatment Pain Rating 0/10 - no pain  -PH     Additional Documentation Pain Scale:  Numbers Pre/Post-Treatment (Group)  -PH       Row Name 09/26/23 1434          Plan of Care Review    Plan of Care Reviewed With patient  -PH     Progress improving  -PH     Outcome Evaluation Pt seen by PT this PM for tx. Pt sat up to EOB req SV w/ pt pulling on this PTAs hand. Pt attempted to stand impulsively although sat down when directed. Pt stood w/ min A and use of fww. Pt amb around bed to chair req CGA and use of fww. SOA noted w/ sats in mid 90's after gait. Pt performed B LE/B UE ther ex for strengthening and to incr endurance. PT will prog as pt enrrique.  -PH       Row Name 09/26/23 1434          Vital Signs    O2 Delivery Pre Treatment room air  -PH     Intra SpO2 (%) 95  -PH     O2 Delivery Intra Treatment room air  -PH     O2 Delivery Post Treatment room air  -PH       Row Name 09/26/23 1434          Positioning and Restraints    Pre-Treatment Position in bed  -PH     Post Treatment Position chair  -PH     In Chair reclined;call light within reach;encouraged to call for assist;exit alarm on;notified nsg  -PH               User Key  (r) = Recorded By, (t) = Taken By, (c) = Cosigned By      Initials Name Provider Type    PH Sherri Whitten, PTA Physical Therapist Assistant                   Outcome Measures       Row Name 09/26/23 1436          How much help from another person do you currently need...    Turning from your back to your side while in flat bed without using bedrails? 3  -PH     Moving from lying on back to sitting on the side of a flat bed without bedrails? 3  -PH     Moving to and from a bed to a chair (including a wheelchair)? 3  -PH     Standing up from a chair using your arms (e.g., wheelchair, bedside chair)? 3  -PH     Climbing 3-5 steps with a railing? 2  -PH     To walk in hospital room? 3  -PH     AM-PAC 6 Clicks Score (PT) 17  -PH     Highest level of mobility 5 --> Static standing  -PH       Row Name 09/26/23 1436          Functional Assessment    Outcome Measure Options  AM-PAC 6 Clicks Basic Mobility (PT)  -PH               User Key  (r) = Recorded By, (t) = Taken By, (c) = Cosigned By      Initials Name Provider Type    Sherri Garcia PTA Physical Therapist Assistant                                 Physical Therapy Education       Title: PT OT SLP Therapies (Done)       Topic: Physical Therapy (Done)       Point: Mobility training (Done)       Learning Progress Summary             Patient Acceptance, E,TB,D, VU,NR by  at 9/26/2023 1436    Acceptance, E,TB,D, VU,NR by  at 9/25/2023 1426    Acceptance, E, VU,NR by KT at 9/22/2023 1232    Acceptance, E,TB, VU,NR by PH at 9/21/2023 1039    Acceptance, E,TB,D, VU,NR by  at 9/20/2023 1009    Acceptance, E,TB,D, VU,NR by  at 9/19/2023 1647    Acceptance, E, VU,NR by KT at 9/19/2023 1533    Acceptance, E,TB,D, VU,NR by  at 9/18/2023 1328    Acceptance, E, VU,NR by KT at 9/18/2023 1028    Acceptance, E,TB, VU,NR by  at 9/17/2023 1438    Acceptance, E,TB,D, VU,NR by  at 9/13/2023 1138    Acceptance, E, VU,NR by KT at 9/12/2023 1156    Acceptance, E,TB,D, VU,NR by  at 9/12/2023 0955                         Point: Home exercise program (Done)       Learning Progress Summary             Patient Acceptance, E,TB,D, VU,NR by  at 9/26/2023 1436    Acceptance, E,TB,D, VU,NR by  at 9/25/2023 1426    Acceptance, E, VU,NR by KT at 9/22/2023 1232    Acceptance, E,TB,D, VU,NR by  at 9/20/2023 1009    Acceptance, E,TB,D, VU,NR by  at 9/19/2023 1647    Acceptance, E, VU,NR by KT at 9/19/2023 1533    Acceptance, E, VU,NR by KT at 9/18/2023 1028    Acceptance, E,TB, VU,NR by CB at 9/17/2023 1438    Acceptance, E,TB,D, VU,NR by PH at 9/13/2023 1138    Acceptance, E, VU,NR by KT at 9/12/2023 1156                         Point: Body mechanics (Done)       Learning Progress Summary             Patient Acceptance, E,TB,D, VU,NR by PH at 9/26/2023 1436    Acceptance, E,TB,D, VU,NR by CH at 9/25/2023 1426    Acceptance, E,  VU,NR by KT at 9/22/2023 1232    Acceptance, E,TB, VU,NR by PH at 9/21/2023 1039    Acceptance, E,TB,D, VU,NR by PH at 9/20/2023 1009    Acceptance, E,TB,D, VU,NR by  at 9/19/2023 1647    Acceptance, E, VU,NR by KT at 9/19/2023 1533    Acceptance, E,TB,D, VU,NR by CH at 9/18/2023 1328    Acceptance, E, VU,NR by KT at 9/18/2023 1028    Acceptance, E,TB,D, VU,NR by PH at 9/13/2023 1138    Acceptance, E, VU,NR by KT at 9/12/2023 1156    Acceptance, E,TB,D, VU,NR by  at 9/12/2023 0955                         Point: Precautions (Done)       Learning Progress Summary             Patient Acceptance, E,TB,D, VU,NR by PH at 9/26/2023 1436    Acceptance, E,TB,D, VU,NR by  at 9/25/2023 1426    Acceptance, E, VU,NR by KT at 9/22/2023 1232    Acceptance, E,TB, VU,NR by PH at 9/21/2023 1039    Acceptance, E,TB,D, VU,NR by  at 9/20/2023 1009    Acceptance, E,TB,D, VU,NR by  at 9/19/2023 1647    Acceptance, E, VU,NR by KT at 9/19/2023 1533    Acceptance, E,TB,D, VU,NR by  at 9/18/2023 1328    Acceptance, E, VU,NR by KT at 9/18/2023 1028    Acceptance, E,TB,D, VU,NR by  at 9/13/2023 1138    Acceptance, E, VU,NR by KT at 9/12/2023 1156    Acceptance, E,TB,D, VU,NR by  at 9/12/2023 0955                                         User Key       Initials Effective Dates Name Provider Type Discipline     06/16/21 -  Meg Fitzgerald PT Physical Therapist PT    KT 06/16/21 -  Chel De Anda RN Registered Nurse Nurse    PH 06/16/21 -  Sherri Whitten PTA Physical Therapist Assistant PT    CB 10/22/21 -  Mary Reyes PT Physical Therapist PT                  PT Recommendation and Plan     Plan of Care Reviewed With: patient  Progress: improving  Outcome Evaluation: Pt seen by PT this PM for tx. Pt sat up to EOB req SV w/ pt pulling on this PTAs hand. Pt attempted to stand impulsively although sat down when directed. Pt stood w/ min A and use of fww. Pt amb around bed to chair req CGA and use of fww. SOA  noted w/ sats in mid 90's after gait. Pt performed B LE/B UE ther ex for strengthening and to incr endurance. PT will prog as pt enrrique.     Time Calculation:         PT Charges       Row Name 09/26/23 1437             Time Calculation    Start Time 1410  -PH      Stop Time 1429  -PH      Time Calculation (min) 19 min  -PH      PT Received On 09/26/23  -PH      PT - Next Appointment 09/27/23  -PH         Timed Charges    64898 - PT Therapeutic Exercise Minutes 6  -PH      94671 - PT Therapeutic Activity Minutes 13  -PH         Total Minutes    Timed Charges Total Minutes 19  -PH       Total Minutes 19  -PH                User Key  (r) = Recorded By, (t) = Taken By, (c) = Cosigned By      Initials Name Provider Type    Sherri Garcia PTA Physical Therapist Assistant                  Therapy Charges for Today       Code Description Service Date Service Provider Modifiers Qty    79327246791  PT THERAPEUTIC ACT EA 15 MIN 9/26/2023 Sherri Whitten PTA GP 1    42895733687 HC PT THER SUPP EA 15 MIN 9/26/2023 Sherri Whitten PTA GP 1            PT G-Codes  Outcome Measure Options: AM-PAC 6 Clicks Basic Mobility (PT)  AM-PAC 6 Clicks Score (PT): 17  AM-PAC 6 Clicks Score (OT): 12  Modified [place] Scale: 3 - Moderate disability.  Requiring some help, but able to walk without assistance.  PT Discharge Summary  Anticipated Discharge Disposition (PT): skilled nursing facility    Sherri Whitten PTA  9/26/2023

## 2023-09-26 NOTE — PLAN OF CARE
Goal Outcome Evaluation:  Plan of Care Reviewed With: patient        Progress: improving  Outcome Evaluation: VSS, room air, patient awake spontaneously in between care, more active tonight than past 2 nights - trying to get out of bed, conversing frequently.  Pleasant, requests to go home. NPO until speech can reevaluate. IVF per order. Able to get up with assistx2 to BSC, fatigued after. Palliative to see and discuss goals of care with family.

## 2023-09-26 NOTE — PLAN OF CARE
Goal Outcome Evaluation:  Plan of Care Reviewed With: patient        Progress: improving  Outcome Evaluation: sinus rhythm, room air, A/Ox3, disoriented to situation along with forgetful at times. Speech re-evaluated, soft puree diet, meds crushed in puree ordered. IV Solumedrol given per MD order. IVF running per MD order.

## 2023-09-26 NOTE — THERAPY RE-EVALUATION
Acute Care - Speech Language Pathology   Swallow Initial Evaluation [facility]     Patient Name: Riky Rios  : 1935  MRN: [id number]  Today's Date: 2023               Admit Date: 2023    Visit Dx:   No diagnosis found.  Patient Active Problem List   Diagnosis    COPD with acute exacerbation    Cardiac arrest    Acute respiratory failure with hypoxia    Pneumothorax on right    Paroxysmal atrial fibrillation    Acute bronchitis due to Rhinovirus    Hypertension    Acute left cerebellar infarct    Closed fracture of one rib of right side     History reviewed. No pertinent past medical history.  History reviewed. No pertinent surgical history.    SLP Recommendation and Plan     SLP Diet Recommendation: puree, thin liquids (23)  Recommended Precautions and Strategies: upright posture during/after eating, small bites of food and sips of liquid, no straw (23)  SLP Rec. for Method of Medication Administration: with puree (23)     Monitor for Signs of Aspiration: notify SLP if any concerns (23)  Recommended Diagnostics: reassess via clinical swallow evaluation (23)     Anticipated Discharge Disposition (SLP): unknown (23)     Therapy Frequency (Swallow): PRN (23)  Predicted Duration Therapy Intervention (Days): until discharge (23)  Oral Care Recommendations: Oral Care BID/PRN (23)        Daily Summary of Progress (SLP): progress toward functional goals is good (23)               Treatment Assessment (SLP): mild, oral dysphagia (23)     Plan for Continued Treatment (SLP): continue treatment per plan of care (23)       Oral Care Recommendations: Oral Care BID/PRN (23)           SWALLOW EVALUATION (last 72 hours)       SLP Adult Swallow Evaluation       Row Name 23                   Rehab Evaluation    Document Type re-evaluation  -CB        Subjective  Information no complaints  -CB        Patient Observations alert;cooperative;agree to therapy  -CB        Patient Effort adequate  -CB           General Information    Patient Profile Reviewed yes  -CB        Current Method of Nutrition NPO  -CB        Precautions/Limitations, Vision WFL;for purposes of eval  -CB        Precautions/Limitations, Hearing WFL;for purposes of eval  -CB        Prior Level of Function-Communication WFL  -CB        Prior Level of Function-Swallowing no diet consistency restrictions  -CB        Plans/Goals Discussed with patient;agreed upon  -CB        Barriers to Rehab medically complex  -CB        Patient's Goals for Discharge patient did not state  -CB           Clinical Swallow Eval    Clinical Swallow Evaluation Summary Reevaluation completed, patient with VFSS on 9/20 indicating puree/thin.  Pt with reduced alertness over last couple of days and made NPO.  Pt easily awakened this date and repositioned in bed.  Able to follow commands and answer questions appropriately with some dysarthria noted.  Trial of thin from cup and puree, self fed, with no overt s/s aspiration noted and improved vocal quality w/hydration.  Pt appropriate to return to puree/thin diet, meds crushed w/puree, upright for PO, feed only when alert, assist as needed.  ST to follow for diet tolerance.  -CB           SLP Treatment Clinical Impressions    Treatment Assessment (SLP) mild;oral dysphagia  -CB        Daily Summary of Progress (SLP) progress toward functional goals is good  -CB        Plan for Continued Treatment (SLP) continue treatment per plan of care  -CB           Recommendations    Therapy Frequency (Swallow) PRN  -CB        Predicted Duration Therapy Intervention (Days) until discharge  -CB        SLP Diet Recommendation puree;thin liquids  -CB        Recommended Diagnostics reassess via clinical swallow evaluation  -CB        Recommended Precautions and Strategies upright posture during/after  eating;small bites of food and sips of liquid;no straw  -CB        Oral Care Recommendations Oral Care BID/PRN  -CB        SLP Rec. for Method of Medication Administration with puree  -CB        Monitor for Signs of Aspiration notify SLP if any concerns  -CB        Anticipated Discharge Disposition (SLP) unknown  -CB           (LTG) Patient will demonstrate functional swallow for    Diet Texture (Demonstrate functional swallow) soft to chew (chopped) textures  -CB        Liquid viscosity (Demonstrate functional swallow) thin liquids  -CB        [place] (Demonstrate functional swallow) independently (over 90% accuracy)  -CB        Time Frame (Demonstrate functional swallow) by discharge  -CB        Progress/Outcomes (Demonstrate functional swallow) continuing progress toward goal  -CB                  User Key  (r) = Recorded By, (t) = Taken By, (c) = Cosigned By      Initials Name Effective Dates    Priti Alan CCC-SLP 06/01/23 -                     EDUCATION  The patient has been educated in the following areas:   Dysphagia (Swallowing Impairment).        SLP GOALS       Row Name 09/26/23 1015             (LTG) Patient will demonstrate functional swallow for    Diet Texture (Demonstrate functional swallow) soft to chew (chopped) textures  -CB      Liquid viscosity (Demonstrate functional swallow) thin liquids  -CB      [place] (Demonstrate functional swallow) independently (over 90% accuracy)  -CB      Time Frame (Demonstrate functional swallow) by discharge  -CB      Progress/Outcomes (Demonstrate functional swallow) continuing progress toward goal  -CB                User Key  (r) = Recorded By, (t) = Taken By, (c) = Cosigned By      Initials Name Provider Type    Priti Alan CCC-SLP Speech and Language Pathologist                       Time Calculation:    Time Calculation- SLP       Row Name 09/26/23 1056             Time Calculation- SLP    SLP Start Time 1015  -CB      SLP Received On  09/26/23  -CB         Untimed Charges    42522-BM Treatment Swallow Minutes 30  -CB         Total Minutes    Untimed Charges Total Minutes 30  -CB       Total Minutes 30  -CB                User Key  (r) = Recorded By, (t) = Taken By, (c) = Cosigned By      Initials Name Provider Type    Priti Alan CCC-SLP Speech and Language Pathologist                    Therapy Charges for Today       Code Description Service Date Service Provider Modifiers Qty    [id number]  ST EVAL ORAL PHARYNG SWALLOW 2 9/26/2023 Priti Bennett CCC-SLP GN 1                 JUAN Fraser  9/26/2023

## 2023-09-26 NOTE — PLAN OF CARE
Goal Outcome Evaluation:  Plan of Care Reviewed With: patient        Progress: improving  Outcome Evaluation: Pt seen by PT this PM for tx. Pt sat up to EOB req SV w/ pt pulling on this PTAs hand. Pt attempted to stand impulsively although sat down when directed. Pt stood w/ min A and use of fww. Pt amb around bed to chair req CGA and use of fww. SOA noted w/ sats in mid 90's after gait. Pt performed B LE/B UE ther ex for strengthening and to incr endurance. PT will prog as pt enrrique.      Anticipated Discharge Disposition (PT): skilled nursing facility

## 2023-09-26 NOTE — PROGRESS NOTES
Name: Riky Rios ADMIT: 2023   : 1935  PCP: Provider, No Known    MRN: [id number] LOS: 17 days   AGE/SEX: 87 y.o. male  ROOM: [facility]/     Subjective   Subjective   Sleeping but again awakens easily.  He actually does look a little better today than he has the last few.  Still mumbles most of his responses and not really intelligible.  He seems to be without complaint.       Objective   Objective   Vital Signs  Temp:  [97.8 °F (36.6 °C)-99.2 °F (37.3 °C)] 97.8 °F (36.6 °C)  Heart Rate:  [] 94  Resp:  [18-20] 20  BP: (114-156)/(60-92) 151/92  SpO2:  [91 %-97 %] 96 %  on  Flow (L/min):  [1] 1;   Device (Oxygen Therapy): room air  Body mass index is 20.37 kg/m².  Physical Exam  Vitals and nursing note reviewed.   Constitutional:       Appearance: He is ill-appearing.   HENT:      Mouth/Throat:      Mouth: Mucous membranes are dry.   Cardiovascular:      Rate and Rhythm: Normal rate and regular rhythm.   Pulmonary:      Effort: Pulmonary effort is normal.      Breath sounds: Decreased air movement present. Rhonchi present.   Abdominal:      General: Bowel sounds are normal.      Palpations: Abdomen is soft.      Tenderness: There is no abdominal tenderness.   Musculoskeletal:         General: No swelling.   Skin:     General: Skin is warm and dry.   Neurological:      Mental Status: He is lethargic.   Psychiatric:         Cognition and Memory: Cognition is impaired.     Results Review     I reviewed the patient's new clinical results.  Results from last 7 days   Lab Units 23  0557 23  0507 23  0532 23  0612   WBC 10*3/mm3 12.27* 16.94* 16.02* 16.66*   HEMOGLOBIN g/dL 9.3* 9.2* 9.8* 9.1*   PLATELETS 10*3/mm3 233 206 205 190     Results from last 7 days   Lab Units 23  0557 23  0507 23  0532 23  0612   SODIUM mmol/L 137 138 148* 152*   POTASSIUM mmol/L 4.3 3.6 4.1 4.5   CHLORIDE mmol/L 105 107 113* 119*   CO2 mmol/L 19.4* 22.4 23.0 21.0*   BUN mg/dL 15  14 21 31*   CREATININE mg/dL 0.83 0.80 0.97 1.08   GLUCOSE mg/dL 157* 160* 122* 99   EGFR mL/min/1.73 85.2 86.2 76.0 66.8     Results from last 7 days   Lab Units 09/25/23  0507 09/24/23  0532 09/23/23  0612 09/22/23  0503   ALBUMIN g/dL 2.6* 2.9* 3.0* 3.0*   BILIRUBIN mg/dL 0.7 0.6 0.5 0.5   ALK PHOS U/L 104 121* 130* 140*   AST (SGOT) U/L 22 29 45* 75*   ALT (SGPT) U/L 67* 99* 144* 189*     Results from last 7 days   Lab Units 09/26/23  0557 09/25/23  0507 09/24/23  0532 09/23/23  0612 09/22/23  0503 09/21/23  0729   CALCIUM mg/dL 8.2* 8.2* 8.8 8.9 8.4* 9.0   ALBUMIN g/dL  --  2.6* 2.9* 3.0* 3.0* 3.0*   MAGNESIUM mg/dL  --   --   --   --   --  1.9   PHOSPHORUS mg/dL  --   --   --   --   --  3.1     Results from last 7 days   Lab Units 09/24/23  0532 09/23/23  0612 09/21/23  0729 09/20/23  0549   PROCALCITONIN ng/mL 0.17 0.20 0.24 0.24     No results found for: HGBA1C, POCGLU      CT Head Without Contrast    Result Date: 9/25/2023  1. No convincing new abnormality when compared to the prior head CT and MRI of the brain from [facility] on 09/13/2023.  2. Patient has moderate small vessel disease in the cerebral white matter and an 8 mm old lacunar infarct in the mid right corona radiata region. There is a moderate size area of encephalomalacia in the lateral right temporal lobe which is likely the sequela of an old lateral right temporal lobe infarct in the right MCA territory that measures 6 x 2.3 x 2.5 cm in anterior posterior, medial lateral and cranial caudal dimension.  3. Patient has a 2 x 1.1 cm old posterior inferior left cerebellar infarct and an additional 1.5 x 1 cm old inferior lateral left cerebellar infarct and these 2 old infarcts are in the left PICA territory. Furthermore there is a 2.1 x 1.4 cm subacute infarct in the posterolateral left cerebellum and this infarct occurred back on September 13, 2023.  4. There is moderate bilateral ethmoid and mild bilateral maxillary and left  sphenoid sinus mucosal thickening there are multiple tiny polypoid opacities in the nasal cavities consistent with multiple nasal polyps and correlation with direct visualization of the nasal cavities is suggested.  Radiation dose reduction techniques were utilized, including automated exposure control and exposure modulation based on body size.   This report was finalized on 9/25/2023 4:18 PM by Dr. Raul Mccollum M.D.       I have personally reviewed all medications:  Scheduled Medications  allopurinol, 300 mg, Oral, Daily  amLODIPine, 10 mg, Oral, Q24H  apixaban, 2.5 mg, Oral, Q12H  arformoterol, 15 mcg, Nebulization, BID - RT  aspirin, 81 mg, Oral, Daily  atorvastatin, 40 mg, Oral, Daily  budesonide, 0.5 mg, Nebulization, Daily - RT  guaiFENesin, 600 mg, Oral, BID  lidocaine, 1 patch, Transdermal, Q24H  methylPREDNISolone sodium succinate, 40 mg, Intravenous, Q8H  senna-docusate sodium, 2 tablet, Oral, BID  tamsulosin, 0.8 mg, Oral, Daily  tiotropium bromide monohydrate, 2 puff, Inhalation, Daily - RT    Infusions  dextrose 5 % and sodium chloride 0.45 % with KCl 20 mEq/L, 75 mL/hr, Last Rate: 75 mL/hr (09/26/23 0445)    Diet  NPO Diet NPO Type: Strict NPO    I have personally reviewed:  [x]  Laboratory   [x]  Microbiology   [x]  Radiology   [x]  EKG/Telemetry  [x]  Cardiology/Vascular   []  Pathology    []  Records       Assessment/Plan     Active Hospital Problems    Diagnosis  POA    **COPD with acute exacerbation [J44.1]  Yes    Acute bronchitis due to Rhinovirus [J20.6]  Yes    Hypertension [I10]  Yes    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       87 y.o. male with COPD with acute  exacerbation.    As per my note yesterday work-up for any other acute medical problems was essentially unremarkable.  Head CT nonacute.  Chest x-ray actually shows some improvement.  Urinalysis negative.  PCO2 was normal.  He seems better today with the only explanation could be because I changed his prednisone to IV Solu-Medrol.  It seemed to be when we took him off IV steroids and back on his home dose prednisone that he deteriorated.  This is despite having a normal a.m. cortisol 9/24.  Still have doubts as to whether he can pass a swallow study but I think for now we continue steroids and IV fluids and have speech therapy reevaluate.  Will discuss with family.  Still may be appropriate for palliative care.     Neurology signed off with recommendations for full anticoagulation and statin therapy at discharge.  While not 100% certain he had atrial fibrillation cardiology also agrees with anticoagulating..  Cleared to be on Eliquis by thoracic surgery which was started 9/20.      Continue high-dose statin as written-on aspirin 81 mg and statin 40 mg daily prior to arrival.  LFTs normalizing  Outpatient ENT follow due to abnormalities noted on MRI above for direct visualization of suspected polyp.  Follow up neurology 3 months.  Full code.  Discussed with patient and nursing staff.  Anticipate discharge to SNU facility timing yet to be determined..      Celso Schmitz MD  [facility]  09/26/23  09:19 EDT

## 2023-09-26 NOTE — CASE MANAGEMENT/SOCIAL WORK
Continued Stay Note  [facility]     Patient Name: Riky Rios  MRN: [id number]  Today's Date: 9/26/2023    Admit Date: 9/9/2023    Plan: SNF   Discharge Plan       Row Name 09/26/23 1217       Plan    Plan SNF    Patient/Family in Agreement with Plan yes    Plan Comments CCP continues to follow for plan.  Signature Desean continues to follow.  Will check for bed availability when pt medically ready.  IVAN Rios RN                   Discharge Codes    No documentation.                 Expected Discharge Date and Time       Expected Discharge Date Expected Discharge Time    Sep 27, 2023               Modesta Rios, RN

## 2023-09-27 PROCEDURE — 25010000002 METHYLPREDNISOLONE PER 40 MG: Performed by: HOSPITALIST

## 2023-09-27 PROCEDURE — 94760 N-INVAS EAR/PLS OXIMETRY 1: CPT

## 2023-09-27 PROCEDURE — 94664 DEMO&/EVAL PT USE INHALER: CPT

## 2023-09-27 PROCEDURE — 94799 UNLISTED PULMONARY SVC/PX: CPT

## 2023-09-27 PROCEDURE — 97530 THERAPEUTIC ACTIVITIES: CPT

## 2023-09-27 PROCEDURE — 94761 N-INVAS EAR/PLS OXIMETRY MLT: CPT

## 2023-09-27 PROCEDURE — 97110 THERAPEUTIC EXERCISES: CPT

## 2023-09-27 RX ORDER — ACETAMINOPHEN 325 MG/1
650 TABLET ORAL EVERY 6 HOURS PRN
Status: DISCONTINUED | OUTPATIENT
Start: 2023-09-27 | End: 2023-09-29 | Stop reason: HOSPADM

## 2023-09-27 RX ADMIN — ARFORMOTEROL TARTRATE 15 MCG: 15 SOLUTION RESPIRATORY (INHALATION) at 07:43

## 2023-09-27 RX ADMIN — ALLOPURINOL 300 MG: 300 TABLET ORAL at 08:15

## 2023-09-27 RX ADMIN — METHYLPREDNISOLONE SODIUM SUCCINATE 40 MG: 40 INJECTION INTRAMUSCULAR; INTRAVENOUS at 00:30

## 2023-09-27 RX ADMIN — AMLODIPINE BESYLATE 10 MG: 10 TABLET ORAL at 08:14

## 2023-09-27 RX ADMIN — METHYLPREDNISOLONE SODIUM SUCCINATE 40 MG: 40 INJECTION INTRAMUSCULAR; INTRAVENOUS at 16:40

## 2023-09-27 RX ADMIN — GUAIFENESIN 600 MG: 600 TABLET, EXTENDED RELEASE ORAL at 21:09

## 2023-09-27 RX ADMIN — LIDOCAINE 1 PATCH: 50 PATCH CUTANEOUS at 08:15

## 2023-09-27 RX ADMIN — POTASSIUM CHLORIDE, DEXTROSE MONOHYDRATE AND SODIUM CHLORIDE 75 ML/HR: 150; 5; 450 INJECTION, SOLUTION INTRAVENOUS at 06:18

## 2023-09-27 RX ADMIN — APIXABAN 2.5 MG: 2.5 TABLET, FILM COATED ORAL at 08:16

## 2023-09-27 RX ADMIN — POTASSIUM CHLORIDE, DEXTROSE MONOHYDRATE AND SODIUM CHLORIDE 75 ML/HR: 150; 5; 450 INJECTION, SOLUTION INTRAVENOUS at 17:54

## 2023-09-27 RX ADMIN — BUDESONIDE 0.5 MG: 0.5 INHALANT ORAL at 07:45

## 2023-09-27 RX ADMIN — METHYLPREDNISOLONE SODIUM SUCCINATE 40 MG: 40 INJECTION INTRAMUSCULAR; INTRAVENOUS at 08:16

## 2023-09-27 RX ADMIN — POTASSIUM CHLORIDE, DEXTROSE MONOHYDRATE AND SODIUM CHLORIDE 75 ML/HR: 150; 5; 450 INJECTION, SOLUTION INTRAVENOUS at 23:29

## 2023-09-27 RX ADMIN — SENNOSIDES AND DOCUSATE SODIUM 2 TABLET: 50; 8.6 TABLET ORAL at 08:15

## 2023-09-27 RX ADMIN — ASPIRIN 81 MG: 81 TABLET, CHEWABLE ORAL at 08:15

## 2023-09-27 RX ADMIN — APIXABAN 2.5 MG: 2.5 TABLET, FILM COATED ORAL at 21:09

## 2023-09-27 RX ADMIN — TAMSULOSIN HYDROCHLORIDE 0.8 MG: 0.4 CAPSULE ORAL at 08:14

## 2023-09-27 RX ADMIN — ATORVASTATIN CALCIUM 40 MG: 20 TABLET, FILM COATED ORAL at 10:11

## 2023-09-27 RX ADMIN — SENNOSIDES AND DOCUSATE SODIUM 2 TABLET: 50; 8.6 TABLET ORAL at 21:09

## 2023-09-27 RX ADMIN — GUAIFENESIN 600 MG: 600 TABLET, EXTENDED RELEASE ORAL at 08:16

## 2023-09-27 RX ADMIN — ARFORMOTEROL TARTRATE 15 MCG: 15 SOLUTION RESPIRATORY (INHALATION) at 20:20

## 2023-09-27 NOTE — PROGRESS NOTES
" LOS: 18 days     Name: Riky Rios  Age: 87 y.o.  Sex: male  :  1935  MRN: [id number]         Primary Care Physician: Provider, No Known    Subjective   Subjective  Mentation appears to be improving.  Nurse states that he has been oriented to person and knows he is in the hospital.  He ate about 50% of his meals which is better than what was reported the past few days.  No acute overnight events noted.    Objective   Vital Signs  Temp:  [97.5 °F (36.4 °C)] 97.5 °F (36.4 °C)  Heart Rate:  [86-92] 88  Resp:  [18-24] 24  BP: (126-159)/(66-85) 159/80  Body mass index is 20.37 kg/m².    Objective:  General Appearance:  Comfortable, in no acute distress and ill-appearing (Elderly, frail, weak and deconditioned).    Vital signs: (most recent): Blood pressure 159/80, pulse 88, temperature 97.5 °F (36.4 °C), resp. rate 24, height 172.7 cm (68\"), weight 60.8 kg (134 lb), SpO2 91 %.    Lungs:  Normal effort and normal respiratory rate.    Heart: Normal rate.  Regular rhythm.    Abdomen: Abdomen is soft.  Bowel sounds are normal.   There is no abdominal tenderness.     Extremities: There is no dependent edema or local swelling.    Neurological: Patient is alert.  (Awake and reasonably alert.  His speech is garbled.  He is oriented to person and knows he is in the hospital.).    Skin:  Warm and dry.              Results Review:       I reviewed the patient's new clinical results.    Results from last 7 days   Lab Units 23  0557 23  0507 23  0532 23  0612 23  0503 23  0729   WBC 10*3/mm3 12.27* 16.94* 16.02* 16.66* 17.51* 16.60*   HEMOGLOBIN g/dL 9.3* 9.2* 9.8* 9.1* 10.3* 11.0*   PLATELETS 10*3/mm3 233 206 205 190 190 185     Results from last 7 days   Lab Units 23  0557 23  0507 23  0532 23  0612 23  2115 23  0503 23  0729   SODIUM mmol/L 137 138 148* 152*  --  148* 146*   POTASSIUM mmol/L 4.3 3.6 4.1 4.5 4.2 3.2* 3.8   CHLORIDE mmol/L 105 " 107 113* 119*  --  113* 111*   CO2 mmol/L 19.4* 22.4 23.0 21.0*  --  24.0 23.0   BUN mg/dL 15 14 21 31*  --  36* 35*   CREATININE mg/dL 0.83 0.80 0.97 1.08  --  1.04 1.14   CALCIUM mg/dL 8.2* 8.2* 8.8 8.9  --  8.4* 9.0   GLUCOSE mg/dL 157* 160* 122* 99  --  100* 127*                 Scheduled Meds:   allopurinol, 300 mg, Oral, Daily  amLODIPine, 10 mg, Oral, Q24H  apixaban, 2.5 mg, Oral, Q12H  arformoterol, 15 mcg, Nebulization, BID - RT  aspirin, 81 mg, Oral, Daily  atorvastatin, 40 mg, Oral, Daily  budesonide, 0.5 mg, Nebulization, Daily - RT  guaiFENesin, 600 mg, Oral, BID  lidocaine, 1 patch, Transdermal, Q24H  methylPREDNISolone sodium succinate, 40 mg, Intravenous, Q8H  senna-docusate sodium, 2 tablet, Oral, BID  tamsulosin, 0.8 mg, Oral, Daily  tiotropium bromide monohydrate, 2 puff, Inhalation, Daily - RT      PRN Meds:     [DISCONTINUED] acetaminophen **OR** [DISCONTINUED] acetaminophen **OR** acetaminophen    senna-docusate sodium **AND** polyethylene glycol **AND** bisacodyl **AND** bisacodyl    ipratropium-albuterol    nitroglycerin    OLANZapine    ondansetron **OR** ondansetron    Potassium Replacement - Follow Nurse / BPA Driven Protocol    sodium chloride  Continuous Infusions:  dextrose 5 % and sodium chloride 0.45 % with KCl 20 mEq/L, 75 mL/hr, Last Rate: 75 mL/hr (09/27/23 0618)        Assessment & Plan   Active Hospital Problems    Diagnosis  POA    **COPD with acute exacerbation [J44.1]  Yes    Acute bronchitis due to Rhinovirus [J20.6]  Yes    Hypertension [I10]  Yes    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       Assessment & Plan    -His encephalopathy appears to be improving  after he was switched back to IV Solu-Medrol.  We will continue this 1 more day and then consider switching to prednisone tomorrow with very slow taper thereafter.  -Continue supportive care with IV fluids.  -COPD appears stable with no evidence of acute exacerbation at this time.  -He has passed a swallow evaluation and has eaten 50% of his meals today.  -Continue high-dose statin as written-on aspirin 81 mg and statin 40 mg daily prior to arrival.  LFTs normalizing  -Outpatient ENT follow due to abnormalities noted on MRI above for direct visualization of suspected polyp.  -Follow up neurology 3 months.  -Full code.  -Discussed with patient and nursing staff.  -Anticipate discharge to SNU facility timing yet to be determined..      Expected Discharge Date: 9/27/2023; Expected Discharge Time:      Raul Rueda MD  [facility]  09/27/23  13:13 EDT

## 2023-09-27 NOTE — PLAN OF CARE
Problem: Adult Inpatient Plan of Care  Goal: Plan of Care Review  Outcome: Ongoing, Progressing  Flowsheets (Taken 9/27/2023 1552)  Progress: no change  Plan of Care Reviewed With: patient  Outcome Evaluation: vss. on room air. aaox4. Pt able to state his name, birthday, and the current year. during am assessment he stated he is in the hospital but was not able to identify which one. follow directions. ivf infusing. d up in chair for most of shift.   Goal Outcome Evaluation:  Plan of Care Reviewed With: patient        Progress: no change  Outcome Evaluation: vss. on room air. aaox4. Pt able to state his name, birthday, and the current year. during am assessment he stated he is in the hospital but was not able to identify which one. follow directions. ivf infusing. d up in chair for most of shift.

## 2023-09-27 NOTE — DISCHARGE PLACEMENT REQUEST
"Riky Bauer (87 y.o. Male)       Date of Birth   1935    Social Security Number       Address   [address] [place] [address]  [facility] [address]    Home Phone   [phone]    MRN   [id number]       Muslim   None    Marital Status                               Admission Date   9/9/23    Admission Type   Urgent    Admitting Provider   Raul Rueda MD    Attending Provider   Raul Rueda MD    Department, Room/Bed   [address], [address]561/1       Discharge Date       Discharge Disposition       Discharge Destination                                 Attending Provider: Raul Rueda MD    Allergies: Rocephin [Ceftriaxone], Iodinated Contrast Media    Isolation: Droplet, Contact   Infection: Rhinovirus  (09/10/23), MRSA (09/20/23)   Code Status: CPR    Ht: 172.7 cm (68\")   Wt: 60.8 kg (134 lb)    Admission Cmt: None   Principal Problem: COPD with acute exacerbation [J44.1]                   Active Insurance as of 9/9/2023       Primary Coverage       Payor Plan Insurance Group Employer/Plan Group    HUMANA MEDICARE REPLACEMENT HUMANA MEDICARE REPLACEMENT [id number]       Payor Plan Address Payor Plan Phone Number Payor Plan Fax Number Effective Dates    [address] [address] [phone]  1/1/2018 - None Entered    [facility] [id number]         Subscriber Name Subscriber Birth Date Member ID       RIKY BAUER 1935 [id number]                     Emergency Contacts        (Rel.) Home Phone Work Phone Mobile Phone    Viviana Bauer (Daughter) -- -- [phone]    JUANCARLOSTEQUILA (Son) [phone] -- --              Emergency Contact Information       Name Relation Home Work Mobile    Viviana Bauer Daughter   [phone]    TEQUILA BAUER Son [phone]            "

## 2023-09-27 NOTE — PLAN OF CARE
Goal Outcome Evaluation:     VSS. Disoriented to place and situation. Very garbled speech. Swallowing pills crushed in applesauce well. Follows commands.       Progress: no change

## 2023-09-27 NOTE — PLAN OF CARE
Goal Outcome Evaluation:  Plan of Care Reviewed With: patient        Progress: improving  Outcome Evaluation: Pt seen by PT this AM for tx. Pt confused during session and required more assist - mod / max A -  after cues were provided for initiation and sequencing to sit up to EOB. Pt attempted to lay back down but remained seated when instructed. Pt stood w/ min A and use of fww. Unsteadiness and posterior lean noted w/ pt correcting after cues provided. Pt amb improved distance of 40' req CGA to min A w/ use of fww. Pt performed ther ex from chair and was UIC at end of session. PT will prog as pt enrrique.      Anticipated Discharge Disposition (PT): skilled nursing facility

## 2023-09-27 NOTE — THERAPY TREATMENT NOTE
Patient Name: Riky Rios  : 1935    MRN: [id number]                              Today's Date: 2023       Admit Date: 2023    Visit Dx: No diagnosis found.  Patient Active Problem List   Diagnosis    COPD with acute exacerbation    Cardiac arrest    Acute respiratory failure with hypoxia    Pneumothorax on right    Paroxysmal atrial fibrillation    Acute bronchitis due to Rhinovirus    Hypertension    Acute left cerebellar infarct    Closed fracture of one rib of right side     History reviewed. No pertinent past medical history.  History reviewed. No pertinent surgical history.   General Information       Row Name 23          Physical Therapy Time and Intention    Document Type therapy note (daily note)  -     Mode of Treatment physical therapy  -PH       Row Name 23          General Information    Existing Precautions/Restrictions fall  -     Barriers to Rehab medically complex;previous functional deficit  -       Row Name 23          Cognition    Orientation Status (Cognition) oriented to;person  -       Row Name 23          Safety Issues, Functional Mobility    Impairments Affecting Function (Mobility) balance;endurance/activity tolerance;strength  -     Comment, Safety Issues/Impairments (Mobility) gt belt and non skid socks donned; speech garbled/difficult to understand  -               User Key  (r) = Recorded By, (t) = Taken By, (c) = Cosigned By      Initials Name Provider Type     Sherri Whitten PTA Physical Therapist Assistant                   Mobility       Row Name 23          Bed Mobility    Bed Mobility supine-sit  -PH     Supine-Sit [place] (Bed Mobility) moderate assist (50% patient effort);verbal cues;nonverbal cues (demo/gesture);maximum assist (25% patient effort)  -     Comment, (Bed Mobility) pt confused and although wanting to get OOB began laying back down; Pt required significantly more  assist today after cues for initiation and sequencing.  -PH       Row Name 09/27/23 0935          Sit-Stand Transfer    Sit-Stand [place] (Transfers) minimum assist (75% patient effort);verbal cues;nonverbal cues (demo/gesture)  -PH     Assistive Device (Sit-Stand Transfers) walker, front-wheeled  -PH     Comment, (Sit-Stand Transfer) mild posterior lean w/ unsteadiness - cues to correct;  -PH       Row Name 09/27/23 0935          Gait/Stairs (Locomotion)    [place] Level (Gait) contact guard;minimum assist (75% patient effort);verbal cues  -PH     Assistive Device (Gait) walker, front-wheeled  -PH     Distance in Feet (Gait) 40'  -PH     Deviations/Abnormal Patterns (Gait) sumeet decreased;festinating/shuffling;stride length decreased  -PH     Bilateral Gait Deviations heel strike decreased;forward flexed posture  -PH     [place] Level (Stairs) unable to assess  -PH     Comment, (Gait/Stairs) pt slow and mildly unsteady w/ no overt LOB; fatigue limited distance although distance improved today  -PH               User Key  (r) = Recorded By, (t) = Taken By, (c) = Cosigned By      Initials Name Provider Type     Sherri Whitten PTA Physical Therapist Assistant                   Obj/Interventions       Row Name 09/27/23 0937          Motor Skills    Therapeutic Exercise other (see comments)  BAP, LAQ, seated march, shldr flexion, punches; x 15 reps each  -PH       Row Name 09/27/23 0937          Balance    Balance Assessment sitting static balance;standing static balance  -PH     Static Sitting Balance standby assist  -PH     Static Standing Balance contact guard;minimal assist  -PH     Position/Device Used, Standing Balance walker, front-wheeled  -PH               User Key  (r) = Recorded By, (t) = Taken By, (c) = Cosigned By      Initials Name Provider Type     Sherri Whitten PTA Physical Therapist Assistant                   Goals/Plan    No documentation.                   Clinical Impression       Row Name 09/27/23 0938          Pain    Pre/Posttreatment Pain Comment Asked about pt w/ pt very difficult to understand today  -PH     Pain Intervention(s) Repositioned;Ambulation/increased activity;Rest  -PH       Row Name 09/27/23 0938          Plan of Care Review    Plan of Care Reviewed With patient  -PH     Outcome Evaluation Pt seen by PT this AM for tx. Pt confused during session and required more assist - mod / max A -  after cues were provided for initiation and sequencing to sit up to EOB. Pt attempted to lay back down but remained seated when instructed. Pt stood w/ min A and use of fww. Unsteadiness and posterior lean noted w/ pt correcting after cues provided. Pt amb improved distance of 40' req CGA to min A w/ use of fww. Pt performed ther ex from chair and was UIC at end of session. PT will prog as pt enrrique.  -PH       Row Name 09/27/23 0938          Vital Signs    O2 Delivery Pre Treatment room air  -PH     O2 Delivery Intra Treatment room air  -PH     O2 Delivery Post Treatment room air  -PH       Row Name 09/27/23 0938          Positioning and Restraints    Pre-Treatment Position in bed  -PH     Post Treatment Position chair  -PH     In Chair reclined;call light within reach;encouraged to call for assist;exit alarm on;notified ns  -PH               User Key  (r) = Recorded By, (t) = Taken By, (c) = Cosigned By      Initials Name Provider Type    PH Sherri Whitten PTA Physical Therapist Assistant                   Outcome Measures       Row Name 09/27/23 0941          How much help from another person do you currently need...    Turning from your back to your side while in flat bed without using bedrails? 3  -PH     Moving from lying on back to sitting on the side of a flat bed without bedrails? 3  -PH     Moving to and from a bed to a chair (including a wheelchair)? 3  -PH     Standing up from a chair using your arms (e.g., wheelchair, bedside chair)? 3  -PH      Climbing 3-5 steps with a railing? 2  -PH     To walk in hospital room? 3  -PH     AM-PAC 6 Clicks Score (PT) 17  -PH     Highest level of mobility 5 --> Static standing  -PH       Row Name 09/27/23 0941          Functional Assessment    Outcome Measure Options AM-PAC 6 Clicks Basic Mobility (PT)  -PH               User Key  (r) = Recorded By, (t) = Taken By, (c) = Cosigned By      Initials Name Provider Type    PH Sherri Whitten PTA Physical Therapist Assistant                                 Physical Therapy Education       Title: PT OT SLP Therapies (Done)       Topic: Physical Therapy (Done)       Point: Mobility training (Done)       Learning Progress Summary             Patient Acceptance, E,TB,D, VU,NR by  at 9/27/2023 0942    Acceptance, E,TB,D, VU,NR by  at 9/26/2023 1436    Acceptance, E,TB,D, VU,NR by  at 9/25/2023 1426    Acceptance, E, VU,NR by KT at 9/22/2023 1232    Acceptance, E,TB, VU,NR by  at 9/21/2023 1039    Acceptance, E,TB,D, VU,NR by  at 9/20/2023 1009    Acceptance, E,TB,D, VU,NR by  at 9/19/2023 1647    Acceptance, E, VU,NR by KT at 9/19/2023 1533    Acceptance, E,TB,D, VU,NR by  at 9/18/2023 1328    Acceptance, E, VU,NR by KT at 9/18/2023 1028    Acceptance, E,TB, VU,NR by  at 9/17/2023 1438    Acceptance, E,TB,D, VU,NR by PH at 9/13/2023 1138    Acceptance, E, VU,NR by KT at 9/12/2023 1156    Acceptance, E,TB,D, VU,NR by  at 9/12/2023 0955                         Point: Home exercise program (Done)       Learning Progress Summary             Patient Acceptance, E,TB,D, VU,NR by  at 9/27/2023 0942    Acceptance, E,TB,D, VU,NR by PH at 9/26/2023 1436    Acceptance, E,TB,D, VU,NR by  at 9/25/2023 1426    Acceptance, E, VU,NR by KT at 9/22/2023 1232    Acceptance, E,TB,D, VU,NR by PH at 9/20/2023 1009    Acceptance, E,TB,D, VU,NR by CH at 9/19/2023 1647    Acceptance, E, VU,NR by KT at 9/19/2023 1533    Acceptance, E, VU,NR by KT at 9/18/2023 1028    Acceptance,  E,TB, VU,NR by CB at 9/17/2023 1438    Acceptance, E,TB,D, VU,NR by PH at 9/13/2023 1138    Acceptance, E, VU,NR by KT at 9/12/2023 1156                         Point: Body mechanics (Done)       Learning Progress Summary             Patient Acceptance, E,TB,D, VU,NR by PH at 9/27/2023 0942    Acceptance, E,TB,D, VU,NR by PH at 9/26/2023 1436    Acceptance, E,TB,D, VU,NR by CH at 9/25/2023 1426    Acceptance, E, VU,NR by KT at 9/22/2023 1232    Acceptance, E,TB, VU,NR by PH at 9/21/2023 1039    Acceptance, E,TB,D, VU,NR by PH at 9/20/2023 1009    Acceptance, E,TB,D, VU,NR by CH at 9/19/2023 1647    Acceptance, E, VU,NR by KT at 9/19/2023 1533    Acceptance, E,TB,D, VU,NR by CH at 9/18/2023 1328    Acceptance, E, VU,NR by KT at 9/18/2023 1028    Acceptance, E,TB,D, VU,NR by PH at 9/13/2023 1138    Acceptance, E, VU,NR by KT at 9/12/2023 1156    Acceptance, E,TB,D, VU,NR by CH at 9/12/2023 0955                         Point: Precautions (Done)       Learning Progress Summary             Patient Acceptance, E,TB,D, VU,NR by PH at 9/27/2023 0942    Acceptance, E,TB,D, VU,NR by PH at 9/26/2023 1436    Acceptance, E,TB,D, VU,NR by CH at 9/25/2023 1426    Acceptance, E, VU,NR by KT at 9/22/2023 1232    Acceptance, E,TB, VU,NR by PH at 9/21/2023 1039    Acceptance, E,TB,D, VU,NR by PH at 9/20/2023 1009    Acceptance, E,TB,D, VU,NR by CH at 9/19/2023 1647    Acceptance, E, VU,NR by KT at 9/19/2023 1533    Acceptance, E,TB,D, VU,NR by  at 9/18/2023 1328    Acceptance, E, VU,NR by  at 9/18/2023 1028    Acceptance, E,TB,D, VU,NR by  at 9/13/2023 1138    Acceptance, E, VU,NR by  at 9/12/2023 1156    Acceptance, E,TB,D, VU,NR by  at 9/12/2023 0955                                         User Key       Initials Effective Dates Name Provider Type Discipline     06/16/21 -  Meg Fitzgerald, PT Physical Therapist PT     06/16/21 -  Chel De Anda, RN Registered Nurse Nurse     06/16/21 -  Maria Dolores  MEGAN Polanco Physical Therapist Assistant PT    CB 10/22/21 -  Mary Reyes PT Physical Therapist PT                  PT Recommendation and Plan     Plan of Care Reviewed With: patient  Progress: improving  Outcome Evaluation: Pt seen by PT this AM for tx. Pt confused during session and required more assist - mod / max A -  after cues were provided for initiation and sequencing to sit up to EOB. Pt attempted to lay back down but remained seated when instructed. Pt stood w/ min A and use of fww. Unsteadiness and posterior lean noted w/ pt correcting after cues provided. Pt amb improved distance of 40' req CGA to min A w/ use of fww. Pt performed ther ex from chair and was UIC at end of session. PT will prog as pt enrrique.     Time Calculation:         PT Charges       Row Name 09/27/23 0942             Time Calculation    Start Time 0810  -PH      Stop Time 0839  -PH      Time Calculation (min) 29 min  -PH      PT Received On 09/27/23  -PH      PT - Next Appointment 09/28/23  -PH         Timed Charges    70850 - PT Therapeutic Exercise Minutes 8  -PH      19255 - PT Therapeutic Activity Minutes 21  -PH         Total Minutes    Timed Charges Total Minutes 29  -PH       Total Minutes 29  -PH                User Key  (r) = Recorded By, (t) = Taken By, (c) = Cosigned By      Initials Name Provider Type    PH Sherri Whitten PTA Physical Therapist Assistant                  Therapy Charges for Today       Code Description Service Date Service Provider Modifiers Qty    38866324668 HC PT THERAPEUTIC ACT EA 15 MIN 9/26/2023 Sherri Whitten, PTA GP 1    12878680375 HC PT THER SUPP EA 15 MIN 9/26/2023 Sherri Whitten, PTA GP 1    30858053200 HC PT THER PROC EA 15 MIN 9/27/2023 HucsuyapadublerCandidaSherri, PTA GP 1    07141582133 HC PT THERAPEUTIC ACT EA 15 MIN 9/27/2023 Sherri Whitten, PTA GP 1            PT G-Codes  Outcome Measure Options: AM-PAC 6 Clicks Basic Mobility (PT)  AM-PAC 6 Clicks Score  (PT): 17  AM-PAC 6 Clicks Score (OT): 12  Modified Cleves Scale: 3 - Moderate disability.  Requiring some help, but able to walk without assistance.  PT Discharge Summary  Anticipated Discharge Disposition (PT): skilled nursing facility    Sherri Whitten, PTA  9/27/2023

## 2023-09-27 NOTE — SIGNIFICANT NOTE
09/27/23 1544   OTHER   Discipline occupational therapist   Rehab Time/Intention   Session Not Performed patient/family declined, not feeling well  (Pt asleep and difficult to arouse this PM. Family states he has been sleepy today. Will follow up tomorrow)   Recommendation   OT - Next Appointment 09/28/23

## 2023-09-28 LAB
ANION GAP SERPL CALCULATED.3IONS-SCNC: 8.3 MMOL/L (ref 5–15)
BUN SERPL-MCNC: 25 MG/DL (ref 8–23)
BUN/CREAT SERPL: 27.5 (ref 7–25)
CALCIUM SPEC-SCNC: 8.5 MG/DL (ref 8.6–10.5)
CHLORIDE SERPL-SCNC: 112 MMOL/L (ref 98–107)
CO2 SERPL-SCNC: 19.7 MMOL/L (ref 22–29)
CREAT SERPL-MCNC: 0.91 MG/DL (ref 0.76–1.27)
DEPRECATED RDW RBC AUTO: 42.5 FL (ref 37–54)
EGFRCR SERPLBLD CKD-EPI 2021: 81.6 ML/MIN/1.73
ERYTHROCYTE [DISTWIDTH] IN BLOOD BY AUTOMATED COUNT: 11.8 % (ref 12.3–15.4)
GLUCOSE SERPL-MCNC: 193 MG/DL (ref 65–99)
HCT VFR BLD AUTO: 22.2 % (ref 37.5–51)
HGB BLD-MCNC: 7.6 G/DL (ref 13–17.7)
MCH RBC QN AUTO: 34.4 PG (ref 26.6–33)
MCHC RBC AUTO-ENTMCNC: 34.2 G/DL (ref 31.5–35.7)
MCV RBC AUTO: 100.5 FL (ref 79–97)
PLATELET # BLD AUTO: 279 10*3/MM3 (ref 140–450)
PMV BLD AUTO: 10.3 FL (ref 6–12)
POTASSIUM SERPL-SCNC: 4.3 MMOL/L (ref 3.5–5.2)
RBC # BLD AUTO: 2.21 10*6/MM3 (ref 4.14–5.8)
SODIUM SERPL-SCNC: 140 MMOL/L (ref 136–145)
WBC NRBC COR # BLD: 16.22 10*3/MM3 (ref 3.4–10.8)

## 2023-09-28 PROCEDURE — 94664 DEMO&/EVAL PT USE INHALER: CPT

## 2023-09-28 PROCEDURE — 63710000001 PREDNISONE PER 5 MG: Performed by: INTERNAL MEDICINE

## 2023-09-28 PROCEDURE — 97530 THERAPEUTIC ACTIVITIES: CPT

## 2023-09-28 PROCEDURE — 92526 ORAL FUNCTION THERAPY: CPT

## 2023-09-28 PROCEDURE — 94799 UNLISTED PULMONARY SVC/PX: CPT

## 2023-09-28 PROCEDURE — 80048 BASIC METABOLIC PNL TOTAL CA: CPT | Performed by: INTERNAL MEDICINE

## 2023-09-28 PROCEDURE — 25010000002 METHYLPREDNISOLONE PER 40 MG: Performed by: HOSPITALIST

## 2023-09-28 PROCEDURE — 85027 COMPLETE CBC AUTOMATED: CPT | Performed by: INTERNAL MEDICINE

## 2023-09-28 PROCEDURE — 94760 N-INVAS EAR/PLS OXIMETRY 1: CPT

## 2023-09-28 PROCEDURE — 94761 N-INVAS EAR/PLS OXIMETRY MLT: CPT

## 2023-09-28 RX ORDER — PANTOPRAZOLE SODIUM 40 MG/1
40 TABLET, DELAYED RELEASE ORAL
Status: DISCONTINUED | OUTPATIENT
Start: 2023-09-29 | End: 2023-09-29 | Stop reason: HOSPADM

## 2023-09-28 RX ORDER — PREDNISONE 10 MG/1
10 TABLET ORAL
Status: DISCONTINUED | OUTPATIENT
Start: 2023-09-28 | End: 2023-09-29 | Stop reason: HOSPADM

## 2023-09-28 RX ADMIN — SENNOSIDES AND DOCUSATE SODIUM 2 TABLET: 50; 8.6 TABLET ORAL at 21:20

## 2023-09-28 RX ADMIN — ARFORMOTEROL TARTRATE 15 MCG: 15 SOLUTION RESPIRATORY (INHALATION) at 19:29

## 2023-09-28 RX ADMIN — AMLODIPINE BESYLATE 10 MG: 10 TABLET ORAL at 12:07

## 2023-09-28 RX ADMIN — PREDNISONE 10 MG: 10 TABLET ORAL at 16:25

## 2023-09-28 RX ADMIN — ARFORMOTEROL TARTRATE 15 MCG: 15 SOLUTION RESPIRATORY (INHALATION) at 07:13

## 2023-09-28 RX ADMIN — METHYLPREDNISOLONE SODIUM SUCCINATE 40 MG: 40 INJECTION INTRAMUSCULAR; INTRAVENOUS at 00:07

## 2023-09-28 RX ADMIN — ALLOPURINOL 300 MG: 300 TABLET ORAL at 12:07

## 2023-09-28 RX ADMIN — APIXABAN 2.5 MG: 2.5 TABLET, FILM COATED ORAL at 12:08

## 2023-09-28 RX ADMIN — GUAIFENESIN 600 MG: 600 TABLET, EXTENDED RELEASE ORAL at 21:20

## 2023-09-28 RX ADMIN — BUDESONIDE 0.5 MG: 0.5 INHALANT ORAL at 07:12

## 2023-09-28 RX ADMIN — ATORVASTATIN CALCIUM 40 MG: 20 TABLET, FILM COATED ORAL at 12:07

## 2023-09-28 RX ADMIN — ASPIRIN 81 MG: 81 TABLET, CHEWABLE ORAL at 12:07

## 2023-09-28 RX ADMIN — LIDOCAINE 1 PATCH: 50 PATCH CUTANEOUS at 08:28

## 2023-09-28 RX ADMIN — GUAIFENESIN 600 MG: 600 TABLET, EXTENDED RELEASE ORAL at 12:07

## 2023-09-28 RX ADMIN — METHYLPREDNISOLONE SODIUM SUCCINATE 40 MG: 40 INJECTION INTRAMUSCULAR; INTRAVENOUS at 08:28

## 2023-09-28 RX ADMIN — APIXABAN 2.5 MG: 2.5 TABLET, FILM COATED ORAL at 21:20

## 2023-09-28 RX ADMIN — TAMSULOSIN HYDROCHLORIDE 0.8 MG: 0.4 CAPSULE ORAL at 12:07

## 2023-09-28 NOTE — PLAN OF CARE
Goal Outcome Evaluation:  Plan of Care Reviewed With: patient, daughter, sibling           Outcome Evaluation: Swallow reeval completed. Pt continues with hoarse vocal quality. Pt tolerated cup sips of thin and pureed. Mastication was good for soft solids. Mild oral residue noted with regular solids. Reviewed VFSS results with pt and family. Recommend soft ground no mixed with thin; meds crushed in pureed; NO STRAWS; upright for meals and 30 min after; slow rate; small bites/sips. ST to follow.

## 2023-09-28 NOTE — PLAN OF CARE
Goal Outcome Evaluation:      VSS, NSR w/ intermittent Afib. RA. AxO x4 at times, disoriented to place and situation at times. Pureed diet, meds crushed in applesauce. Meplex on coccyx. Ax2 to BSC, Ax1 w/ providing urinal. D5 1/2 NS 20 K+ @ 75 mL/hr. L arm skin tear dressing CDI, vaseline gauze, ace wrap. Abdomen scab dressing CDI.

## 2023-09-28 NOTE — SIGNIFICANT NOTE
09/28/23 1640   Post Acute Pre-Cert Documentation   Request Submitted by Facility - Type: Hospital   Post-Acute Authorization Type Submitted: SNF   Date Post Acute Pre-Cert Inititated per Facility 09/28/23   Verification from Payer Yes   Date Post Acute Pre-Cert Completed 09/28/23   Accepting Facility [facility]   Hospital Discharge Date Requested 09/29/23   All Clinicals Submitted? Yes   Had Accepting Facility at Time of Submission Yes   Response Received from Insurance? Approval   Response Communicated to: ;Accepting Facility Liaison;Accepting Facility Auth Department   Authorization Number: [id number]   Post Acute Pre-Cert Initiated Comment Viktoriya WEBER

## 2023-09-28 NOTE — CASE MANAGEMENT/SOCIAL WORK
Continued Stay Note  [facility]     Patient Name: Riky Rios  MRN: [id number]  Today's Date: 9/28/2023    Admit Date: 9/9/2023    Plan: [facility] pending precert   Discharge Plan       Row Name 09/28/23 1331       Plan    Plan [facility] pending precert    Patient/Family in Agreement with Plan yes    Plan Comments Pt accepted to [facility] and bed available.  Email sent to  [facility] to start precert.  Await determination from [employer].  Family to transport.  IVAN Rios RN                   Discharge Codes    No documentation.                 Expected Discharge Date and Time       Expected Discharge Date Expected Discharge Time    Sep 27, 2023               Modesta Rios, RN

## 2023-09-28 NOTE — PROGRESS NOTES
" LOS: 19 days     Name: Riky Rios  Age: 88 y.o.  Sex: male  :  1935  MRN: [id number]         Primary Care Physician: Provider, No Known    Subjective   Subjective  Mentation has improved and he is now awake and oriented.  Had some somnolence this morning but this seems resolved at the time of my visit.    Objective   Vital Signs  Temp:  [97.1 °F (36.2 °C)-98 °F (36.7 °C)] 97.1 °F (36.2 °C)  Heart Rate:  [79-92] 79  Resp:  [20] 20  BP: (118-139)/(67-72) 118/67  Body mass index is 20.37 kg/m².    Objective:  General Appearance:  Comfortable, in no acute distress and ill-appearing (Elderly and deconditioned appearing).    Vital signs: (most recent): Blood pressure 118/67, pulse 79, temperature 97.1 °F (36.2 °C), temperature source Oral, resp. rate 20, height 172.7 cm (68\"), weight 60.8 kg (134 lb), SpO2 95 %.    Lungs:  Normal effort and normal respiratory rate.  He is not in respiratory distress.  There are decreased breath sounds.    Heart: Normal rate.  Regular rhythm.    Abdomen: Abdomen is soft.  Bowel sounds are normal.   There is no abdominal tenderness.     Extremities: There is no dependent edema or local swelling.    Neurological: Patient is alert and oriented to person, place and time.    Skin:  Warm and dry.              Results Review:       I reviewed the patient's new clinical results.    Results from last 7 days   Lab Units 23  0526 23  0557 23  0507 23  0532 23  0612 23  0503   WBC 10*3/mm3 16.22* 12.27* 16.94* 16.02* 16.66* 17.51*   HEMOGLOBIN g/dL 7.6* 9.3* 9.2* 9.8* 9.1* 10.3*   PLATELETS 10*3/mm3 279 233 206 205 190 190     Results from last 7 days   Lab Units 23  0526 23  0557 23  0507 23  0532 23  0612 235 23  0503   SODIUM mmol/L 140 137 138 148* 152*  --  148*   POTASSIUM mmol/L 4.3 4.3 3.6 4.1 4.5 4.2 3.2*   CHLORIDE mmol/L 112* 105 107 113* 119*  --  113*   CO2 mmol/L 19.7* 19.4* 22.4 23.0 21.0*  -- "  24.0   BUN mg/dL 25* 15 14 21 31*  --  36*   CREATININE mg/dL 0.91 0.83 0.80 0.97 1.08  --  1.04   CALCIUM mg/dL 8.5* 8.2* 8.2* 8.8 8.9  --  8.4*   GLUCOSE mg/dL 193* 157* 160* 122* 99  --  100*                 Scheduled Meds:   allopurinol, 300 mg, Oral, Daily  amLODIPine, 10 mg, Oral, Q24H  apixaban, 2.5 mg, Oral, Q12H  arformoterol, 15 mcg, Nebulization, BID - RT  aspirin, 81 mg, Oral, Daily  atorvastatin, 40 mg, Oral, Daily  budesonide, 0.5 mg, Nebulization, Daily - RT  guaiFENesin, 600 mg, Oral, BID  lidocaine, 1 patch, Transdermal, Q24H  predniSONE, 10 mg, Oral, Daily With Breakfast  senna-docusate sodium, 2 tablet, Oral, BID  tamsulosin, 0.8 mg, Oral, Daily  tiotropium bromide monohydrate, 2 puff, Inhalation, Daily - RT      PRN Meds:     [DISCONTINUED] acetaminophen **OR** [DISCONTINUED] acetaminophen **OR** acetaminophen    acetaminophen    senna-docusate sodium **AND** polyethylene glycol **AND** bisacodyl **AND** bisacodyl    ipratropium-albuterol    nitroglycerin    OLANZapine    ondansetron **OR** ondansetron    Potassium Replacement - Follow Nurse / BPA Driven Protocol    sodium chloride  Continuous Infusions:  dextrose 5 % and sodium chloride 0.45 % with KCl 20 mEq/L, 75 mL/hr, Last Rate: 75 mL/hr (09/27/23 4320)        Assessment & Plan   Active Hospital Problems    Diagnosis  POA    **COPD with acute exacerbation [J44.1]  Yes    Acute bronchitis due to Rhinovirus [J20.6]  Yes    Hypertension [I10]  Yes    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       Assessment & Plan    -His encephalopathy is much improved.  Will change IV Solu-Medrol back to prednisone 10 mg and conducted very  slow taper.  -Continue supportive care with IV fluids.  I have asked his nurse to discontinue these should he continue to have oral intake similar to yesterday.  -COPD appears stable with no evidence of acute exacerbation at this time.  -Continue high-dose statin as written-on aspirin 81 mg and statin 40 mg daily prior to arrival.  LFTs normalizing  -Outpatient ENT follow due to abnormalities noted on MRI above for direct visualization of suspected polyp.  -Follow up neurology 3 months.  -Full code.  -Discussed with patient and nursing staff.  -Anticipate discharge to SNU facility.  May be ready for discharge tomorrow.      Expected Discharge Date: 9/27/2023; Expected Discharge Time:      Raul Rueda MD  [facility]  09/28/23  13:25 EDT

## 2023-09-28 NOTE — PLAN OF CARE
Problem: Adult Inpatient Plan of Care  Goal: Plan of Care Review  Outcome: Ongoing, Progressing  Flowsheets (Taken 9/28/2023 1519)  Progress: no change  Plan of Care Reviewed With:   patient   family  Outcome Evaluation: vss ex elevated HR when working with PT. pt with increased lethargy from 5055-9212. pt awake and alert for a few hours this shift, able to state that he is in the hospital, he has been here for three weeks, that he has been hospitalized for three weeks. steroids changed to po, plan for rehab at discharge.   Goal Outcome Evaluation:  Plan of Care Reviewed With: patient, family        Progress: no change  Outcome Evaluation: vss ex elevated HR when working with PT. pt with increased lethargy from 8995-6875. pt awake and alert for a few hours this shift, able to state that he is in the hospital, he has been here for three weeks, that he has been hospitalized for three weeks. steroids changed to po, plan for rehab at discharge. Diet updated per speech     Patient awake and alert from 1600 to end of this RN's shift at 1900.

## 2023-09-28 NOTE — THERAPY TREATMENT NOTE
Patient Name: Riky Rios  : 1935    MRN: [id number]                              Today's Date: 2023       Admit Date: 2023    Visit Dx: No diagnosis found.  Patient Active Problem List   Diagnosis    COPD with acute exacerbation    Cardiac arrest    Acute respiratory failure with hypoxia    Pneumothorax on right    Paroxysmal atrial fibrillation    Acute bronchitis due to Rhinovirus    Hypertension    Acute left cerebellar infarct    Closed fracture of one rib of right side     History reviewed. No pertinent past medical history.  History reviewed. No pertinent surgical history.   General Information       Row Name 23 1419          Physical Therapy Time and Intention    Document Type therapy note (daily note)  -PH     Mode of Treatment physical therapy  -PH       Row Name 23 141          General Information    Existing Precautions/Restrictions fall  -PH     Barriers to Rehab medically complex;previous functional deficit  -       Row Name 23 141          Safety Issues, Functional Mobility    Impairments Affecting Function (Mobility) balance;endurance/activity tolerance;strength  -PH               User Key  (r) = Recorded By, (t) = Taken By, (c) = Cosigned By      Initials Name Provider Type     Sherri Whitten PTA Physical Therapist Assistant                   Mobility       Row Name 23 142          Bed Mobility    Bed Mobility supine-sit  -PH     Supine-Sit Sharon (Bed Mobility) supervision;verbal cues;nonverbal cues (demo/gesture)  -PH     Comment, (Bed Mobility) less assist today w/ cues to place B feet on floor  -PH       Row Name 23 142          Sit-Stand Transfer    Sit-Stand Sharon (Transfers) contact guard;minimum assist (75% patient effort);verbal cues  -PH     Assistive Device (Sit-Stand Transfers) walker, front-wheeled  -PH     Comment, (Sit-Stand Transfer) mild posterior lean during and after standing w/ cues to correct  -PH        Row Name 09/28/23 1420          Gait/Stairs (Locomotion)    [place] Level (Gait) minimum assist (75% patient effort);verbal cues;nonverbal cues (demo/gesture)  -PH     Assistive Device (Gait) walker, front-wheeled  -PH     Distance in Feet (Gait) 45'  -PH     Deviations/Abnormal Patterns (Gait) sumeet decreased;gait speed decreased;stride length decreased  -PH     Bilateral Gait Deviations forward flexed posture;heel strike decreased  -PH     [place] Level (Stairs) unable to assess  -PH     Comment, (Gait/Stairs) pt slow and unsteady; req cues to steer around obstacles then assist; fatigue limited distance w/ pt's distance reduced from 9/27. Incr unsteadiness as pt turned to sit in chair w/ no overt LOB; pt's HR in high 90's w/ pt SOA. O2 sats in high 90's  -PH               User Key  (r) = Recorded By, (t) = Taken By, (c) = Cosigned By      Initials Name Provider Type     Sherri Whitten PTA Physical Therapist Assistant                   Obj/Interventions       Row Name 09/28/23 1423          Motor Skills    Therapeutic Exercise other (see comments)  Pt's HR high as he began ther ex and was asked repeated to remain still. Pt continued attempting to perform exercises. Pt then rested approx 30 sec w/ HR noted to be as high as mid 140's. RN notified.  -PH       Row Name 09/28/23 1423          Balance    Balance Assessment sitting static balance  -PH     Static Sitting Balance supervision;verbal cues  -PH     Static Standing Balance minimal assist;verbal cues;non-verbal cues (demo/gesture)  -PH     Position/Device Used, Standing Balance walker, front-wheeled  -PH               User Key  (r) = Recorded By, (t) = Taken By, (c) = Cosigned By      Initials Name Provider Type     Sherri Whitten PTA Physical Therapist Assistant                   Goals/Plan    No documentation.                  Clinical Impression       Row Name 09/28/23 1425          Pain    Pretreatment Pain Rating 0/10  - no pain  -PH     Posttreatment Pain Rating 0/10 - no pain  -PH     Additional Documentation Pain Scale: Numbers Pre/Post-Treatment (Group)  -PH       Row Name 09/28/23 1425          Plan of Care Review    Plan of Care Reviewed With patient;family  -PH     Progress declining  -PH     Outcome Evaluation Pt seen by PT this PM for tx. Pt sat up to EOB w/ SV. Pt then stood w/ min A and use of fww. Unsteadiness and posterior lean noted both as pt performed sit to stand and stood w/ cues for postural correction. Pt then amb 45' w/ pt req cues then assist for steering around obstacles. Pt req mostly min A then mod A as pt neared chair and turned w/ use of fww. SOA noted w/ O2 sats in high 90's and pt's HR in high 90's. Pt attempted to start ther ex w/ pt asked to remain still. Pt finally remained motionless approx 30 sec w/ HR reaching as high as mid 140's then dropped to high 90's. RN notified. PT will prog as pt enrrique.  -PH       Row Name 09/28/23 1425          Vital Signs    O2 Delivery Pre Treatment room air  -PH     O2 Delivery Intra Treatment room air  -PH     O2 Delivery Post Treatment room air  -PH       Row Name 09/28/23 1425          Positioning and Restraints    Pre-Treatment Position in bed  -PH     Post Treatment Position chair  -PH     In Chair reclined;call light within reach;encouraged to call for assist;exit alarm on;with family/caregiver;notified nsg  -PH               User Key  (r) = Recorded By, (t) = Taken By, (c) = Cosigned By      Initials Name Provider Type    PH Sherri Whitten PTA Physical Therapist Assistant                   Outcome Measures       Row Name 09/28/23 1430          How much help from another person do you currently need...    Turning from your back to your side while in flat bed without using bedrails? 3  -PH     Moving from lying on back to sitting on the side of a flat bed without bedrails? 3  -PH     Moving to and from a bed to a chair (including a wheelchair)? 3  -PH      Standing up from a chair using your arms (e.g., wheelchair, bedside chair)? 3  -PH     Climbing 3-5 steps with a railing? 2  -PH     To walk in hospital room? 2  -PH     AM-PAC 6 Clicks Score (PT) 16  -PH     Highest level of mobility 5 --> Static standing  -PH       Row Name 09/28/23 1430          Functional Assessment    Outcome Measure Options AM-PAC 6 Clicks Basic Mobility (PT)  -PH               User Key  (r) = Recorded By, (t) = Taken By, (c) = Cosigned By      Initials Name Provider Type    PH Sherri Whitten PTA Physical Therapist Assistant                                 Physical Therapy Education       Title: PT OT SLP Therapies (Done)       Topic: Physical Therapy (Done)       Point: Mobility training (Done)       Learning Progress Summary             Patient Acceptance, E,TB, VU,NR by  at 9/28/2023 1430    Acceptance, E,TB,D, VU,NR by  at 9/27/2023 0942    Acceptance, E,TB,D, VU,NR by  at 9/26/2023 1436    Acceptance, E,TB,D, VU,NR by  at 9/25/2023 1426    Acceptance, E, VU,NR by KT at 9/22/2023 1232    Acceptance, E,TB, VU,NR by  at 9/21/2023 1039    Acceptance, E,TB,D, VU,NR by  at 9/20/2023 1009    Acceptance, E,TB,D, VU,NR by  at 9/19/2023 1647    Acceptance, E, VU,NR by KT at 9/19/2023 1533    Acceptance, E,TB,D, VU,NR by  at 9/18/2023 1328    Acceptance, E, VU,NR by KT at 9/18/2023 1028    Acceptance, E,TB, VU,NR by  at 9/17/2023 1438    Acceptance, E,TB,D, VU,NR by  at 9/13/2023 1138    Acceptance, E, VU,NR by KT at 9/12/2023 1156    Acceptance, E,TB,D, VU,NR by  at 9/12/2023 0955                         Point: Home exercise program (Done)       Learning Progress Summary             Patient Acceptance, E,TB, VU,NR by  at 9/28/2023 1430    Acceptance, E,TB,D, VU,NR by PH at 9/27/2023 0942    Acceptance, E,TB,D, VU,NR by PH at 9/26/2023 1436    Acceptance, E,TB,D, VU,NR by CH at 9/25/2023 1426    Acceptance, E, VU,NR by KT at 9/22/2023 1232    Acceptance, E,TB,D,  VU,NR by PH at 9/20/2023 1009    Acceptance, E,TB,D, VU,NR by CH at 9/19/2023 1647    Acceptance, E, VU,NR by KT at 9/19/2023 1533    Acceptance, E, VU,NR by KT at 9/18/2023 1028    Acceptance, E,TB, VU,NR by CB at 9/17/2023 1438    Acceptance, E,TB,D, VU,NR by PH at 9/13/2023 1138    Acceptance, E, VU,NR by KT at 9/12/2023 1156                         Point: Body mechanics (Done)       Learning Progress Summary             Patient Acceptance, E,TB, VU,NR by PH at 9/28/2023 1430    Acceptance, E,TB,D, VU,NR by PH at 9/27/2023 0942    Acceptance, E,TB,D, VU,NR by PH at 9/26/2023 1436    Acceptance, E,TB,D, VU,NR by CH at 9/25/2023 1426    Acceptance, E, VU,NR by KT at 9/22/2023 1232    Acceptance, E,TB, VU,NR by PH at 9/21/2023 1039    Acceptance, E,TB,D, VU,NR by PH at 9/20/2023 1009    Acceptance, E,TB,D, VU,NR by CH at 9/19/2023 1647    Acceptance, E, VU,NR by KT at 9/19/2023 1533    Acceptance, E,TB,D, VU,NR by CH at 9/18/2023 1328    Acceptance, E, VU,NR by KT at 9/18/2023 1028    Acceptance, E,TB,D, VU,NR by PH at 9/13/2023 1138    Acceptance, E, VU,NR by KT at 9/12/2023 1156    Acceptance, E,TB,D, VU,NR by CH at 9/12/2023 0955                         Point: Precautions (Done)       Learning Progress Summary             Patient Acceptance, E,TB, VU,NR by PH at 9/28/2023 1430    Acceptance, E,TB,D, VU,NR by PH at 9/27/2023 0942    Acceptance, E,TB,D, VU,NR by PH at 9/26/2023 1436    Acceptance, E,TB,D, VU,NR by CH at 9/25/2023 1426    Acceptance, E, VU,NR by KT at 9/22/2023 1232    Acceptance, E,TB, VU,NR by PH at 9/21/2023 1039    Acceptance, E,TB,D, VU,NR by PH at 9/20/2023 1009    Acceptance, E,TB,D, VU,NR by CH at 9/19/2023 1647    Acceptance, E, VU,NR by KT at 9/19/2023 1533    Acceptance, E,TB,D, VU,NR by CH at 9/18/2023 1328    Acceptance, E, VU,NR by KT at 9/18/2023 1028    Acceptance, E,TB,D, VU,NR by PH at 9/13/2023 1138    Acceptance, E, VU,NR by KT at 9/12/2023 1156    Acceptance, E,TB,D, VU,NR by CH  at 9/12/2023 0955                                         User Key       Initials Effective Dates Name Provider Type Discipline    CH 06/16/21 -  Meg Fitzgerald, PT Physical Therapist PT    KT 06/16/21 -  Chel De Anda, RN Registered Nurse Nurse    PH 06/16/21 -  Sherri Whitten PTA Physical Therapist Assistant PT    CB 10/22/21 -  Mary Reyes, MATA Physical Therapist PT                  PT Recommendation and Plan     Plan of Care Reviewed With: patient, family  Progress: declining  Outcome Evaluation: Pt seen by PT this PM for tx. Pt sat up to EOB w/ SV. Pt then stood w/ min A and use of fww. Unsteadiness and posterior lean noted both as pt performed sit to stand and stood w/ cues for postural correction. Pt then amb 45' w/ pt req cues then assist for steering around obstacles. Pt req mostly min A then mod A as pt neared chair and turned w/ use of fww. SOA noted w/ O2 sats in high 90's and pt's HR in high 90's. Pt attempted to start ther ex w/ pt asked to remain still. Pt finally remained motionless approx 30 sec w/ HR reaching as high as mid 140's then dropped to high 90's. RN notified. PT will prog as pt enrrique.     Time Calculation:         PT Charges       Row Name 09/28/23 1430             Time Calculation    Start Time 1352  -PH      Stop Time 1415  -PH      Time Calculation (min) 23 min  -PH      PT Received On 09/28/23  -PH      PT - Next Appointment 09/29/23  -PH         Timed Charges    16265 - PT Therapeutic Activity Minutes 23  -PH         Total Minutes    Timed Charges Total Minutes 23  -PH       Total Minutes 23  -PH                User Key  (r) = Recorded By, (t) = Taken By, (c) = Cosigned By      Initials Name Provider Type    PH Sherri Whitten PTA Physical Therapist Assistant                  Therapy Charges for Today       Code Description Service Date Service Provider Modifiers Qty    50831973969 HC PT THER PROC EA 15 MIN 9/27/2023 Sherri Whitten PTA GP 1     [id number]  PT THERAPEUTIC ACT EA 15 MIN 9/27/2023 Sherri Whitten, MEGAN GP 1    66756507238 HC PT THERAPEUTIC ACT EA 15 MIN 9/28/2023 Sherri Whitten, MEGAN GP 2            PT G-Codes  Outcome Measure Options: AM-PAC 6 Clicks Basic Mobility (PT)  AM-PAC 6 Clicks Score (PT): 16  AM-PAC 6 Clicks Score (OT): 12  Modified [place] Scale: 3 - Moderate disability.  Requiring some help, but able to walk without assistance.  PT Discharge Summary  Anticipated Discharge Disposition (PT): skilled nursing facility    Sherri Whitten PTA  9/28/2023

## 2023-09-28 NOTE — THERAPY RE-EVALUATION
Acute Care - Speech Language Pathology   Swallow Re-Evaluation [facility]     Patient Name: Riky Rios  : 1935  MRN: [id number]  Today's Date: 2023               Admit Date: 2023    Visit Dx:   No diagnosis found.  Patient Active Problem List   Diagnosis    COPD with acute exacerbation    Cardiac arrest    Acute respiratory failure with hypoxia    Pneumothorax on right    Paroxysmal atrial fibrillation    Acute bronchitis due to Rhinovirus    Hypertension    Acute left cerebellar infarct    Closed fracture of one rib of right side     History reviewed. No pertinent past medical history.  History reviewed. No pertinent surgical history.    SLP Recommendation and Plan  SLP Swallowing Diagnosis: oral dysphagia, suspected pharyngeal dysphagia (23)  SLP Diet Recommendation: soft to chew textures, ground, thin liquids (23)  Recommended Precautions and Strategies: upright posture during/after eating, small bites of food and sips of liquid, no straw, multiple swallows per bite of food, multiple swallows per sip of liquid (23)  SLP Rec. for Method of Medication Administration: meds crushed, with puree (23)     Monitor for Signs of Aspiration: yes, notify SLP if any concerns (23)     Swallow Criteria for Skilled Therapeutic Interventions Met: demonstrates skilled criteria (23)  Anticipated Discharge Disposition (SLP): unknown (23)  Rehab Potential/Prognosis, Swallowing: good, to achieve stated therapy goals (23)  Therapy Frequency (Swallow): PRN (23)  Predicted Duration Therapy Intervention (Days): until discharge (23)  Oral Care Recommendations: Oral Care BID/PRN (23)                                      Oral Care Recommendations: Oral Care BID/PRN (23)    Plan of Care Reviewed With: patient, daughter, sibling  Outcome Evaluation: Swallow reeval completed. Pt continues  with hoarse vocal quality. Pt tolerated cup sips of thin and pureed. Mastication was good for soft solids. Mild oral residue noted with regular solids. Reviewed VFSS results with pt and family. Recommend soft ground no mixed with thin; meds crushed in pureed; NO STRAWS; upright for meals and 30 min after; slow rate; small bites/sips. ST to follow.      SWALLOW EVALUATION (last 72 hours)       SLP Adult Swallow Evaluation       Row Name 09/28/23 1500 09/26/23 1015                Rehab Evaluation    Document Type re-evaluation  -AW re-evaluation  -CB       Subjective Information no complaints  -AW no complaints  -CB       Patient Observations alert;agree to therapy;cooperative  -AW alert;cooperative;agree to therapy  -CB       Patient/Family/Caregiver Comments/Observations Pt's daughter and brother and sister-in-law in room.  -AW --       Patient Effort good  -AW adequate  -CB       Symptoms Noted During/After Treatment none  -AW --          General Information    Patient Profile Reviewed yes  -AW yes  -CB       Pertinent History Of Current Problem Pt admitted with rhinovirus, acute COPD exacerbation, large right pneumothorax status post chest tube. MRI positive for acute stroke.  -AW --       Current Method of Nutrition pureed;thin liquids;other (see comments)  No straws, VFSS 9/20/23  -AW NPO  -CB       Precautions/Limitations, Vision WFL;for purposes of eval  -AW WFL;for purposes of eval  -CB       Precautions/Limitations, Hearing WFL;for purposes of eval  -AW WFL;for purposes of eval  -CB       Prior Level of Function-Communication WFL  -AW WFL  -CB       Prior Level of Function-Swallowing no diet consistency restrictions  -AW no diet consistency restrictions  -CB       Plans/Goals Discussed with patient;family;agreed upon  -AW patient;agreed upon  -CB       Barriers to Rehab medically complex  -AW medically complex  -CB       Patient's Goals for Discharge patient did not state  -AW patient did not state  -CB           Pain    Additional Documentation Pain Scale: Numbers Pre/Post-Treatment (Group)  -AW --          Pain Scale: Numbers Pre/Post-Treatment    Pretreatment Pain Rating 0/10 - no pain  -AW --       Posttreatment Pain Rating 0/10 - no pain  -AW --          Oral Motor Structure and Function    Dentition Assessment natural, present and adequate  -AW --       Mucosal Quality moist, healthy  -AW --          Oral Musculature and Cranial Nerve Assessment    Oral Motor General Assessment WFL  -AW --       Vocal Impairment, Detail. Cranial Nerve X (Vagus) vocal quality abnormality (see comments);other (see comments)  voice hoarse; pt/family reported this has been consistent since hospitalization  -AW --          General Eating/Swallowing Observations    Respiratory Support Currently in Use room air  -AW --       Eating/Swallowing Skills self-fed;fed by SLP  -AW --       Positioning During Eating upright in bed  -AW --       Utensils Used spoon;straw;cup  -AW --       Consistencies Trialed thin liquids;pureed;soft to chew textures;regular textures  -AW --          Clinical Swallow Eval    Clinical Swallow Evaluation Summary Swallow reeval completed. Pt continues with hoarse vocal quality. Pt tolerated cup sips of thin and pureed. Mastication was good for soft solids. Mild oral residue noted with regular solids. Reviewed VFSS results with pt and family. Recommend soft ground no mixed with thin; meds crushed in pureed; NO STRAWS; upright for meals and 30 min after; slow rate; small bites/sips. ST to follow.  -AW Reevaluation completed, patient with VFSS on 9/20 indicating puree/thin.  Pt with reduced alertness over last couple of days and made NPO.  Pt easily awakened this date and repositioned in bed.  Able to follow commands and answer questions appropriately with some dysarthria noted.  Trial of thin from cup and puree, self fed, with no overt s/s aspiration noted and improved vocal quality w/hydration.  Pt appropriate to  return to puree/thin diet, meds crushed w/puree, upright for PO, feed only when alert, assist as needed.  ST to follow for diet tolerance.  -CB          SLP Evaluation Clinical Impression    SLP Swallowing Diagnosis oral dysphagia;suspected pharyngeal dysphagia  -AW --       Functional Impact risk of aspiration/pneumonia  -AW --       Rehab Potential/Prognosis, Swallowing good, to achieve stated therapy goals  -AW --       Swallow Criteria for Skilled Therapeutic Interventions Met demonstrates skilled criteria  -AW --          SLP Treatment Clinical Impressions    Treatment Assessment (SLP) -- mild;oral dysphagia  -CB       Daily Summary of Progress (SLP) -- progress toward functional goals is good  -CB       Plan for Continued Treatment (SLP) -- continue treatment per plan of care  -CB          Recommendations    Therapy Frequency (Swallow) PRN  -AW PRN  -CB       Predicted Duration Therapy Intervention (Days) until discharge  -AW until discharge  -CB       SLP Diet Recommendation soft to chew textures;ground;thin liquids  -AW puree;thin liquids  -CB       Recommended Diagnostics -- reassess via clinical swallow evaluation  -CB       Recommended Precautions and Strategies upright posture during/after eating;small bites of food and sips of liquid;no straw;multiple swallows per bite of food;multiple swallows per sip of liquid  -AW upright posture during/after eating;small bites of food and sips of liquid;no straw  -CB       Oral Care Recommendations Oral Care BID/PRN  -AW Oral Care BID/PRN  -CB       SLP Rec. for Method of Medication Administration meds crushed;with puree  -AW with puree  -CB       Monitor for Signs of Aspiration yes;notify SLP if any concerns  -AW notify SLP if any concerns  -CB       Anticipated Discharge Disposition (SLP) unknown  -AW unknown  -CB          (LTG) Patient will demonstrate functional swallow for    Diet Texture (Demonstrate functional swallow) -- soft to chew (chopped) textures  -CB        Liquid viscosity (Demonstrate functional swallow) -- thin liquids  -CB       [place] (Demonstrate functional swallow) -- independently (over 90% accuracy)  -CB       Time Frame (Demonstrate functional swallow) -- by discharge  -CB       Progress/Outcomes (Demonstrate functional swallow) -- continuing progress toward goal  -CB                 User Key  (r) = Recorded By, (t) = Taken By, (c) = Cosigned By      Initials Name Effective Dates    AW Amanda Hall SLP 08/28/23 -     Priti Alan CCC-SLP 06/01/23 -                     EDUCATION  The patient has been educated in the following areas:   Dysphagia (Swallowing Impairment) Oral Care/Hydration Modified Diet Instruction.        SLP GOALS       Row Name 09/26/23 1015             (LTG) Patient will demonstrate functional swallow for    Diet Texture (Demonstrate functional swallow) soft to chew (chopped) textures  -CB      Liquid viscosity (Demonstrate functional swallow) thin liquids  -CB      [place] (Demonstrate functional swallow) independently (over 90% accuracy)  -CB      Time Frame (Demonstrate functional swallow) by discharge  -CB      Progress/Outcomes (Demonstrate functional swallow) continuing progress toward goal  -CB                User Key  (r) = Recorded By, (t) = Taken By, (c) = Cosigned By      Initials Name Provider Type    Priti Alan CCC-SLP Speech and Language Pathologist                       Time Calculation:    Time Calculation- SLP       Row Name 09/28/23 1628             Time Calculation- SLP    SLP Start Time 1400  -AW      SLP Received On 09/28/23  -                User Key  (r) = Recorded By, (t) = Taken By, (c) = Cosigned By      Initials Name Provider Type     Amanda Hall SLP Speech and Language Pathologist                    Therapy Charges for Today       Code Description Service Date Service Provider Modifiers Qty    40298313806  ST TREATMENT SWALLOW 4 9/28/2023 Amanda Hall, SLP GN 1                 Amanda  Rafael, SLP  9/28/2023

## 2023-09-28 NOTE — PLAN OF CARE
Goal Outcome Evaluation:  Plan of Care Reviewed With: patient, family        Progress: declining  Outcome Evaluation: Pt seen by PT this PM for tx. Pt sat up to EOB w/ SV. Pt then stood w/ min A and use of fww. Unsteadiness and posterior lean noted both as pt performed sit to stand and stood w/ cues for postural correction. Pt then amb 45' w/ pt req cues then assist for steering around obstacles. Pt req mostly min A then mod A as pt neared chair and turned w/ use of fww. SOA noted w/ O2 sats in high 90's and pt's HR in high 90's. Pt attempted to start ther ex w/ pt asked to remain still. Pt finally remained motionless approx 30 sec w/ HR reaching as high as mid 140's then dropped to high 90's. RN notified. PT will prog as pt enrrique.      Anticipated Discharge Disposition (PT): skilled nursing facility

## 2023-09-29 VITALS
WEIGHT: 134 LBS | SYSTOLIC BLOOD PRESSURE: 140 MMHG | RESPIRATION RATE: 18 BRPM | TEMPERATURE: 97.8 F | HEIGHT: 68 IN | BODY MASS INDEX: 20.31 KG/M2 | DIASTOLIC BLOOD PRESSURE: 72 MMHG | OXYGEN SATURATION: 99 % | HEART RATE: 80 BPM

## 2023-09-29 LAB
ANION GAP SERPL CALCULATED.3IONS-SCNC: 4.9 MMOL/L (ref 5–15)
BUN SERPL-MCNC: 25 MG/DL (ref 8–23)
BUN/CREAT SERPL: 28.1 (ref 7–25)
CALCIUM SPEC-SCNC: 8.3 MG/DL (ref 8.6–10.5)
CHLORIDE SERPL-SCNC: 115 MMOL/L (ref 98–107)
CO2 SERPL-SCNC: 21.1 MMOL/L (ref 22–29)
CREAT SERPL-MCNC: 0.89 MG/DL (ref 0.76–1.27)
DEPRECATED RDW RBC AUTO: 44.9 FL (ref 37–54)
EGFRCR SERPLBLD CKD-EPI 2021: 82.9 ML/MIN/1.73
ERYTHROCYTE [DISTWIDTH] IN BLOOD BY AUTOMATED COUNT: 12 % (ref 12.3–15.4)
GLUCOSE SERPL-MCNC: 106 MG/DL (ref 65–99)
HCT VFR BLD AUTO: 23.6 % (ref 37.5–51)
HGB BLD-MCNC: 8 G/DL (ref 13–17.7)
MCH RBC QN AUTO: 35.1 PG (ref 26.6–33)
MCHC RBC AUTO-ENTMCNC: 33.9 G/DL (ref 31.5–35.7)
MCV RBC AUTO: 103.5 FL (ref 79–97)
PLATELET # BLD AUTO: 298 10*3/MM3 (ref 140–450)
PMV BLD AUTO: 10.4 FL (ref 6–12)
POTASSIUM SERPL-SCNC: 4.3 MMOL/L (ref 3.5–5.2)
RBC # BLD AUTO: 2.28 10*6/MM3 (ref 4.14–5.8)
SODIUM SERPL-SCNC: 141 MMOL/L (ref 136–145)
WBC NRBC COR # BLD: 14.79 10*3/MM3 (ref 3.4–10.8)

## 2023-09-29 PROCEDURE — 85027 COMPLETE CBC AUTOMATED: CPT | Performed by: INTERNAL MEDICINE

## 2023-09-29 PROCEDURE — 94761 N-INVAS EAR/PLS OXIMETRY MLT: CPT

## 2023-09-29 PROCEDURE — 94664 DEMO&/EVAL PT USE INHALER: CPT

## 2023-09-29 PROCEDURE — 63710000001 PREDNISONE PER 5 MG: Performed by: INTERNAL MEDICINE

## 2023-09-29 PROCEDURE — 94799 UNLISTED PULMONARY SVC/PX: CPT

## 2023-09-29 PROCEDURE — 80048 BASIC METABOLIC PNL TOTAL CA: CPT | Performed by: INTERNAL MEDICINE

## 2023-09-29 PROCEDURE — 94760 N-INVAS EAR/PLS OXIMETRY 1: CPT

## 2023-09-29 RX ORDER — ARFORMOTEROL TARTRATE 15 UG/2ML
15 SOLUTION RESPIRATORY (INHALATION)
Qty: 120 ML
Start: 2023-09-29

## 2023-09-29 RX ORDER — IPRATROPIUM BROMIDE AND ALBUTEROL SULFATE 2.5; .5 MG/3ML; MG/3ML
3 SOLUTION RESPIRATORY (INHALATION) EVERY 4 HOURS PRN
Qty: 360 ML
Start: 2023-09-29

## 2023-09-29 RX ORDER — BUDESONIDE 0.5 MG/2ML
0.5 INHALANT ORAL
Start: 2023-09-30

## 2023-09-29 RX ORDER — PREDNISONE 10 MG/1
10 TABLET ORAL
Start: 2023-09-30

## 2023-09-29 RX ORDER — AMLODIPINE BESYLATE 10 MG/1
10 TABLET ORAL
Start: 2023-09-30

## 2023-09-29 RX ORDER — PANTOPRAZOLE SODIUM 40 MG/1
40 TABLET, DELAYED RELEASE ORAL
Start: 2023-09-30

## 2023-09-29 RX ORDER — BUDESONIDE AND FORMOTEROL FUMARATE DIHYDRATE 160; 4.5 UG/1; UG/1
2 AEROSOL RESPIRATORY (INHALATION)
Refills: 12
Start: 2023-09-29 | End: 2023-09-29 | Stop reason: HOSPADM

## 2023-09-29 RX ADMIN — ARFORMOTEROL TARTRATE 15 MCG: 15 SOLUTION RESPIRATORY (INHALATION) at 07:36

## 2023-09-29 RX ADMIN — LIDOCAINE 1 PATCH: 50 PATCH CUTANEOUS at 09:33

## 2023-09-29 RX ADMIN — SENNOSIDES AND DOCUSATE SODIUM 2 TABLET: 50; 8.6 TABLET ORAL at 09:34

## 2023-09-29 RX ADMIN — ATORVASTATIN CALCIUM 40 MG: 20 TABLET, FILM COATED ORAL at 09:34

## 2023-09-29 RX ADMIN — ASPIRIN 81 MG: 81 TABLET, CHEWABLE ORAL at 09:34

## 2023-09-29 RX ADMIN — PANTOPRAZOLE SODIUM 40 MG: 40 TABLET, DELAYED RELEASE ORAL at 06:24

## 2023-09-29 RX ADMIN — GUAIFENESIN 600 MG: 600 TABLET, EXTENDED RELEASE ORAL at 09:34

## 2023-09-29 RX ADMIN — PREDNISONE 10 MG: 10 TABLET ORAL at 09:34

## 2023-09-29 RX ADMIN — TAMSULOSIN HYDROCHLORIDE 0.8 MG: 0.4 CAPSULE ORAL at 09:34

## 2023-09-29 RX ADMIN — ALLOPURINOL 300 MG: 300 TABLET ORAL at 09:35

## 2023-09-29 RX ADMIN — AMLODIPINE BESYLATE 10 MG: 10 TABLET ORAL at 09:35

## 2023-09-29 RX ADMIN — BUDESONIDE 0.5 MG: 0.5 INHALANT ORAL at 07:39

## 2023-09-29 RX ADMIN — APIXABAN 2.5 MG: 2.5 TABLET, FILM COATED ORAL at 09:34

## 2023-09-29 NOTE — PLAN OF CARE
Goal Outcome Evaluation:      VSS. RA. Ax2 to BSC, Ax1 to use urinal at bedside. Soft mechanical ground diet now, meds crushed in applesauce. L arm skin tear dressing CDI. Abdomen scab dressing CDI. Plans to go to rehab @ [facility] tomorrow

## 2023-09-29 NOTE — NURSING NOTE
1350 Report given to RN from [facility].     LUE Dressing changed at bedside.     1441 Notified [facility] that son will be providing transportation instead of daughter. Pts son provided with paperwork and instructed to give to rehab upon arrival.

## 2023-09-29 NOTE — DISCHARGE SUMMARY
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       DETAILS OF HOSPITAL STAY     Hospital Course  This is a an 88-year-old male with history of COPD who presented to an outside emergency room with 2-day history of shortness of breath and productive cough.  While in the emergency room prior to transfer he was given a dose of Rocephin and experienced cardiac arrest requiring 1 round of CPR.  He was transferred to this facility and upon immediate evaluation he was found to have a large right-sided pneumothorax on repeat chest x-ray.  Pulmonology was urgently consulted and placed a chest tube at the bedside.  He was found to be positive for rhinovirus and had acute exacerbation of COPD as well.  He is on chronic steroids and has been weaned off of IV Solu-Medrol back to prednisone.  He typically takes 2 mg daily as an outpatient.  He was also followed by thoracic surgery.  His chest tube was eventually removed and his acute exacerbation of COPD has resolved along with the rhinovirus infection.  Acute hypoxic respiratory failure has resolved and he is now back on room air.  He also had possible but not definitive paroxysmal atrial fibrillation.  Cardiology evaluated and checked an echocardiogram but this was unremarkable.  They suspected that his cardiac arrest was either related to allergic reaction from the  Rocephin versus contrast allergy versus the pneumothorax itself.  Patient developed slurred speech while all of this was being addressed and had brain MRI positive for acute stroke as outlined below.  Neurology evaluated.  He was placed on anticoagulation for stroke and possible A-fib and has since been transitioned to Eliquis.  MRI also suggested possibility of nasal polyp and ENT follow-up in the outpatient setting is recommended.  Towards the end of his hospital stay he transiently developed respiratory failure again secondary to mucous plugging.  This was relieved and oxygenation has improved once again.  His prednisone dose was decreased back down to 2 milligrams but he experienced deterioration of his mentation with this.  Etiology was not entirely clear but he was empirically placed back on IV steroids with resolution of his encephalopathy.  I have transitioned him to prednisone 10 mg at this time and he is tolerating this with no additional decompensation of his mental state.  He was evaluated by speech therapy once he became more awake and alert.  He passed a swallow study and diet has been reinstituted.  He is eating about 50% of his meals.  At this point he is medically stable and ready for rehab.  Consulting services have all signed off.  Pulmonology recommends he remain on Spiriva, Pulmicort, and Brovana upon discharge.  With regards to the anticoagulation, would keep this in place for now given that he is going to be in the supervised setting of rehab and he should follow-up with back to stroke neurology in 3 months.  Recommend very slow wean of his prednisone and would suggest decreasing by 1 mg every 5 to 7 days.  At this point he will transition to subacute rehab.       Pertinent Test Results and Procedures Performed    Chest x-ray:  Subtle right rib angulation deformities, acute versus old   fractures.      Large right-sided pneumothorax. Suggestion of subtle mediastinal shift   towards the left.       The left lung is clear. Cardiac size within normal limits. There is   atherosclerotic calcification of the aorta. Mild prominence of the   pulmonary vasculature, question mild congestion.     CT chest:  1. Chest tube within the right pleural cavity. Small persistent right   pneumothorax. Trace right pleural fluid.   2. Small-moderate volume subcutaneous emphysema in the right chest wall.   3. Aspirated secretions throughout the right middle and bilateral lower   lobe bronchi. No current pulmonary parenchymal findings to suggest   pneumonitis/pneumonia. Right middle and right lower lobe segmental   atelectasis.   4. Acute nondisplaced right fifth rib fracture.     Head CT:  There is moderate diffuse atrophy. Severe vascular   calcification is noted. Areas of decreased attenuation are noted   involving the left cerebellar hemisphere posterolaterally consistent   with areas of infarction, age-indeterminate. The infarction may be   remote although an acute to subacute infarct cannot be excluded. The   largest measures approximately 2.4 x 1.0 cm in size. Moderate small   vessel ischemic disease is noted. A lacunar infarct involving the   subcortical white matter of frontal lobe inferolaterally is noted.   Polypoidal masses are appreciated involving the nasal cavity anteriorly   larger on the left. Direct visualization is suggested.      Decreased attenuation is appreciated involving the central white matter   of the right temporal lobe with relative sparing of the cortex. There is   no evidence of volume loss. This may represent an area of gliosis   however an underlying lesion cannot be excluded. Further evaluation with   a MRI examination of the brain with and without contrast is recommended   in order to better assess the right temporal lobe and to exclude acute   infarction. Likely nasal polyps are appreciated bilaterally more   prominent on the left. Direct visualization is suggested.     MRI:  There is a focus  of acute infarction within the posterior aspect of the   left cerebellar hemisphere measuring up to 2.1 x 1.1 cm in greatest   axial dimensions that is within the left PICA distribution. This finding   was discussed with the nurse caring for this patient, Jocelynn Melendrez RN, on 09/13/2023 at approximately 8:45 PM.  She was instructed to   notify the physician caring for this patient with this result.      Additionally, there are findings compatible with chronic infarcts within   the left cerebellar hemisphere within the left PICA distribution and   within the posterior aspects of both occipital lobes within the PCA   distributions.      There are moderate to severe changes of chronic small vessel ischemic   phenomenon and there is a subcentimeter chronic infarct within the right   corona radiata.      There is a focus of encephalomalacia within the anterior and inferior   portion of the right temporal lobe that is of uncertain etiology.      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      There are moderate changes of inflammatory paranasal sinus disease.   Additionally, there are nonspecific nodular foci within the nasal   cavities that are suspicious for polyps. Correlation with direct visual   inspection is suggested.     MRA of the head:  1.  The left cerebellar acute infarct has increased in size by   approximately 2 mm since the study of  09/13/2023. No new infarct is   identified.   2. There is attenuated signal within the left vertebral artery at the   skull base beginning just proximal to the dura and extending   intracranially. Further evaluation with a CT angiogram of the neck and   head is recommended.   3.  Extensive small vessel ischemic disease, remote infarcts involving   the left  cerebellar hemisphere posteriorly in the corona radiata on the   right are noted. Encephalomalacia involving the right temporal lobe   inferiorly is noted. These findings are similar to the prior   examination.       Physical Exam at Discharge:  General: No acute distress, AAOx3, weak and deconditioned appearing.  Elderly and frail  HEENT: EOMI, PERRL  Cardiovascular: +s1 and s2, RRR  Lungs: No rhonchi or wheezing  Abdomen: soft, nontender    Consults:   Consults       Date and Time Order Name Status Description    9/13/2023  8:50 PM Inpatient Neurology Consult Stroke Completed     9/13/2023  1:33 PM Inpatient Thoracic Surgery Consult Completed     9/9/2023 10:44 PM Inpatient Cardiology Consult Completed     9/9/2023 10:44 PM Inpatient Pulmonology Consult Completed               Condition on Discharge: Stable, improved    Discharge Disposition  Skilled Nursing Facility ([place] - External)    Discharge Medications     Discharge Medications        New Medications        Instructions Start Date   amLODIPine 10 MG tablet  Commonly known as: NORVASC   10 mg, Oral, Every 24 Hours Scheduled   Start Date: September 30, 2023     apixaban 2.5 MG tablet tablet  Commonly known as: ELIQUIS   2.5 mg, Oral, Every 12 Hours Scheduled      arformoterol 15 MCG/2ML nebulizer solution  Commonly known as: BROVANA   15 mcg, Nebulization, 2 Times Daily - RT      budesonide 0.5 MG/2ML nebulizer solution  Commonly known as: PULMICORT   0.5 mg, Nebulization, Daily - RT   Start Date: September 30, 2023     ipratropium-albuterol 0.5-2.5 mg/3 ml nebulizer  Commonly known as: DUO-NEB   3 mL, Nebulization, Every 4 Hours PRN      pantoprazole 40 MG EC tablet  Commonly known as: PROTONIX   40 mg, Oral, Every Early Morning   Start Date: September 30, 2023     tiotropium bromide monohydrate 2.5 MCG/ACT aerosol solution inhaler  Commonly known as: SPIRIVA RESPIMAT   2 puffs, Inhalation, Daily - RT   Start Date: September 30, 2023            Changes to  Medications        Instructions Start Date   predniSONE 10 MG tablet  Commonly known as: DELTASONE  What changed:   medication strength  how much to take  when to take this   10 mg, Oral, Daily With Breakfast   Start Date: September 30, 2023            Continue These Medications        Instructions Start Date   allopurinol 300 MG tablet  Commonly known as: ZYLOPRIM   300 mg, Oral, Daily      ascorbic acid 500 MG tablet  Commonly known as: VITAMIN C   500 mg, Oral, Daily      aspirin 81 MG chewable tablet   81 mg, Oral, Daily      atorvastatin 40 MG tablet  Commonly known as: LIPITOR   40 mg, Oral, Daily      tamsulosin 0.4 MG capsule 24 hr capsule  Commonly known as: FLOMAX   2 capsules, Oral, Daily             Stop These Medications      cyproheptadine 4 MG tablet  Commonly known as: PERIACTIN     mirtazapine 15 MG disintegrating tablet  Commonly known as: REMERON SOL-TAB              Discharge Diet:   Diet Instructions       Diet: Regular/House Diet; Mechanical Ground (NDD 2); Thin (IDDSI 0); No Straw, No Mixed Consistencies      Discharge Diet: Regular/House Diet    Texture: Mechanical Ground (NDD 2)    Fluid Consistency: Thin (IDDSI 0)    Diet Modifiers / Additional Diets:  No Straw  No Mixed Consistencies               Activity at Discharge:   Activity Instructions       Activity as Tolerated              Follow-up Appointments  No future appointments.  Additional Instructions for the Follow-ups that You Need to Schedule       Discharge Follow-up with PCP   As directed       Currently Documented PCP:    Jethro, No Known    PCP Phone Number:    [phone]     Follow Up Details: 1 week        Discharge Follow-up with Specialty: [facility] neurology in 3 months   As directed      Specialty: [facility] neurology in 3 months        Discharge Follow-up with Specialty: Outpatient ENT follow due to abnormalities noted on MRI above for direct visualization of suspected polyp   As directed      Specialty: Outpatient ENT  follow due to abnormalities noted on MRI above for direct visualization of suspected polyp              I have examined and discussed discharge planning with the patient today.    I wore full protective equipment throughout the patient encounter including eye protection and facemask.  Hand hygiene was performed before donning protective equipment and after removal when leaving the room.     Raul Rueda MD  09/29/23  11:41 EDT    Time: Discharge greater than 30 min

## 2023-09-29 NOTE — CASE MANAGEMENT/SOCIAL WORK
Case Management Discharge Note      Final Note: dc to SNF    Provided Post Acute Provider List?: Yes  Post Acute Provider List: Home Health  Delivered To: Support Person (Pts son/Brian)    Selected Continued Care - Discharged on 9/29/2023 Admission date: 9/9/2023 - Discharge disposition: Skilled Nursing Facility ([place] - External)      Destination Coordination complete.      Service Provider Selected Services Address Phone Fax Patient Preferred    [facility] [id number] [id number] [phone] --              Durable Medical Equipment    No services have been selected for the patient.                Dialysis/Infusion    No services have been selected for the patient.                Home Medical Care    No services have been selected for the patient.                Therapy    No services have been selected for the patient.                Community Resources    No services have been selected for the patient.                Community & DME    No services have been selected for the patient.                         Final Discharge Disposition Code: 03 - skilled nursing facility (SNF)

## 2023-09-30 ENCOUNTER — HOSPITAL ENCOUNTER (EMERGENCY)
Facility: HOSPITAL | Age: 88
Discharge: HOME | End: 2023-09-30
Attending: EMERGENCY MEDICINE
Payer: MEDICARE

## 2023-09-30 ENCOUNTER — APPOINTMENT (OUTPATIENT)
Dept: GENERAL RADIOLOGY | Facility: HOSPITAL | Age: 88
End: 2023-09-30
Payer: MEDICARE

## 2023-09-30 ENCOUNTER — APPOINTMENT (OUTPATIENT)
Dept: CT IMAGING | Facility: HOSPITAL | Age: 88
End: 2023-09-30
Payer: MEDICARE

## 2023-09-30 VITALS
OXYGEN SATURATION: 94 % | RESPIRATION RATE: 18 BRPM | HEIGHT: 68 IN | DIASTOLIC BLOOD PRESSURE: 89 MMHG | BODY MASS INDEX: 20.31 KG/M2 | HEART RATE: 73 BPM | TEMPERATURE: 98.1 F | SYSTOLIC BLOOD PRESSURE: 164 MMHG | WEIGHT: 134 LBS

## 2023-09-30 DIAGNOSIS — R79.89 ELEVATED TROPONIN: ICD-10-CM

## 2023-09-30 DIAGNOSIS — S09.90XA CLOSED HEAD INJURY, INITIAL ENCOUNTER: Primary | ICD-10-CM

## 2023-09-30 DIAGNOSIS — Z79.01 ANTICOAGULATED: ICD-10-CM
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
## 2023-09-30 LAB
ALBUMIN SERPL-MCNC: 2.8 G/DL (ref 3.5–5.2)
ALBUMIN/GLOB SERPL: 1.2 G/DL
ALP SERPL-CCNC: 129 U/L (ref 39–117)
ALT SERPL W P-5'-P-CCNC: 100 U/L (ref 1–41)
ANION GAP SERPL CALCULATED.3IONS-SCNC: 4.8 MMOL/L (ref 5–15)
AST SERPL-CCNC: 37 U/L (ref 1–40)
BILIRUB SERPL-MCNC: 0.5 MG/DL (ref 0–1.2)
BUN SERPL-MCNC: 25 MG/DL (ref 8–23)
BUN/CREAT SERPL: 27.5 (ref 7–25)
CALCIUM SPEC-SCNC: 8.7 MG/DL (ref 8.6–10.5)
CHLORIDE SERPL-SCNC: 116 MMOL/L (ref 98–107)
CO2 SERPL-SCNC: 26.2 MMOL/L (ref 22–29)
CREAT SERPL-MCNC: 0.91 MG/DL (ref 0.76–1.27)
DEPRECATED RDW RBC AUTO: 46.8 FL (ref 37–54)
EGFRCR SERPLBLD CKD-EPI 2021: 81.6 ML/MIN/1.73
ERYTHROCYTE [DISTWIDTH] IN BLOOD BY AUTOMATED COUNT: 12.5 % (ref 12.3–15.4)
GEN 5 2HR TROPONIN T REFLEX: 82 NG/L
GLOBULIN UR ELPH-MCNC: 2.3 GM/DL
GLUCOSE SERPL-MCNC: 82 MG/DL (ref 65–99)
HCT VFR BLD AUTO: 25.9 % (ref 37.5–51)
HGB BLD-MCNC: 8.8 G/DL (ref 13–17.7)
LYMPHOCYTES # BLD MANUAL: 1.46 10*3/MM3 (ref 0.7–3.1)
LYMPHOCYTES NFR BLD MANUAL: 5 % (ref 5–12)
MCH RBC QN AUTO: 35.3 PG (ref 26.6–33)
MCHC RBC AUTO-ENTMCNC: 34 G/DL (ref 31.5–35.7)
MCV RBC AUTO: 104 FL (ref 79–97)
MONOCYTES # BLD: 0.73 10*3/MM3 (ref 0.1–0.9)
NEUTROPHILS # BLD AUTO: 12.37 10*3/MM3 (ref 1.7–7)
NEUTROPHILS NFR BLD MANUAL: 85 % (ref 42.7–76)
NRBC BLD AUTO-RTO: 0.5 /100 WBC (ref 0–0.2)
PLAT MORPH BLD: NORMAL
PLATELET # BLD AUTO: 360 10*3/MM3 (ref 140–450)
PMV BLD AUTO: 10.4 FL (ref 6–12)
POTASSIUM SERPL-SCNC: 3.9 MMOL/L (ref 3.5–5.2)
PROT SERPL-MCNC: 5.1 G/DL (ref 6–8.5)
RBC # BLD AUTO: 2.49 10*6/MM3 (ref 4.14–5.8)
RBC MORPH BLD: NORMAL
SODIUM SERPL-SCNC: 147 MMOL/L (ref 136–145)
TROPONIN T DELTA: 4 NG/L
TROPONIN T SERPL HS-MCNC: 78 NG/L
VARIANT LYMPHS NFR BLD MANUAL: 10 % (ref 19.6–45.3)
WBC MORPH BLD: NORMAL
WBC NRBC COR # BLD: 14.55 10*3/MM3 (ref 3.4–10.8)

## 2023-09-30 PROCEDURE — 90715 TDAP VACCINE 7 YRS/> IM: CPT | Performed by: EMERGENCY MEDICINE

## 2023-09-30 PROCEDURE — 73080 X-RAY EXAM OF ELBOW: CPT

## 2023-09-30 PROCEDURE — 70450 CT HEAD/BRAIN W/O DYE: CPT

## 2023-09-30 PROCEDURE — 25010000002 TETANUS-DIPHTH-ACELL PERTUSSIS 5-2.5-18.5 LF-MCG/0.5 SUSPENSION PREFILLED SYRINGE: Performed by: EMERGENCY MEDICINE

## 2023-09-30 PROCEDURE — 93005 ELECTROCARDIOGRAM TRACING: CPT | Performed by: NURSE PRACTITIONER

## 2023-09-30 PROCEDURE — 36415 COLL VENOUS BLD VENIPUNCTURE: CPT

## 2023-09-30 PROCEDURE — 99284 EMERGENCY DEPT VISIT MOD MDM: CPT

## 2023-09-30 PROCEDURE — 90471 IMMUNIZATION ADMIN: CPT | Performed by: EMERGENCY MEDICINE

## 2023-09-30 PROCEDURE — 84484 ASSAY OF TROPONIN QUANT: CPT | Performed by: EMERGENCY MEDICINE

## 2023-09-30 PROCEDURE — 85025 COMPLETE CBC W/AUTO DIFF WBC: CPT | Performed by: NURSE PRACTITIONER

## 2023-09-30 PROCEDURE — 71045 X-RAY EXAM CHEST 1 VIEW: CPT

## 2023-09-30 PROCEDURE — 85007 BL SMEAR W/DIFF WBC COUNT: CPT | Performed by: NURSE PRACTITIONER

## 2023-09-30 PROCEDURE — 72125 CT NECK SPINE W/O DYE: CPT

## 2023-09-30 PROCEDURE — 80053 COMPREHEN METABOLIC PANEL: CPT | Performed by: NURSE PRACTITIONER

## 2023-09-30 RX ORDER — HYDROCODONE BITARTRATE AND ACETAMINOPHEN 7.5; 325 MG/1; MG/1
1 TABLET ORAL ONCE
Status: DISCONTINUED | OUTPATIENT
Start: 2023-09-30 | End: 2023-09-30

## 2023-09-30 RX ADMIN — TETANUS TOXOID, REDUCED DIPHTHERIA TOXOID AND ACELLULAR PERTUSSIS VACCINE, ADSORBED 0.5 ML: 5; 2.5; 8; 8; 2.5 SUSPENSION INTRAMUSCULAR at 09:02

## 2023-09-30 NOTE — ED NOTES
"Serene with [facility] states \"He fell tonight trying to get to the bathroom hit the back of his head, he is on eliquis. But my main concern is that he was clutching his chest and he just left your facility yesterday after being admitted for COPD exacerbation and cardiac arrest. He doesn't complain of chest pain now, only a headache and elbow pain.\"   "

## 2023-09-30 NOTE — ED NOTES
Pt here for fall - states needed to go to the bathroom, fell while attempting to get up.  Pt has skin tear to right elbow, and bruise to occipital.  Pt is on eliquis.  Pt has no c/o at this time.

## 2023-09-30 NOTE — ED PROVIDER NOTES
MD ATTESTATION NOTE    The GISEL and I have discussed this patient's history, physical exam, and treatment plan.  I have reviewed the documentation and personally had a face to face interaction with the patient. I affirm the documentation and agree with the treatment and plan.  The attached note describes my personal findings.    I provided a substantive portion of the care of this patient. I personally performed the physical exam, in its entirety.    Independent Historians: Patient, EMS    A complete HPI/ROS/PMH/PSH/SH/FH are unobtainable due to: Nothing    Chronic or social conditions impacting patient care (social determinants of health): Chronically dependent on nursing home care    Riky Rios is a 88 y.o. male who presents to the ED c/o acute fall.  EMS reports the patient had a fall just prior to arrival.  He struck his head.  Nursing home reports that he clutched his chest prior to the fall.  They report he was recently evaluated for COPD exacerbation and a cardiac arrest.  The patient denies any chest pain.  He denies any head or neck or back pain.  He is uncertain when he last had a tetanus shot.      Review of prior external notes (non-ED) -and- Review of prior external test results outside of this encounter: Discharge summary dated yesterday with COPD exacerbation and acute bronchitis.  He had cardiac arrest in the emergency room after a dose of Rocephin.  He was found to have a pneumothorax on chest x-ray.  He required a chest tube.  He had a unremarkable cardiac echo.  The cardiac arrest was felt to be related to Rocephin allergy versus contrast allergy versus pneumothorax.  MRI showed an acute stroke.    Prescription drug monitoring program review:        On exam:  GENERAL: Awake, alert, no acute distress  SKIN: Warm, dry  HENT: Normocephalic, atraumatic  EYES: no scleral icterus  CV: regular rhythm, regular rate  RESPIRATORY: normal effort, lungs clear  ABDOMEN: soft, nontender,  nondistended  MUSCULOSKELETAL: no deformity, no tenderness in the cervical, thoracic, or lumbar spines.  He has a skin tear to the right elbow.  NEURO: alert, moves all extremities, follows commands                                                             Labs  Recent Results (from the past 24 hour(s))   ECG 12 Lead Other; fall, recent cardiac arrest    Collection Time: 09/30/23  7:37 AM   Result Value Ref Range    QT Interval 355 ms    QTC Interval 410 ms   Comprehensive Metabolic Panel    Collection Time: 09/30/23  9:01 AM    Specimen: Arm, Right; Blood   Result Value Ref Range    Glucose 82 65 - 99 mg/dL    BUN 25 (H) 8 - 23 mg/dL    Creatinine 0.91 0.76 - 1.27 mg/dL    Sodium 147 (H) 136 - 145 mmol/L    Potassium 3.9 3.5 - 5.2 mmol/L    Chloride 116 (H) 98 - 107 mmol/L    CO2 26.2 22.0 - 29.0 mmol/L    Calcium 8.7 8.6 - 10.5 mg/dL    Total Protein 5.1 (L) 6.0 - 8.5 g/dL    Albumin 2.8 (L) 3.5 - 5.2 g/dL    ALT (SGPT) 100 (H) 1 - 41 U/L    AST (SGOT) 37 1 - 40 U/L    Alkaline Phosphatase 129 (H) 39 - 117 U/L    Total Bilirubin 0.5 0.0 - 1.2 mg/dL    Globulin 2.3 gm/dL    A/G Ratio 1.2 g/dL    BUN/Creatinine Ratio 27.5 (H) 7.0 - 25.0    Anion Gap 4.8 (L) 5.0 - 15.0 mmol/L    eGFR 81.6 >60.0 mL/min/1.73   CBC Auto Differential    Collection Time: 09/30/23  9:01 AM    Specimen: Arm, Right; Blood   Result Value Ref Range    WBC 14.55 (H) 3.40 - 10.80 10*3/mm3    RBC 2.49 (L) 4.14 - 5.80 10*6/mm3    Hemoglobin 8.8 (L) 13.0 - 17.7 g/dL    Hematocrit 25.9 (L) 37.5 - 51.0 %    .0 (H) 79.0 - 97.0 fL    MCH 35.3 (H) 26.6 - 33.0 pg    MCHC 34.0 31.5 - 35.7 g/dL    RDW 12.5 12.3 - 15.4 %    RDW-SD 46.8 37.0 - 54.0 fl    MPV 10.4 6.0 - 12.0 fL    Platelets 360 140 - 450 10*3/mm3    nRBC 0.5 (H) 0.0 - 0.2 /100 WBC   High Sensitivity Troponin T    Collection Time: 09/30/23  9:01 AM    Specimen: Arm, Right; Blood   Result Value Ref Range    HS Troponin T 78 (C) <15 ng/L   Manual Differential    Collection Time:  09/30/23  9:01 AM    Specimen: Arm, Right; Blood   Result Value Ref Range    Neutrophil % 85.0 (H) 42.7 - 76.0 %    Lymphocyte % 10.0 (L) 19.6 - 45.3 %    Monocyte % 5.0 5.0 - 12.0 %    Neutrophils Absolute 12.37 (H) 1.70 - 7.00 10*3/mm3    Lymphocytes Absolute 1.46 0.70 - 3.10 10*3/mm3    Monocytes Absolute 0.73 0.10 - 0.90 10*3/mm3    RBC Morphology Normal Normal    WBC Morphology Normal Normal    Platelet Morphology Normal Normal   High Sensitivity Troponin T 2Hr    Collection Time: 09/30/23 11:17 AM    Specimen: Arm, Left; Blood   Result Value Ref Range    HS Troponin T 82 (C) <15 ng/L    Troponin T Delta 4 (C) >=-4 - <+4 ng/L       Radiology  XR Elbow 3+ View Right    Result Date: 9/30/2023  RIGHT ELBOW: AP, LATERAL  HISTORY: Fall with right humerus pain. Redness and swelling.  FINDINGS: There is elevation of the anterior fat pad and there appears to be elevation of the posterior fat pad though no fracture plane is demonstrated. There is a soft tissue wound at the posterior elbow with overlying bandaging material. Wound overlies the cortical bone at the olecranon. No olecranon fracture is evident. This places patient at risk for osteomyelitis. Vascular calcifications are present. There is no dislocation.      1. Posterior elbow soft tissue wound overlying the olecranon without evidence for underlying fracture. Wound places patient at risk for osteomyelitis. 2. There appears to be elevation of the fat pads suggesting a joint effusion which could indicate a radiographically occult fracture though no fracture is evident. 3. Atherosclerotic disease.  This report was finalized on 9/30/2023 8:00 AM by Dr. Martin Collins M.D.      CT Head Without Contrast, CT Cervical Spine Without Contrast    Result Date: 9/30/2023  CT HEAD WITHOUT CONTRAST, CT CERVICAL SPINE WITHOUT CONTRAST  HISTORY: Fall. Hit back of head.  TECHNIQUE: Head CT includes axial imaging from the base of skull to vertex without IV contrast and this  data was reconstructed in coronal and sagittal planes. Cervical spine CT includes axial imaging from the skull base to the upper thoracic spine and this data was reconstructed in coronal and sagittal planes. Radiation dose reduction techniques were utilized, including automated exposure control and exposure modulation based on body size.  COMPARISON: CT head without contrast 09/25/2023, MRI brain 09/13/2023, CT head 09/13/2023.  FINDINGS: Head CT: There is moderate chronic small vessel ischemic white matter change. There are no abnormal areas of increased attenuation intra-axially to suggest hemorrhage. There is no evidence for mass effect or shift of the midline structures.  8 mm chronic lacunar infarction is present within the mid right corona radiata. Encephalomalacia is present in the lateral right temporal lobe most likely due to an old infarction in the right MCA distribution. There is also chronic infarctions within the left cerebellar hemisphere and there is a subacute infarction within the posterolateral left cerebellar hemisphere that was demonstrated to be acute on brain MRI 09/13/2023. Bone windows demonstrate no calvarial fracture. Moderate ethmoid and maxillary and left sphenoid sinus mucosal thickening is present and there remain polypoid opacities nasal cavities consistent with nasal polyps. Atherosclerotic calcifications are present involving the cavernous and supracavernous segments of both internal carotid arteries and intracranial segments of both vertebral arteries.  Cervical spine CT: There are chronic arthritic changes at the anterior atlantoaxial articulation. The odontoid process is intact and the C1 ring is intact. There is degenerative disc disease with disc space narrowing at C3-4, C4-5, C5-6, C6-7, C7-T1. Multilevel endplate spur formation is present and there is uncovertebral overgrowth with osseous encroachment of the neural foramina that appears moderate on the right at C3-4 and C4-5  and on the left at C5-6. There is 3 mm anterolisthesis of C2 with respect to C3. Alignment is otherwise within normal limits. Mild multilevel facet arthritis is present that appears greatest on the right at C2-3. The prevertebral soft tissues appear within normal limits. Carotid vascular calcifications are present.      1. No evidence for acute intracranial abnormality. Moderate chronic small ischemic white matter change. Chronic encephalomalacia right temporal lobe and chronic left cerebellar hemisphere infarctions. 8 mm chronic lacunar infarction in the right corona radiata. There is also a subacute infarct in the posterolateral left cerebral hemisphere that was demonstrated to be acute on brain MRI 09/13/2023. 2. Moderate ethmoid maxillary and left sphenoid sinus mucosal thickening. Polypoid opacities nasal cavities consistent with nasal polyps. 3. Multilevel degenerative disc disease without evidence for fracture or acute abnormality of the cervical spine. 4. Carotid and intracranial vascular calcifications.   Radiation dose reduction techniques were utilized, including automated exposure control and exposure modulation based on body size.   This report was finalized on 9/30/2023 7:46 AM by Dr. Martin Collins M.D.      XR Chest 1 View    Result Date: 9/30/2023  CHEST SINGLE VIEW  HISTORY: Fall. Recent cardiac arrest.  COMPARISON: AP chest 09/25/2023, two-view chest 09/20/2023.  FINDINGS: Heart size is mildly enlarged. There are small bilateral pleural effusions. There is left greater than right basilar opacity due to atelectasis or infiltrate and this appears similar to the previous exam. Small calcified nodules present the right midlung. Atherosclerotic calcifications are present.      Overall, findings are similar to the examination 5 days ago. There are small pleural effusions and there is left greater than right basilar atelectasis or infiltrate.  This report was finalized on 9/30/2023 8:00 AM by   Martin Collins M.D.       Medical Decision Making:  ED Course as of 09/30/23 1432   Sat Sep 30, 2023   0643 Patient is an 88-year-old who presents today from [facility] after a fall while getting up to go to the restroom.  CT head and cervical spine have been ordered to rule out intercranial abnormality or cervical spine fracture or other abnormality.  He has a skin tear to his right elbow that we will provide wound care for.  He denies any chest pain before, during or even after his fall. [MS]   0720 Reviewed pt's history and workup with Dr. Abbott.  After a bedside evaluation, he agrees with the plan of care.     [MS]   0742 EKG          EKG time: 737  Rhythm/Rate: Normal sinus, rate 80  P waves and KS: Normal P, normal KS  QRS, axis: Narrow QRS, normal axis  ST and T waves: T wave inversions lateral leads    Independently Interpreted by me  Not significantly changed compared to prior 9/17/2023   [TR]   0742 XR Chest 1 View  My independent interpretation of the chest x-ray is no pneumothorax [TR]   1013 Hemoglobin(!): 8.8 [MS]   1013 Hematocrit(!): 25.9 [MS]   1013 HS Troponin T(!!): 78 [MS]   1228 Patient's troponins today were 78 and 82, however patient has chronically elevated troponins.  He was just admitted for a pneumothorax which resulted in a cardiac arrest.  Patient has denied chest pain throughout his ER stay.  His CT head and cervical spine were unremarkable.  Plan to discharge back to the facility with close follow-up with his PMD and cardiologist. [MS]   1240 Patient's daughter is able to pick him and take back to [facility]. [MS]      ED Course User Index  [MS] Roxanne Dumont, APRN  [TR] Rivas Abbott MD       The patient presents with an acute fall in the setting of recent cardiac arrest and COPD exacerbation.  Plan to CT his head and cervical spine given his fall.  We will x-ray his right elbow given his skin tear.  We will update his tetanus status.  We will obtain  chemistries and blood counts as well as troponin.  My differential diagnosis includes syncope, fall, intracranial hemorrhage, pneumothorax, elbow fracture and others.    Procedures:  Procedures            Diagnosis  Final diagnoses:   Closed head injury, initial encounter   Elevated troponin   Fall, initial encounter   Anticoagulated       Note Disclaimer: At [facility], we believe that sharing information builds trust and better relationships. You are receiving this note because you recently visited [facility]. It is possible you will see health information before a provider has talked with you about it. This kind of information can be easy to misunderstand. To help you fully understand what it means for your health, we urge you to discuss this note with your provider.       Rivas Abbott MD  09/30/23 0401

## 2023-09-30 NOTE — DISCHARGE INSTRUCTIONS
You have been seen today in the ER for head injury.  Your imaging in the emergency department was negative for any acute intracranial pathology.  Because you are on a anticoagulant, there is a small risk of a delayed intracranial bleed for up to 1 week from the time of injury.  Please have family members keep a close eye on you over the next several days.  Please make sure you have close follow-up with your primary care physician.  Follow up with Cardiology as previously instructed.  Please return to the emergency department immediately for headache, visual disturbance, confusion, weakness, nausea or vomiting or any other concerning symptoms

## 2023-09-30 NOTE — ED PROVIDER NOTES
EMERGENCY DEPARTMENT ENCOUNTER    Room Number:  08/08  Date of encounter:  9/30/2023  PCP: Provider, No Known  Patient Care Team:  Provider, No Known as PCP - General   Independent Historians: Patient and EMS report            HPI:  Chief Complaint: Fall  A complete HPI/ROS/PMH/PSH/SH/FH are unobtainable due to: Nothing    Chronic or social conditions impacting patient care (social determinants of health): None    Context: Riky Rios is a 88 y.o. male with a history of COPD, HTN, CVA, PAF, anticoagulated on Eliquis who arrives to the ED via EMS from [facility].  Patient presents with c/o head and neck pain status post a fall.  Patient states that he had gotten up to go to the bathroom, lost his balance and fell backwards, hitting his head.   Patient denies LOC, nausea, vomiting, headache, back pain.  Report from EMS states that the nurse said that patient complained of chest pain, patient denies chest pain prior to the fall or currently.  Patient states that nothing makes the symptoms better and nothing worsens symptoms.      Review of prior external notes (non-ED): Medical records reviewed in [facility], patient was admitted 9/9/2023 through 9/29/2023 for COPD, acute left cerebellar infarct, rib fracture, pneumothorax on the right.  During this admission after receiving a dose of Rocephin he did experience cardiac arrest, required 1 round of CPR.  He was followed by thoracic surgery, had a chest tube due to the pneumothorax.  Brain MRI was positive for acute stroke.    Review of prior external test results outside of this encounter: CT chest performed 9/13/2023-chest tube within right pleural cavity, small persistent right pneumothorax.  Small to moderate volume of subcutaneous emphysema in the right chest wall.  Nondisplaced right fifth rib fracture.  MRI of the brain performed 9/13/2023 focus of acute infarction within the posterior aspect of the left cerebellar hemisphere.    PAST MEDICAL  HISTORY  Active Ambulatory Problems     Diagnosis Date Noted    COPD with acute exacerbation 09/09/2023    Cardiac arrest 09/09/2023    Acute respiratory failure with hypoxia 09/09/2023    Pneumothorax on right 09/10/2023    Paroxysmal atrial fibrillation 09/14/2023    Acute bronchitis due to Rhinovirus 09/19/2023    Hypertension 09/19/2023    Acute left cerebellar infarct 09/19/2023    Closed fracture of one rib of right side 09/19/2023     Resolved Ambulatory Problems     Diagnosis Date Noted    No Resolved Ambulatory Problems     No Additional Past Medical History       The patient has started, but not completed, their COVID-19 vaccination series.    PAST SURGICAL HISTORY  No past surgical history on file.      FAMILY HISTORY  No family history on file.      SOCIAL HISTORY  Social History     Socioeconomic History    Marital status:    Tobacco Use    Smoking status: Never    Smokeless tobacco: Never   Vaping Use    Vaping Use: Never used   Substance and Sexual Activity    Alcohol use: Yes     Alcohol/week: 14.0 standard drinks     Types: 14 Cans of beer per week    Drug use: Never    Sexual activity: Defer         ALLERGIES  Rocephin [ceftriaxone] and Iodinated contrast media        REVIEW OF SYSTEMS  Review of Systems     All systems reviewed and negative except for those discussed in HPI.       PHYSICAL EXAM    I have reviewed the triage vital signs and nursing notes.    ED Triage Vitals [09/30/23 0509]   Temp Heart Rate Resp BP SpO2   98.1 °F (36.7 °C) 82 18 167/72 94 %       Physical Exam  GENERAL: Elderly appearing, nontoxic appearing, not distressed  HENT: normocephalic, atraumatic  EYES: no scleral icterus, PERRL  CV: regular rhythm, regular rate, no murmur  RESPIRATORY: normal effort, CTAB  ABDOMEN: soft   MUSCULOSKELETAL: no deformity  Cervical vertebral tenderness to palpation, no thoracic or lumbar  No step off or crepitus noted  Bilateral cervical paraspinal tenderness to palpation  Bilateral  equal handgrips  NEURO: alert, moves all extremities, follows commands, mental status normal/baseline  Recent and remote memory functions are normal  Patient is attentive with normal concentration  Language is fluent  Speech is clear  Speech is nondysarthric  Symmetric smile with no facial droop  Eyes close shut strongly bilaterally  Symmetric eyebrow raise bilaterally  EOMI, PERRL  CN II-XII grossly normal otherwise  5/5 strength to bilateral upper and lower extremities  No pronator drift  Intact FNF   SKIN: warm, dry, no rash , 2 cm skin tear to the right elbow  Psych: Appropriate mood and affect  Nursing notes and vital signs reviewed          LAB RESULTS  Recent Results (from the past 24 hour(s))   ECG 12 Lead Other; fall, recent cardiac arrest    Collection Time: 09/30/23  7:37 AM   Result Value Ref Range    QT Interval 355 ms    QTC Interval 410 ms   Comprehensive Metabolic Panel    Collection Time: 09/30/23  9:01 AM    Specimen: Arm, Right; Blood   Result Value Ref Range    Glucose 82 65 - 99 mg/dL    BUN 25 (H) 8 - 23 mg/dL    Creatinine 0.91 0.76 - 1.27 mg/dL    Sodium 147 (H) 136 - 145 mmol/L    Potassium 3.9 3.5 - 5.2 mmol/L    Chloride 116 (H) 98 - 107 mmol/L    CO2 26.2 22.0 - 29.0 mmol/L    Calcium 8.7 8.6 - 10.5 mg/dL    Total Protein 5.1 (L) 6.0 - 8.5 g/dL    Albumin 2.8 (L) 3.5 - 5.2 g/dL    ALT (SGPT) 100 (H) 1 - 41 U/L    AST (SGOT) 37 1 - 40 U/L    Alkaline Phosphatase 129 (H) 39 - 117 U/L    Total Bilirubin 0.5 0.0 - 1.2 mg/dL    Globulin 2.3 gm/dL    A/G Ratio 1.2 g/dL    BUN/Creatinine Ratio 27.5 (H) 7.0 - 25.0    Anion Gap 4.8 (L) 5.0 - 15.0 mmol/L    eGFR 81.6 >60.0 mL/min/1.73   CBC Auto Differential    Collection Time: 09/30/23  9:01 AM    Specimen: Arm, Right; Blood   Result Value Ref Range    WBC 14.55 (H) 3.40 - 10.80 10*3/mm3    RBC 2.49 (L) 4.14 - 5.80 10*6/mm3    Hemoglobin 8.8 (L) 13.0 - 17.7 g/dL    Hematocrit 25.9 (L) 37.5 - 51.0 %    .0 (H) 79.0 - 97.0 fL    MCH 35.3 (H)  26.6 - 33.0 pg    MCHC 34.0 31.5 - 35.7 g/dL    RDW 12.5 12.3 - 15.4 %    RDW-SD 46.8 37.0 - 54.0 fl    MPV 10.4 6.0 - 12.0 fL    Platelets 360 140 - 450 10*3/mm3    nRBC 0.5 (H) 0.0 - 0.2 /100 WBC   High Sensitivity Troponin T    Collection Time: 09/30/23  9:01 AM    Specimen: Arm, Right; Blood   Result Value Ref Range    HS Troponin T 78 (C) <15 ng/L   Manual Differential    Collection Time: 09/30/23  9:01 AM    Specimen: Arm, Right; Blood   Result Value Ref Range    Neutrophil % 85.0 (H) 42.7 - 76.0 %    Lymphocyte % 10.0 (L) 19.6 - 45.3 %    Monocyte % 5.0 5.0 - 12.0 %    Neutrophils Absolute 12.37 (H) 1.70 - 7.00 10*3/mm3    Lymphocytes Absolute 1.46 0.70 - 3.10 10*3/mm3    Monocytes Absolute 0.73 0.10 - 0.90 10*3/mm3    RBC Morphology Normal Normal    WBC Morphology Normal Normal    Platelet Morphology Normal Normal   High Sensitivity Troponin T 2Hr    Collection Time: 09/30/23 11:17 AM    Specimen: Arm, Left; Blood   Result Value Ref Range    HS Troponin T 82 (C) <15 ng/L    Troponin T Delta 4 (C) >=-4 - <+4 ng/L       Ordered the above labs and independently reviewed the results.        RADIOLOGY  XR Elbow 3+ View Right    Result Date: 9/30/2023  RIGHT ELBOW: AP, LATERAL  HISTORY: Fall with right humerus pain. Redness and swelling.  FINDINGS: There is elevation of the anterior fat pad and there appears to be elevation of the posterior fat pad though no fracture plane is demonstrated. There is a soft tissue wound at the posterior elbow with overlying bandaging material. Wound overlies the cortical bone at the olecranon. No olecranon fracture is evident. This places patient at risk for osteomyelitis. Vascular calcifications are present. There is no dislocation.      1. Posterior elbow soft tissue wound overlying the olecranon without evidence for underlying fracture. Wound places patient at risk for osteomyelitis. 2. There appears to be elevation of the fat pads suggesting a joint effusion which could indicate  a radiographically occult fracture though no fracture is evident. 3. Atherosclerotic disease.  This report was finalized on 9/30/2023 8:00 AM by Dr. Martin Collins M.D.      CT Head Without Contrast, CT Cervical Spine Without Contrast    Result Date: 9/30/2023  CT HEAD WITHOUT CONTRAST, CT CERVICAL SPINE WITHOUT CONTRAST  HISTORY: Fall. Hit back of head.  TECHNIQUE: Head CT includes axial imaging from the base of skull to vertex without IV contrast and this data was reconstructed in coronal and sagittal planes. Cervical spine CT includes axial imaging from the skull base to the upper thoracic spine and this data was reconstructed in coronal and sagittal planes. Radiation dose reduction techniques were utilized, including automated exposure control and exposure modulation based on body size.  COMPARISON: CT head without contrast 09/25/2023, MRI brain 09/13/2023, CT head 09/13/2023.  FINDINGS: Head CT: There is moderate chronic small vessel ischemic white matter change. There are no abnormal areas of increased attenuation intra-axially to suggest hemorrhage. There is no evidence for mass effect or shift of the midline structures.  8 mm chronic lacunar infarction is present within the mid right corona radiata. Encephalomalacia is present in the lateral right temporal lobe most likely due to an old infarction in the right MCA distribution. There is also chronic infarctions within the left cerebellar hemisphere and there is a subacute infarction within the posterolateral left cerebellar hemisphere that was demonstrated to be acute on brain MRI 09/13/2023. Bone windows demonstrate no calvarial fracture. Moderate ethmoid and maxillary and left sphenoid sinus mucosal thickening is present and there remain polypoid opacities nasal cavities consistent with nasal polyps. Atherosclerotic calcifications are present involving the cavernous and supracavernous segments of both internal carotid arteries and intracranial segments  of both vertebral arteries.  Cervical spine CT: There are chronic arthritic changes at the anterior atlantoaxial articulation. The odontoid process is intact and the C1 ring is intact. There is degenerative disc disease with disc space narrowing at C3-4, C4-5, C5-6, C6-7, C7-T1. Multilevel endplate spur formation is present and there is uncovertebral overgrowth with osseous encroachment of the neural foramina that appears moderate on the right at C3-4 and C4-5 and on the left at C5-6. There is 3 mm anterolisthesis of C2 with respect to C3. Alignment is otherwise within normal limits. Mild multilevel facet arthritis is present that appears greatest on the right at C2-3. The prevertebral soft tissues appear within normal limits. Carotid vascular calcifications are present.      1. No evidence for acute intracranial abnormality. Moderate chronic small ischemic white matter change. Chronic encephalomalacia right temporal lobe and chronic left cerebellar hemisphere infarctions. 8 mm chronic lacunar infarction in the right corona radiata. There is also a subacute infarct in the posterolateral left cerebral hemisphere that was demonstrated to be acute on brain MRI 09/13/2023. 2. Moderate ethmoid maxillary and left sphenoid sinus mucosal thickening. Polypoid opacities nasal cavities consistent with nasal polyps. 3. Multilevel degenerative disc disease without evidence for fracture or acute abnormality of the cervical spine. 4. Carotid and intracranial vascular calcifications.   Radiation dose reduction techniques were utilized, including automated exposure control and exposure modulation based on body size.   This report was finalized on 9/30/2023 7:46 AM by Dr. Martin Collins M.D.      XR Chest 1 View    Result Date: 9/30/2023  CHEST SINGLE VIEW  HISTORY: Fall. Recent cardiac arrest.  COMPARISON: AP chest 09/25/2023, two-view chest 09/20/2023.  FINDINGS: Heart size is mildly enlarged. There are small bilateral pleural  effusions. There is left greater than right basilar opacity due to atelectasis or infiltrate and this appears similar to the previous exam. Small calcified nodules present the right midlung. Atherosclerotic calcifications are present.      Overall, findings are similar to the examination 5 days ago. There are small pleural effusions and there is left greater than right basilar atelectasis or infiltrate.  This report was finalized on 9/30/2023 8:00 AM by Dr. Martin Collins M.D.       I ordered the above noted radiological studies. Reviewed by me and discussed with radiologist.  See dictation for official radiology interpretation.      PROCEDURES    Procedures    DIFFERENTIAL DIAGNOSIS:  Differential Diagnosis for head injury include but are not limited to the following:  Cerebral contusion, Concussion ( with or without LOC), Head contusion, Skull fracture, orbital fracture, Hematoma, Intracranial Hemorrhage, Laceration, Post Concussion Syndrome.       PROGRESS, DATA ANALYSIS, CONSULTS, AND MEDICAL DECISION MAKING    All labs have been independently reviewed by me.  All radiology studies have been reviewed by me and discussed with radiologist dictating the report.   EKG's independently viewed and interpreted by me.  Discussion below represents my analysis of pertinent findings related to patient's condition, differential diagnosis, treatment plan and final disposition.        ED Course as of 09/30/23 1516   Sat Sep 30, 2023   0643 Patient is an 88-year-old who presents today from [facility] after a fall while getting up to go to the restroom.  CT head and cervical spine have been ordered to rule out intercranial abnormality or cervical spine fracture or other abnormality.  He has a skin tear to his right elbow that we will provide wound care for.  He denies any chest pain before, during or even after his fall. [MS]   8720 Reviewed pt's history and workup with Dr. Abbott.  After a bedside evaluation, he  agrees with the plan of care.     [MS]   0742 EKG          EKG time: 737  Rhythm/Rate: Normal sinus, rate 80  P waves and DE: Normal P, normal DE  QRS, axis: Narrow QRS, normal axis  ST and T waves: T wave inversions lateral leads    Independently Interpreted by me  Not significantly changed compared to prior 9/17/2023   [TR]   0742 XR Chest 1 View  My independent interpretation of the chest x-ray is no pneumothorax [TR]   1013 Hemoglobin(!): 8.8 [MS]   1013 Hematocrit(!): 25.9 [MS]   1013 HS Troponin T(!!): 78 [MS]   1228 Patient's troponins today were 78 and 82, however patient has chronically elevated troponins.  He was just admitted for a pneumothorax which resulted in a cardiac arrest.  Patient has denied chest pain throughout his ER stay.  His CT head and cervical spine were unremarkable.  Plan to discharge back to the facility with close follow-up with his PMD and cardiologist. [MS]   1240 Patient's daughter is able to pick him and take back to [facility]. [MS]      ED Course User Index  [MS] Roxanne Dumont, APRN  [TR] Rivas Abbott MD         DISPOSITION  ED Disposition       ED Disposition   Discharge    Condition   Stable    Comment   --             Discussed plan for discharge, as there is no emergent indication for admission. Pt/family is agreeable and understands need for follow up and repeat testing.  Pt is aware that discharge does not mean that nothing is wrong but it indicates no emergency is present that requires admission and they must continue care with follow-up as given below or physician of their choice.   Patient/Family voiced understanding of above instructions.  Patient discharged in stable condition.    DIAGNOSIS  Final diagnoses:   Closed head injury, initial encounter   Elevated troponin   Fall, initial encounter   Anticoagulated       FOLLOW UP   Your PMD    Call in 2 days      [facility] CARDIOLOG[facility]  [address]  [facility]  [id number]  [phone]  Call in 2 days        RX     Medication List      No changes were made to your prescriptions during this visit.           AS OF 15:16 EDT VITALS:    BP - 164/89  HR - 73  TEMP - 98.1 °F (36.7 °C) (Oral)  O2 SATS - 94%      MEDICATIONS GIVEN IN ER    Medications   Tetanus-Diphth-Acell Pertussis (BOOSTRIX) injection 0.5 mL (0.5 mL Intramuscular Given 9/30/23 0902)                Note Disclaimer: At [facility], we believe that sharing information builds trust and better relationships. You are receiving this note because you recently visited [facility]. It is possible you will see health information before a provider has talked with you about it. This kind of information can be easy to misunderstand. To help you fully understand what it means for your health, we urge you to discuss this note with your provider.         Roxanne Dumont, APRN  09/30/23 1517

## 2023-10-01 LAB
QT INTERVAL: 355 MS
QTC INTERVAL: 410 MS

## 2023-10-09 ENCOUNTER — TELEPHONE (OUTPATIENT)
Dept: CARDIOLOGY | Facility: CLINIC | Age: 88
End: 2023-10-09

## 2023-10-09 NOTE — TELEPHONE ENCOUNTER
Caller: DAVION    Relationship to patient: Nursing Home    Best call back number: [phone]    New or established patient?  [x] New  [] Established    Date of discharge: 9/29/23    Facility discharged from: [facility]    Diagnosis/Symptoms: CARDIAC ARREST    Length of stay (If applicable): 20 DAYS    Specialty Only: Did you see a Psychiatric provider?    [x] Yes  [] No  If so, who? DR. WARREN AND DR TRINITY RICARDO NURSE FROM NURSING HOME FACILITY IS CALLING TO SCHEDULE FOLLOW UP APPT FOR THE PT. THERE ARE NO NOTES IN THE DISCHARGE PAPERWORK THAT SAY TO FOLLOW UP WITH CARDIOLOGY. PLEASE ADVISE ON SCHEDULING

## 2023-10-11 ENCOUNTER — TELEPHONE (OUTPATIENT)
Dept: CARDIOLOGY | Facility: CLINIC | Age: 88
End: 2023-10-11

## 2023-10-11 NOTE — TELEPHONE ENCOUNTER
Caller: RACHAEL AT [facility]    Relationship to patient:     Best call back number: [phone] ASK FOR RACHAEL    New or established patient?  [x] New  [] Established    Date of discharge: 09/29/2023    Facility discharged from:     Diagnosis/Symptoms: AFIB, CARDIAC ARREST        Specialty Only: Did you see a [facility] provider?    [x] Yes  [] No  If so, who? DR WARREN  A TIMEFRAME FOR A F/U APPT WAS NOT ON HOSPITAL DISCHARGE PLEASE CALL RACHAEL BACK TO SCHEDULE F/U APPT

## 2023-10-12 NOTE — TELEPHONE ENCOUNTER
I spent 40 minutes on hold with 4 different people trying to get appt scheduled for pt.  Was not able to get in touch with someone at facility to schedule appt.     I called pt's son, Brian Rios, and scheduled the appt for the soonest they could come in on 10/27/23.    Thank you,    Kanika SWANSON RN  Triage LC  10/12/23 13:52 EDT

## 2023-10-13 ENCOUNTER — TELEPHONE (OUTPATIENT)
Dept: NEUROLOGY | Facility: CLINIC | Age: 88
End: 2023-10-13

## 2023-10-13 NOTE — TELEPHONE ENCOUNTER
Caller: YASMIN BAUER    Relationship to patient: DAUGHTER IN LAW    Best call back number: [phone]    New or established patient?  [x] New  [] Established    Date of discharge: 9/30/23    Facility discharged from: [facility] ED    Diagnosis/Symptoms: STROKE    Length of stay (If applicable): 9 HOURS    Specialty Only: Did you see a Religion health provider?    [x] Yes  [] No  If so, who? MARYAM      PLEASE CONTACT PATIENT SON @ [phone]    PLEASE REVIEW    THANK YOU

## 2023-10-27 ENCOUNTER — OFFICE VISIT (OUTPATIENT)
Age: 88
End: 2023-10-27
Payer: MEDICARE

## 2023-10-27 VITALS
OXYGEN SATURATION: 98 % | WEIGHT: 130 LBS | HEIGHT: 68 IN | BODY MASS INDEX: 19.7 KG/M2 | SYSTOLIC BLOOD PRESSURE: 130 MMHG | DIASTOLIC BLOOD PRESSURE: 70 MMHG | HEART RATE: 68 BPM

## 2023-10-27 DIAGNOSIS — I46.9 CARDIAC ARREST: Primary | ICD-10-CM

## 2023-10-27 DIAGNOSIS — I63.40 CEREBROVASCULAR ACCIDENT (CVA) DUE TO EMBOLISM OF CEREBRAL ARTERY: ICD-10-CM

## 2023-10-27 PROBLEM — R54 FRAIL ELDERLY: Status: ACTIVE | Noted: 2018-08-10

## 2023-10-27 PROBLEM — N40.0 BENIGN PROSTATIC HYPERPLASIA: Status: ACTIVE | Noted: 2022-03-04

## 2023-10-27 PROBLEM — M19.90 OSTEOARTHRITIS: Status: ACTIVE | Noted: 2023-10-27

## 2023-10-27 PROBLEM — Z86.79 HISTORY OF HYPERTENSION: Status: ACTIVE | Noted: 2023-10-27

## 2023-10-27 RX ORDER — NAPROXEN SODIUM 220 MG
220 TABLET ORAL 2 TIMES DAILY PRN
COMMUNITY

## 2023-10-27 RX ORDER — ALBUTEROL SULFATE 2.5 MG/3ML
SOLUTION RESPIRATORY (INHALATION)
COMMUNITY

## 2023-10-27 RX ORDER — AMOXICILLIN 250 MG
1 CAPSULE ORAL DAILY
COMMUNITY

## 2023-10-27 RX ORDER — UBIDECARENONE 75 MG
50 CAPSULE ORAL DAILY
COMMUNITY

## 2023-10-27 RX ORDER — FUROSEMIDE 40 MG/1
1 TABLET ORAL DAILY
COMMUNITY
Start: 2023-10-16 | End: 2023-10-27

## 2023-10-27 NOTE — PROGRESS NOTES
Subjective:     Encounter Date:10/27/2023      Patient ID: Riky Rios is a 88 y.o. male.    Chief Complaint:  Follow up after recent admission    HPI:   88 y.o. male with COPD who was admitted to the hospital in early September due to PEA arrest requiring CPR 2/2 pneumothorax vs presumed allergic reaction to ceftriaxone.  After ROSC, he was reportedly noted to be in A-fib with RVR.  We were consulted for further evaluation of his arrest and his A-fib.  Echocardiogram at that time was normal.  There was no recurrence of his A-fib.  He did have an MRI brain which revealed a new stroke as well as old prior strokes.  Given concern of possible A-fib, he was started on Eliquis.  This has since been discontinued as review of the EKGs do not reveal atrial fibrillation and we have been unable to obtain the strip that was noted to be A-fib post resuscitation.    The following portions of the patient's history were reviewed and updated as appropriate: allergies, current medications, past family history, past medical history, past social history, past surgical history and problem list.     REVIEW OF SYSTEMS:   All systems reviewed.  Pertinent positives identified in HPI.  All other systems are negative.    History reviewed. No pertinent past medical history.    History reviewed. No pertinent family history.    Social History     Socioeconomic History    Marital status:    Tobacco Use    Smoking status: Never    Smokeless tobacco: Never   Vaping Use    Vaping Use: Never used   Substance and Sexual Activity    Alcohol use: Yes     Alcohol/week: 14.0 standard drinks of alcohol     Types: 14 Cans of beer per week    Drug use: Never    Sexual activity: Defer       Allergies   Allergen Reactions    Rocephin [Ceftriaxone] Anaphylaxis    Iodinated Contrast Media Arrhythmia and Unknown - High Severity       History reviewed. No pertinent surgical history.      ECG 12 Lead    Date/Time: 10/27/2023 11:01 AM  Performed by:  Rashid Johnson MD    Authorized by: Rashid Johnson MD  Comparison: compared with previous ECG from 9/30/2023  Similar to previous ECG  Rhythm: sinus rhythm  Rate: normal  Conduction: conduction normal  QRS axis: normal  Other findings: non-specific ST-T wave changes    Clinical impression: non-specific ECG             Objective:         Vitals:    10/27/23 1039   BP: 130/70   Pulse: 68   SpO2: 98%       PHYSICAL EXAM:  GEN: well appearing, in NAD   HEENT: NCAT, EOMI, moist mucus membranes   Respiratory: CTAB, no wheezes, rales or rhonchi  CV: normal rate, regular rhythm, normal S1, S2, no murmurs, rubs, gallops, +2 radial pulses b/l  GI: soft, nontender, nondistended  MSK: no edema  Skin: no rash, warm, dry  Heme/Lymph: no bruising or bleeding  Neuro: Alert and Oriented x 3, grossly normal motor function        Assessment:         (I46.9) Cardiac arrest    (I63.40) Cerebrovascular accident (CVA) due to embolism of cerebral artery    88 y.o. male with COPD who was admitted to the hospital in early September due to PEA arrest requiring CPR 2/2 pneumothorax vs presumed allergic reaction to ceftriaxone.       Plan:       #Pea arrest  Likely secondary to pneumothorax vs allergic rxn to ceftriaxone.    #CVA  Patient found to have CVA in September. MRI also revealed old infarcts. Given concern for possible AF he was started on AC however we were never able to obtain a strip of his reported AF. We discussed this and he stated that even if found to have AF he would not want to be on AC.  - no AC  - no further cardiac eval    Dr Dyer, thank you very much for referring this kind patient to me. Please call me with any questions or concerns. I will see the patient again in the office as needed         Rashid Johnson MD  10/27/23  [facility]    Outpatient Encounter Medications as of 10/27/2023   Medication Sig Dispense Refill    albuterol (PROVENTIL) (2.5 MG/3ML) 0.083% nebulizer solution inhale ONE vial via nebulization  BY MOUTH EVERY 4 HOURS AS NEEDED FOR wheezing.      allopurinol (ZYLOPRIM) 300 MG tablet Take 1 tablet by mouth Daily.      ascorbic acid (VITAMIN C) 500 MG tablet Take 1 tablet by mouth Daily.      aspirin 81 MG chewable tablet Chew 1 tablet Daily.      atorvastatin (LIPITOR) 40 MG tablet Take 1 tablet by mouth Daily.      budesonide (PULMICORT) 0.5 MG/2ML nebulizer solution Take 2 mL by nebulization Daily.      furosemide (LASIX) 40 MG tablet Take 1 tablet by mouth Daily.      ipratropium-albuterol (DUO-NEB) 0.5-2.5 mg/3 ml nebulizer Take 3 mL by nebulization Every 4 (Four) Hours As Needed for Shortness of Air. 360 mL     naproxen sodium (ALEVE) 220 MG tablet Take 1 tablet by mouth 2 (Two) Times a Day As Needed.      pantoprazole (PROTONIX) 40 MG EC tablet Take 1 tablet by mouth Every Morning.      predniSONE (DELTASONE) 10 MG tablet Take 1 tablet by mouth Daily With Breakfast.      sennosides-docusate (senna-docusate sodium) 8.6-50 MG per tablet Take 1 tablet by mouth Daily.      tamsulosin (FLOMAX) 0.4 MG capsule 24 hr capsule Take 2 capsules by mouth Daily.      tiotropium bromide monohydrate (SPIRIVA RESPIMAT) 2.5 MCG/ACT aerosol solution inhaler Inhale 2 puffs Daily.      vitamin B-12 (CYANOCOBALAMIN) 100 MCG tablet Take 0.5 tablets by mouth Daily.      amLODIPine (NORVASC) 10 MG tablet Take 1 tablet by mouth Daily.      apixaban (ELIQUIS) 2.5 MG tablet tablet Take 1 tablet by mouth Every 12 (Twelve) Hours. Indications: Atrial Fibrillation 60 tablet     arformoterol (BROVANA) 15 MCG/2ML nebulizer solution Take 2 mL by nebulization 2 (Two) Times a Day. 120 mL      No facility-administered encounter medications on file as of 10/27/2023.

## 2024-12-11 NOTE — THERAPY EVALUATION
In an effort to ensure that our patients LiveWell, a Team Member has reviewed your chart and identified an opportunity to provide the best care possible. An attempt was made to discuss or schedule due or overdue Preventive or Chronic Condition care.Care Gaps identified: Wellness Visits.    The Outcome was Contact was not made, left message. We are attempting to schedule a yearly wellness visit. If you have any questions or need help with scheduling, contact your primary care provider..   Type of Appointment needed: Primary Care Visit    2nd  phone attempt  LMOM for mom to give the clinic a call back to schedule an appointment with a new pcp     PCP not verified       Patient Name: Riky Rios  : 1935    MRN: [id number]                              Today's Date: 9/15/2023       Admit Date: 2023    Visit Dx: No diagnosis found.  Patient Active Problem List   Diagnosis    COPD with acute exacerbation    Cardiac arrest    Acute respiratory failure with hypoxia    Pneumothorax on right    Acute on chronic respiratory failure with hypoxia    Acute ischemic stroke    Paroxysmal atrial fibrillation     History reviewed. No pertinent past medical history.  History reviewed. No pertinent surgical history.   General Information       Row Name 09/15/23 1318          OT Time and Intention    Document Type evaluation  -KG     Mode of Treatment individual therapy;occupational therapy  -KG       Row Name 09/15/23 131          General Information    Patient Profile Reviewed yes  -KG     Prior Level of Function independent:;ADL's  -KG     Existing Precautions/Restrictions no known precautions/restrictions  -KG     Barriers to Rehab none identified  -KG       Row Name 09/15/23 131          Living Environment    People in Home spouse  -KG       Row Name 09/15/23 131          Home Main Entrance    Number of Stairs, Main Entrance none  ramp  -KG       Row Name 09/15/23 131          Cognition    Orientation Status (Cognition) oriented to;person;place;time  -KG       Row Name 09/15/23 131          Safety Issues, Functional Mobility    Impairments Affecting Function (Mobility) endurance/activity tolerance;strength  -KG               User Key  (r) = Recorded By, (t) = Taken By, (c) = Cosigned By      Initials Name Provider Type    KG Billy Ruiz, OT Occupational Therapist                     Mobility/ADL's       Row Name 09/15/23 131          Bed Mobility    Bed Mobility supine-sit  -KG     All Activities, [place] (Bed Mobility) standby assist  -KG       Row Name 09/15/23 131          Transfers    Transfers bed-chair transfer  -KG       Row Name 09/15/23 131           Bed-Chair Transfer    Bed-Chair [place] (Transfers) contact guard  -KG     Assistive Device (Bed-Chair Transfers) walker, front-wheeled  -KG       Row Name 09/15/23 1319          Activities of Daily Living    BADL Assessment/Intervention lower body dressing  -KG       Row Name 09/15/23 1319          Lower Body Dressing Assessment/Training    [place] Level (Lower Body Dressing) socks;don;set up  -KG     Position (Lower Body Dressing) edge of bed sitting  -KG               User Key  (r) = Recorded By, (t) = Taken By, (c) = Cosigned By      Initials Name Provider Type    KG Billy Ruiz OT Occupational Therapist                   Obj/Interventions       Row Name 09/15/23 1320          Sensory Assessment (Somatosensory)    Sensory Assessment (Somatosensory) sensation intact  -KG       Row Name 09/15/23 1320          Vision Assessment/Intervention    Visual Impairment/Limitations WFL  -KG       [facility] Name 09/15/23 1320          Range of Motion Comprehensive    General Range of Motion no range of motion deficits identified  -KG       Row Name 09/15/23 1320          Strength Comprehensive (MMT)    General Manual Muscle Testing (MMT) Assessment --  BUE strength 4/5 grossly  -KG       [facility] Name 09/15/23 1320          Balance    Balance Assessment sitting static balance;sitting dynamic balance;standing static balance;standing dynamic balance  -KG     Static Sitting Balance standby assist  -KG     Dynamic Sitting Balance contact guard  -KG     Static Standing Balance contact guard  -KG     Dynamic Standing Balance contact guard  -KG     Position/Device Used, Standing Balance walker, front-wheeled  -KG     Balance Interventions sitting;standing;occupation based/functional task  -KG               User Key  (r) = Recorded By, (t) = Taken By, (c) = Cosigned By      Initials Name Provider Type    Billy Fisher OT Occupational Therapist                   Goals/Plan       Row Name 09/15/23 1324          Bed Mobility  Goal 1 (OT)    Activity/Assistive Device (Bed Mobility Goal 1, OT) sit to supine;supine to sit  -KG     [place] Level/Cues Needed (Bed Mobility Goal 1, OT) independent  -KG     Time Frame (Bed Mobility Goal 1, OT) short term goal (STG);2 weeks  -KG     Progress/Outcomes (Bed Mobility Goal 1, OT) new goal  -KG       Row Name 09/15/23 1324          Transfer Goal 1 (OT)    Activity/Assistive Device (Transfer Goal 1, OT) sit-to-stand/stand-to-sit;bed-to-chair/chair-to-bed;toilet  -KG     [place] Level/Cues Needed (Transfer Goal 1, OT) standby assist  -KG     Time Frame (Transfer Goal 1, OT) short term goal (STG);2 weeks  -KG     Progress/Outcome (Transfer Goal 1, OT) new goal  -KG       Row Name 09/15/23 1324          Bathing Goal 1 (OT)    Activity/Device (Bathing Goal 1, OT) upper body bathing;lower body bathing  -KG     [place] Level/Cues Needed (Bathing Goal 1, OT) standby assist  -KG     Time Frame (Bathing Goal 1, OT) short term goal (STG);2 weeks  -KG     Progress/Outcomes (Bathing Goal 1, OT) new goal  -KG       Row Name 09/15/23 1324          Dressing Goal 1 (OT)    Activity/Device (Dressing Goal 1, OT) upper body dressing;lower body dressing  -KG     [place]/Cues Needed (Dressing Goal 1, OT) standby assist  -KG     Time Frame (Dressing Goal 1, OT) short term goal (STG);2 weeks  -KG     Progress/Outcome (Dressing Goal 1, OT) new goal  -KG       Row Name 09/15/23 1324          Grooming Goal 1 (OT)    Activity/Device (Grooming Goal 1, OT) grooming skills, all  -KG     [place] (Grooming Goal 1, OT) standby assist  -KG     Time Frame (Grooming Goal 1, OT) 2 weeks;short term goal (STG)  -KG     Progress/Outcome (Grooming Goal 1, OT) new goal  -KG       Row Name 09/15/23 1324          Therapy Assessment/Plan (OT)    Planned Therapy Interventions (OT) activity tolerance training;occupation/activity based interventions;ROM/therapeutic exercise;strengthening exercise  -KG               User Key   (r) = Recorded By, (t) = Taken By, (c) = Cosigned By      Initials Name Provider Type    MARCEL Billy Ruiz OT Occupational Therapist                   Clinical Impression       Row Name 09/15/23 1321          Pain Assessment    Pretreatment Pain Rating 6/10  -KG     Posttreatment Pain Rating 5/10  -KG     Pain Location - Side/Orientation other (see comments)  chest  -KG     Pain Intervention(s) Repositioned  -KG       Row Name 09/15/23 1321          Plan of Care Review    Plan of Care Reviewed With patient;family  -KG     Progress no change  -KG     Outcome Evaluation Pt admitted to [facility] acute COPD exacerbation. While inpatient, MRI (+) for CVA. Pt is (I) w/ ADLs at baseline and uses rollator for functional mobility. Agreeable to OT eval this date. Performing bed mobility w/ SBA, OOBTC w/ CGA using RW. S/u for LBD at EOB. Pt w/ mild strength/endurance deficits throughout session. Anticipate home w/ HHOT and increased assistance at d/c. OT will f/u to address deficits.  -KG       Row Name 09/15/23 1321          Therapy Assessment/Plan (OT)    Therapy Frequency (OT) 5 times/wk  -KG       Row Name 09/15/23 1321          Therapy Plan Review/Discharge Plan (OT)    Anticipated Discharge Disposition (OT) home with home health  -KG       Row Name 09/15/23 1321          Vital Signs    Pre Patient Position Supine  -KG     Intra Patient Position Standing  -KG     Post Patient Position Sitting  -KG       Row Name 09/15/23 1321          Positioning and Restraints    Pre-Treatment Position in bed  -KG     Post Treatment Position chair  -KG     In Chair reclined;call light within reach;encouraged to call for assist;exit alarm on;with family/caregiver  -KG               User Key  (r) = Recorded By, (t) = Taken By, (c) = Cosigned By      Initials Name Provider Type    Billy Fisher OT Occupational Therapist                   Outcome Measures       Row Name 09/15/23 1325          How much help from another is currently needed...     Putting on and taking off regular lower body clothing? 3  -KG     Bathing (including washing, rinsing, and drying) 3  -KG     Toileting (which includes using toilet bed pan or urinal) 3  -KG     Putting on and taking off regular upper body clothing 3  -KG     Taking care of personal grooming (such as brushing teeth) 3  -KG     Eating meals 3  -KG     AM-PAC 6 Clicks Score (OT) 18  -KG       Row Name 09/15/23 0934          How much help from another person do you currently need...    Turning from your back to your side while in flat bed without using bedrails? 4  -ES     Moving from lying on back to sitting on the side of a flat bed without bedrails? 4  -ES     Moving to and from a bed to a chair (including a wheelchair)? 3  -ES     Standing up from a chair using your arms (e.g., wheelchair, bedside chair)? 3  -ES     Climbing 3-5 steps with a railing? 2  -ES     To walk in hospital room? 3  -ES     AM-PAC 6 Clicks Score (PT) 19  -ES     Highest level of mobility 6 --> Walked 10 steps or more  -ES       Row Name 09/15/23 1325          Modified [place] Scale    Modified [place] Scale 3 - Moderate disability.  Requiring some help, but able to walk without assistance.  -KG       Row Name 09/15/23 1325          Functional Assessment    Outcome Measure Options AM-PAC 6 Clicks Daily Activity (OT);Modified [place]  -KG               User Key  (r) = Recorded By, (t) = Taken By, (c) = Cosigned By      Initials Name Provider Type    ES Sarah Thao RN Registered Nurse    Billy Fisher OT Occupational Therapist                    Occupational Therapy Education       Title: PT OT SLP Therapies (In Progress)       Topic: Occupational Therapy (Not Started)       Point: ADL training (Not Started)       Description:   Instruct learner(s) on proper safety adaptation and remediation techniques during self care or transfers.   Instruct in proper use of assistive devices.                  Learner Progress:  Not documented  in this visit.              Point: Home exercise program (Not Started)       Description:   Instruct learner(s) on appropriate technique for monitoring, assisting and/or progressing therapeutic exercises/activities.                  Learner Progress:  Not documented in this visit.              Point: Precautions (Not Started)       Description:   Instruct learner(s) on prescribed precautions during self-care and functional transfers.                  Learner Progress:  Not documented in this visit.              Point: Body mechanics (Not Started)       Description:   Instruct learner(s) on proper positioning and spine alignment during self-care, functional mobility activities and/or exercises.                  Learner Progress:  Not documented in this visit.                                  OT Recommendation and Plan  Planned Therapy Interventions (OT): activity tolerance training, occupation/activity based interventions, ROM/therapeutic exercise, strengthening exercise  Therapy Frequency (OT): 5 times/wk  Plan of Care Review  Plan of Care Reviewed With: patient, family  Progress: no change  Outcome Evaluation: Pt admitted to [facility] acute COPD exacerbation. While inpatient, MRI (+) for CVA. Pt is (I) w/ ADLs at baseline and uses rollator for functional mobility. Agreeable to OT eval this date. Performing bed mobility w/ SBA, OOBTC w/ CGA using RW. S/u for LBD at EOB. Pt w/ mild strength/endurance deficits throughout session. Anticipate home w/ HHOT and increased assistance at d/c. OT will f/u to address deficits.     Time Calculation:   Evaluation Complexity (OT)  Review Occupational Profile/Medical/Therapy History Complexity: expanded/moderate complexity  Assessment, Occupational Performance/Identification of Deficit Complexity: 3-5 performance deficits  Clinical Decision Making Complexity (OT): detailed assessment/moderate complexity  Overall Complexity of Evaluation (OT): moderate complexity     Time Calculation- OT        Row Name 09/15/23 1326             Time Calculation- OT    OT Start Time 1030  -KG      OT Stop Time 1059  -KG      OT Time Calculation (min) 29 min  -KG      Total Timed Code Minutes- OT 25 minute(s)  -KG      OT Non-Billable Time (min) 4 min  -KG      OT Received On 09/15/23  -KG      OT - Next Appointment 09/18/23  -KG      OT Goal Re-Cert Due Date 09/29/23  -KG         Timed Charges    25348 - OT Self Care/Mgmt Minutes 25  -KG         Untimed Charges    OT Eval/Re-eval Minutes 4  -KG         Total Minutes    Timed Charges Total Minutes 25  -KG      Untimed Charges Total Minutes 4  -KG       Total Minutes 29  -KG                User Key  (r) = Recorded By, (t) = Taken By, (c) = Cosigned By      Initials Name Provider Type    KG Billy Ruiz OT Occupational Therapist                  Therapy Charges for Today       Code Description Service Date Service Provider Modifiers Qty    49043429521 HC OT SELF CARE/MGMT/TRAIN EA 15 MIN 9/15/2023 Billy Ruiz OT GO 2    17794516515 HC OT EVAL MOD COMPLEXITY 2 9/15/2023 Billy Ruiz OT GO 1                 Billy Ruiz OT  9/15/2023

## 2025-08-04 ENCOUNTER — HOSPITAL ENCOUNTER (OUTPATIENT)
Facility: HOSPITAL | Age: 89
Setting detail: OBSERVATION
Discharge: HOME | End: 2025-08-07
Attending: EMERGENCY MEDICINE | Admitting: HOSPITALIST
Payer: MEDICARE

## 2025-08-04 ENCOUNTER — APPOINTMENT (OUTPATIENT)
Dept: CT IMAGING | Facility: HOSPITAL | Age: 89
End: 2025-08-04
Payer: MEDICARE

## 2025-08-04 ENCOUNTER — APPOINTMENT (OUTPATIENT)
Dept: GENERAL RADIOLOGY | Facility: HOSPITAL | Age: 89
End: 2025-08-04
Payer: MEDICARE

## 2025-08-04 ENCOUNTER — APPOINTMENT (OUTPATIENT)
Dept: CARDIOLOGY | Facility: HOSPITAL | Age: 89
End: 2025-08-04
Payer: MEDICARE

## 2025-08-04 DIAGNOSIS — R53.1 GENERALIZED WEAKNESS: Primary | ICD-10-CM

## 2025-08-04 DIAGNOSIS — R79.89 ELEVATED TROPONIN: ICD-10-CM

## 2025-08-04 DIAGNOSIS — R29.6 MULTIPLE FALLS: ICD-10-CM

## 2025-08-04 LAB
ALBUMIN SERPL-MCNC: 3 G/DL (ref 3.5–5.2)
ALBUMIN/GLOB SERPL: 0.9 G/DL
ALP SERPL-CCNC: 164 U/L (ref 39–117)
ALT SERPL W P-5'-P-CCNC: 22 U/L (ref 1–41)
ANION GAP SERPL CALCULATED.3IONS-SCNC: 10 MMOL/L (ref 5–15)
AORTIC ARCH: 1.8 CM
AORTIC DIMENSIONLESS INDEX: 0.69 (DI)
ASCENDING AORTA: 3.4 CM
AST SERPL-CCNC: 20 U/L (ref 1–40)
AV MEAN PRESS GRAD SYS DOP V1V2: 3.3 MMHG
AV VMAX SYS DOP: 122.7 CM/SEC
BASOPHILS # BLD AUTO: 0.05 10*3/MM3 (ref 0–0.2)
BASOPHILS NFR BLD AUTO: 0.3 % (ref 0–1.5)
BH CV ECHO MEAS - ACS: 2.1 CM
BH CV ECHO MEAS - AO MAX PG: 6 MMHG
BH CV ECHO MEAS - AO ROOT DIAM: 3.9 CM
BH CV ECHO MEAS - AO V2 VTI: 24.9 CM
BH CV ECHO MEAS - AVA(I,D): 2.13 CM2
BH CV ECHO MEAS - EDV(CUBED): 66.6 ML
BH CV ECHO MEAS - EDV(MOD-SP2): 80 ML
BH CV ECHO MEAS - EDV(MOD-SP4): 78 ML
BH CV ECHO MEAS - EF(MOD-SP2): 63.7 %
BH CV ECHO MEAS - EF(MOD-SP4): 65.4 %
BH CV ECHO MEAS - ESV(CUBED): 21.3 ML
BH CV ECHO MEAS - ESV(MOD-SP2): 29 ML
BH CV ECHO MEAS - ESV(MOD-SP4): 27 ML
BH CV ECHO MEAS - FS: 31.6 %
BH CV ECHO MEAS - IVS/LVPW: 0.97 CM
BH CV ECHO MEAS - IVSD: 1.16 CM
BH CV ECHO MEAS - LAT PEAK E' VEL: 5.2 CM/SEC
BH CV ECHO MEAS - LV DIASTOLIC VOL/BSA (35-75): 45.8 CM2
BH CV ECHO MEAS - LV MASS(C)D: 164.8 GRAMS
BH CV ECHO MEAS - LV MAX PG: 3.4 MMHG
BH CV ECHO MEAS - LV MEAN PG: 1.78 MMHG
BH CV ECHO MEAS - LV SYSTOLIC VOL/BSA (12-30): 15.9 CM2
BH CV ECHO MEAS - LV V1 MAX: 92.5 CM/SEC
BH CV ECHO MEAS - LV V1 VTI: 17.1 CM
BH CV ECHO MEAS - LVIDD: 4.1 CM
BH CV ECHO MEAS - LVIDS: 2.8 CM
BH CV ECHO MEAS - LVOT AREA: 3.1 CM2
BH CV ECHO MEAS - LVOT DIAM: 1.99 CM
BH CV ECHO MEAS - LVPWD: 1.2 CM
BH CV ECHO MEAS - MED PEAK E' VEL: 5.4 CM/SEC
BH CV ECHO MEAS - MV A DUR: 0.1 SEC
BH CV ECHO MEAS - MV A MAX VEL: 123.1 CM/SEC
BH CV ECHO MEAS - MV DEC SLOPE: 792.7 CM/SEC2
BH CV ECHO MEAS - MV DEC TIME: 0.23 SEC
BH CV ECHO MEAS - MV E MAX VEL: 82 CM/SEC
BH CV ECHO MEAS - MV E/A: 0.67
BH CV ECHO MEAS - MV MAX PG: 8.6 MMHG
BH CV ECHO MEAS - MV MEAN PG: 3.5 MMHG
BH CV ECHO MEAS - MV P1/2T: 36.5 MSEC
BH CV ECHO MEAS - MV V2 VTI: 27.9 CM
BH CV ECHO MEAS - MVA(P1/2T): 6 CM2
BH CV ECHO MEAS - MVA(VTI): 1.9 CM2
BH CV ECHO MEAS - PA ACC TIME: 0.03 SEC
BH CV ECHO MEAS - PA V2 MAX: 100.8 CM/SEC
BH CV ECHO MEAS - PULM A REVS DUR: 0.11 SEC
BH CV ECHO MEAS - PULM A REVS VEL: 25.7 CM/SEC
BH CV ECHO MEAS - PULM DIAS VEL: 32.7 CM/SEC
BH CV ECHO MEAS - PULM S/D: 1.4
BH CV ECHO MEAS - PULM SYS VEL: 45.8 CM/SEC
BH CV ECHO MEAS - RV MAX PG: 2.33 MMHG
BH CV ECHO MEAS - RV V1 MAX: 76.3 CM/SEC
BH CV ECHO MEAS - RV V1 VTI: 12.8 CM
BH CV ECHO MEAS - SUP REN AO DIAM: 1.5 CM
BH CV ECHO MEAS - SV(LVOT): 53 ML
BH CV ECHO MEAS - SV(MOD-SP2): 51 ML
BH CV ECHO MEAS - SV(MOD-SP4): 51 ML
BH CV ECHO MEAS - SVI(LVOT): 31.1 ML/M2
BH CV ECHO MEAS - SVI(MOD-SP2): 30 ML/M2
BH CV ECHO MEAS - SVI(MOD-SP4): 30 ML/M2
BH CV ECHO MEAS - TAPSE (>1.6): 1.74 CM
BH CV ECHO MEAS - TR MAX PG: 25 MMHG
BH CV ECHO MEAS - TR MAX VEL: 249.8 CM/SEC
BH CV ECHO MEASUREMENTS AVERAGE E/E' RATIO: 15.47
BH CV XLRA - RV BASE: 3.3 CM
BH CV XLRA - RV LENGTH: 7.5 CM
BH CV XLRA - RV MID: 2.9 CM
BH CV XLRA - TDI S': 12.1 CM/SEC
BILIRUB SERPL-MCNC: 0.4 MG/DL (ref 0–1.2)
BUN SERPL-MCNC: 28 MG/DL (ref 8–23)
BUN/CREAT SERPL: 26.7 (ref 7–25)
BURR CELLS BLD QL SMEAR: NORMAL
CALCIUM SPEC-SCNC: 9.1 MG/DL (ref 8.6–10.5)
CHLORIDE SERPL-SCNC: 109 MMOL/L (ref 98–107)
CK SERPL-CCNC: 36 U/L (ref 20–200)
CO2 SERPL-SCNC: 23 MMOL/L (ref 22–29)
CREAT SERPL-MCNC: 1.05 MG/DL (ref 0.76–1.27)
DEPRECATED RDW RBC AUTO: 50 FL (ref 37–54)
EGFRCR SERPLBLD CKD-EPI 2021: 68.3 ML/MIN/1.73
EOSINOPHIL # BLD AUTO: 0.08 10*3/MM3 (ref 0–0.4)
EOSINOPHIL NFR BLD AUTO: 0.4 % (ref 0.3–6.2)
ERYTHROCYTE [DISTWIDTH] IN BLOOD BY AUTOMATED COUNT: 12.8 % (ref 12.3–15.4)
GEN 5 1HR TROPONIN T REFLEX: 277 NG/L
GLOBULIN UR ELPH-MCNC: 3.2 GM/DL
GLUCOSE SERPL-MCNC: 143 MG/DL (ref 65–99)
HCT VFR BLD AUTO: 28.8 % (ref 37.5–51)
HGB BLD-MCNC: 9.4 G/DL (ref 13–17.7)
IMM GRANULOCYTES # BLD AUTO: 0.24 10*3/MM3 (ref 0–0.05)
IMM GRANULOCYTES NFR BLD AUTO: 1.2 % (ref 0–0.5)
LEFT ATRIUM VOLUME INDEX: 21.3 ML/M2
LV EF BIPLANE MOD: 64.4 %
LYMPHOCYTES # BLD AUTO: 1.81 10*3/MM3 (ref 0.7–3.1)
LYMPHOCYTES NFR BLD AUTO: 9.2 % (ref 19.6–45.3)
MCH RBC QN AUTO: 34.7 PG (ref 26.6–33)
MCHC RBC AUTO-ENTMCNC: 32.6 G/DL (ref 31.5–35.7)
MCV RBC AUTO: 106.3 FL (ref 79–97)
MONOCYTES # BLD AUTO: 1.5 10*3/MM3 (ref 0.1–0.9)
MONOCYTES NFR BLD AUTO: 7.6 % (ref 5–12)
NEUTROPHILS NFR BLD AUTO: 15.96 10*3/MM3 (ref 1.7–7)
NEUTROPHILS NFR BLD AUTO: 81.3 % (ref 42.7–76)
NT-PROBNP SERPL-MCNC: 2759 PG/ML (ref 0–1800)
OVALOCYTES BLD QL SMEAR: NORMAL
PLAT MORPH BLD: NORMAL
PLATELET # BLD AUTO: 191 10*3/MM3 (ref 140–450)
PMV BLD AUTO: 10.7 FL (ref 6–12)
POTASSIUM SERPL-SCNC: 4.4 MMOL/L (ref 3.5–5.2)
PROT SERPL-MCNC: 6.2 G/DL (ref 6–8.5)
QT INTERVAL: 360 MS
QTC INTERVAL: 438 MS
RBC # BLD AUTO: 2.71 10*6/MM3 (ref 4.14–5.8)
SCHISTOCYTES BLD QL SMEAR: NORMAL
SINUS: 3.7 CM
SODIUM SERPL-SCNC: 142 MMOL/L (ref 136–145)
STJ: 2.8 CM
TROPONIN T % DELTA: -7
TROPONIN T NUMERIC DELTA: -22 NG/L
TROPONIN T SERPL HS-MCNC: 299 NG/L
WBC MORPH BLD: NORMAL
WBC NRBC COR # BLD AUTO: 19.64 10*3/MM3 (ref 3.4–10.8)

## 2025-08-04 PROCEDURE — 72125 CT NECK SPINE W/O DYE: CPT

## 2025-08-04 PROCEDURE — 93306 TTE W/DOPPLER COMPLETE: CPT | Performed by: INTERNAL MEDICINE

## 2025-08-04 PROCEDURE — 71045 X-RAY EXAM CHEST 1 VIEW: CPT

## 2025-08-04 PROCEDURE — 85007 BL SMEAR W/DIFF WBC COUNT: CPT | Performed by: EMERGENCY MEDICINE

## 2025-08-04 PROCEDURE — G0378 HOSPITAL OBSERVATION PER HR: HCPCS

## 2025-08-04 PROCEDURE — 82550 ASSAY OF CK (CPK): CPT | Performed by: EMERGENCY MEDICINE

## 2025-08-04 PROCEDURE — 99285 EMERGENCY DEPT VISIT HI MDM: CPT

## 2025-08-04 PROCEDURE — 94799 UNLISTED PULMONARY SVC/PX: CPT

## 2025-08-04 PROCEDURE — 70450 CT HEAD/BRAIN W/O DYE: CPT

## 2025-08-04 PROCEDURE — 84484 ASSAY OF TROPONIN QUANT: CPT | Performed by: EMERGENCY MEDICINE

## 2025-08-04 PROCEDURE — 83880 ASSAY OF NATRIURETIC PEPTIDE: CPT | Performed by: HOSPITALIST

## 2025-08-04 PROCEDURE — 80053 COMPREHEN METABOLIC PANEL: CPT | Performed by: EMERGENCY MEDICINE

## 2025-08-04 PROCEDURE — 93306 TTE W/DOPPLER COMPLETE: CPT

## 2025-08-04 PROCEDURE — 25510000001 PERFLUTREN 6.52 MG/ML SUSPENSION 2 ML VIAL: Performed by: HOSPITALIST

## 2025-08-04 PROCEDURE — 93005 ELECTROCARDIOGRAM TRACING: CPT | Performed by: EMERGENCY MEDICINE

## 2025-08-04 PROCEDURE — 85025 COMPLETE CBC W/AUTO DIFF WBC: CPT | Performed by: EMERGENCY MEDICINE

## 2025-08-04 PROCEDURE — 93010 ELECTROCARDIOGRAM REPORT: CPT | Performed by: INTERNAL MEDICINE

## 2025-08-04 PROCEDURE — 36415 COLL VENOUS BLD VENIPUNCTURE: CPT

## 2025-08-04 RX ORDER — ATORVASTATIN CALCIUM 20 MG/1
40 TABLET, FILM COATED ORAL NIGHTLY
Status: DISCONTINUED | OUTPATIENT
Start: 2025-08-04 | End: 2025-08-05

## 2025-08-04 RX ORDER — ALLOPURINOL 300 MG/1
300 TABLET ORAL DAILY
Status: DISCONTINUED | OUTPATIENT
Start: 2025-08-04 | End: 2025-08-07 | Stop reason: HOSPADM

## 2025-08-04 RX ORDER — MIRTAZAPINE 15 MG/1
15 TABLET, FILM COATED ORAL NIGHTLY
Status: DISCONTINUED | OUTPATIENT
Start: 2025-08-04 | End: 2025-08-07 | Stop reason: HOSPADM

## 2025-08-04 RX ORDER — ATORVASTATIN CALCIUM 20 MG/1
10 TABLET, FILM COATED ORAL NIGHTLY
Status: DISCONTINUED | OUTPATIENT
Start: 2025-08-04 | End: 2025-08-04

## 2025-08-04 RX ORDER — DOCUSATE SODIUM 100 MG/1
100 CAPSULE, LIQUID FILLED ORAL DAILY
COMMUNITY
End: 2025-08-07 | Stop reason: HOSPADM

## 2025-08-04 RX ORDER — MULTIVITAMIN WITH IRON
1000 TABLET ORAL DAILY
Status: DISCONTINUED | OUTPATIENT
Start: 2025-08-04 | End: 2025-08-05

## 2025-08-04 RX ORDER — HYDRALAZINE HYDROCHLORIDE 20 MG/ML
10 INJECTION INTRAMUSCULAR; INTRAVENOUS EVERY 4 HOURS PRN
Status: DISCONTINUED | OUTPATIENT
Start: 2025-08-04 | End: 2025-08-05

## 2025-08-04 RX ORDER — PREDNISONE 1 MG/1
2 TABLET ORAL DAILY
COMMUNITY
Start: 2025-06-03 | End: 2026-06-03

## 2025-08-04 RX ORDER — ASPIRIN 81 MG/1
81 TABLET, CHEWABLE ORAL DAILY
Status: DISCONTINUED | OUTPATIENT
Start: 2025-08-04 | End: 2025-08-07 | Stop reason: HOSPADM

## 2025-08-04 RX ORDER — BUDESONIDE 0.5 MG/2ML
0.5 INHALANT ORAL
Status: DISCONTINUED | OUTPATIENT
Start: 2025-08-04 | End: 2025-08-07 | Stop reason: HOSPADM

## 2025-08-04 RX ORDER — PANTOPRAZOLE SODIUM 40 MG/1
40 TABLET, DELAYED RELEASE ORAL
Status: DISCONTINUED | OUTPATIENT
Start: 2025-08-04 | End: 2025-08-07 | Stop reason: HOSPADM

## 2025-08-04 RX ORDER — ALBUTEROL SULFATE 0.83 MG/ML
2.5 SOLUTION RESPIRATORY (INHALATION) EVERY 6 HOURS PRN
Status: DISCONTINUED | OUTPATIENT
Start: 2025-08-04 | End: 2025-08-07 | Stop reason: HOSPADM

## 2025-08-04 RX ORDER — PREDNISONE 20 MG/1
40 TABLET ORAL
Status: DISCONTINUED | OUTPATIENT
Start: 2025-08-05 | End: 2025-08-04

## 2025-08-04 RX ORDER — PREDNISONE 20 MG/1
20 TABLET ORAL
Status: DISCONTINUED | OUTPATIENT
Start: 2025-08-05 | End: 2025-08-07 | Stop reason: HOSPADM

## 2025-08-04 RX ORDER — SENNOSIDES 8.6 MG
650 CAPSULE ORAL NIGHTLY PRN
COMMUNITY

## 2025-08-04 RX ORDER — ALLOPURINOL 100 MG/1
100 TABLET ORAL DAILY
Status: DISCONTINUED | OUTPATIENT
Start: 2025-08-04 | End: 2025-08-04

## 2025-08-04 RX ORDER — FERROUS SULFATE 325(65) MG
325 TABLET ORAL
COMMUNITY

## 2025-08-04 RX ORDER — TAMSULOSIN HYDROCHLORIDE 0.4 MG/1
0.4 CAPSULE ORAL DAILY
Status: DISCONTINUED | OUTPATIENT
Start: 2025-08-04 | End: 2025-08-07 | Stop reason: HOSPADM

## 2025-08-04 RX ORDER — MIRTAZAPINE 15 MG/1
7.5 TABLET, FILM COATED ORAL NIGHTLY
COMMUNITY

## 2025-08-04 RX ORDER — SODIUM CHLORIDE 450 MG/100ML
75 INJECTION, SOLUTION INTRAVENOUS CONTINUOUS
Status: ACTIVE | OUTPATIENT
Start: 2025-08-04 | End: 2025-08-05

## 2025-08-04 RX ADMIN — ASPIRIN 81 MG CHEWABLE TABLET 81 MG: 81 TABLET CHEWABLE at 17:52

## 2025-08-04 RX ADMIN — SODIUM CHLORIDE 75 ML/HR: 4.5 INJECTION, SOLUTION INTRAVENOUS at 17:52

## 2025-08-04 RX ADMIN — PERFLUTREN 2 ML: 6.52 INJECTION, SUSPENSION INTRAVENOUS at 16:40

## 2025-08-04 RX ADMIN — ALLOPURINOL 300 MG: 300 TABLET ORAL at 17:52

## 2025-08-04 RX ADMIN — ATORVASTATIN CALCIUM 40 MG: 20 TABLET, FILM COATED ORAL at 21:22

## 2025-08-04 RX ADMIN — Medication 1000 MCG: at 17:53

## 2025-08-04 RX ADMIN — MIRTAZAPINE 15 MG: 15 TABLET, FILM COATED ORAL at 21:22

## 2025-08-04 RX ADMIN — NITROGLYCERIN 0.5 INCH: 20 OINTMENT TOPICAL at 17:52

## 2025-08-04 RX ADMIN — BUDESONIDE 0.5 MG: 0.5 INHALANT RESPIRATORY (INHALATION) at 21:00

## 2025-08-04 RX ADMIN — PANTOPRAZOLE SODIUM 40 MG: 40 TABLET, DELAYED RELEASE ORAL at 17:52

## 2025-08-04 RX ADMIN — TAMSULOSIN HYDROCHLORIDE 0.4 MG: 0.4 CAPSULE ORAL at 17:52

## 2025-08-05 LAB
ALBUMIN SERPL-MCNC: 2.6 G/DL (ref 3.5–5.2)
ALBUMIN/GLOB SERPL: 1 G/DL
ALP SERPL-CCNC: 142 U/L (ref 39–117)
ALT SERPL W P-5'-P-CCNC: 21 U/L (ref 1–41)
ANION GAP SERPL CALCULATED.3IONS-SCNC: 8 MMOL/L (ref 5–15)
AST SERPL-CCNC: 19 U/L (ref 1–40)
BASOPHILS # BLD AUTO: 0.03 10*3/MM3 (ref 0–0.2)
BASOPHILS NFR BLD AUTO: 0.2 % (ref 0–1.5)
BILIRUB SERPL-MCNC: 0.3 MG/DL (ref 0–1.2)
BUN SERPL-MCNC: 24 MG/DL (ref 8–23)
BUN/CREAT SERPL: 25 (ref 7–25)
CALCIUM SPEC-SCNC: 8.5 MG/DL (ref 8.6–10.5)
CHLORIDE SERPL-SCNC: 111 MMOL/L (ref 98–107)
CHOLEST SERPL-MCNC: 81 MG/DL (ref 0–200)
CK SERPL-CCNC: 70 U/L (ref 20–200)
CO2 SERPL-SCNC: 23 MMOL/L (ref 22–29)
CREAT SERPL-MCNC: 0.96 MG/DL (ref 0.76–1.27)
DEPRECATED RDW RBC AUTO: 48.6 FL (ref 37–54)
EGFRCR SERPLBLD CKD-EPI 2021: 76 ML/MIN/1.73
EOSINOPHIL # BLD AUTO: 0.15 10*3/MM3 (ref 0–0.4)
EOSINOPHIL NFR BLD AUTO: 1 % (ref 0.3–6.2)
ERYTHROCYTE [DISTWIDTH] IN BLOOD BY AUTOMATED COUNT: 12.8 % (ref 12.3–15.4)
GLOBULIN UR ELPH-MCNC: 2.7 GM/DL
GLUCOSE SERPL-MCNC: 105 MG/DL (ref 65–99)
HBA1C MFR BLD: 6.1 % (ref 4.8–5.6)
HCT VFR BLD AUTO: 25.2 % (ref 37.5–51)
HDLC SERPL-MCNC: 34 MG/DL (ref 40–60)
HGB BLD-MCNC: 8.3 G/DL (ref 13–17.7)
IMM GRANULOCYTES # BLD AUTO: 0.15 10*3/MM3 (ref 0–0.05)
IMM GRANULOCYTES NFR BLD AUTO: 1 % (ref 0–0.5)
LDLC SERPL CALC-MCNC: 33 MG/DL (ref 0–100)
LDLC/HDLC SERPL: 1.04 {RATIO}
LYMPHOCYTES # BLD AUTO: 2.86 10*3/MM3 (ref 0.7–3.1)
LYMPHOCYTES NFR BLD AUTO: 19 % (ref 19.6–45.3)
MCH RBC QN AUTO: 34.7 PG (ref 26.6–33)
MCHC RBC AUTO-ENTMCNC: 32.9 G/DL (ref 31.5–35.7)
MCV RBC AUTO: 105.4 FL (ref 79–97)
MONOCYTES # BLD AUTO: 1.29 10*3/MM3 (ref 0.1–0.9)
MONOCYTES NFR BLD AUTO: 8.6 % (ref 5–12)
NEUTROPHILS NFR BLD AUTO: 10.55 10*3/MM3 (ref 1.7–7)
NEUTROPHILS NFR BLD AUTO: 70.2 % (ref 42.7–76)
PLATELET # BLD AUTO: 192 10*3/MM3 (ref 140–450)
PMV BLD AUTO: 10.6 FL (ref 6–12)
POTASSIUM SERPL-SCNC: 4.3 MMOL/L (ref 3.5–5.2)
PROT SERPL-MCNC: 5.3 G/DL (ref 6–8.5)
QT INTERVAL: 395 MS
QTC INTERVAL: 453 MS
RBC # BLD AUTO: 2.39 10*6/MM3 (ref 4.14–5.8)
SODIUM SERPL-SCNC: 142 MMOL/L (ref 136–145)
TRIGL SERPL-MCNC: 59 MG/DL (ref 0–150)
TROPONIN T SERPL HS-MCNC: 300 NG/L
TROPONIN T SERPL HS-MCNC: 333 NG/L
TSH SERPL DL<=0.05 MIU/L-ACNC: 2.13 UIU/ML (ref 0.27–4.2)
VIT B12 BLD-MCNC: >2000 PG/ML (ref 211–946)
VLDLC SERPL-MCNC: 14 MG/DL (ref 5–40)
WBC NRBC COR # BLD AUTO: 15.03 10*3/MM3 (ref 3.4–10.8)

## 2025-08-05 PROCEDURE — 94761 N-INVAS EAR/PLS OXIMETRY MLT: CPT

## 2025-08-05 PROCEDURE — 63710000001 REVEFENACIN 175 MCG/3ML SOLUTION: Performed by: HOSPITALIST

## 2025-08-05 PROCEDURE — G0378 HOSPITAL OBSERVATION PER HR: HCPCS

## 2025-08-05 PROCEDURE — 63710000001 PREDNISONE PER 1 MG: Performed by: HOSPITALIST

## 2025-08-05 PROCEDURE — 97535 SELF CARE MNGMENT TRAINING: CPT

## 2025-08-05 PROCEDURE — 97110 THERAPEUTIC EXERCISES: CPT

## 2025-08-05 PROCEDURE — 93005 ELECTROCARDIOGRAM TRACING: CPT | Performed by: NURSE PRACTITIONER

## 2025-08-05 PROCEDURE — 36415 COLL VENOUS BLD VENIPUNCTURE: CPT | Performed by: HOSPITALIST

## 2025-08-05 PROCEDURE — 80053 COMPREHEN METABOLIC PANEL: CPT | Performed by: HOSPITALIST

## 2025-08-05 PROCEDURE — 97162 PT EVAL MOD COMPLEX 30 MIN: CPT

## 2025-08-05 PROCEDURE — 83036 HEMOGLOBIN GLYCOSYLATED A1C: CPT | Performed by: HOSPITALIST

## 2025-08-05 PROCEDURE — 94799 UNLISTED PULMONARY SVC/PX: CPT

## 2025-08-05 PROCEDURE — 84484 ASSAY OF TROPONIN QUANT: CPT | Performed by: NURSE PRACTITIONER

## 2025-08-05 PROCEDURE — 96361 HYDRATE IV INFUSION ADD-ON: CPT

## 2025-08-05 PROCEDURE — 82607 VITAMIN B-12: CPT | Performed by: HOSPITALIST

## 2025-08-05 PROCEDURE — 84443 ASSAY THYROID STIM HORMONE: CPT | Performed by: HOSPITALIST

## 2025-08-05 PROCEDURE — 82550 ASSAY OF CK (CPK): CPT | Performed by: HOSPITALIST

## 2025-08-05 PROCEDURE — 84484 ASSAY OF TROPONIN QUANT: CPT | Performed by: HOSPITALIST

## 2025-08-05 PROCEDURE — 85025 COMPLETE CBC W/AUTO DIFF WBC: CPT | Performed by: HOSPITALIST

## 2025-08-05 PROCEDURE — 99213 OFFICE O/P EST LOW 20 MIN: CPT | Performed by: STUDENT IN AN ORGANIZED HEALTH CARE EDUCATION/TRAINING PROGRAM

## 2025-08-05 PROCEDURE — 96360 HYDRATION IV INFUSION INIT: CPT

## 2025-08-05 PROCEDURE — 94664 DEMO&/EVAL PT USE INHALER: CPT

## 2025-08-05 PROCEDURE — 97166 OT EVAL MOD COMPLEX 45 MIN: CPT

## 2025-08-05 PROCEDURE — 80061 LIPID PANEL: CPT | Performed by: HOSPITALIST

## 2025-08-05 RX ORDER — AMLODIPINE BESYLATE 10 MG/1
10 TABLET ORAL DAILY
Status: DISCONTINUED | OUTPATIENT
Start: 2025-08-05 | End: 2025-08-07 | Stop reason: HOSPADM

## 2025-08-05 RX ORDER — ATORVASTATIN CALCIUM 20 MG/1
10 TABLET, FILM COATED ORAL NIGHTLY
Status: DISCONTINUED | OUTPATIENT
Start: 2025-08-05 | End: 2025-08-07 | Stop reason: HOSPADM

## 2025-08-05 RX ORDER — ISOSORBIDE MONONITRATE 60 MG/1
30 TABLET, EXTENDED RELEASE ORAL
Status: DISCONTINUED | OUTPATIENT
Start: 2025-08-05 | End: 2025-08-07 | Stop reason: HOSPADM

## 2025-08-05 RX ADMIN — TAMSULOSIN HYDROCHLORIDE 0.4 MG: 0.4 CAPSULE ORAL at 08:44

## 2025-08-05 RX ADMIN — PREDNISONE 20 MG: 20 TABLET ORAL at 08:44

## 2025-08-05 RX ADMIN — ALLOPURINOL 300 MG: 300 TABLET ORAL at 08:44

## 2025-08-05 RX ADMIN — ATORVASTATIN CALCIUM 10 MG: 20 TABLET, FILM COATED ORAL at 22:02

## 2025-08-05 RX ADMIN — NITROGLYCERIN 0.5 INCH: 20 OINTMENT TOPICAL at 14:01

## 2025-08-05 RX ADMIN — Medication 1000 MCG: at 08:44

## 2025-08-05 RX ADMIN — NITROGLYCERIN 0.5 INCH: 20 OINTMENT TOPICAL at 08:44

## 2025-08-05 RX ADMIN — PANTOPRAZOLE SODIUM 40 MG: 40 TABLET, DELAYED RELEASE ORAL at 07:34

## 2025-08-05 RX ADMIN — BUDESONIDE 0.5 MG: 0.5 INHALANT RESPIRATORY (INHALATION) at 20:12

## 2025-08-05 RX ADMIN — ASPIRIN 81 MG CHEWABLE TABLET 81 MG: 81 TABLET CHEWABLE at 08:44

## 2025-08-05 RX ADMIN — SODIUM CHLORIDE 75 ML/HR: 4.5 INJECTION, SOLUTION INTRAVENOUS at 11:45

## 2025-08-05 RX ADMIN — BUDESONIDE 0.5 MG: 0.5 INHALANT RESPIRATORY (INHALATION) at 13:05

## 2025-08-05 RX ADMIN — MIRTAZAPINE 15 MG: 15 TABLET, FILM COATED ORAL at 22:02

## 2025-08-05 RX ADMIN — ISOSORBIDE MONONITRATE 30 MG: 60 TABLET, EXTENDED RELEASE ORAL at 16:51

## 2025-08-05 RX ADMIN — REVEFENACIN 175 MCG: 175 SOLUTION RESPIRATORY (INHALATION) at 13:05

## 2025-08-05 RX ADMIN — AMLODIPINE BESYLATE 10 MG: 10 TABLET ORAL at 16:51

## 2025-08-05 RX ADMIN — PANTOPRAZOLE SODIUM 40 MG: 40 TABLET, DELAYED RELEASE ORAL at 16:51

## 2025-08-06 LAB
ANION GAP SERPL CALCULATED.3IONS-SCNC: 10 MMOL/L (ref 5–15)
BASOPHILS # BLD AUTO: 0.02 10*3/MM3 (ref 0–0.2)
BASOPHILS NFR BLD AUTO: 0.1 % (ref 0–1.5)
BUN SERPL-MCNC: 24 MG/DL (ref 8–23)
BUN/CREAT SERPL: 22.2 (ref 7–25)
CALCIUM SPEC-SCNC: 8.3 MG/DL (ref 8.6–10.5)
CHLORIDE SERPL-SCNC: 107 MMOL/L (ref 98–107)
CO2 SERPL-SCNC: 20 MMOL/L (ref 22–29)
CREAT SERPL-MCNC: 1.08 MG/DL (ref 0.76–1.27)
DEPRECATED RDW RBC AUTO: 50.5 FL (ref 37–54)
EGFRCR SERPLBLD CKD-EPI 2021: 66 ML/MIN/1.73
EOSINOPHIL # BLD AUTO: 0 10*3/MM3 (ref 0–0.4)
EOSINOPHIL NFR BLD AUTO: 0 % (ref 0.3–6.2)
ERYTHROCYTE [DISTWIDTH] IN BLOOD BY AUTOMATED COUNT: 13.2 % (ref 12.3–15.4)
GEN 5 1HR TROPONIN T REFLEX: 268 NG/L
GLUCOSE SERPL-MCNC: 156 MG/DL (ref 65–99)
HCT VFR BLD AUTO: 24.7 % (ref 37.5–51)
HGB BLD-MCNC: 8.1 G/DL (ref 13–17.7)
IMM GRANULOCYTES # BLD AUTO: 0.33 10*3/MM3 (ref 0–0.05)
IMM GRANULOCYTES NFR BLD AUTO: 1.8 % (ref 0–0.5)
LYMPHOCYTES # BLD AUTO: 1.8 10*3/MM3 (ref 0.7–3.1)
LYMPHOCYTES NFR BLD AUTO: 10 % (ref 19.6–45.3)
MCH RBC QN AUTO: 34.5 PG (ref 26.6–33)
MCHC RBC AUTO-ENTMCNC: 32.8 G/DL (ref 31.5–35.7)
MCV RBC AUTO: 105.1 FL (ref 79–97)
MONOCYTES # BLD AUTO: 0.88 10*3/MM3 (ref 0.1–0.9)
MONOCYTES NFR BLD AUTO: 4.9 % (ref 5–12)
NEUTROPHILS NFR BLD AUTO: 14.95 10*3/MM3 (ref 1.7–7)
NEUTROPHILS NFR BLD AUTO: 83.2 % (ref 42.7–76)
PLATELET # BLD AUTO: 182 10*3/MM3 (ref 140–450)
PMV BLD AUTO: 11.6 FL (ref 6–12)
POTASSIUM SERPL-SCNC: 4.5 MMOL/L (ref 3.5–5.2)
RBC # BLD AUTO: 2.35 10*6/MM3 (ref 4.14–5.8)
SODIUM SERPL-SCNC: 137 MMOL/L (ref 136–145)
TROPONIN T % DELTA: 6
TROPONIN T NUMERIC DELTA: 15 NG/L
TROPONIN T SERPL HS-MCNC: 253 NG/L
WBC NRBC COR # BLD AUTO: 17.98 10*3/MM3 (ref 3.4–10.8)

## 2025-08-06 PROCEDURE — 63710000001 PREDNISONE PER 1 MG: Performed by: HOSPITALIST

## 2025-08-06 PROCEDURE — 84484 ASSAY OF TROPONIN QUANT: CPT | Performed by: HOSPITALIST

## 2025-08-06 PROCEDURE — 63710000001 REVEFENACIN 175 MCG/3ML SOLUTION: Performed by: HOSPITALIST

## 2025-08-06 PROCEDURE — 94799 UNLISTED PULMONARY SVC/PX: CPT

## 2025-08-06 PROCEDURE — G0378 HOSPITAL OBSERVATION PER HR: HCPCS

## 2025-08-06 PROCEDURE — 80048 BASIC METABOLIC PNL TOTAL CA: CPT | Performed by: HOSPITALIST

## 2025-08-06 PROCEDURE — 94664 DEMO&/EVAL PT USE INHALER: CPT

## 2025-08-06 PROCEDURE — 94640 AIRWAY INHALATION TREATMENT: CPT

## 2025-08-06 PROCEDURE — 85025 COMPLETE CBC W/AUTO DIFF WBC: CPT | Performed by: HOSPITALIST

## 2025-08-06 RX ADMIN — MIRTAZAPINE 15 MG: 15 TABLET, FILM COATED ORAL at 20:12

## 2025-08-06 RX ADMIN — ATORVASTATIN CALCIUM 10 MG: 20 TABLET, FILM COATED ORAL at 20:12

## 2025-08-06 RX ADMIN — ISOSORBIDE MONONITRATE 30 MG: 60 TABLET, EXTENDED RELEASE ORAL at 09:06

## 2025-08-06 RX ADMIN — AMLODIPINE BESYLATE 10 MG: 10 TABLET ORAL at 09:06

## 2025-08-06 RX ADMIN — ALLOPURINOL 300 MG: 300 TABLET ORAL at 09:06

## 2025-08-06 RX ADMIN — ASPIRIN 81 MG CHEWABLE TABLET 81 MG: 81 TABLET CHEWABLE at 09:06

## 2025-08-06 RX ADMIN — PANTOPRAZOLE SODIUM 40 MG: 40 TABLET, DELAYED RELEASE ORAL at 17:27

## 2025-08-06 RX ADMIN — PREDNISONE 20 MG: 20 TABLET ORAL at 09:06

## 2025-08-06 RX ADMIN — REVEFENACIN 175 MCG: 175 SOLUTION RESPIRATORY (INHALATION) at 09:49

## 2025-08-06 RX ADMIN — BUDESONIDE 0.5 MG: 0.5 INHALANT RESPIRATORY (INHALATION) at 20:13

## 2025-08-06 RX ADMIN — TAMSULOSIN HYDROCHLORIDE 0.4 MG: 0.4 CAPSULE ORAL at 09:06

## 2025-08-06 RX ADMIN — BUDESONIDE 0.5 MG: 0.5 INHALANT RESPIRATORY (INHALATION) at 09:49

## 2025-08-07 VITALS
HEART RATE: 66 BPM | BODY MASS INDEX: 19.7 KG/M2 | SYSTOLIC BLOOD PRESSURE: 131 MMHG | DIASTOLIC BLOOD PRESSURE: 67 MMHG | WEIGHT: 130 LBS | OXYGEN SATURATION: 96 % | HEIGHT: 68 IN | RESPIRATION RATE: 18 BRPM | TEMPERATURE: 98.8 F

## 2025-08-07 LAB
ANION GAP SERPL CALCULATED.3IONS-SCNC: 8.6 MMOL/L (ref 5–15)
BASOPHILS # BLD AUTO: 0.02 10*3/MM3 (ref 0–0.2)
BASOPHILS NFR BLD AUTO: 0.1 % (ref 0–1.5)
BUN SERPL-MCNC: 25 MG/DL (ref 8–23)
BUN/CREAT SERPL: 22.3 (ref 7–25)
CALCIUM SPEC-SCNC: 8.8 MG/DL (ref 8.6–10.5)
CHLORIDE SERPL-SCNC: 109 MMOL/L (ref 98–107)
CO2 SERPL-SCNC: 21.4 MMOL/L (ref 22–29)
CREAT SERPL-MCNC: 1.12 MG/DL (ref 0.76–1.27)
DEPRECATED RDW RBC AUTO: 47.8 FL (ref 37–54)
EGFRCR SERPLBLD CKD-EPI 2021: 63.2 ML/MIN/1.73
EOSINOPHIL # BLD AUTO: 0 10*3/MM3 (ref 0–0.4)
EOSINOPHIL NFR BLD AUTO: 0 % (ref 0.3–6.2)
ERYTHROCYTE [DISTWIDTH] IN BLOOD BY AUTOMATED COUNT: 12.7 % (ref 12.3–15.4)
GLUCOSE SERPL-MCNC: 140 MG/DL (ref 65–99)
HCT VFR BLD AUTO: 23.5 % (ref 37.5–51)
HGB BLD-MCNC: 7.6 G/DL (ref 13–17.7)
IMM GRANULOCYTES # BLD AUTO: 0.32 10*3/MM3 (ref 0–0.05)
IMM GRANULOCYTES NFR BLD AUTO: 1.9 % (ref 0–0.5)
LYMPHOCYTES # BLD AUTO: 1.98 10*3/MM3 (ref 0.7–3.1)
LYMPHOCYTES NFR BLD AUTO: 11.7 % (ref 19.6–45.3)
MCH RBC QN AUTO: 33.9 PG (ref 26.6–33)
MCHC RBC AUTO-ENTMCNC: 32.3 G/DL (ref 31.5–35.7)
MCV RBC AUTO: 104.9 FL (ref 79–97)
MONOCYTES # BLD AUTO: 1.03 10*3/MM3 (ref 0.1–0.9)
MONOCYTES NFR BLD AUTO: 6.1 % (ref 5–12)
NEUTROPHILS NFR BLD AUTO: 13.61 10*3/MM3 (ref 1.7–7)
NEUTROPHILS NFR BLD AUTO: 80.2 % (ref 42.7–76)
NRBC BLD AUTO-RTO: 0 /100 WBC (ref 0–0.2)
PLATELET # BLD AUTO: 202 10*3/MM3 (ref 140–450)
PMV BLD AUTO: 10.4 FL (ref 6–12)
POTASSIUM SERPL-SCNC: 4.6 MMOL/L (ref 3.5–5.2)
RBC # BLD AUTO: 2.24 10*6/MM3 (ref 4.14–5.8)
SODIUM SERPL-SCNC: 139 MMOL/L (ref 136–145)
WBC NRBC COR # BLD AUTO: 16.96 10*3/MM3 (ref 3.4–10.8)

## 2025-08-07 PROCEDURE — 94799 UNLISTED PULMONARY SVC/PX: CPT

## 2025-08-07 PROCEDURE — G0378 HOSPITAL OBSERVATION PER HR: HCPCS

## 2025-08-07 PROCEDURE — 63710000001 PREDNISONE PER 1 MG: Performed by: HOSPITALIST

## 2025-08-07 PROCEDURE — 63710000001 REVEFENACIN 175 MCG/3ML SOLUTION: Performed by: HOSPITALIST

## 2025-08-07 PROCEDURE — 80048 BASIC METABOLIC PNL TOTAL CA: CPT | Performed by: HOSPITALIST

## 2025-08-07 PROCEDURE — 97530 THERAPEUTIC ACTIVITIES: CPT

## 2025-08-07 PROCEDURE — 97110 THERAPEUTIC EXERCISES: CPT

## 2025-08-07 PROCEDURE — 85025 COMPLETE CBC W/AUTO DIFF WBC: CPT | Performed by: HOSPITALIST

## 2025-08-07 PROCEDURE — 94664 DEMO&/EVAL PT USE INHALER: CPT

## 2025-08-07 RX ORDER — ISOSORBIDE MONONITRATE 30 MG/1
30 TABLET, EXTENDED RELEASE ORAL
Qty: 30 TABLET | Refills: 0 | Status: SHIPPED | OUTPATIENT
Start: 2025-08-08 | End: 2025-09-07

## 2025-08-07 RX ORDER — PANTOPRAZOLE SODIUM 40 MG/1
40 TABLET, DELAYED RELEASE ORAL
Qty: 60 TABLET | Refills: 0 | Status: SHIPPED | OUTPATIENT
Start: 2025-08-07 | End: 2025-09-06

## 2025-08-07 RX ORDER — AMLODIPINE BESYLATE 10 MG/1
10 TABLET ORAL DAILY
Qty: 30 TABLET | Refills: 0 | Status: SHIPPED | OUTPATIENT
Start: 2025-08-08 | End: 2025-09-07

## 2025-08-07 RX ORDER — ATORVASTATIN CALCIUM 10 MG/1
10 TABLET, FILM COATED ORAL NIGHTLY
Qty: 30 TABLET | Refills: 0 | Status: SHIPPED | OUTPATIENT
Start: 2025-08-07 | End: 2025-09-06

## 2025-08-07 RX ADMIN — ASPIRIN 81 MG CHEWABLE TABLET 81 MG: 81 TABLET CHEWABLE at 09:23

## 2025-08-07 RX ADMIN — AMLODIPINE BESYLATE 10 MG: 10 TABLET ORAL at 09:23

## 2025-08-07 RX ADMIN — PANTOPRAZOLE SODIUM 40 MG: 40 TABLET, DELAYED RELEASE ORAL at 06:36

## 2025-08-07 RX ADMIN — ALLOPURINOL 300 MG: 300 TABLET ORAL at 09:23

## 2025-08-07 RX ADMIN — BUDESONIDE 0.5 MG: 0.5 INHALANT RESPIRATORY (INHALATION) at 08:56

## 2025-08-07 RX ADMIN — ISOSORBIDE MONONITRATE 30 MG: 60 TABLET, EXTENDED RELEASE ORAL at 09:22

## 2025-08-07 RX ADMIN — TAMSULOSIN HYDROCHLORIDE 0.4 MG: 0.4 CAPSULE ORAL at 09:22

## 2025-08-07 RX ADMIN — PREDNISONE 20 MG: 20 TABLET ORAL at 09:22

## 2025-08-07 RX ADMIN — REVEFENACIN 175 MCG: 175 SOLUTION RESPIRATORY (INHALATION) at 08:58

## 2025-08-13 LAB
QT INTERVAL: 395 MS
QTC INTERVAL: 453 MS